# Patient Record
Sex: MALE | Race: WHITE | NOT HISPANIC OR LATINO | Employment: FULL TIME | URBAN - METROPOLITAN AREA
[De-identification: names, ages, dates, MRNs, and addresses within clinical notes are randomized per-mention and may not be internally consistent; named-entity substitution may affect disease eponyms.]

---

## 2024-03-27 ENCOUNTER — APPOINTMENT (OUTPATIENT)
Dept: CARDIOLOGY | Facility: HOSPITAL | Age: 64
DRG: 308 | End: 2024-03-27
Payer: COMMERCIAL

## 2024-03-27 ENCOUNTER — HOSPITAL ENCOUNTER (INPATIENT)
Facility: HOSPITAL | Age: 64
LOS: 4 days | Discharge: HOME | DRG: 308 | End: 2024-03-31
Attending: EMERGENCY MEDICINE | Admitting: INTERNAL MEDICINE
Payer: COMMERCIAL

## 2024-03-27 ENCOUNTER — APPOINTMENT (OUTPATIENT)
Dept: RADIOLOGY | Facility: HOSPITAL | Age: 64
DRG: 308 | End: 2024-03-27
Payer: COMMERCIAL

## 2024-03-27 DIAGNOSIS — I50.21 ACUTE HFREF (HEART FAILURE WITH REDUCED EJECTION FRACTION) (MULTI): ICD-10-CM

## 2024-03-27 DIAGNOSIS — I48.91 NEW ONSET ATRIAL FIBRILLATION (MULTI): Primary | ICD-10-CM

## 2024-03-27 DIAGNOSIS — I05.0 SEVERE MITRAL VALVE STENOSIS: ICD-10-CM

## 2024-03-27 LAB
ALBUMIN SERPL BCP-MCNC: 4.1 G/DL (ref 3.4–5)
ALP SERPL-CCNC: 87 U/L (ref 33–136)
ALT SERPL W P-5'-P-CCNC: 15 U/L (ref 10–52)
ANION GAP SERPL CALC-SCNC: 10 MMOL/L (ref 10–20)
APTT PPP: 36 SECONDS (ref 27–38)
AST SERPL W P-5'-P-CCNC: 12 U/L (ref 9–39)
BASOPHILS # BLD AUTO: 0.05 X10*3/UL (ref 0–0.1)
BASOPHILS NFR BLD AUTO: 0.6 %
BILIRUB SERPL-MCNC: 0.6 MG/DL (ref 0–1.2)
BNP SERPL-MCNC: 568 PG/ML (ref 0–99)
BUN SERPL-MCNC: 17 MG/DL (ref 6–23)
CALCIUM SERPL-MCNC: 9.4 MG/DL (ref 8.6–10.3)
CARDIAC TROPONIN I PNL SERPL HS: 25 NG/L (ref 0–20)
CARDIAC TROPONIN I PNL SERPL HS: 26 NG/L (ref 0–20)
CHLORIDE SERPL-SCNC: 104 MMOL/L (ref 98–107)
CO2 SERPL-SCNC: 27 MMOL/L (ref 21–32)
CREAT SERPL-MCNC: 1.36 MG/DL (ref 0.5–1.3)
D DIMER PPP FEU-MCNC: 476 NG/ML FEU
EGFRCR SERPLBLD CKD-EPI 2021: 58 ML/MIN/1.73M*2
EOSINOPHIL # BLD AUTO: 0.12 X10*3/UL (ref 0–0.7)
EOSINOPHIL NFR BLD AUTO: 1.4 %
ERYTHROCYTE [DISTWIDTH] IN BLOOD BY AUTOMATED COUNT: 13.7 % (ref 11.5–14.5)
GLUCOSE SERPL-MCNC: 118 MG/DL (ref 74–99)
HCT VFR BLD AUTO: 50.3 % (ref 41–52)
HGB BLD-MCNC: 16.4 G/DL (ref 13.5–17.5)
HOLD SPECIMEN: NORMAL
IMM GRANULOCYTES # BLD AUTO: 0.02 X10*3/UL (ref 0–0.7)
IMM GRANULOCYTES NFR BLD AUTO: 0.2 % (ref 0–0.9)
INR PPP: 1.2 (ref 0.9–1.1)
LYMPHOCYTES # BLD AUTO: 1.93 X10*3/UL (ref 1.2–4.8)
LYMPHOCYTES NFR BLD AUTO: 21.9 %
MAGNESIUM SERPL-MCNC: 2.01 MG/DL (ref 1.6–2.4)
MCH RBC QN AUTO: 29.2 PG (ref 26–34)
MCHC RBC AUTO-ENTMCNC: 32.6 G/DL (ref 32–36)
MCV RBC AUTO: 90 FL (ref 80–100)
MONOCYTES # BLD AUTO: 0.82 X10*3/UL (ref 0.1–1)
MONOCYTES NFR BLD AUTO: 9.3 %
NEUTROPHILS # BLD AUTO: 5.88 X10*3/UL (ref 1.2–7.7)
NEUTROPHILS NFR BLD AUTO: 66.6 %
NRBC BLD-RTO: 0 /100 WBCS (ref 0–0)
PLATELET # BLD AUTO: 138 X10*3/UL (ref 150–450)
POTASSIUM SERPL-SCNC: 4.1 MMOL/L (ref 3.5–5.3)
PROT SERPL-MCNC: 7.2 G/DL (ref 6.4–8.2)
PROTHROMBIN TIME: 13.2 SECONDS (ref 9.8–12.8)
RBC # BLD AUTO: 5.62 X10*6/UL (ref 4.5–5.9)
SODIUM SERPL-SCNC: 137 MMOL/L (ref 136–145)
TSH SERPL-ACNC: 1.58 MIU/L (ref 0.44–3.98)
UFH PPP CHRO-ACNC: 0.5 IU/ML
WBC # BLD AUTO: 8.8 X10*3/UL (ref 4.4–11.3)

## 2024-03-27 PROCEDURE — 85610 PROTHROMBIN TIME: CPT | Performed by: NURSE PRACTITIONER

## 2024-03-27 PROCEDURE — 71045 X-RAY EXAM CHEST 1 VIEW: CPT

## 2024-03-27 PROCEDURE — 85379 FIBRIN DEGRADATION QUANT: CPT | Performed by: NURSE PRACTITIONER

## 2024-03-27 PROCEDURE — 93005 ELECTROCARDIOGRAM TRACING: CPT

## 2024-03-27 PROCEDURE — S4991 NICOTINE PATCH NONLEGEND: HCPCS | Performed by: INTERNAL MEDICINE

## 2024-03-27 PROCEDURE — 96365 THER/PROPH/DIAG IV INF INIT: CPT

## 2024-03-27 PROCEDURE — 80053 COMPREHEN METABOLIC PANEL: CPT | Performed by: NURSE PRACTITIONER

## 2024-03-27 PROCEDURE — 83880 ASSAY OF NATRIURETIC PEPTIDE: CPT | Performed by: NURSE PRACTITIONER

## 2024-03-27 PROCEDURE — 2500000001 HC RX 250 WO HCPCS SELF ADMINISTERED DRUGS (ALT 637 FOR MEDICARE OP): Performed by: INTERNAL MEDICINE

## 2024-03-27 PROCEDURE — 1200000002 HC GENERAL ROOM WITH TELEMETRY DAILY

## 2024-03-27 PROCEDURE — 84443 ASSAY THYROID STIM HORMONE: CPT | Performed by: NURSE PRACTITIONER

## 2024-03-27 PROCEDURE — 99223 1ST HOSP IP/OBS HIGH 75: CPT | Performed by: INTERNAL MEDICINE

## 2024-03-27 PROCEDURE — 36415 COLL VENOUS BLD VENIPUNCTURE: CPT | Performed by: NURSE PRACTITIONER

## 2024-03-27 PROCEDURE — 2500000001 HC RX 250 WO HCPCS SELF ADMINISTERED DRUGS (ALT 637 FOR MEDICARE OP): Performed by: NURSE PRACTITIONER

## 2024-03-27 PROCEDURE — 96376 TX/PRO/DX INJ SAME DRUG ADON: CPT

## 2024-03-27 PROCEDURE — 83735 ASSAY OF MAGNESIUM: CPT | Performed by: NURSE PRACTITIONER

## 2024-03-27 PROCEDURE — 2500000002 HC RX 250 W HCPCS SELF ADMINISTERED DRUGS (ALT 637 FOR MEDICARE OP, ALT 636 FOR OP/ED): Performed by: INTERNAL MEDICINE

## 2024-03-27 PROCEDURE — 85025 COMPLETE CBC W/AUTO DIFF WBC: CPT | Performed by: NURSE PRACTITIONER

## 2024-03-27 PROCEDURE — 84484 ASSAY OF TROPONIN QUANT: CPT | Performed by: NURSE PRACTITIONER

## 2024-03-27 PROCEDURE — 2500000004 HC RX 250 GENERAL PHARMACY W/ HCPCS (ALT 636 FOR OP/ED): Performed by: NURSE PRACTITIONER

## 2024-03-27 PROCEDURE — 71045 X-RAY EXAM CHEST 1 VIEW: CPT | Performed by: RADIOLOGY

## 2024-03-27 PROCEDURE — 85520 HEPARIN ASSAY: CPT | Performed by: INTERNAL MEDICINE

## 2024-03-27 PROCEDURE — 99291 CRITICAL CARE FIRST HOUR: CPT | Performed by: EMERGENCY MEDICINE

## 2024-03-27 RX ORDER — POLYETHYLENE GLYCOL 3350 17 G/17G
17 POWDER, FOR SOLUTION ORAL DAILY
Status: DISCONTINUED | OUTPATIENT
Start: 2024-03-27 | End: 2024-03-31 | Stop reason: HOSPADM

## 2024-03-27 RX ORDER — HEPARIN SODIUM 5000 [USP'U]/ML
60 INJECTION, SOLUTION INTRAVENOUS; SUBCUTANEOUS ONCE
Status: COMPLETED | OUTPATIENT
Start: 2024-03-27 | End: 2024-03-27

## 2024-03-27 RX ORDER — NAPROXEN SODIUM 220 MG/1
81 TABLET, FILM COATED ORAL ONCE
Status: DISCONTINUED | OUTPATIENT
Start: 2024-03-27 | End: 2024-03-27

## 2024-03-27 RX ORDER — NAPROXEN SODIUM 220 MG/1
324 TABLET, FILM COATED ORAL ONCE
Status: COMPLETED | OUTPATIENT
Start: 2024-03-27 | End: 2024-03-27

## 2024-03-27 RX ORDER — PANTOPRAZOLE SODIUM 40 MG/1
40 TABLET, DELAYED RELEASE ORAL
Status: DISCONTINUED | OUTPATIENT
Start: 2024-03-28 | End: 2024-03-31 | Stop reason: HOSPADM

## 2024-03-27 RX ORDER — IBUPROFEN 200 MG
1 TABLET ORAL DAILY
Status: DISCONTINUED | OUTPATIENT
Start: 2024-03-27 | End: 2024-03-31 | Stop reason: HOSPADM

## 2024-03-27 RX ORDER — ACETAMINOPHEN 650 MG/1
650 SUPPOSITORY RECTAL EVERY 4 HOURS PRN
Status: DISCONTINUED | OUTPATIENT
Start: 2024-03-27 | End: 2024-03-31 | Stop reason: HOSPADM

## 2024-03-27 RX ORDER — AMOXICILLIN 250 MG
2 CAPSULE ORAL 2 TIMES DAILY
Status: DISCONTINUED | OUTPATIENT
Start: 2024-03-27 | End: 2024-03-31 | Stop reason: HOSPADM

## 2024-03-27 RX ORDER — ACETAMINOPHEN 500 MG
5 TABLET ORAL NIGHTLY
Status: DISCONTINUED | OUTPATIENT
Start: 2024-03-27 | End: 2024-03-31 | Stop reason: HOSPADM

## 2024-03-27 RX ORDER — ACETAMINOPHEN 325 MG/1
650 TABLET ORAL EVERY 4 HOURS PRN
Status: DISCONTINUED | OUTPATIENT
Start: 2024-03-27 | End: 2024-03-31 | Stop reason: HOSPADM

## 2024-03-27 RX ORDER — METOPROLOL TARTRATE 1 MG/ML
10 INJECTION, SOLUTION INTRAVENOUS EVERY 6 HOURS PRN
Status: DISCONTINUED | OUTPATIENT
Start: 2024-03-27 | End: 2024-03-30

## 2024-03-27 RX ORDER — METOPROLOL TARTRATE 25 MG/1
25 TABLET, FILM COATED ORAL 2 TIMES DAILY
Status: DISCONTINUED | OUTPATIENT
Start: 2024-03-27 | End: 2024-03-28

## 2024-03-27 RX ORDER — HEPARIN SODIUM 5000 [USP'U]/ML
1150 INJECTION, SOLUTION INTRAVENOUS; SUBCUTANEOUS ONCE
Status: COMPLETED | OUTPATIENT
Start: 2024-03-27 | End: 2024-03-27

## 2024-03-27 RX ORDER — PANTOPRAZOLE SODIUM 40 MG/10ML
40 INJECTION, POWDER, LYOPHILIZED, FOR SOLUTION INTRAVENOUS
Status: DISCONTINUED | OUTPATIENT
Start: 2024-03-28 | End: 2024-03-31 | Stop reason: HOSPADM

## 2024-03-27 RX ORDER — ACETAMINOPHEN 160 MG/5ML
650 SOLUTION ORAL EVERY 4 HOURS PRN
Status: DISCONTINUED | OUTPATIENT
Start: 2024-03-27 | End: 2024-03-31 | Stop reason: HOSPADM

## 2024-03-27 RX ORDER — HEPARIN SODIUM 5000 [USP'U]/ML
INJECTION, SOLUTION INTRAVENOUS; SUBCUTANEOUS EVERY 4 HOURS PRN
Status: DISCONTINUED | OUTPATIENT
Start: 2024-03-27 | End: 2024-03-27

## 2024-03-27 RX ORDER — HEPARIN SODIUM 10000 [USP'U]/100ML
0-4500 INJECTION, SOLUTION INTRAVENOUS CONTINUOUS
Status: DISCONTINUED | OUTPATIENT
Start: 2024-03-27 | End: 2024-03-27

## 2024-03-27 RX ORDER — ONDANSETRON HYDROCHLORIDE 2 MG/ML
4 INJECTION, SOLUTION INTRAVENOUS EVERY 8 HOURS PRN
Status: DISCONTINUED | OUTPATIENT
Start: 2024-03-27 | End: 2024-03-31 | Stop reason: HOSPADM

## 2024-03-27 RX ORDER — HEPARIN SODIUM 5000 [USP'U]/ML
2000-4000 INJECTION, SOLUTION INTRAVENOUS; SUBCUTANEOUS EVERY 4 HOURS PRN
Status: DISCONTINUED | OUTPATIENT
Start: 2024-03-27 | End: 2024-03-27

## 2024-03-27 RX ORDER — GUAIFENESIN/DEXTROMETHORPHAN 100-10MG/5
5 SYRUP ORAL EVERY 4 HOURS PRN
Status: DISCONTINUED | OUTPATIENT
Start: 2024-03-27 | End: 2024-03-31 | Stop reason: HOSPADM

## 2024-03-27 RX ORDER — HEPARIN SODIUM 10000 [USP'U]/100ML
0-4000 INJECTION, SOLUTION INTRAVENOUS CONTINUOUS
Status: DISCONTINUED | OUTPATIENT
Start: 2024-03-27 | End: 2024-03-27

## 2024-03-27 RX ADMIN — HEPARIN SODIUM 700 UNITS/HR: 10000 INJECTION, SOLUTION INTRAVENOUS at 10:59

## 2024-03-27 RX ADMIN — HEPARIN SODIUM 3450 UNITS: 5000 INJECTION INTRAVENOUS; SUBCUTANEOUS at 11:00

## 2024-03-27 RX ADMIN — SODIUM CHLORIDE 1000 ML: 9 INJECTION, SOLUTION INTRAVENOUS at 10:53

## 2024-03-27 RX ADMIN — HEPARIN SODIUM 1000 UNITS/HR: 10000 INJECTION, SOLUTION INTRAVENOUS at 11:29

## 2024-03-27 RX ADMIN — NICOTINE 1 PATCH: 14 PATCH, EXTENDED RELEASE TRANSDERMAL at 15:56

## 2024-03-27 RX ADMIN — METOPROLOL TARTRATE 25 MG: 25 TABLET, FILM COATED ORAL at 12:17

## 2024-03-27 RX ADMIN — ACETAMINOPHEN 650 MG: 325 TABLET ORAL at 21:49

## 2024-03-27 RX ADMIN — APIXABAN 5 MG: 5 TABLET, FILM COATED ORAL at 17:35

## 2024-03-27 RX ADMIN — METOPROLOL TARTRATE 25 MG: 25 TABLET, FILM COATED ORAL at 20:01

## 2024-03-27 RX ADMIN — HEPARIN SODIUM 1150 UNITS: 5000 INJECTION INTRAVENOUS; SUBCUTANEOUS at 11:30

## 2024-03-27 RX ADMIN — ASPIRIN 81 MG 324 MG: 81 TABLET ORAL at 10:53

## 2024-03-27 SDOH — SOCIAL STABILITY: SOCIAL NETWORK: HOW OFTEN DO YOU GET TOGETHER WITH FRIENDS OR RELATIVES?: PATIENT DECLINED

## 2024-03-27 SDOH — ECONOMIC STABILITY: FOOD INSECURITY: WITHIN THE PAST 12 MONTHS, THE FOOD YOU BOUGHT JUST DIDN'T LAST AND YOU DIDN'T HAVE MONEY TO GET MORE.: NEVER TRUE

## 2024-03-27 SDOH — ECONOMIC STABILITY: FOOD INSECURITY: WITHIN THE PAST 12 MONTHS, YOU WORRIED THAT YOUR FOOD WOULD RUN OUT BEFORE YOU GOT MONEY TO BUY MORE.: NEVER TRUE

## 2024-03-27 SDOH — SOCIAL STABILITY: SOCIAL INSECURITY: ABUSE: ADULT

## 2024-03-27 SDOH — ECONOMIC STABILITY: TRANSPORTATION INSECURITY
IN THE PAST 12 MONTHS, HAS LACK OF TRANSPORTATION KEPT YOU FROM MEETINGS, WORK, OR FROM GETTING THINGS NEEDED FOR DAILY LIVING?: NO

## 2024-03-27 SDOH — ECONOMIC STABILITY: TRANSPORTATION INSECURITY
IN THE PAST 12 MONTHS, HAS THE LACK OF TRANSPORTATION KEPT YOU FROM MEDICAL APPOINTMENTS OR FROM GETTING MEDICATIONS?: NO

## 2024-03-27 SDOH — SOCIAL STABILITY: SOCIAL NETWORK: IN A TYPICAL WEEK, HOW MANY TIMES DO YOU TALK ON THE PHONE WITH FAMILY, FRIENDS, OR NEIGHBORS?: PATIENT DECLINED

## 2024-03-27 SDOH — ECONOMIC STABILITY: HOUSING INSECURITY: IN THE LAST 12 MONTHS, HOW MANY PLACES HAVE YOU LIVED?: 1

## 2024-03-27 SDOH — SOCIAL STABILITY: SOCIAL INSECURITY: DO YOU FEEL UNSAFE GOING BACK TO THE PLACE WHERE YOU ARE LIVING?: NO

## 2024-03-27 SDOH — SOCIAL STABILITY: SOCIAL INSECURITY
WITHIN THE LAST YEAR, HAVE YOU BEEN HUMILIATED OR EMOTIONALLY ABUSED IN OTHER WAYS BY YOUR PARTNER OR EX-PARTNER?: PATIENT DECLINED

## 2024-03-27 SDOH — SOCIAL STABILITY: SOCIAL NETWORK: HOW OFTEN DO YOU ATTENT MEETINGS OF THE CLUB OR ORGANIZATION YOU BELONG TO?: PATIENT DECLINED

## 2024-03-27 SDOH — SOCIAL STABILITY: SOCIAL INSECURITY: WERE YOU ABLE TO COMPLETE ALL THE BEHAVIORAL HEALTH SCREENINGS?: YES

## 2024-03-27 SDOH — SOCIAL STABILITY: SOCIAL NETWORK
DO YOU BELONG TO ANY CLUBS OR ORGANIZATIONS SUCH AS CHURCH GROUPS UNIONS, FRATERNAL OR ATHLETIC GROUPS, OR SCHOOL GROUPS?: PATIENT DECLINED

## 2024-03-27 SDOH — SOCIAL STABILITY: SOCIAL INSECURITY: DOES ANYONE TRY TO KEEP YOU FROM HAVING/CONTACTING OTHER FRIENDS OR DOING THINGS OUTSIDE YOUR HOME?: NO

## 2024-03-27 SDOH — SOCIAL STABILITY: SOCIAL INSECURITY: HAS ANYONE EVER THREATENED TO HURT YOUR FAMILY OR YOUR PETS?: NO

## 2024-03-27 SDOH — ECONOMIC STABILITY: HOUSING INSECURITY
IN THE LAST 12 MONTHS, WAS THERE A TIME WHEN YOU DID NOT HAVE A STEADY PLACE TO SLEEP OR SLEPT IN A SHELTER (INCLUDING NOW)?: NO

## 2024-03-27 SDOH — SOCIAL STABILITY: SOCIAL INSECURITY: DO YOU FEEL ANYONE HAS EXPLOITED OR TAKEN ADVANTAGE OF YOU FINANCIALLY OR OF YOUR PERSONAL PROPERTY?: NO

## 2024-03-27 SDOH — SOCIAL STABILITY: SOCIAL INSECURITY: HAVE YOU HAD THOUGHTS OF HARMING ANYONE ELSE?: NO

## 2024-03-27 SDOH — ECONOMIC STABILITY: INCOME INSECURITY: HOW HARD IS IT FOR YOU TO PAY FOR THE VERY BASICS LIKE FOOD, HOUSING, MEDICAL CARE, AND HEATING?: NOT HARD AT ALL

## 2024-03-27 SDOH — SOCIAL STABILITY: SOCIAL INSECURITY: ARE THERE ANY APPARENT SIGNS OF INJURIES/BEHAVIORS THAT COULD BE RELATED TO ABUSE/NEGLECT?: NO

## 2024-03-27 SDOH — ECONOMIC STABILITY: INCOME INSECURITY: IN THE LAST 12 MONTHS, WAS THERE A TIME WHEN YOU WERE NOT ABLE TO PAY THE MORTGAGE OR RENT ON TIME?: NO

## 2024-03-27 SDOH — SOCIAL STABILITY: SOCIAL INSECURITY
WITHIN THE LAST YEAR, HAVE YOU BEEN KICKED, HIT, SLAPPED, OR OTHERWISE PHYSICALLY HURT BY YOUR PARTNER OR EX-PARTNER?: PATIENT DECLINED

## 2024-03-27 SDOH — HEALTH STABILITY: MENTAL HEALTH
STRESS IS WHEN SOMEONE FEELS TENSE, NERVOUS, ANXIOUS, OR CAN'T SLEEP AT NIGHT BECAUSE THEIR MIND IS TROUBLED. HOW STRESSED ARE YOU?: PATIENT DECLINED

## 2024-03-27 SDOH — SOCIAL STABILITY: SOCIAL INSECURITY: WITHIN THE LAST YEAR, HAVE YOU BEEN AFRAID OF YOUR PARTNER OR EX-PARTNER?: PATIENT DECLINED

## 2024-03-27 SDOH — SOCIAL STABILITY: SOCIAL NETWORK: HOW OFTEN DO YOU ATTEND CHURCH OR RELIGIOUS SERVICES?: PATIENT DECLINED

## 2024-03-27 SDOH — SOCIAL STABILITY: SOCIAL NETWORK: ARE YOU MARRIED, WIDOWED, DIVORCED, SEPARATED, NEVER MARRIED, OR LIVING WITH A PARTNER?: PATIENT DECLINED

## 2024-03-27 SDOH — SOCIAL STABILITY: SOCIAL INSECURITY
WITHIN THE LAST YEAR, HAVE TO BEEN RAPED OR FORCED TO HAVE ANY KIND OF SEXUAL ACTIVITY BY YOUR PARTNER OR EX-PARTNER?: PATIENT DECLINED

## 2024-03-27 SDOH — SOCIAL STABILITY: SOCIAL INSECURITY: ARE YOU OR HAVE YOU BEEN THREATENED OR ABUSED PHYSICALLY, EMOTIONALLY, OR SEXUALLY BY ANYONE?: YES

## 2024-03-27 ASSESSMENT — COGNITIVE AND FUNCTIONAL STATUS - GENERAL
MOBILITY SCORE: 24
MOBILITY SCORE: 24
DAILY ACTIVITIY SCORE: 24
PATIENT BASELINE BEDBOUND: NO
DAILY ACTIVITIY SCORE: 24

## 2024-03-27 ASSESSMENT — ACTIVITIES OF DAILY LIVING (ADL)
TOILETING: INDEPENDENT
JUDGMENT_ADEQUATE_SAFELY_COMPLETE_DAILY_ACTIVITIES: YES
WALKS IN HOME: INDEPENDENT
DRESSING YOURSELF: INDEPENDENT
GROOMING: INDEPENDENT
JUDGMENT_ADEQUATE_SAFELY_COMPLETE_DAILY_ACTIVITIES: YES
GROOMING: INDEPENDENT
BATHING: INDEPENDENT
LACK_OF_TRANSPORTATION: NO
DRESSING YOURSELF: INDEPENDENT
HEARING - LEFT EAR: FUNCTIONAL
LACK_OF_TRANSPORTATION: NO
HEARING - RIGHT EAR: FUNCTIONAL
ASSISTIVE_DEVICE: EYEGLASSES;DENTURES LOWER
HEARING - RIGHT EAR: FUNCTIONAL
ASSISTIVE_DEVICE: EYEGLASSES;DENTURES LOWER
ADEQUATE_TO_COMPLETE_ADL: YES
WALKS IN HOME: INDEPENDENT
ASSISTIVE_DEVICE: EYEGLASSES;DENTURES LOWER
TOILETING: INDEPENDENT
FEEDING YOURSELF: INDEPENDENT
LACK_OF_TRANSPORTATION: NO
HEARING - LEFT EAR: FUNCTIONAL
GROOMING: INDEPENDENT
FEEDING YOURSELF: INDEPENDENT
WALKS IN HOME: INDEPENDENT
ADEQUATE_TO_COMPLETE_ADL: YES
BATHING: INDEPENDENT
ADEQUATE_TO_COMPLETE_ADL: YES
JUDGMENT_ADEQUATE_SAFELY_COMPLETE_DAILY_ACTIVITIES: YES
HEARING - LEFT EAR: FUNCTIONAL
DRESSING YOURSELF: INDEPENDENT
TOILETING: INDEPENDENT
BATHING: INDEPENDENT
FEEDING YOURSELF: INDEPENDENT
PATIENT'S MEMORY ADEQUATE TO SAFELY COMPLETE DAILY ACTIVITIES?: YES
HEARING - RIGHT EAR: FUNCTIONAL

## 2024-03-27 ASSESSMENT — ENCOUNTER SYMPTOMS
DIZZINESS: 1
FEVER: 0
WEAKNESS: 1
PALPITATIONS: 0
COUGH: 0
CHILLS: 0
FATIGUE: 1
ABDOMINAL PAIN: 0
SHORTNESS OF BREATH: 1

## 2024-03-27 ASSESSMENT — PAIN DESCRIPTION - DESCRIPTORS: DESCRIPTORS: DULL;PRESSURE

## 2024-03-27 ASSESSMENT — PATIENT HEALTH QUESTIONNAIRE - PHQ9
2. FEELING DOWN, DEPRESSED OR HOPELESS: NOT AT ALL
1. LITTLE INTEREST OR PLEASURE IN DOING THINGS: NOT AT ALL
SUM OF ALL RESPONSES TO PHQ9 QUESTIONS 1 & 2: 0

## 2024-03-27 ASSESSMENT — LIFESTYLE VARIABLES
AUDIT-C TOTAL SCORE: 0
SKIP TO QUESTIONS 9-10: 1
SUBSTANCE_ABUSE_PAST_12_MONTHS: NO
HOW OFTEN DO YOU HAVE A DRINK CONTAINING ALCOHOL: NEVER
PRESCIPTION_ABUSE_PAST_12_MONTHS: NO
HOW MANY STANDARD DRINKS CONTAINING ALCOHOL DO YOU HAVE ON A TYPICAL DAY: PATIENT DOES NOT DRINK
HOW OFTEN DO YOU HAVE 6 OR MORE DRINKS ON ONE OCCASION: NEVER
AUDIT-C TOTAL SCORE: 0

## 2024-03-27 ASSESSMENT — PAIN - FUNCTIONAL ASSESSMENT
PAIN_FUNCTIONAL_ASSESSMENT: 0-10

## 2024-03-27 ASSESSMENT — COLUMBIA-SUICIDE SEVERITY RATING SCALE - C-SSRS
1. IN THE PAST MONTH, HAVE YOU WISHED YOU WERE DEAD OR WISHED YOU COULD GO TO SLEEP AND NOT WAKE UP?: NO
6. HAVE YOU EVER DONE ANYTHING, STARTED TO DO ANYTHING, OR PREPARED TO DO ANYTHING TO END YOUR LIFE?: NO
2. HAVE YOU ACTUALLY HAD ANY THOUGHTS OF KILLING YOURSELF?: NO

## 2024-03-27 ASSESSMENT — PAIN SCALES - GENERAL
PAINLEVEL_OUTOF10: 1
PAINLEVEL_OUTOF10: 0 - NO PAIN
PAINLEVEL_OUTOF10: 1

## 2024-03-27 ASSESSMENT — PAIN DESCRIPTION - LOCATION: LOCATION: CHEST

## 2024-03-27 NOTE — PROGRESS NOTES
03/27/24 1235   Discharge Planning   Living Arrangements Spouse/significant other   Support Systems Spouse/significant other   Assistance Needed A&OX3; independent with ADLs with no DME; drives; room air baseline and room air currently; not on anticoagulation prior to hospitalization   Type of Residence Private residence   Number of Stairs to Enter Residence 0   Number of Stairs Within Residence 12   Do you have animals or pets at home? No   Who is requesting discharge planning? Provider   Patient expects to be discharged to: TBD -likely home no needs pending cardiology workup (amiodarone? cardioversion? Eliquis?)   Does the patient need discharge transport arranged? No   Financial Resource Strain   How hard is it for you to pay for the very basics like food, housing, medical care, and heating? Not hard   Housing Stability   In the last 12 months, was there a time when you were not able to pay the mortgage or rent on time? N   In the last 12 months, how many places have you lived? 1   In the last 12 months, was there a time when you did not have a steady place to sleep or slept in a shelter (including now)? N   Transportation Needs   In the past 12 months, has lack of transportation kept you from medical appointments or from getting medications? no   In the past 12 months, has lack of transportation kept you from meetings, work, or from getting things needed for daily living? No     03/27/2024 1237pm  Spoke with patient bedside in ED. At this time pending cardiology workup, but patient denies any home going needs. Patient preference is home with no needs pending workup for new Afib.

## 2024-03-27 NOTE — PROGRESS NOTES
Pharmacy Medication History Review    Farooq Lang is a 63 y.o. male admitted for No Principal Problem: There is no principal problem currently on the Problem List. Please update the Problem List and refresh.. Pharmacy reviewed the patient's [id number] medications and allergies for accuracy.    The list below reflectives the updated PTA list. Please review each medication in order reconciliation for additional clarification and justification.  Prior to Admission medications    Medication Sig Start Date End Date Taking? Authorizing Provider   UNABLE TO FIND Take 1 capsule by mouth once daily. Med Name: IBS Clear OTC supplement    Historical Provider, MD        The list below reflectives the updated allergy list. Please review each documented allergy for additional clarification and justification.  Allergies  Reviewed by Sarah Jacob, EMT on 3/27/2024   No Known Allergies         Below are additional concerns with the patient's PTA list.      Emily Bettencourt

## 2024-03-27 NOTE — CONSULTS
Inpatient consult to Cardiology  Consult performed by: LENY Alexander-CNP  Consult ordered by: Cecil Conti MD  Reason for consult: afib          History Of Present Illness  Farooq Lang is a 63 y.o. male presenting with complaints of weakness and shortness of breath for about a week. He states he is normally a very active man and last week he went to bring in the garbage can and had to stop correction to catch his breath and then again when he got to the house. He thought it would get better over the next few days but it did not. He denies any palpitations but did have some superficial chest wall pain yesterday.     Lab work in the ER showed glucose 118, potassium 4.1, BUN/Cr 17/1.36, magnesium 2.01, , troponin 26, 25, d dimer negative, INR 1.2, WBC 8.8, H&H 16.4/50.3, platelets 138, CXR showed moderate vascular congestion and interstitial edema.     EKG showed atrial fibrillation, rate 95 bpm. /91.     He was give IVF bolus and metoprolol tartrate 25mg with improvement in his HR. He was also started on a heparin gtt.     Past Medical History  Past Medical History:   Diagnosis Date    Acute upper respiratory infection, unspecified 01/16/2019    Acute URI    Flatulence 09/19/2017    Flatulence and related conditions    Infectious gastroenteritis and colitis, unspecified 02/18/2016    Infectious colitis    Other chest pain 03/10/2016    Chest discomfort    Personal history of other benign neoplasm 09/19/2017    History of other benign neoplasm    Personal history of other diseases of the digestive system 02/15/2016    History of diverticulitis    Personal history of other diseases of the respiratory system 10/16/2019    History of acute bronchitis    Personal history of other mental and behavioral disorders 02/15/2016    History of depression    Personal history of other specified conditions 02/15/2016    History of insomnia    Personal history of other specified conditions 09/19/2017     History of diarrhea    Personal history of other specified conditions 03/10/2016    History of dizziness    Personal history of pneumonia (recurrent) 02/15/2016    History of pneumonia    Pleurodynia 11/29/2017    Chest pain, pleuritic    Right lower quadrant pain 11/14/2018    Abdominal pain, acute, right lower quadrant       Surgical History  Past Surgical History:   Procedure Laterality Date    TONSILLECTOMY  01/18/2016    Tonsillectomy        Social History  Current cigarette smoked, smokes 0.5ppd. Denies ETOH or drug use.     Family History  No family history on file.     Allergies  Patient has no known allergies.    Review of Systems  Review of Systems   Constitutional:  Positive for fatigue. Negative for chills and fever.   Respiratory:  Positive for shortness of breath. Negative for cough.    Cardiovascular:  Negative for chest pain, palpitations and leg swelling.   Gastrointestinal:  Negative for abdominal pain.   Neurological:  Positive for dizziness and weakness.   All other systems reviewed and are negative.         Physical Exam  Constitutional: Well developed, awake/alert/oriented x3, no distress, alert and cooperative  Eyes: PERRL, EOMI, clear sclera  ENMT: mucous membranes moist, no apparent injury, no lesions seen  Head/Neck: Neck supple, no apparent injury, thyroid without mass or tenderness, No JVD, trachea midline, no bruits  Respiratory/Thorax: Patent airways, diminished throughout with good chest expansion, thorax symmetric  Cardiovascular: irregular rhythm, no murmurs, 2+ equal pulses of the extremities, normal S 1and S 2  Gastrointestinal: Nondistended, soft, non-tender, no rebound tenderness or guarding, no masses palpable, no organomegaly, +BS, no bruits  Musculoskeletal: ROM intact, no joint swelling, normal strength  Extremities: normal extremities, no cyanosis edema, contusions or wounds, no clubbing  Neurological: alert and oriented x3, intact senses, motor, response and reflexes,  "normal strength  Lymphatic: No significant lymphadenopathy  Psychological: Appropriate mood and behavior  Skin: Warm and dry, no lesions, no rashes       Last Recorded Vitals  Blood pressure 106/75, pulse 88, temperature 37 °C (98.6 °F), temperature source Temporal, resp. rate 15, height 1.676 m (5' 6\"), weight 57.2 kg (126 lb), SpO2 98 %.    Relevant Results    Scheduled medications  apixaban, 5 mg, oral, q12h  metoprolol tartrate, 25 mg, oral, BID      Continuous medications  heparin, 0-4,500 Units/hr, Last Rate: 1,000 Units/hr (03/27/24 1129)      PRN medications  PRN medications: heparin, metoprolol    Results for orders placed or performed during the hospital encounter of 03/27/24 (from the past 24 hour(s))   Light Blue Top   Result Value Ref Range    Extra Tube Hold for add-ons.    Lavender Top   Result Value Ref Range    Extra Tube Hold for add-ons.    SST TOP   Result Value Ref Range    Extra Tube Hold for add-ons.    Lavender Top   Result Value Ref Range    Extra Tube Hold for add-ons.    PST Top   Result Value Ref Range    Extra Tube Hold for add-ons.    CBC and Auto Differential   Result Value Ref Range    WBC 8.8 4.4 - 11.3 x10*3/uL    nRBC 0.0 0.0 - 0.0 /100 WBCs    RBC 5.62 4.50 - 5.90 x10*6/uL    Hemoglobin 16.4 13.5 - 17.5 g/dL    Hematocrit 50.3 41.0 - 52.0 %    MCV 90 80 - 100 fL    MCH 29.2 26.0 - 34.0 pg    MCHC 32.6 32.0 - 36.0 g/dL    RDW 13.7 11.5 - 14.5 %    Platelets 138 (L) 150 - 450 x10*3/uL    Neutrophils % 66.6 40.0 - 80.0 %    Immature Granulocytes %, Automated 0.2 0.0 - 0.9 %    Lymphocytes % 21.9 13.0 - 44.0 %    Monocytes % 9.3 2.0 - 10.0 %    Eosinophils % 1.4 0.0 - 6.0 %    Basophils % 0.6 0.0 - 2.0 %    Neutrophils Absolute 5.88 1.20 - 7.70 x10*3/uL    Immature Granulocytes Absolute, Automated 0.02 0.00 - 0.70 x10*3/uL    Lymphocytes Absolute 1.93 1.20 - 4.80 x10*3/uL    Monocytes Absolute 0.82 0.10 - 1.00 x10*3/uL    Eosinophils Absolute 0.12 0.00 - 0.70 x10*3/uL    Basophils " Absolute 0.05 0.00 - 0.10 x10*3/uL   Comprehensive metabolic panel   Result Value Ref Range    Glucose 118 (H) 74 - 99 mg/dL    Sodium 137 136 - 145 mmol/L    Potassium 4.1 3.5 - 5.3 mmol/L    Chloride 104 98 - 107 mmol/L    Bicarbonate 27 21 - 32 mmol/L    Anion Gap 10 10 - 20 mmol/L    Urea Nitrogen 17 6 - 23 mg/dL    Creatinine 1.36 (H) 0.50 - 1.30 mg/dL    eGFR 58 (L) >60 mL/min/1.73m*2    Calcium 9.4 8.6 - 10.3 mg/dL    Albumin 4.1 3.4 - 5.0 g/dL    Alkaline Phosphatase 87 33 - 136 U/L    Total Protein 7.2 6.4 - 8.2 g/dL    AST 12 9 - 39 U/L    Bilirubin, Total 0.6 0.0 - 1.2 mg/dL    ALT 15 10 - 52 U/L   Magnesium   Result Value Ref Range    Magnesium 2.01 1.60 - 2.40 mg/dL   Coagulation Screen   Result Value Ref Range    Protime 13.2 (H) 9.8 - 12.8 seconds    INR 1.2 (H) 0.9 - 1.1    aPTT 36 27 - 38 seconds   Troponin I, High Sensitivity   Result Value Ref Range    Troponin I, High Sensitivity 26 (H) 0 - 20 ng/L   D-Dimer, Quantitative Non VTE   Result Value Ref Range    D-Dimer Non VTE, Quant (ng/mL FEU) 476 <=500 ng/mL FEU   B-type Natriuretic Peptide   Result Value Ref Range     (H) 0 - 99 pg/mL   Troponin I, High Sensitivity   Result Value Ref Range    Troponin I, High Sensitivity 25 (H) 0 - 20 ng/L   TSH with reflex to Free T4 if abnormal   Result Value Ref Range    Thyroid Stimulating Hormone 1.58 0.44 - 3.98 mIU/L       XR chest 1 view   Final Result   Moderate vascular congestion and interstitial edema.        MACRO:   None        Signed by: Mary Robert 3/27/2024 11:13 AM   Dictation workstation:   [id number]      Transthoracic Echo (TTE) Complete    (Results Pending)       No echocardiogram results found for the past 12 months       Assessment/Plan     New onset atrial fibrillation  -Reports symptoms started about a week ago  -EKG reviewed, AF, rate controlled  -Heparin gtt started  -Metoprolol tartrate 25mg BID started  -TSH WNL  -Check echo  -Stop heparin gtt   -Start Eliquis 5mg  BID  -Monitor on tele  -Plan for outpt DCCV in 3-4 weeks    2. Shortness of breath  -Currently pt denies  -Pulse ox 98% on room air  -CXR showed moderate vascular congestion and interstitial edema  -  -Would not give anymore IVF  -If acute SOB, give lasix IV    3. Elevated troponin-Non MI elevation   -Troponin downtrending  -I reviewed the EKG, no acute changes, ST elevation, or depression  -Reports superficial left sided chest pain  -Check echo  -Outpt stress test     4. Acute kidney injury  -IVF given  -Monitor    Patricia Rdz, APRN-CNP

## 2024-03-27 NOTE — PROGRESS NOTES
The patient was seen by the midlevel/resident.  I have personally saw the patient and made/approved the management plan and take responsibility for the patient management.  I reviewed the EKG's (when done) and agree with the interpretation.  I have seen and examined the patient; agree with the workup, evaluation, MDM, and diagnosis.  The care plan has been discussed with the midlevel/resident; I have reviewed the note and agree with the documented findings.       Presents ED with complaint of weakness for about a week patient is normally active but has been short of breath and a little weak which is unusual for him.  He denies any cough cold symptoms.  Here in the emergency room his heart rates been around 100 found to be in A-fib which is new for him.  He has been cutting back on his smoking is now down to half to three quarters of a pack a day.  He has been trying to quit.  He was started on heparin with no contraindication.  Spoke to Dr. Johnson who will consult and agrees with plan.  Will hospitalize under the hospitalist service.  ED Course as of 03/27/24 1816   Wed Mar 27, 2024   1134 Spoke to hospitalist agrees with plan to hospitalize.  Will repeat the troponin. [RZ]      ED Course User Index  [RZ] Cecil Conti MD         Diagnoses as of 03/27/24 1816   New onset atrial fibrillation (CMS/HCC)     Cecil Conti MD

## 2024-03-27 NOTE — ED PROVIDER NOTES
HPI   Chief Complaint   Patient presents with    Chest Pain     Pt presents to the ER with Chest discomfort, Dizziness, shortness of breath with any exertion, and generally not feeling well. PT states this has been going on for x2 months. PT does not have any medical hx. Pt states that his right ear has also been causing him discomfort.         63-year-old male presents today with weakness x 1 week.  He states that he is always very active and last week while taking out garbage he became very short of breath and weak.  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  He denies fever.  He denies cough.  His only medical history is irritable bowel syndrome which he manages naturally.  He will notice blood that is bright on his toilet paper which is common for patient.  He was tachycardic in triage at 110 bpm.  He was hypertensive but he does not take any medication for hypertension.  He has never been diagnosed with atrial fibs.  He smokes approximately 16 cigarettes daily but he usually smokes 1 pack/day.  He cut back after he started feeling short of breath.  He denies any sick contacts.  He denies fever or chills.  He denies any recent trauma or fall.  He has never required a blood transfusion.  He denies history of pulmonary embolism or DVT.      History provided by:  Patient   used: No                        No data recorded                   Patient History   Past Medical History:   Diagnosis Date    Acute upper respiratory infection, unspecified 01/16/2019    Acute URI    Flatulence 09/19/2017    Flatulence and related conditions    Infectious gastroenteritis and colitis, unspecified 02/18/2016    Infectious colitis    Other chest pain 03/10/2016    Chest discomfort    Personal history of other benign neoplasm 09/19/2017    History of other benign neoplasm    Personal history of other diseases of the digestive system 02/15/2016    History of diverticulitis    Personal history  of other diseases of the respiratory system 10/16/2019    History of acute bronchitis    Personal history of other mental and behavioral disorders 02/15/2016    History of depression    Personal history of other specified conditions 02/15/2016    History of insomnia    Personal history of other specified conditions 09/19/2017    History of diarrhea    Personal history of other specified conditions 03/10/2016    History of dizziness    Personal history of pneumonia (recurrent) 02/15/2016    History of pneumonia    Pleurodynia 11/29/2017    Chest pain, pleuritic    Right lower quadrant pain 11/14/2018    Abdominal pain, acute, right lower quadrant     Past Surgical History:   Procedure Laterality Date    TONSILLECTOMY  01/18/2016    Tonsillectomy     No family history on file.  Social History     Tobacco Use    Smoking status: Not on file    Smokeless tobacco: Not on file   Substance Use Topics    Alcohol use: Not on file    Drug use: Not on file       Physical Exam   ED Triage Vitals [03/27/24 1019]   Temperature Heart Rate Respirations BP   37 °C (98.6 °F) (!) 110 20 (!) 150/91      Pulse Ox Temp Source Heart Rate Source Patient Position   98 % Temporal Monitor Sitting      BP Location FiO2 (%)     Left arm --       Physical Exam  Constitutional:       Appearance: He is well-developed.   HENT:      Head: Normocephalic and atraumatic.   Cardiovascular:      Rate and Rhythm: Tachycardia present. Rhythm irregular.      Heart sounds: No murmur heard.  Pulmonary:      Effort: Pulmonary effort is normal.      Breath sounds: Normal breath sounds.   Abdominal:      Palpations: Abdomen is soft.   Musculoskeletal:         General: Normal range of motion.      Cervical back: Normal range of motion and neck supple.   Skin:     General: Skin is warm.      Capillary Refill: Capillary refill takes less than 2 seconds.   Neurological:      General: No focal deficit present.      Mental Status: He is alert.   Psychiatric:          Mood and Affect: Mood normal.         Behavior: Behavior normal.         ED Course & MDM   ED Course as of 03/27/24 1241   Wed Mar 27, 2024   1134 Spoke to hospitalist agrees with plan to hospitalize.  Will repeat the troponin. [RZ]      ED Course User Index  [RZ] Cecil Conti MD         Diagnoses as of 03/27/24 1241   New onset atrial fibrillation (CMS/HCC)       Medical Decision Making  EKG is new onset atrial fibs.  Patient was started on low intensity heparin for bridging of new onset atrial fibs.  He will probably come into our hospital with new onset atrial fibs.  He was seen and staffed with attending.  Basic labs were ordered.  He received 1 L normal saline.  Patient's heart rate was around 109 bpm and attending and I decided not to give him metoprolol or Cardizem at this time but instead I gave him 1 L normal saline.  His blood pressure actually improved after fluid from 150/91 to 131/78.  Patient appeared comfortable.  His first troponin was elevated to 25 and I requested a second troponin.  His second troponin was stable at 26.  Metabolic panel had a blood glucose of 118 and patient was experiencing acute kidney injury with a creatinine 1.36 and a GFR 58.  His INR was 1.2.  His D-dimer was only 476 and this lowered our suspicion for PE.  His CBC was negative for leukocytosis or left shift and his platelets were 138.  Chest x-ray showed moderate vascular congestion and patient does smoke regularly.  He was seen and staffed with attending who called report to hospital service and admitted patient for new onset atrial fibs.  Patient was started on high intensity heparin.    Amount and/or Complexity of Data Reviewed  Labs: ordered.  Radiology: ordered.  ECG/medicine tests: ordered and independent interpretation performed.     Details: EKG is atrial fibs completed at 10:10 AM.  Rate is 95 bpm.  No NE interval.  QT corrected is 417 ms.  No ST elevation or depression.  Interpreted both by attending and  myself.  Prior EKG was completed on June 19, 2023 and it was normal sinus rhythm.        Procedure  Procedures     Carloz Montano, APRN-CNP  03/27/24 1246

## 2024-03-27 NOTE — ED PROCEDURE NOTE
Procedure  Critical Care    Performed by: Cecil Conti MD  Authorized by: Cecil Conti MD    Critical care provider statement:     Critical care time (minutes):  35    Critical care time was exclusive of:  Separately billable procedures and treating other patients    Critical care was necessary to treat or prevent imminent or life-threatening deterioration of the following conditions:  Cardiac failure    Critical care was time spent personally by me on the following activities:  Ordering and performing treatments and interventions, ordering and review of radiographic studies, ordering and review of laboratory studies, pulse oximetry, re-evaluation of patient's condition, examination of patient, evaluation of patient's response to treatment and discussions with consultants    Care discussed with: admitting provider                 Cecil Conti MD  03/27/24 1125

## 2024-03-27 NOTE — CARE PLAN
The patient's goals for the shift include No SOB    The clinical goals for the shift include Pt will have no SOB throughout shift.      Problem: Pain - Adult  Goal: Verbalizes/displays adequate comfort level or baseline comfort level  Outcome: Progressing     Problem: Safety - Adult  Goal: Free from fall injury  Outcome: Progressing     Problem: Discharge Planning  Goal: Discharge to home or other facility with appropriate resources  Outcome: Progressing     Problem: Chronic Conditions and Co-morbidities  Goal: Patient's chronic conditions and co-morbidity symptoms are monitored and maintained or improved  Outcome: Progressing     Problem: Daily Care  Goal: Daily care needs are met  Outcome: Progressing     Problem: Psychosocial Needs  Goal: Demonstrates ability to cope with hospitalization/illness  Outcome: Progressing  Goal: Collaborate with me, my family, and caregiver to identify my specific goals  Outcome: Progressing  Flowsheets (Taken 3/27/2024 1133)  Cultural Requests During Hospitalization: none  Spiritual Requests During Hospitalization: none

## 2024-03-27 NOTE — H&P
[facility] Hospitalist History and Physical Note         Farooq Lang  :  1960(63 y.o.)  MRN:  [id number]  PCP: Munir Amado DO    Principal Problem:    New onset atrial fibrillation (CMS/HCC)      Assessment and Plan:     Farooq Lang is a 63 y.o. male without significant past medical history presented with dyspnea found to have new onset A-fib with RVR.    #New onset A-fib with RVR  #Acute cardiogenic pulmonary edema  #ELIE  #Elevated troponin not due to ACS/MI  #Tobacco abuse  -Started on apixaban and metoprolol  -Permissive hypertension due to ELIE  -Will consider diuretics if patient becomes hypoxia or respiratory status worsens despite rate control  -Awaiting echo  -Tobacco cessation encouraged, nicotine therapy provided  -CS recs appreciated: Cardiology     Disposition: await test results, await consultant recommendations, and await clinical improvement    DVT Prophylaxis: full anticoagulation apixaban    Code status: Full Code  Diet: Adult diet Regular    BMI Classification: Body mass index is 20.34 kg/m². normal BMI 18.5-24.9    Level of MDM:  Moderate    patient and family updated, I personally examined the patient, and I personally reviewed and agree with residentphysical exam, assessment/plan with my noted corrections if any    Family Communication  Number:  Name of Designated Family Representative:      Total time spent: 75 minutes, of total time providing counseling or in coordination of care.  Total time on this day of visit includes record and documentation review before and after visit including documentation and time not explicitly included on EMR time stamp.    [id number]: Please page me for patient care issues.  [phone]: Please page night hospitalist for any issues.        Subjective:     Chief Complaint   Patient presents with    Chest Pain     Pt presents to the ER with Chest discomfort, Dizziness, shortness of breath with any exertion, and generally not feeling well. PT  states this has been going on for x2 months. PT does not have any medical hx. Pt states that his right ear has also been causing him discomfort.       Interval History:    Farooq Lang is a 63 y.o. male with PMH of tobacco abuse presented from home with 1 week of worsening dyspnea.  Associated with fatigue.  Worse with exertion improved with rest.  Patient denies chest pain, palpitation, lower extremity edema, fever, rigor.  Patient is healthy at baseline and does not take any medication.  Upon arrival in ED patient was found to be in A-fib with RVR which is new for patient.  Temp:  [36.8 °C (98.2 °F)-37 °C (98.6 °F)] 36.8 °C (98.2 °F)  Heart Rate:  [] 77  Resp:  [13-20] 15  BP: (106-150)/(75-91) 129/83  Lab Results   Component Value Date    GLUCOSE 118 (H) 03/27/2024    CALCIUM 9.4 03/27/2024     03/27/2024    K 4.1 03/27/2024    CO2 27 03/27/2024     03/27/2024    BUN 17 03/27/2024    CREATININE 1.36 (H) 03/27/2024     Lab Results   Component Value Date    WBC 8.8 03/27/2024    HGB 16.4 03/27/2024    HCT 50.3 03/27/2024    MCV 90 03/27/2024     (L) 03/27/2024     IMAGES:  No results found for this or any previous visit (from the past 4464 hour(s)).     No echocardiogram results found for the past 12 months  === 03/27/24 ===    XR CHEST 1 VIEW    - Impression -  Moderate vascular congestion and interstitial edema.    MACRO:  None    Signed by: Mary Robert 3/27/2024 11:13 AM  Dictation workstation:   [id number]  === 11/02/21 ===    CT HEAD WO IV CONTRAST    - Impression -  No acute intracranial process.  === 03/27/24 ===    XR CHEST 1 VIEW    - Impression -  Moderate vascular congestion and interstitial edema.    MACRO:  None    Signed by: Mary Robert 3/27/2024 11:13 AM  Dictation workstation:   [id number]      Past Medical History:   Diagnosis Date    Acute upper respiratory infection, unspecified 01/16/2019    Acute URI    Flatulence 09/19/2017    Flatulence and related  conditions    Infectious gastroenteritis and colitis, unspecified 02/18/2016    Infectious colitis    Other chest pain 03/10/2016    Chest discomfort    Personal history of other benign neoplasm 09/19/2017    History of other benign neoplasm    Personal history of other diseases of the digestive system 02/15/2016    History of diverticulitis    Personal history of other diseases of the respiratory system 10/16/2019    History of acute bronchitis    Personal history of other mental and behavioral disorders 02/15/2016    History of depression    Personal history of other specified conditions 02/15/2016    History of insomnia    Personal history of other specified conditions 09/19/2017    History of diarrhea    Personal history of other specified conditions 03/10/2016    History of dizziness    Personal history of pneumonia (recurrent) 02/15/2016    History of pneumonia    Pleurodynia 11/29/2017    Chest pain, pleuritic    Right lower quadrant pain 11/14/2018    Abdominal pain, acute, right lower quadrant     Past Surgical History:   Procedure Laterality Date    TONSILLECTOMY  01/18/2016    Tonsillectomy     No family history on file.  Social History     Socioeconomic History    Marital status:      Spouse name: Not on file    Number of children: Not on file    Years of education: Not on file    Highest education level: Not on file   Occupational History    Not on file   Tobacco Use    Smoking status: Not on file    Smokeless tobacco: Not on file   Substance and Sexual Activity    Alcohol use: Not on file    Drug use: Not on file    Sexual activity: Not on file   Other Topics Concern    Not on file   Social History Narrative    Not on file     Social Determinants of Health     Financial Resource Strain: Low Risk  (3/27/2024)    Overall Financial Resource Strain (CARDIA)     Difficulty of Paying Living Expenses: Not hard at all   Food Insecurity: Not on file   Transportation Needs: No Transportation Needs  (3/27/2024)    PRAPARE - Transportation     Lack of Transportation (Medical): No     Lack of Transportation (Non-Medical): No   Physical Activity: Not on file   Stress: Not on file   Social Connections: Not on file   Intimate Partner Violence: Not on file   Housing Stability: Low Risk  (3/27/2024)    Housing Stability Vital Sign     Unable to Pay for Housing in the Last Year: No     Number of Places Lived in the Last Year: 1     Unstable Housing in the Last Year: No       No Known Allergies  Prior to Admission medications    Medication Sig Start Date End Date Taking? Authorizing Provider   UNABLE TO FIND Take 1 capsule by mouth once daily. Med Name: IBS Clear OTC supplement    Historical Provider, MD       Review of Systems   All other systems reviewed and are negative.      Objective:     Vitals:    03/27/24 1217 03/27/24 1300 03/27/24 1400 03/27/24 1500   BP: 131/78 124/85 106/75 129/83   BP Location:       Patient Position:       Pulse: (!) 109 (!) 107 88 77   Resp:  13 15 15   Temp:    36.8 °C (98.2 °F)   TempSrc:    Temporal   SpO2:    97%   Weight:       Height:         Physical Exam  Vitals and nursing note reviewed.   Constitutional:       General: He is not in acute distress.     Appearance: Normal appearance. He is not ill-appearing.   HENT:      Head: Normocephalic and atraumatic.      Right Ear: External ear normal.      Left Ear: External ear normal.      Nose: Nose normal. No congestion or rhinorrhea.      Mouth/Throat:      Mouth: Mucous membranes are moist.   Eyes:      Extraocular Movements: Extraocular movements intact.      Pupils: Pupils are equal, round, and reactive to light.   Cardiovascular:      Rate and Rhythm: Tachycardia present. Rhythm irregularly irregular.      Pulses: Normal pulses.      Heart sounds: Normal heart sounds.   Pulmonary:      Effort: Pulmonary effort is normal.      Breath sounds: Normal breath sounds.   Abdominal:      General: Abdomen is flat. Bowel sounds are normal.       Palpations: Abdomen is soft.   Musculoskeletal:         General: Normal range of motion.      Cervical back: Normal range of motion and neck supple.      Right lower leg: No edema.      Left lower leg: No edema.   Skin:     General: Skin is warm and dry.      Capillary Refill: Capillary refill takes less than 2 seconds.   Neurological:      General: No focal deficit present.      Mental Status: He is alert and oriented to person, place, and time. Mental status is at baseline.   Psychiatric:         Mood and Affect: Mood normal.         Thought Content: Thought content normal.         Judgment: Judgment normal.         Lab Results   Component Value Date     03/27/2024    K 4.1 03/27/2024     03/27/2024    CO2 27 03/27/2024    BUN 17 03/27/2024    CREATININE 1.36 (H) 03/27/2024    GLUCOSE 118 (H) 03/27/2024    CALCIUM 9.4 03/27/2024    PROT 7.2 03/27/2024    BILITOT 0.6 03/27/2024    ALKPHOS 87 03/27/2024    AST 12 03/27/2024    ALT 15 03/27/2024    GLOB 3.1 01/18/2023     Lab Results   Component Value Date    WBC 8.8 03/27/2024    HGB 16.4 03/27/2024    HCT 50.3 03/27/2024    MCV 90 03/27/2024     (L) 03/27/2024     Lab Results   Component Value Date    TSH 1.58 03/27/2024     Lab Results   Component Value Date    LACTATE 0.8 07/26/2021    TROPONINI <0.02 11/02/2021     (H) 03/27/2024    DDIMER 476 03/27/2024    INR 1.2 (H) 03/27/2024     Additional results since admission have been reviewed.    Dictated using Collisionable Version 2.4  Proof read however unrecognized voice recognition errors may have occurred     Electronically signed by Marin Marcos DO on 03/27/24 at 3:09 PM

## 2024-03-28 ENCOUNTER — APPOINTMENT (OUTPATIENT)
Dept: CARDIOLOGY | Facility: HOSPITAL | Age: 64
DRG: 308 | End: 2024-03-28
Payer: COMMERCIAL

## 2024-03-28 LAB
ALBUMIN SERPL BCP-MCNC: 3.7 G/DL (ref 3.4–5)
ALP SERPL-CCNC: 77 U/L (ref 33–136)
ALT SERPL W P-5'-P-CCNC: 15 U/L (ref 10–52)
ANION GAP SERPL CALC-SCNC: 12 MMOL/L (ref 10–20)
AORTIC VALVE MEAN GRADIENT: 2 MMHG
AORTIC VALVE PEAK VELOCITY: 0.88 M/S
AST SERPL W P-5'-P-CCNC: 10 U/L (ref 9–39)
AV PEAK GRADIENT: 3.1 MMHG
AVA (PEAK VEL): 2.38 CM2
AVA (VTI): 2.69 CM2
BASOPHILS # BLD AUTO: 0.03 X10*3/UL (ref 0–0.1)
BASOPHILS NFR BLD AUTO: 0.3 %
BILIRUB DIRECT SERPL-MCNC: 0.2 MG/DL (ref 0–0.3)
BILIRUB SERPL-MCNC: 1 MG/DL (ref 0–1.2)
BUN SERPL-MCNC: 13 MG/DL (ref 6–23)
CALCIUM SERPL-MCNC: 8.8 MG/DL (ref 8.6–10.3)
CHLORIDE SERPL-SCNC: 107 MMOL/L (ref 98–107)
CO2 SERPL-SCNC: 22 MMOL/L (ref 21–32)
CREAT SERPL-MCNC: 1.12 MG/DL (ref 0.5–1.3)
EGFRCR SERPLBLD CKD-EPI 2021: 73 ML/MIN/1.73M*2
EJECTION FRACTION APICAL 4 CHAMBER: 41.3
EJECTION FRACTION: 42 %
EOSINOPHIL # BLD AUTO: 0.08 X10*3/UL (ref 0–0.7)
EOSINOPHIL NFR BLD AUTO: 0.8 %
ERYTHROCYTE [DISTWIDTH] IN BLOOD BY AUTOMATED COUNT: 13.9 % (ref 11.5–14.5)
GLUCOSE SERPL-MCNC: 99 MG/DL (ref 74–99)
HCT VFR BLD AUTO: 45.4 % (ref 41–52)
HGB BLD-MCNC: 15 G/DL (ref 13.5–17.5)
IMM GRANULOCYTES # BLD AUTO: 0.04 X10*3/UL (ref 0–0.7)
IMM GRANULOCYTES NFR BLD AUTO: 0.4 % (ref 0–0.9)
LEFT ATRIUM VOLUME AREA LENGTH INDEX BSA: 43 ML/M2
LEFT VENTRICLE INTERNAL DIMENSION DIASTOLE: 4.47 CM (ref 3.5–6)
LEFT VENTRICULAR OUTFLOW TRACT DIAMETER: 1.98 CM
LYMPHOCYTES # BLD AUTO: 1.8 X10*3/UL (ref 1.2–4.8)
LYMPHOCYTES NFR BLD AUTO: 19 %
MAGNESIUM SERPL-MCNC: 1.78 MG/DL (ref 1.6–2.4)
MCH RBC QN AUTO: 29.3 PG (ref 26–34)
MCHC RBC AUTO-ENTMCNC: 33 G/DL (ref 32–36)
MCV RBC AUTO: 89 FL (ref 80–100)
MITRAL VALVE E/E' RATIO: 36.95
MONOCYTES # BLD AUTO: 0.98 X10*3/UL (ref 0.1–1)
MONOCYTES NFR BLD AUTO: 10.3 %
NEUTROPHILS # BLD AUTO: 6.55 X10*3/UL (ref 1.2–7.7)
NEUTROPHILS NFR BLD AUTO: 69.2 %
NRBC BLD-RTO: 0 /100 WBCS (ref 0–0)
PHOSPHATE SERPL-MCNC: 2.2 MG/DL (ref 2.5–4.9)
PLATELET # BLD AUTO: 118 X10*3/UL (ref 150–450)
POTASSIUM SERPL-SCNC: 4.1 MMOL/L (ref 3.5–5.3)
PROT SERPL-MCNC: 6.4 G/DL (ref 6.4–8.2)
RBC # BLD AUTO: 5.12 X10*6/UL (ref 4.5–5.9)
RIGHT VENTRICLE FREE WALL PEAK S': 10 CM/S
RIGHT VENTRICLE PEAK SYSTOLIC PRESSURE: 47.1 MMHG
SODIUM SERPL-SCNC: 137 MMOL/L (ref 136–145)
TRICUSPID ANNULAR PLANE SYSTOLIC EXCURSION: 1.8 CM
UFH PPP CHRO-ACNC: 0.6 IU/ML
UFH PPP CHRO-ACNC: 0.9 IU/ML
WBC # BLD AUTO: 9.5 X10*3/UL (ref 4.4–11.3)

## 2024-03-28 PROCEDURE — S4991 NICOTINE PATCH NONLEGEND: HCPCS | Performed by: INTERNAL MEDICINE

## 2024-03-28 PROCEDURE — 2500000002 HC RX 250 W HCPCS SELF ADMINISTERED DRUGS (ALT 637 FOR MEDICARE OP, ALT 636 FOR OP/ED): Performed by: INTERNAL MEDICINE

## 2024-03-28 PROCEDURE — 93306 TTE W/DOPPLER COMPLETE: CPT

## 2024-03-28 PROCEDURE — 36415 COLL VENOUS BLD VENIPUNCTURE: CPT | Performed by: NURSE PRACTITIONER

## 2024-03-28 PROCEDURE — 2500000004 HC RX 250 GENERAL PHARMACY W/ HCPCS (ALT 636 FOR OP/ED): Performed by: INTERNAL MEDICINE

## 2024-03-28 PROCEDURE — 2500000004 HC RX 250 GENERAL PHARMACY W/ HCPCS (ALT 636 FOR OP/ED): Performed by: NURSE PRACTITIONER

## 2024-03-28 PROCEDURE — 99232 SBSQ HOSP IP/OBS MODERATE 35: CPT | Performed by: INTERNAL MEDICINE

## 2024-03-28 PROCEDURE — 80076 HEPATIC FUNCTION PANEL: CPT | Performed by: INTERNAL MEDICINE

## 2024-03-28 PROCEDURE — 83735 ASSAY OF MAGNESIUM: CPT | Performed by: INTERNAL MEDICINE

## 2024-03-28 PROCEDURE — 36415 COLL VENOUS BLD VENIPUNCTURE: CPT | Performed by: INTERNAL MEDICINE

## 2024-03-28 PROCEDURE — 2500000002 HC RX 250 W HCPCS SELF ADMINISTERED DRUGS (ALT 637 FOR MEDICARE OP, ALT 636 FOR OP/ED): Performed by: NURSE PRACTITIONER

## 2024-03-28 PROCEDURE — 1200000002 HC GENERAL ROOM WITH TELEMETRY DAILY

## 2024-03-28 PROCEDURE — 85025 COMPLETE CBC W/AUTO DIFF WBC: CPT | Performed by: INTERNAL MEDICINE

## 2024-03-28 PROCEDURE — 85520 HEPARIN ASSAY: CPT | Performed by: NURSE PRACTITIONER

## 2024-03-28 PROCEDURE — 2500000001 HC RX 250 WO HCPCS SELF ADMINISTERED DRUGS (ALT 637 FOR MEDICARE OP): Performed by: INTERNAL MEDICINE

## 2024-03-28 PROCEDURE — 84100 ASSAY OF PHOSPHORUS: CPT | Performed by: INTERNAL MEDICINE

## 2024-03-28 PROCEDURE — 2500000001 HC RX 250 WO HCPCS SELF ADMINISTERED DRUGS (ALT 637 FOR MEDICARE OP): Performed by: NURSE PRACTITIONER

## 2024-03-28 RX ORDER — HEPARIN SODIUM 10000 [USP'U]/100ML
0-4000 INJECTION, SOLUTION INTRAVENOUS CONTINUOUS
Status: DISCONTINUED | OUTPATIENT
Start: 2024-03-28 | End: 2024-03-31

## 2024-03-28 RX ORDER — DIGOXIN 0.25 MG/ML
250 INJECTION INTRAMUSCULAR; INTRAVENOUS ONCE
Status: COMPLETED | OUTPATIENT
Start: 2024-03-28 | End: 2024-03-28

## 2024-03-28 RX ORDER — TRAZODONE HYDROCHLORIDE 50 MG/1
25 TABLET ORAL NIGHTLY PRN
Status: DISCONTINUED | OUTPATIENT
Start: 2024-03-28 | End: 2024-03-31 | Stop reason: HOSPADM

## 2024-03-28 RX ORDER — HEPARIN SODIUM 5000 [USP'U]/ML
INJECTION, SOLUTION INTRAVENOUS; SUBCUTANEOUS EVERY 4 HOURS PRN
Status: DISCONTINUED | OUTPATIENT
Start: 2024-03-28 | End: 2024-03-31

## 2024-03-28 RX ORDER — METOPROLOL SUCCINATE 50 MG/1
50 TABLET, EXTENDED RELEASE ORAL 2 TIMES DAILY
Status: DISCONTINUED | OUTPATIENT
Start: 2024-03-28 | End: 2024-03-29

## 2024-03-28 RX ORDER — WARFARIN SODIUM 5 MG/1
10 TABLET ORAL DAILY
Status: COMPLETED | OUTPATIENT
Start: 2024-03-28 | End: 2024-03-29

## 2024-03-28 RX ADMIN — Medication 5 MG: at 20:36

## 2024-03-28 RX ADMIN — BENZOCAINE AND MENTHOL 1 LOZENGE: 15; 3.6 LOZENGE ORAL at 21:54

## 2024-03-28 RX ADMIN — APIXABAN 5 MG: 5 TABLET, FILM COATED ORAL at 05:52

## 2024-03-28 RX ADMIN — DIGOXIN 250 MCG: 0.25 INJECTION INTRAMUSCULAR; INTRAVENOUS at 14:34

## 2024-03-28 RX ADMIN — METOPROLOL SUCCINATE 50 MG: 50 TABLET, EXTENDED RELEASE ORAL at 20:35

## 2024-03-28 RX ADMIN — PERFLUTREN 1 ML OF DILUTION: 6.52 INJECTION, SUSPENSION INTRAVENOUS at 10:22

## 2024-03-28 RX ADMIN — WARFARIN SODIUM 10 MG: 5 TABLET ORAL at 18:28

## 2024-03-28 RX ADMIN — METOPROLOL TARTRATE 25 MG: 25 TABLET, FILM COATED ORAL at 08:14

## 2024-03-28 RX ADMIN — PANTOPRAZOLE SODIUM 40 MG: 40 TABLET, DELAYED RELEASE ORAL at 05:51

## 2024-03-28 RX ADMIN — NICOTINE 1 PATCH: 14 PATCH, EXTENDED RELEASE TRANSDERMAL at 08:14

## 2024-03-28 RX ADMIN — SENNOSIDES AND DOCUSATE SODIUM 2 TABLET: 8.6; 5 TABLET ORAL at 20:35

## 2024-03-28 RX ADMIN — HEPARIN SODIUM 1000 UNITS/HR: 10000 INJECTION, SOLUTION INTRAVENOUS at 13:52

## 2024-03-28 ASSESSMENT — COGNITIVE AND FUNCTIONAL STATUS - GENERAL
DAILY ACTIVITIY SCORE: 24
DAILY ACTIVITIY SCORE: 24
MOBILITY SCORE: 24
MOBILITY SCORE: 24

## 2024-03-28 ASSESSMENT — PAIN SCALES - GENERAL
PAINLEVEL_OUTOF10: 0 - NO PAIN
PAINLEVEL_OUTOF10: 0 - NO PAIN

## 2024-03-28 ASSESSMENT — PAIN - FUNCTIONAL ASSESSMENT: PAIN_FUNCTIONAL_ASSESSMENT: 0-10

## 2024-03-28 NOTE — PROGRESS NOTES
Farooq Lang is a 63 y.o. male on day 1 of admission presenting with New onset atrial fibrillation (CMS/HCC).      Subjective   He is sitting up in the bed, feels anxious, wants to go outside to smoke. Complains of shortness of breath with laying on his left side and also with exertion.       Review of systems:  Constitutional: negative for fever, chills, or malaise  Neuro: negative for dizziness, headache, numbness, tingling  ENT: Negative for nasal congestion or sore throat  Resp: negative for cough, or wheezing  CV: negative for chest pain, palpitations  GI: negative for abd pain, nausea, vomiting or diarrhea  : negative for dysuria, frequency, or urgency  Skin: negative for lesions, wounds, or rash  Musculoskeletal: Negative for weakness, myalgia, or arthralgia  Endocrine: Negative for polyuria or polydipsia         Objective   Constitutional: Well developed, awake/alert/oriented x3, no distress, alert and cooperative  Eyes: PERRL, EOMI, clear sclera  ENMT: mucous membranes moist, no apparent injury, no lesions seen  Head/Neck: Neck supple, no apparent injury, thyroid without mass or tenderness, No JVD, trachea midline, no bruits  Respiratory/Thorax: Patent airways, CTAB, normal breath sounds with good chest expansion, thorax symmetric  Cardiovascular: irregular rhythm, no murmurs, 2+ equal pulses of the extremities, normal S 1and S 2  Gastrointestinal: Nondistended, soft, non-tender, no rebound tenderness or guarding, no masses palpable, no organomegaly, +BS, no bruits  Musculoskeletal: ROM intact, no joint swelling, normal strength  Extremities: normal extremities, no cyanosis edema, contusions or wounds, no clubbing  Neurological: alert and oriented x3, intact senses, motor, response and reflexes, normal strength  Lymphatic: No significant lymphadenopathy  Psychological: Appropriate mood and behavior  Skin: Warm and dry, no lesions, no rashes      Last Recorded Vitals  /81   Pulse 99   Temp 36.3  "°C (97.3 °F)   Resp 18   Ht 1.676 m (5' 6\")   Wt 57.2 kg (126 lb)   SpO2 99%   BMI 20.34 kg/m²     Intake/Output last 3 Shifts:  I/O last 3 completed shifts:  In: 60 (1 mL/kg) [P.O.:60]  Out: - (0 mL/kg)   Weight: 57.2 kg   No intake/output data recorded.    Relevant Results  Scheduled medications  melatonin, 5 mg, oral, Nightly  metoprolol succinate XL, 50 mg, oral, BID  nicotine, 1 patch, transdermal, Daily  pantoprazole, 40 mg, oral, Daily before breakfast   Or  pantoprazole, 40 mg, intravenous, Daily before breakfast  polyethylene glycol, 17 g, oral, Daily  sennosides-docusate sodium, 2 tablet, oral, BID  warfarin, 10 mg, oral, Daily      Continuous medications  heparin, 0-4,000 Units/hr, Last Rate: 1,000 Units/hr (03/28/24 1352)      PRN medications  PRN medications: acetaminophen **OR** acetaminophen **OR** acetaminophen, benzocaine-menthol, dextromethorphan-guaifenesin, heparin, metoprolol, ondansetron, traZODone    Results for orders placed or performed during the hospital encounter of 03/27/24 (from the past 24 hour(s))   Heparin Assay   Result Value Ref Range    Heparin Unfractionated 0.5 See Comment Below for Therapeutic Ranges IU/mL   CBC and Auto Differential   Result Value Ref Range    WBC 9.5 4.4 - 11.3 x10*3/uL    nRBC 0.0 0.0 - 0.0 /100 WBCs    RBC 5.12 4.50 - 5.90 x10*6/uL    Hemoglobin 15.0 13.5 - 17.5 g/dL    Hematocrit 45.4 41.0 - 52.0 %    MCV 89 80 - 100 fL    MCH 29.3 26.0 - 34.0 pg    MCHC 33.0 32.0 - 36.0 g/dL    RDW 13.9 11.5 - 14.5 %    Platelets 118 (L) 150 - 450 x10*3/uL    Neutrophils % 69.2 40.0 - 80.0 %    Immature Granulocytes %, Automated 0.4 0.0 - 0.9 %    Lymphocytes % 19.0 13.0 - 44.0 %    Monocytes % 10.3 2.0 - 10.0 %    Eosinophils % 0.8 0.0 - 6.0 %    Basophils % 0.3 0.0 - 2.0 %    Neutrophils Absolute 6.55 1.20 - 7.70 x10*3/uL    Immature Granulocytes Absolute, Automated 0.04 0.00 - 0.70 x10*3/uL    Lymphocytes Absolute 1.80 1.20 - 4.80 x10*3/uL    Monocytes Absolute " 0.98 0.10 - 1.00 x10*3/uL    Eosinophils Absolute 0.08 0.00 - 0.70 x10*3/uL    Basophils Absolute 0.03 0.00 - 0.10 x10*3/uL   Magnesium   Result Value Ref Range    Magnesium 1.78 1.60 - 2.40 mg/dL   Hepatic Function Panel   Result Value Ref Range    Albumin 3.7 3.4 - 5.0 g/dL    Bilirubin, Total 1.0 0.0 - 1.2 mg/dL    Bilirubin, Direct 0.2 0.0 - 0.3 mg/dL    Alkaline Phosphatase 77 33 - 136 U/L    ALT 15 10 - 52 U/L    AST 10 9 - 39 U/L    Total Protein 6.4 6.4 - 8.2 g/dL   Phosphorus   Result Value Ref Range    Phosphorus 2.2 (L) 2.5 - 4.9 mg/dL   Basic Metabolic Panel   Result Value Ref Range    Glucose 99 74 - 99 mg/dL    Sodium 137 136 - 145 mmol/L    Potassium 4.1 3.5 - 5.3 mmol/L    Chloride 107 98 - 107 mmol/L    Bicarbonate 22 21 - 32 mmol/L    Anion Gap 12 10 - 20 mmol/L    Urea Nitrogen 13 6 - 23 mg/dL    Creatinine 1.12 0.50 - 1.30 mg/dL    eGFR 73 >60 mL/min/1.73m*2    Calcium 8.8 8.6 - 10.3 mg/dL   Transthoracic Echo (TTE) Complete   Result Value Ref Range    AV pk ck 0.88 m/s    AV mn grad 2.0 mmHg    LVOT diam 1.98 cm    LV biplane EF 42 %    MV avg E/e' ratio 36.95     LA vol index A/L 43.0 ml/m2    Tricuspid annular plane systolic excursion 1.8 cm    RV free wall pk S' 10.00 cm/s    LVIDd 4.47 cm    RVSP 47.1 mmHg    Aortic Valve Area by Continuity of VTI 2.69 cm2    Aortic Valve Area by Continuity of Peak Velocity 2.38 cm2    AV pk grad 3.1 mmHg    LV A4C EF 41.3        Transthoracic Echo (TTE) Complete   Final Result      XR chest 1 view   Final Result   Moderate vascular congestion and interstitial edema.        MACRO:   None        Signed by: Mary Robert 3/27/2024 11:13 AM   Dictation workstation:   [id number]          Transthoracic Echo (TTE) Complete    Result Date: 3/28/2024   [facility], [address] [address]               Tel [phone] and Fax [phone] TRANSTHORACIC ECHOCARDIOGRAM REPORT  Patient Name:      MADYSON Barclay Physician:     [id number] Morris Johnson MD Study Date:        3/28/2024            Ordering Provider:    [id number] CHARISSA HILLIARD MRN/PID:           [id number]             Fellow: Accession#:        [id number]         Nurse:                Natalia Aleman RN Date of Birth/Age: 1960 / 63 years Sonographer:          Helen Linda RDCS Gender:            M                    Additional Staff: Height:            167.64 cm            Admit Date:           3/27/2024 Weight:            57.15 kg             Admission Status:     Inpatient -                                                               Routine BSA / BMI:         1.64 m2 / 20.34      Encounter#:           [id number]                    kg/m2                                         Department Location:  [facility] Non                                                               Invasive Blood Pressure: 124 /85 mmHg Study Type:    TRANSTHORACIC ECHO (TTE) COMPLETE Diagnosis/ICD: Unspecified atrial fibrillation-I48.91 Indication:    Atrial Fibrillation CPT Code:      Echo Complete w Full [id number] Patient History: Smoker:            Current. Pertinent History: Chest Pain and Dyspnea. Dizziness. Study Detail: The following Echo studies were performed: 2D, M-Mode, Doppler and               color flow. Definity used as a contrast agent for endocardial               border definition. Total contrast used for this procedure was 1.0               mL via IV push. The patient was awake.  Critical Event Critical Event: Test was completed as per department protocol. Critical Finding: Mitral stenosis. Time Test was Completed: 10:45:33 AM Notified: Dr. Johnson, Patricia Rdz NP. Attending notification time: 11:01:31 AM  PHYSICIAN INTERPRETATION: Left Ventricle: The left ventricular systolic function is mildly decreased, with an estimated ejection fraction of 45%. There is global hypokinesis of the left ventricle with minor  regional variations. The left ventricular cavity size is mildly dilated. Left ventricular diastolic filling was indeterminate. Left Atrium: The left atrium is severely dilated. Right Ventricle: The right ventricle is normal in size. There is normal right ventricular global systolic function. Right Atrium: The right atrium is mildly dilated. Aortic Valve: The aortic valve is trileaflet. There is mild aortic valve cusp calcification. There is trace to mild aortic valve regurgitation. The peak instantaneous gradient of the aortic valve is 3.1 mmHg. The mean gradient of the aortic valve is 2.0 mmHg. Mitral Valve: The mitral valve is moderately thickened. 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 There is evidence of severe mitral valve stenosis. The doppler estimated mean and peak diastolic pressure gradients are 12.7 mmHg and 21.8 mmHg respectively. There is moderate mitral annular calcification. There is moderate mitral valve regurgitation. Tricuspid Valve: The tricuspid valve is structurally normal. There is mild tricuspid regurgitation. Pulmonic Valve: The pulmonic valve is not well visualized. The pulmonic valve regurgitation was not well visualized. Pericardium: There is no pericardial effusion noted. Aorta: The aortic root is normal. Pulmonary Artery: The tricuspid regurgitant velocity is 3.32 m/s, and with an estimated right atrial pressure of 3 mmHg, the estimated pulmonary artery pressure is mild to moderately elevated with the RVSP at 47.1 mmHg.  CONCLUSIONS:  1. Left ventricular systolic function is mildly decreased with a 45% estimated ejection fraction.  2. The left atrium is severely dilated.  3. The mitral valve is moderately thickened. 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  4. There is moderate mitral annular calcification.  5. There is severe mitral valve stenosis.  6. Moderate mitral valve regurgitation.  7. Mild to moderately elevated pulmonary artery pressure.  8. There is global hypokinesis of  the left ventricle with minor regional variations. QUANTITATIVE DATA SUMMARY: 2D MEASUREMENTS:                          Normal Ranges: IVSd:          0.60 cm   (0.6-1.1cm) LVPWd:         0.90 cm   (0.6-1.1cm) LVIDd:         4.47 cm   (3.9-5.9cm) LVIDs:         3.60 cm LV Mass Index: 63.0 g/m2 LV % FS        19.4 % LA VOLUME:                               Normal Ranges: LA Vol A4C:        71.6 ml    (22+/-6mL/m2) LA Vol A2C:        67.4 ml LA Vol BP:         70.7 ml LA Vol Index A4C:  43.6 ml/m2 LA Vol Index A2C:  41.0 ml/m2 LA Vol Index BP:   43.0 ml/m2 LA Area A4C:       22.3 cm2 LA Area A2C:       22.0 cm2 LA Major Axis A4C: 5.9 cm LA Major Axis A2C: 6.1 cm LA Volume Index:   40.8 ml/m2 LA Vol A4C:        66.6 ml LA Vol A2C:        64.7 ml RA VOLUME BY A/L METHOD:                       Normal Ranges: RA Area A4C: 17.6 cm2 M-MODE MEASUREMENTS:                  Normal Ranges: Ao Root: 3.20 cm (2.0-3.7cm) LAs:     5.42 cm (2.7-4.0cm) AORTA MEASUREMENTS:                      Normal Ranges: Ao Sinus, d: 3.30 cm (2.1-3.5cm) Asc Ao, d:   3.00 cm (2.1-3.4cm) LV SYSTOLIC FUNCTION BY 2D PLANIMETRY (MOD):                     Normal Ranges: EF-A4C View: 41.3 % (>=55%) EF-A2C View: 40.4 % EF-Biplane:  41.5 % LV DIASTOLIC FUNCTION:                            Normal Ranges: MV Peak E:      1.85 m/s   (0.7-1.2 m/s) MV e'           0.05 m/s   (>8.0) MV lateral e'   0.05 m/s MV medial e'    0.04 m/s E/e' Ratio:     36.95      (<8.0) PulmV Sys Christian:  17.52 cm/s PulmV Thompson Christian: 32.92 cm/s PulmV S/D Christian:  0.53 MITRAL VALVE:                       Normal Ranges: MV Vmax:    2.33 m/s  (<=1.3m/s) MV peak P.8 mmHg (<5mmHg) MV mean P.7 mmHg (<2mmHg) MV VTI:     60.69 cm  (10-13cm) MITRAL INSUFFICIENCY:                      Normal Ranges: MR VTI:  150.95 cm MR Vmax: 491.71 cm/s AORTIC VALVE:                                   Normal Ranges: AoV Vmax:                0.88 m/s (<=1.7m/s) AoV Peak PG:             3.1 mmHg (<20mmHg) AoV  Mean P.0 mmHg (1.7-11.5mmHg) LVOT Max Christian:            0.68 m/s (<=1.1m/s) AoV VTI:                 14.53 cm (18-25cm) LVOT VTI:                12.65 cm LVOT Diameter:           1.98 cm  (1.8-2.4cm) AoV Area, VTI:           2.69 cm2 (2.5-5.5cm2) AoV Area,Vmax:           2.38 cm2 (2.5-4.5cm2) AoV Dimensionless Index: 0.87  RIGHT VENTRICLE: RV Basal 3.10 cm RV Mid   2.20 cm RV Major 6.7 cm TAPSE:   18.0 mm RV s'    0.10 m/s TRICUSPID VALVE/RVSP:                             Normal Ranges: Peak TR Velocity: 3.32 m/s RV Syst Pressure: 47.1 mmHg (< 30mmHg) IVC Diam:         2.21 cm PULMONIC VALVE:                      Normal Ranges: PV Max Christian: 0.7 m/s  (0.6-0.9m/s) PV Max P.7 mmHg Pulmonary Veins: PulmV Thompson Christian: 32.92 cm/s PulmV S/D Christian:  0.53 PulmV Sys Christian:  17.52 cm/s AORTA: Asc Ao Diam 3.01 cm  [id number] Morris Johnson MD Electronically signed on 3/28/2024 at 12:19:54 PM  ** Final **           Assessment/Plan   Principal Problem:    New onset atrial fibrillation (CMS/HCC)    New onset atrial fibrillation  -Reports symptoms started about a week ago  -EKG reviewed, AF, rate controlled  -Started on Eliquis and metoprolol  -TSH WNL  -Echo as above->LVEF 45%, left atrium severely dilated, severe mitral stenosis  -Stop eliquis->contraindicated with MS  -Start Heparin gtt and bridge with coumadin until INR 2-3     --Platelets down to 118 today-> monitor closely  -Change metoprolol tartrate to succinate 50mg BID  -Give Digoxin 0.25mg IV x1 now  -Due to severe atrium dilatation, unable to cardiovert->would most likely not be successful or remain in SR long  -Monitor on tele     2. Shortness of breath  -Currently pt denies  -Pulse ox 99% on room air  -CXR showed moderate vascular congestion and interstitial edema  -  -Would not give anymore IVF  -If acute SOB, give lasix IV  -SOB most likely from rapid AF->needs improved rate control as above     3. Elevated troponin-Non MI elevation   -Troponin  downtrending  -I reviewed the EKG, no acute changes, ST elevation, or depression  -Reports superficial left sided chest pain  -Echo as above  -Will need left and right heart cath as outpt     4. Acute kidney injury, resolved  -IVF given  -Monitor    5. Severe mitral stenosis, rheumatic valve disease  -Echo as above  -will need left and right heart cath as outpt then referral to structural heart team    6. Tobacco use  -Nicotine patch  -Advised to quit    7. Thrombocytopenia  -Platelets 138 on admit, today 118  -Monitor on heparin/Coumadin    Plan of care discussed with Dr. Johnson and hospitalist    Patricia Rdz, APRN-CNP

## 2024-03-28 NOTE — PROGRESS NOTES
[facility] Hospitalist Progress Note       [id number]: Please page me for patient care issues.  [id number]: Please page night hospitalist for any issues.     Farooq Lang  :  1960(63 y.o.)  MRN:  [id number]  PCP: Munir Amado DO      Principal Problem:    New onset atrial fibrillation (CMS/HCC)      Assessment and Plan:     Farooq Lang is a 63 y.o. male with PMH of tobacco abuse presented from home with 1 week of worsening dyspnea.  Associated with fatigue.  Worse with exertion improved with rest.  Patient denies chest pain, palpitation, lower extremity edema, fever, rigor.  Patient is healthy at baseline and does not take any medication.  Upon arrival in ED patient was found to be in A-fib with RVR which is new for patient      #New onset A-fib with RVR likely due to severe MS  # Acute HFmrEF (45%, 3/28/24)  # New severe mitral valve stenosis  #Acute cardiogenic pulmonary edema  #ELIE, resolved  # Thrombocytopenia  #Elevated troponin not due to ACS/MI  #Tobacco abuse  -Started on apixaban and metoprolol  -switched apixaban to coumadin due to valvular afib  -hep gtt pending therapeutic INR of 2-3  -PRN IV lasix for resp distress 2/2 pulm edema  -Tobacco cessation encouraged, nicotine therapy provided  -cards oupt [facility] and pt to be referred to valve clinic outpatient  -CS recs appreciated: Cardiology    Disposition: Discharge home on 3/29 VS 3/30 pending therapeutic INR of 2-3    DVT Prophylaxis:  Heparin gtt and Coumadin    Code status: Full Code  Diet: Adult diet Regular    Level of MDM:  High    I personally examined the patient, I personally reviewed chart, data, labs radiology reports, and I personally reviewed and agree with residentphysical exam, assessment/plan with my noted corrections if any    Family Communication  Number :  Name of Designated Family Representative:      Total time spent: 35 minutes, of total time providing counseling or in coordination of care. Total time on this day of  "visit includes record and documentation review before and after visit including documentation and time not explicitly included on EMR time stamp      Subjective:   Interval History:  HPI  The patient complains of dyspnea  The patient feels their symptoms areimproving  no events or new concerns    Scheduled Meds:digoxin, 250 mcg, intravenous, Once  melatonin, 5 mg, oral, Nightly  metoprolol succinate XL, 50 mg, oral, BID  nicotine, 1 patch, transdermal, Daily  pantoprazole, 40 mg, oral, Daily before breakfast   Or  pantoprazole, 40 mg, intravenous, Daily before breakfast  polyethylene glycol, 17 g, oral, Daily  sennosides-docusate sodium, 2 tablet, oral, BID  warfarin, 10 mg, oral, Daily      Continuous Infusions:heparin, 0-4,000 Units/hr, Last Rate: 1,000 Units/hr (03/28/24 1352)      PRN Meds:PRN medications: acetaminophen **OR** acetaminophen **OR** acetaminophen, benzocaine-menthol, dextromethorphan-guaifenesin, heparin, metoprolol, ondansetron, traZODone    Review of Systems   All other systems reviewed and are negative.    Interval Pertinent History:  Social History     Tobacco Use    Smoking status: Every Day     Packs/day: 0.50     Years: 49.00     Additional pack years: 0.00     Total pack years: 24.50     Types: Cigarettes     Start date: 1/1/1975     Passive exposure: Current    Smokeless tobacco: Never   Substance Use Topics    Alcohol use: Not on file         Objective:   Patient Vitals for the past 24 hrs:   BP Temp Temp src Pulse Resp SpO2 Height Weight   03/28/24 1402 119/81 36.3 °C (97.3 °F) -- 99 18 99 % -- --   03/28/24 1100 128/76 36.2 °C (97.2 °F) -- 99 18 95 % -- --   03/28/24 0915 -- -- -- -- -- 96 % -- --   03/28/24 0438 149/75 36.5 °C (97.7 °F) -- 90 18 96 % -- --   03/28/24 0000 121/78 36.6 °C (97.9 °F) -- 87 18 96 % -- --   03/27/24 2000 125/73 36.4 °C (97.6 °F) -- 59 18 95 % -- --   03/27/24 1536 129/83 36.8 °C (98.2 °F) Temporal 77 15 -- 1.676 m (5' 6\") 57.2 kg (126 lb)   03/27/24 1500 -- " 36.8 °C (98.2 °F) -- -- -- 97 % -- --       Average, Min, and Max for last 24 hours Vitals:  TEMPERATURE:  Temp  Av.5 °C (97.7 °F)  Min: 36.2 °C (97.2 °F)  Max: 36.8 °C (98.2 °F)    RESPIRATIONS RANGE: Resp  Av.5  Min: 15  Max: 18    PULSE RANGE: Pulse  Av.2  Min: 59  Max: 99    BLOOD PRESSURE RANGE:  Systolic (24hrs), Av , Min:119 , Max:149   ; Diastolic (24hrs), Av, Min:73, Max:83      PULSE OXIMETRY RANGE: SpO2  Av.3 %  Min: 95 %  Max: 99 %    I/O last 3 completed shifts:  In: 60 (1 mL/kg) [P.O.:60]  Out: - (0 mL/kg)   Weight: 57.2 kg   Weight change:      Physical Exam  Vitals and nursing note reviewed.   Constitutional:       General: He is not in acute distress.     Appearance: Normal appearance. He is not ill-appearing.   HENT:      Head: Normocephalic and atraumatic.      Right Ear: External ear normal.      Left Ear: External ear normal.      Nose: Nose normal. No congestion or rhinorrhea.      Mouth/Throat:      Mouth: Mucous membranes are moist.   Eyes:      Extraocular Movements: Extraocular movements intact.      Pupils: Pupils are equal, round, and reactive to light.   Cardiovascular:      Rate and Rhythm: Normal rate. Rhythm irregularly irregular.      Pulses: Normal pulses.      Heart sounds: Murmur heard.   Pulmonary:      Effort: Pulmonary effort is normal.      Breath sounds: Normal breath sounds.   Abdominal:      General: Abdomen is flat. Bowel sounds are normal.      Palpations: Abdomen is soft.   Musculoskeletal:         General: Normal range of motion.      Cervical back: Normal range of motion and neck supple.      Right lower leg: No edema.      Left lower leg: No edema.   Skin:     General: Skin is warm and dry.      Capillary Refill: Capillary refill takes less than 2 seconds.   Neurological:      General: No focal deficit present.      Mental Status: He is alert and oriented to person, place, and time. Mental status is at baseline.   Psychiatric:         Mood  and Affect: Mood normal.         Thought Content: Thought content normal.         Judgment: Judgment normal.         Lab Results   Component Value Date     03/28/2024    K 4.1 03/28/2024     03/28/2024    CO2 22 03/28/2024    BUN 13 03/28/2024    CREATININE 1.12 03/28/2024    GLUCOSE 99 03/28/2024    CALCIUM 8.8 03/28/2024    PROT 6.4 03/28/2024    BILITOT 1.0 03/28/2024    ALKPHOS 77 03/28/2024    AST 10 03/28/2024    ALT 15 03/28/2024    GLOB 3.1 01/18/2023       Lab Results   Component Value Date    WBC 9.5 03/28/2024    HGB 15.0 03/28/2024    HCT 45.4 03/28/2024    MCV 89 03/28/2024     (L) 03/28/2024    LYMPHOPCT 19.0 03/28/2024    RBC 5.12 03/28/2024    MCH 29.3 03/28/2024    MCHC 33.0 03/28/2024    RDW 13.9 03/28/2024       Lab Results   Component Value Date    TSH 1.58 03/27/2024     Lab Results   Component Value Date    LACTATE 0.8 07/26/2021    TROPONINI <0.02 11/02/2021     (H) 03/27/2024    DDIMER 476 03/27/2024    INR 1.2 (H) 03/27/2024       IMAGES:  No results found for this or any previous visit (from the past 4464 hour(s)).     Transthoracic Echo (TTE) Complete    Result Date: 3/28/2024   [facility], [id number] [place] [address]               Tel [phone] and Fax [phone] TRANSTHORACIC ECHOCARDIOGRAM REPORT  Patient Name:      MADYSON Barclay Physician:    [id number] Morris Johnson MD Study Date:        3/28/2024            Ordering Provider:    [id number] CHARISSA HILLIARD MRN/PID:           [id number]             Fellow: Accession#:        [id number]         Nurse:                Natalia Aleman RN Date of Birth/Age: 1960 / 63 years Sonographer:          Helen Linda RDCS Gender:            M                    Additional Staff: Height:            167.64 cm            Admit Date:           3/27/2024 Weight:            57.15 kg             Admission Status:     Inpatient -                                                                Routine BSA / BMI:         1.64 m2 / 20.34      Encounter#:           [id number]                    kg/m2                                         Department Location:  [facility] Non                                                               Invasive Blood Pressure: 124 /85 mmHg Study Type:    TRANSTHORACIC ECHO (TTE) COMPLETE Diagnosis/ICD: Unspecified atrial fibrillation-I48.91 Indication:    Atrial Fibrillation CPT Code:      Echo Complete w Full [id number] Patient History: Smoker:            Current. Pertinent History: Chest Pain and Dyspnea. Dizziness. Study Detail: The following Echo studies were performed: 2D, M-Mode, Doppler and               color flow. Definity used as a contrast agent for endocardial               border definition. Total contrast used for this procedure was 1.0               mL via IV push. The patient was awake.  Critical Event Critical Event: Test was completed as per department protocol. Critical Finding: Mitral stenosis. Time Test was Completed: 10:45:33 AM Notified: Patricia Morrison NP. Attending notification time: 11:01:31 AM  PHYSICIAN INTERPRETATION: Left Ventricle: The left ventricular systolic function is mildly decreased, with an estimated ejection fraction of 45%. There is global hypokinesis of the left ventricle with minor regional variations. The left ventricular cavity size is mildly dilated. Left ventricular diastolic filling was indeterminate. Left Atrium: The left atrium is severely dilated. Right Ventricle: The right ventricle is normal in size. There is normal right ventricular global systolic function. Right Atrium: The right atrium is mildly dilated. Aortic Valve: The aortic valve is trileaflet. There is mild aortic valve cusp calcification. There is trace to mild aortic valve regurgitation. The peak instantaneous gradient of the aortic valve is 3.1 mmHg. The mean gradient of the aortic valve is  2.0 mmHg. Mitral Valve: The mitral valve is moderately thickened. 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 There is evidence of severe mitral valve stenosis. The doppler estimated mean and peak diastolic pressure gradients are 12.7 mmHg and 21.8 mmHg respectively. There is moderate mitral annular calcification. There is moderate mitral valve regurgitation. Tricuspid Valve: The tricuspid valve is structurally normal. There is mild tricuspid regurgitation. Pulmonic Valve: The pulmonic valve is not well visualized. The pulmonic valve regurgitation was not well visualized. Pericardium: There is no pericardial effusion noted. Aorta: The aortic root is normal. Pulmonary Artery: The tricuspid regurgitant velocity is 3.32 m/s, and with an estimated right atrial pressure of 3 mmHg, the estimated pulmonary artery pressure is mild to moderately elevated with the RVSP at 47.1 mmHg.  CONCLUSIONS:  1. Left ventricular systolic function is mildly decreased with a 45% estimated ejection fraction.  2. The left atrium is severely dilated.  3. The mitral valve is moderately thickened. 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  4. There is moderate mitral annular calcification.  5. There is severe mitral valve stenosis.  6. Moderate mitral valve regurgitation.  7. Mild to moderately elevated pulmonary artery pressure.  8. There is global hypokinesis of the left ventricle with minor regional variations. QUANTITATIVE DATA SUMMARY: 2D MEASUREMENTS:                          Normal Ranges: IVSd:          0.60 cm   (0.6-1.1cm) LVPWd:         0.90 cm   (0.6-1.1cm) LVIDd:         4.47 cm   (3.9-5.9cm) LVIDs:         3.60 cm LV Mass Index: 63.0 g/m2 LV % FS        19.4 % LA VOLUME:                               Normal Ranges: LA Vol A4C:        71.6 ml    (22+/-6mL/m2) LA Vol A2C:        67.4 ml LA Vol BP:         70.7 ml LA Vol Index A4C:  43.6 ml/m2 LA Vol Index A2C:  41.0 ml/m2 LA Vol Index BP:   43.0 ml/m2 LA Area A4C:       22.3 cm2 LA  Area A2C:       22.0 cm2 LA Major Axis A4C: 5.9 cm LA Major Axis A2C: 6.1 cm LA Volume Index:   40.8 ml/m2 LA Vol A4C:        66.6 ml LA Vol A2C:        64.7 ml RA VOLUME BY A/L METHOD:                       Normal Ranges: RA Area A4C: 17.6 cm2 M-MODE MEASUREMENTS:                  Normal Ranges: Ao Root: 3.20 cm (2.0-3.7cm) LAs:     5.42 cm (2.7-4.0cm) AORTA MEASUREMENTS:                      Normal Ranges: Ao Sinus, d: 3.30 cm (2.1-3.5cm) Asc Ao, d:   3.00 cm (2.1-3.4cm) LV SYSTOLIC FUNCTION BY 2D PLANIMETRY (MOD):                     Normal Ranges: EF-A4C View: 41.3 % (>=55%) EF-A2C View: 40.4 % EF-Biplane:  41.5 % LV DIASTOLIC FUNCTION:                            Normal Ranges: MV Peak E:      1.85 m/s   (0.7-1.2 m/s) MV e'           0.05 m/s   (>8.0) MV lateral e'   0.05 m/s MV medial e'    0.04 m/s E/e' Ratio:     36.95      (<8.0) PulmV Sys Christian:  17.52 cm/s PulmV Thompson Christian: 32.92 cm/s PulmV S/D Christian:  0.53 MITRAL VALVE:                       Normal Ranges: MV Vmax:    2.33 m/s  (<=1.3m/s) MV peak P.8 mmHg (<5mmHg) MV mean P.7 mmHg (<2mmHg) MV VTI:     60.69 cm  (10-13cm) MITRAL INSUFFICIENCY:                      Normal Ranges: MR VTI:  150.95 cm MR Vmax: 491.71 cm/s AORTIC VALVE:                                   Normal Ranges: AoV Vmax:                0.88 m/s (<=1.7m/s) AoV Peak PG:             3.1 mmHg (<20mmHg) AoV Mean P.0 mmHg (1.7-11.5mmHg) LVOT Max Christian:            0.68 m/s (<=1.1m/s) AoV VTI:                 14.53 cm (18-25cm) LVOT VTI:                12.65 cm LVOT Diameter:           1.98 cm  (1.8-2.4cm) AoV Area, VTI:           2.69 cm2 (2.5-5.5cm2) AoV Area,Vmax:           2.38 cm2 (2.5-4.5cm2) AoV Dimensionless Index: 0.87  RIGHT VENTRICLE: RV Basal 3.10 cm RV Mid   2.20 cm RV Major 6.7 cm TAPSE:   18.0 mm RV s'    0.10 m/s TRICUSPID VALVE/RVSP:                             Normal Ranges: Peak TR Velocity: 3.32 m/s RV Syst Pressure: 47.1 mmHg (< 30mmHg) IVC Diam:          2.21 cm PULMONIC VALVE:                      Normal Ranges: PV Max Christian: 0.7 m/s  (0.6-0.9m/s) PV Max P.7 mmHg Pulmonary Veins: PulmV Thompson Christian: 32.92 cm/s PulmV S/D Christian:  0.53 PulmV Sys Christian:  17.52 cm/s AORTA: Asc Ao Diam 3.01 cm  [id number] Morris Johnson MD Electronically signed on 3/28/2024 at 12:19:54 PM  ** Final **    === 24 ===    XR CHEST 1 VIEW    - Impression -  Moderate vascular congestion and interstitial edema.    MACRO:  None    Signed by: Mayr Robert 3/27/2024 11:13 AM  Dictation workstation:   [id number]  === 21 ===    CT HEAD WO IV CONTRAST    - Impression -  No acute intracranial process.  === 24 ===    XR CHEST 1 VIEW    - Impression -  Moderate vascular congestion and interstitial edema.    MACRO:  None    Signed by: Mary Robert 3/27/2024 11:13 AM  Dictation workstation:   [id number]      Additional results of the last 24 hours have been reviewed.    Dictated using [employer] Version 2.4  Proof read however unrecognized voice recognition errors may have occurred     Electronically signed by Marin Marcos DO on 24 at 2:33 PM

## 2024-03-28 NOTE — CARE PLAN
The patient's goals for the shift include No SOB and no pain.    The clinical goals for the shift include no SOB and no pain.

## 2024-03-28 NOTE — CARE PLAN
Problem: Pain - Adult  Goal: Verbalizes/displays adequate comfort level or baseline comfort level  Outcome: Progressing     Problem: Safety - Adult  Goal: Free from fall injury  Outcome: Progressing     Problem: Discharge Planning  Goal: Discharge to home or other facility with appropriate resources  Outcome: Progressing     Problem: Chronic Conditions and Co-morbidities  Goal: Patient's chronic conditions and co-morbidity symptoms are monitored and maintained or improved  Outcome: Progressing     Problem: Pain  Goal: My pain/discomfort is manageable  Outcome: Progressing     Problem: Safety  Goal: Patient will be injury free during hospitalization  Outcome: Progressing  Goal: I will remain free of falls  Outcome: Progressing     Problem: Daily Care  Goal: Daily care needs are met  Outcome: Progressing     Problem: Psychosocial Needs  Goal: Demonstrates ability to cope with hospitalization/illness  Outcome: Progressing  Goal: Collaborate with me, my family, and caregiver to identify my specific goals  Outcome: Progressing     Problem: Discharge Barriers  Goal: My discharge needs are met  Outcome: Progressing   The patient's goals for the shift include No SOB    The clinical goals for the shift include Patient will verbalize a decrease in SOB this shift    Over the shift, the patient did not make progress toward the following goals. Barriers to progression include pain. Recommendations to address these barriers include pain meds, and sleep.

## 2024-03-29 LAB
ALBUMIN SERPL BCP-MCNC: 3.7 G/DL (ref 3.4–5)
ANION GAP SERPL CALC-SCNC: 10 MMOL/L (ref 10–20)
APTT PPP: 59 SECONDS (ref 27–38)
BASOPHILS # BLD AUTO: 0.04 X10*3/UL (ref 0–0.1)
BASOPHILS NFR BLD AUTO: 0.4 %
BUN SERPL-MCNC: 13 MG/DL (ref 6–23)
CALCIUM SERPL-MCNC: 8.7 MG/DL (ref 8.6–10.3)
CHLORIDE SERPL-SCNC: 105 MMOL/L (ref 98–107)
CO2 SERPL-SCNC: 23 MMOL/L (ref 21–32)
CREAT SERPL-MCNC: 1.05 MG/DL (ref 0.5–1.3)
EGFRCR SERPLBLD CKD-EPI 2021: 79 ML/MIN/1.73M*2
EOSINOPHIL # BLD AUTO: 0.07 X10*3/UL (ref 0–0.7)
EOSINOPHIL NFR BLD AUTO: 0.7 %
ERYTHROCYTE [DISTWIDTH] IN BLOOD BY AUTOMATED COUNT: 13.8 % (ref 11.5–14.5)
GLUCOSE SERPL-MCNC: 97 MG/DL (ref 74–99)
HCT VFR BLD AUTO: 44.3 % (ref 41–52)
HGB BLD-MCNC: 14.7 G/DL (ref 13.5–17.5)
IMM GRANULOCYTES # BLD AUTO: 0.04 X10*3/UL (ref 0–0.7)
IMM GRANULOCYTES NFR BLD AUTO: 0.4 % (ref 0–0.9)
INR PPP: 1.5 (ref 0.9–1.1)
LYMPHOCYTES # BLD AUTO: 1.75 X10*3/UL (ref 1.2–4.8)
LYMPHOCYTES NFR BLD AUTO: 17.7 %
MAGNESIUM SERPL-MCNC: 1.69 MG/DL (ref 1.6–2.4)
MCH RBC QN AUTO: 29.1 PG (ref 26–34)
MCHC RBC AUTO-ENTMCNC: 33.2 G/DL (ref 32–36)
MCV RBC AUTO: 88 FL (ref 80–100)
MONOCYTES # BLD AUTO: 1.06 X10*3/UL (ref 0.1–1)
MONOCYTES NFR BLD AUTO: 10.7 %
NEUTROPHILS # BLD AUTO: 6.92 X10*3/UL (ref 1.2–7.7)
NEUTROPHILS NFR BLD AUTO: 70.1 %
NRBC BLD-RTO: 0 /100 WBCS (ref 0–0)
PHOSPHATE SERPL-MCNC: 2.2 MG/DL (ref 2.5–4.9)
PLATELET # BLD AUTO: 117 X10*3/UL (ref 150–450)
POTASSIUM SERPL-SCNC: 3.8 MMOL/L (ref 3.5–5.3)
PROTHROMBIN TIME: 17.3 SECONDS (ref 9.8–12.8)
RBC # BLD AUTO: 5.05 X10*6/UL (ref 4.5–5.9)
SODIUM SERPL-SCNC: 134 MMOL/L (ref 136–145)
UFH PPP CHRO-ACNC: 0.5 IU/ML
WBC # BLD AUTO: 9.9 X10*3/UL (ref 4.4–11.3)

## 2024-03-29 PROCEDURE — S4991 NICOTINE PATCH NONLEGEND: HCPCS | Performed by: INTERNAL MEDICINE

## 2024-03-29 PROCEDURE — 85610 PROTHROMBIN TIME: CPT | Performed by: NURSE PRACTITIONER

## 2024-03-29 PROCEDURE — 85520 HEPARIN ASSAY: CPT | Performed by: INTERNAL MEDICINE

## 2024-03-29 PROCEDURE — 99232 SBSQ HOSP IP/OBS MODERATE 35: CPT | Performed by: INTERNAL MEDICINE

## 2024-03-29 PROCEDURE — 2500000002 HC RX 250 W HCPCS SELF ADMINISTERED DRUGS (ALT 637 FOR MEDICARE OP, ALT 636 FOR OP/ED): Performed by: NURSE PRACTITIONER

## 2024-03-29 PROCEDURE — 2500000001 HC RX 250 WO HCPCS SELF ADMINISTERED DRUGS (ALT 637 FOR MEDICARE OP): Performed by: INTERNAL MEDICINE

## 2024-03-29 PROCEDURE — 85730 THROMBOPLASTIN TIME PARTIAL: CPT | Performed by: NURSE PRACTITIONER

## 2024-03-29 PROCEDURE — 36415 COLL VENOUS BLD VENIPUNCTURE: CPT | Performed by: INTERNAL MEDICINE

## 2024-03-29 PROCEDURE — 2500000004 HC RX 250 GENERAL PHARMACY W/ HCPCS (ALT 636 FOR OP/ED): Performed by: NURSE PRACTITIONER

## 2024-03-29 PROCEDURE — 2500000004 HC RX 250 GENERAL PHARMACY W/ HCPCS (ALT 636 FOR OP/ED): Performed by: INTERNAL MEDICINE

## 2024-03-29 PROCEDURE — 83735 ASSAY OF MAGNESIUM: CPT | Performed by: INTERNAL MEDICINE

## 2024-03-29 PROCEDURE — 85025 COMPLETE CBC W/AUTO DIFF WBC: CPT | Performed by: INTERNAL MEDICINE

## 2024-03-29 PROCEDURE — 2500000002 HC RX 250 W HCPCS SELF ADMINISTERED DRUGS (ALT 637 FOR MEDICARE OP, ALT 636 FOR OP/ED): Performed by: INTERNAL MEDICINE

## 2024-03-29 PROCEDURE — C9113 INJ PANTOPRAZOLE SODIUM, VIA: HCPCS | Performed by: INTERNAL MEDICINE

## 2024-03-29 PROCEDURE — 2500000001 HC RX 250 WO HCPCS SELF ADMINISTERED DRUGS (ALT 637 FOR MEDICARE OP): Performed by: NURSE PRACTITIONER

## 2024-03-29 PROCEDURE — 1200000002 HC GENERAL ROOM WITH TELEMETRY DAILY

## 2024-03-29 PROCEDURE — 80069 RENAL FUNCTION PANEL: CPT | Performed by: INTERNAL MEDICINE

## 2024-03-29 RX ORDER — DIGOXIN 125 MCG
125 TABLET ORAL DAILY
Status: DISCONTINUED | OUTPATIENT
Start: 2024-03-29 | End: 2024-03-31 | Stop reason: HOSPADM

## 2024-03-29 RX ORDER — METOPROLOL SUCCINATE 50 MG/1
50 TABLET, EXTENDED RELEASE ORAL ONCE
Status: DISCONTINUED | OUTPATIENT
Start: 2024-03-29 | End: 2024-03-30

## 2024-03-29 RX ORDER — METOPROLOL SUCCINATE 50 MG/1
100 TABLET, EXTENDED RELEASE ORAL 2 TIMES DAILY
Status: DISCONTINUED | OUTPATIENT
Start: 2024-03-29 | End: 2024-03-31 | Stop reason: HOSPADM

## 2024-03-29 RX ADMIN — ACETAMINOPHEN 650 MG: 325 TABLET ORAL at 03:32

## 2024-03-29 RX ADMIN — HEPARIN SODIUM 900 UNITS/HR: 10000 INJECTION, SOLUTION INTRAVENOUS at 13:36

## 2024-03-29 RX ADMIN — Medication 1000 MG: at 12:53

## 2024-03-29 RX ADMIN — METOPROLOL SUCCINATE 100 MG: 50 TABLET, EXTENDED RELEASE ORAL at 20:46

## 2024-03-29 RX ADMIN — SENNOSIDES AND DOCUSATE SODIUM 2 TABLET: 8.6; 5 TABLET ORAL at 20:46

## 2024-03-29 RX ADMIN — METOPROLOL SUCCINATE 50 MG: 50 TABLET, EXTENDED RELEASE ORAL at 08:48

## 2024-03-29 RX ADMIN — PANTOPRAZOLE SODIUM 40 MG: 40 INJECTION, POWDER, FOR SOLUTION INTRAVENOUS at 03:16

## 2024-03-29 RX ADMIN — Medication 1000 MG: at 20:46

## 2024-03-29 RX ADMIN — Medication 5 MG: at 20:46

## 2024-03-29 RX ADMIN — DIGOXIN 125 MCG: 125 TABLET ORAL at 09:07

## 2024-03-29 RX ADMIN — NICOTINE 1 PATCH: 14 PATCH, EXTENDED RELEASE TRANSDERMAL at 08:48

## 2024-03-29 RX ADMIN — Medication 1000 MG: at 08:47

## 2024-03-29 RX ADMIN — Medication 1000 MG: at 16:33

## 2024-03-29 RX ADMIN — WARFARIN SODIUM 10 MG: 5 TABLET ORAL at 17:15

## 2024-03-29 ASSESSMENT — COGNITIVE AND FUNCTIONAL STATUS - GENERAL
DAILY ACTIVITIY SCORE: 24
MOBILITY SCORE: 24
MOBILITY SCORE: 24
DAILY ACTIVITIY SCORE: 24

## 2024-03-29 ASSESSMENT — PAIN SCALES - GENERAL
PAINLEVEL_OUTOF10: 0 - NO PAIN
PAINLEVEL_OUTOF10: 0 - NO PAIN
PAINLEVEL_OUTOF10: 3

## 2024-03-29 ASSESSMENT — PAIN - FUNCTIONAL ASSESSMENT
PAIN_FUNCTIONAL_ASSESSMENT: 0-10
PAIN_FUNCTIONAL_ASSESSMENT: 0-10

## 2024-03-29 ASSESSMENT — PAIN DESCRIPTION - LOCATION: LOCATION: HEAD

## 2024-03-29 NOTE — PROGRESS NOTES
[facility] Hospitalist Progress Note       [phone]: Please page me for patient care issues.  [phone]: Please page night hospitalist for any issues.     Farooq Lang  :  1960(63 y.o.)  MRN:  [id number]  PCP: Munir Amado DO      Principal Problem:    New onset atrial fibrillation (CMS/HCC)      Assessment and Plan:     Farooq Lang is a 63 y.o. male with PMH of tobacco abuse presented from home with 1 week of worsening dyspnea.  Associated with fatigue.  Worse with exertion improved with rest.  Patient denies chest pain, palpitation, lower extremity edema, fever, rigor.  Patient is healthy at baseline and does not take any medication.  Upon arrival in ED patient was found to be in A-fib with RVR which is new for patient      #New onset A-fib with RVR likely due to severe MS  # Acute HFmrEF (45%, 3/28/24)  # New severe mitral valve stenosis  #Acute cardiogenic pulmonary edema  #ELIE, resolved  # Thrombocytopenia w/o s/o bleeding  #Elevated troponin not due to ACS/MI  #Tobacco abuse  -Started on metoprolol  -switched apixaban to coumadin due to valvular afib  -hep gtt pending therapeutic INR of 2-3  -PRN IV lasix for resp distress 2/2 pulm edema  -Tobacco cessation encouraged, nicotine therapy provided  -cards oupt [facility] and pt to be referred to valve clinic outpatient  -CS recs appreciated: Cardiology    Disposition: Discharge home on 3/30 pending therapeutic INR of 2-3    DVT Prophylaxis:  Heparin gtt and Coumadin    Code status: Full Code  Diet: Adult diet Regular    Level of MDM:  High    I personally examined the patient, I personally reviewed chart, data, labs radiology reports, and I personally reviewed and agree with residentphysical exam, assessment/plan with my noted corrections if any    Family Communication  Number :  Name of Designated Family Representative:      Total time spent: 35 minutes, of total time providing counseling or in coordination of care. Total time on this day of visit  includes record and documentation review before and after visit including documentation and time not explicitly included on EMR time stamp      Subjective:   Interval History:  HPI  The patient complains of dyspnea  The patient feels their symptoms areimproving  no events or new concerns    Scheduled Meds:melatonin, 5 mg, oral, Nightly  metoprolol succinate XL, 50 mg, oral, BID  nicotine, 1 patch, transdermal, Daily  pantoprazole, 40 mg, oral, Daily before breakfast   Or  pantoprazole, 40 mg, intravenous, Daily before breakfast  polyethylene glycol, 17 g, oral, Daily  sennosides-docusate sodium, 2 tablet, oral, BID  warfarin, 10 mg, oral, Daily      Continuous Infusions:heparin, 0-4,000 Units/hr, Last Rate: 900 Units/hr (24)      PRN Meds:PRN medications: acetaminophen **OR** acetaminophen **OR** acetaminophen, benzocaine-menthol, dextromethorphan-guaifenesin, heparin, metoprolol, ondansetron, traZODone    Review of Systems   All other systems reviewed and are negative.    Interval Pertinent History:  Social History     Tobacco Use    Smoking status: Every Day     Packs/day: 0.50     Years: 49.00     Additional pack years: 0.00     Total pack years: 24.50     Types: Cigarettes     Start date: 1975     Passive exposure: Current    Smokeless tobacco: Never   Substance Use Topics    Alcohol use: Not on file         Objective:   Patient Vitals for the past 24 hrs:   BP Temp Temp src Pulse Resp SpO2   24 0405 111/74 36.4 °C (97.5 °F) Temporal 86 18 95 %   24 0000 117/79 36.8 °C (98.2 °F) Temporal 92 18 97 %   24 1957 119/82 36.8 °C (98.2 °F) Temporal 91 17 95 %   24 1800 110/71 36.3 °C (97.3 °F) -- 85 18 96 %   24 1402 119/81 36.3 °C (97.3 °F) -- 99 18 99 %   24 1100 128/76 36.2 °C (97.2 °F) -- 99 18 95 %   24 0915 -- -- -- -- -- 96 %         Average, Min, and Max for last 24 hours Vitals:  TEMPERATURE:  Temp  Av.4 °C (97.6 °F)  Min: 36.2 °C (97.2 °F)  Max:  36.8 °C (98.2 °F)    RESPIRATIONS RANGE: Resp  Av.8  Min: 17  Max: 18    PULSE RANGE: Pulse  Av  Min: 85  Max: 99    BLOOD PRESSURE RANGE:  Systolic (24hrs), Av , Min:110 , Max:128   ; Diastolic (24hrs), Av, Min:71, Max:82      PULSE OXIMETRY RANGE: SpO2  Av.1 %  Min: 95 %  Max: 99 %    I/O last 3 completed shifts:  In: 106.2 (1.9 mL/kg) [P.O.:60; I.V.:46.2 (0.8 mL/kg)]  Out: - (0 mL/kg)   Weight: 57.2 kg   Weight change:      Physical Exam  Vitals and nursing note reviewed.   Constitutional:       General: He is not in acute distress.     Appearance: Normal appearance. He is not ill-appearing.   HENT:      Head: Normocephalic and atraumatic.      Right Ear: External ear normal.      Left Ear: External ear normal.      Nose: Nose normal. No congestion or rhinorrhea.      Mouth/Throat:      Mouth: Mucous membranes are moist.   Eyes:      Extraocular Movements: Extraocular movements intact.      Pupils: Pupils are equal, round, and reactive to light.   Cardiovascular:      Rate and Rhythm: Tachycardia present. Rhythm irregularly irregular.      Pulses: Normal pulses.      Heart sounds: Murmur heard.   Pulmonary:      Effort: Pulmonary effort is normal.      Breath sounds: Normal breath sounds.   Abdominal:      General: Abdomen is flat. Bowel sounds are normal.      Palpations: Abdomen is soft.   Musculoskeletal:         General: Normal range of motion.      Cervical back: Normal range of motion and neck supple.      Right lower leg: No edema.      Left lower leg: No edema.   Skin:     General: Skin is warm and dry.      Capillary Refill: Capillary refill takes less than 2 seconds.   Neurological:      General: No focal deficit present.      Mental Status: He is alert and oriented to person, place, and time. Mental status is at baseline.   Psychiatric:         Mood and Affect: Mood normal.         Thought Content: Thought content normal.         Judgment: Judgment normal.         Lab Results    Component Value Date     (L) 03/29/2024    K 3.8 03/29/2024     03/29/2024    CO2 23 03/29/2024    BUN 13 03/29/2024    CREATININE 1.05 03/29/2024    GLUCOSE 97 03/29/2024    CALCIUM 8.7 03/29/2024    PROT 6.4 03/28/2024    BILITOT 1.0 03/28/2024    ALKPHOS 77 03/28/2024    AST 10 03/28/2024    ALT 15 03/28/2024    GLOB 3.1 01/18/2023       Lab Results   Component Value Date    WBC 9.9 03/29/2024    HGB 14.7 03/29/2024    HCT 44.3 03/29/2024    MCV 88 03/29/2024     (L) 03/29/2024    LYMPHOPCT 17.7 03/29/2024    RBC 5.05 03/29/2024    MCH 29.1 03/29/2024    MCHC 33.2 03/29/2024    RDW 13.8 03/29/2024       Lab Results   Component Value Date    TSH 1.58 03/27/2024     Lab Results   Component Value Date    LACTATE 0.8 07/26/2021    TROPONINI <0.02 11/02/2021     (H) 03/27/2024    DDIMER 476 03/27/2024    INR 1.5 (H) 03/29/2024       IMAGES:  No results found for this or any previous visit (from the past 4464 hour(s)).     Transthoracic Echo (TTE) Complete    Result Date: 3/28/2024   [facility], [address] [place] [address]               Tel [phone] and Fax [phone] TRANSTHORACIC ECHOCARDIOGRAM REPORT  Patient Name:      MADSYON Barclay Physician:    [id number] Morris Johnson MD Study Date:        3/28/2024            Ordering Provider:    [id number] CHARISSA HILLIARD MRN/PID:           [id number]             Fellow: Accession#:        [id number]         Nurse:                Natalia Aleman RN Date of Birth/Age: 1960 / 63 years Sonographer:          Helen Linda RDCS Gender:            M                    Additional Staff: Height:            167.64 cm            Admit Date:           3/27/2024 Weight:            57.15 kg             Admission Status:     Inpatient -                                                               Routine BSA / BMI:         1.64 m2 / 20.34      Encounter#:            [id number]                    kg/m2                                         Department Location:  [facility]                                                               Invasive Blood Pressure: 124 /85 mmHg Study Type:    TRANSTHORACIC ECHO (TTE) COMPLETE Diagnosis/ICD: Unspecified atrial fibrillation-I48.91 Indication:    Atrial Fibrillation CPT Code:      Echo Complete w Full Doppler-[id number] Patient History: Smoker:            Current. Pertinent History: Chest Pain and Dyspnea. Dizziness. Study Detail: The following Echo studies were performed: 2D, M-Mode, Doppler and               color flow. Definity used as a contrast agent for endocardial               border definition. Total contrast used for this procedure was 1.0               mL via IV push. The patient was awake.  Critical Event Critical Event: Test was completed as per department protocol. Critical Finding: Mitral stenosis. Time Test was Completed: 10:45:33 AM Notified: Patricia Morrison NP. Attending notification time: 11:01:31 AM  PHYSICIAN INTERPRETATION: Left Ventricle: The left ventricular systolic function is mildly decreased, with an estimated ejection fraction of 45%. There is global hypokinesis of the left ventricle with minor regional variations. The left ventricular cavity size is mildly dilated. Left ventricular diastolic filling was indeterminate. Left Atrium: The left atrium is severely dilated. Right Ventricle: The right ventricle is normal in size. There is normal right ventricular global systolic function. Right Atrium: The right atrium is mildly dilated. Aortic Valve: The aortic valve is trileaflet. There is mild aortic valve cusp calcification. There is trace to mild aortic valve regurgitation. The peak instantaneous gradient of the aortic valve is 3.1 mmHg. The mean gradient of the aortic valve is 2.0 mmHg. Mitral Valve: The mitral valve is moderately thickened. 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 There is  evidence of severe mitral valve stenosis. The doppler estimated mean and peak diastolic pressure gradients are 12.7 mmHg and 21.8 mmHg respectively. There is moderate mitral annular calcification. There is moderate mitral valve regurgitation. Tricuspid Valve: The tricuspid valve is structurally normal. There is mild tricuspid regurgitation. Pulmonic Valve: The pulmonic valve is not well visualized. The pulmonic valve regurgitation was not well visualized. Pericardium: There is no pericardial effusion noted. Aorta: The aortic root is normal. Pulmonary Artery: The tricuspid regurgitant velocity is 3.32 m/s, and with an estimated right atrial pressure of 3 mmHg, the estimated pulmonary artery pressure is mild to moderately elevated with the RVSP at 47.1 mmHg.  CONCLUSIONS:  1. Left ventricular systolic function is mildly decreased with a 45% estimated ejection fraction.  2. The left atrium is severely dilated.  3. The mitral valve is moderately thickened. 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  4. There is moderate mitral annular calcification.  5. There is severe mitral valve stenosis.  6. Moderate mitral valve regurgitation.  7. Mild to moderately elevated pulmonary artery pressure.  8. There is global hypokinesis of the left ventricle with minor regional variations. QUANTITATIVE DATA SUMMARY: 2D MEASUREMENTS:                          Normal Ranges: IVSd:          0.60 cm   (0.6-1.1cm) LVPWd:         0.90 cm   (0.6-1.1cm) LVIDd:         4.47 cm   (3.9-5.9cm) LVIDs:         3.60 cm LV Mass Index: 63.0 g/m2 LV % FS        19.4 % LA VOLUME:                               Normal Ranges: LA Vol A4C:        71.6 ml    (22+/-6mL/m2) LA Vol A2C:        67.4 ml LA Vol BP:         70.7 ml LA Vol Index A4C:  43.6 ml/m2 LA Vol Index A2C:  41.0 ml/m2 LA Vol Index BP:   43.0 ml/m2 LA Area A4C:       22.3 cm2 LA Area A2C:       22.0 cm2 LA Major Axis A4C: 5.9 cm LA Major Axis A2C: 6.1 cm LA Volume Index:   40.8 ml/m2 LA Vol A4C:         66.6 ml LA Vol A2C:        64.7 ml RA VOLUME BY A/L METHOD:                       Normal Ranges: RA Area A4C: 17.6 cm2 M-MODE MEASUREMENTS:                  Normal Ranges: Ao Root: 3.20 cm (2.0-3.7cm) LAs:     5.42 cm (2.7-4.0cm) AORTA MEASUREMENTS:                      Normal Ranges: Ao Sinus, d: 3.30 cm (2.1-3.5cm) Asc Ao, d:   3.00 cm (2.1-3.4cm) LV SYSTOLIC FUNCTION BY 2D PLANIMETRY (MOD):                     Normal Ranges: EF-A4C View: 41.3 % (>=55%) EF-A2C View: 40.4 % EF-Biplane:  41.5 % LV DIASTOLIC FUNCTION:                            Normal Ranges: MV Peak E:      1.85 m/s   (0.7-1.2 m/s) MV e'           0.05 m/s   (>8.0) MV lateral e'   0.05 m/s MV medial e'    0.04 m/s E/e' Ratio:     36.95      (<8.0) PulmV Sys Christian:  17.52 cm/s PulmV Thompson Christian: 32.92 cm/s PulmV S/D Christian:  0.53 MITRAL VALVE:                       Normal Ranges: MV Vmax:    2.33 m/s  (<=1.3m/s) MV peak P.8 mmHg (<5mmHg) MV mean P.7 mmHg (<2mmHg) MV VTI:     60.69 cm  (10-13cm) MITRAL INSUFFICIENCY:                      Normal Ranges: MR VTI:  150.95 cm MR Vmax: 491.71 cm/s AORTIC VALVE:                                   Normal Ranges: AoV Vmax:                0.88 m/s (<=1.7m/s) AoV Peak PG:             3.1 mmHg (<20mmHg) AoV Mean P.0 mmHg (1.7-11.5mmHg) LVOT Max Christian:            0.68 m/s (<=1.1m/s) AoV VTI:                 14.53 cm (18-25cm) LVOT VTI:                12.65 cm LVOT Diameter:           1.98 cm  (1.8-2.4cm) AoV Area, VTI:           2.69 cm2 (2.5-5.5cm2) AoV Area,Vmax:           2.38 cm2 (2.5-4.5cm2) AoV Dimensionless Index: 0.87  RIGHT VENTRICLE: RV Basal 3.10 cm RV Mid   2.20 cm RV Major 6.7 cm TAPSE:   18.0 mm RV s'    0.10 m/s TRICUSPID VALVE/RVSP:                             Normal Ranges: Peak TR Velocity: 3.32 m/s RV Syst Pressure: 47.1 mmHg (< 30mmHg) IVC Diam:         2.21 cm PULMONIC VALVE:                      Normal Ranges: PV Max Crhistian: 0.7 m/s  (0.6-0.9m/s) PV Max P.7 mmHg  Pulmonary Veins: PulmV Thompson Christian: 32.92 cm/s PulmV S/D Christian:  0.53 PulmV Sys Christian:  17.52 cm/s AORTA: Asc Ao Diam 3.01 cm  [id number] Morris Johnson MD Electronically signed on 3/28/2024 at 12:19:54 PM  ** Final **    === 03/27/24 ===    XR CHEST 1 VIEW    - Impression -  Moderate vascular congestion and interstitial edema.    MACRO:  None    Signed by: Mary Robert 3/27/2024 11:13 AM  Dictation workstation:   [id number]  === 11/02/21 ===    CT HEAD WO IV CONTRAST    - Impression -  No acute intracranial process.  === 03/27/24 ===    XR CHEST 1 VIEW    - Impression -  Moderate vascular congestion and interstitial edema.    MACRO:  None    Signed by: Mary Robert 3/27/2024 11:13 AM  Dictation workstation:   [id number]      Additional results of the last 24 hours have been reviewed.    Dictated using [employer] Version 2.4  Proof read however unrecognized voice recognition errors may have occurred     Electronically signed by Marin Marcos DO on 03/29/24 at 8:11 AM

## 2024-03-29 NOTE — PROGRESS NOTES
03/29/24 1524   Discharge Planning   Living Arrangements Spouse/significant other   Support Systems Spouse/significant other   Assistance Needed A&Ox3, independent with ADLs with no DME; drives, room air baseline   Type of Residence Private residence   Home or Post Acute Services None   Patient expects to be discharged to: Home no needs, plan on patient discharging on Coumadin   Does the patient need discharge transport arranged? No

## 2024-03-29 NOTE — DOCUMENTATION CLARIFICATION NOTE
"    PATIENT:               MADYSON WRIGHT  ACCT #:                  [id number]  MRN:                       [id number]  :                       1960  ADMIT DATE:       3/27/2024 10:15 AM  DISCH DATE:  RESPONDING PROVIDER #:        [id number]          PROVIDER RESPONSE TEXT:    no myocardial injury    CDI QUERY TEXT:    [facility]_Abnormal Studies      Instruction:    Based on your assessment of the patient and the clinical information, please provide the requested documentation by clicking on the appropriate radio button and enter any additional information if prompted.    Question: Is there a diagnosis indicative of the lab values or image study      When answering this query, please exercise your independent professional judgment. The fact that a question is being asked, does not imply that any particular answer is desired or expected.    The patient's clinical indicators include:  Clinical Information: 64 y/o male with a BMI of 20.34 presented with weakness x 1 week. He was taking out his trash and became very short of breath and weak.    Clinical Indicators:    Per H and P 3/27/24 Agidi \"New onset A-fib with RVR\" and \"Elevated troponin not due to ACS/MI\"    Per Labs 3/27/24 through 3/29/24    Troponins 26 and 25    Per XR chest \"Moderate vascular congestion and interstitial edema.\"      Treatment: Cardiology consult, Toprol-XL 50 mg table one then 100mg 2 times a day, Lanoxin 250 mcg injection once then 125mcg table daily, Lopressor tablet 25mg 2 times a day, repeat labs, and supportive care    Risk Factors: 64 y/o with New onset AFIB with RVR and elevated troponins  Options provided:  -- Chronic Myocardial injury  -- Acute Myocardial injury  -- Other - I will add my own diagnosis  -- Refer to Clinical Documentation Reviewer    Query created by: Royal Aleman on 3/29/2024 9:36 AM      Electronically signed by:  CHARISSA HILLIARD DO 3/29/2024 9:49 AM          "

## 2024-03-29 NOTE — CARE PLAN
The patient's goals for the shift include No SOB    The clinical goals for the shift include Heparin drip should be continued as a bridge to therapeutic coumadin therapy      Problem: Pain - Adult  Goal: Verbalizes/displays adequate comfort level or baseline comfort level  Outcome: Progressing     Problem: Safety - Adult  Goal: Free from fall injury  Outcome: Progressing     Problem: Discharge Planning  Goal: Discharge to home or other facility with appropriate resources  Outcome: Progressing     Problem: Chronic Conditions and Co-morbidities  Goal: Patient's chronic conditions and co-morbidity symptoms are monitored and maintained or improved  Outcome: Progressing     Problem: Pain  Goal: My pain/discomfort is manageable  Outcome: Progressing     Problem: Safety  Goal: Patient will be injury free during hospitalization  Outcome: Progressing  Goal: I will remain free of falls  Outcome: Progressing     Problem: Daily Care  Goal: Daily care needs are met  Outcome: Progressing     Problem: Psychosocial Needs  Goal: Demonstrates ability to cope with hospitalization/illness  Outcome: Progressing  Goal: Collaborate with me, my family, and caregiver to identify my specific goals  Outcome: Progressing     Problem: Discharge Barriers  Goal: My discharge needs are met  Outcome: Progressing

## 2024-03-29 NOTE — PROGRESS NOTES
Farooq Lang is a 63 y.o. male on day 2 of admission presenting with New onset atrial fibrillation (CMS/HCC).      Subjective   He is sitting up in the bed, states he feels a little better today. Telemetry reviewed, HR 80's at rest, not increasing as much with ambulation.       Review of systems:  Constitutional: negative for fever, chills, or malaise  Neuro: negative for dizziness, headache, numbness, tingling  ENT: Negative for nasal congestion or sore throat  Resp: negative for cough, or wheezing  CV: negative for chest pain, palpitations  GI: negative for abd pain, nausea, vomiting or diarrhea  : negative for dysuria, frequency, or urgency  Skin: negative for lesions, wounds, or rash  Musculoskeletal: Negative for weakness, myalgia, or arthralgia  Endocrine: Negative for polyuria or polydipsia         Objective   Constitutional: Well developed, awake/alert/oriented x3, no distress, alert and cooperative  Eyes: PERRL, EOMI, clear sclera  ENMT: mucous membranes moist, no apparent injury, no lesions seen  Head/Neck: Neck supple, no apparent injury, thyroid without mass or tenderness, No JVD, trachea midline, no bruits  Respiratory/Thorax: Patent airways, CTAB, normal breath sounds with good chest expansion, thorax symmetric  Cardiovascular: irregular rhythm, no murmurs, 2+ equal pulses of the extremities, normal S 1and S 2  Gastrointestinal: Nondistended, soft, non-tender, no rebound tenderness or guarding, no masses palpable, no organomegaly, +BS, no bruits  Musculoskeletal: ROM intact, no joint swelling, normal strength  Extremities: normal extremities, no cyanosis edema, contusions or wounds, no clubbing  Neurological: alert and oriented x3, intact senses, motor, response and reflexes, normal strength  Lymphatic: No significant lymphadenopathy  Psychological: Appropriate mood and behavior  Skin: Warm and dry, no lesions, no rashes      Last Recorded Vitals  /88 (Patient Position: Standing)   Pulse 83  "  Temp 36.2 °C (97.2 °F)   Resp 18   Ht 1.676 m (5' 6\")   Wt 57.2 kg (126 lb)   SpO2 92%   BMI 20.34 kg/m²     Intake/Output last 3 Shifts:  I/O last 3 completed shifts:  In: 106.2 (1.9 mL/kg) [P.O.:60; I.V.:46.2 (0.8 mL/kg)]  Out: - (0 mL/kg)   Weight: 57.2 kg   No intake/output data recorded.    Relevant Results  Scheduled medications  digoxin, 125 mcg, oral, Daily  melatonin, 5 mg, oral, Nightly  metoprolol succinate XL, 100 mg, oral, BID  metoprolol succinate XL, 50 mg, oral, Once  nicotine, 1 patch, transdermal, Daily  pantoprazole, 40 mg, oral, Daily before breakfast   Or  pantoprazole, 40 mg, intravenous, Daily before breakfast  polyethylene glycol, 17 g, oral, Daily  potassium phosphate (monobasic), 1,000 mg, oral, With meals & nightly  sennosides-docusate sodium, 2 tablet, oral, BID  warfarin, 10 mg, oral, Daily      Continuous medications  heparin, 0-4,000 Units/hr, Last Rate: 900 Units/hr (03/29/24 1336)      PRN medications  PRN medications: acetaminophen **OR** acetaminophen **OR** acetaminophen, benzocaine-menthol, dextromethorphan-guaifenesin, heparin, metoprolol, ondansetron, traZODone    Results for orders placed or performed during the hospital encounter of 03/27/24 (from the past 24 hour(s))   Heparin Assay, UFH   Result Value Ref Range    Heparin Unfractionated 0.9 See Comment Below for Therapeutic Ranges IU/mL   Heparin Assay, UFH   Result Value Ref Range    Heparin Unfractionated 0.6 See Comment Below for Therapeutic Ranges IU/mL   Renal Function Panel   Result Value Ref Range    Glucose 97 74 - 99 mg/dL    Sodium 134 (L) 136 - 145 mmol/L    Potassium 3.8 3.5 - 5.3 mmol/L    Chloride 105 98 - 107 mmol/L    Bicarbonate 23 21 - 32 mmol/L    Anion Gap 10 10 - 20 mmol/L    Urea Nitrogen 13 6 - 23 mg/dL    Creatinine 1.05 0.50 - 1.30 mg/dL    eGFR 79 >60 mL/min/1.73m*2    Calcium 8.7 8.6 - 10.3 mg/dL    Phosphorus 2.2 (L) 2.5 - 4.9 mg/dL    Albumin 3.7 3.4 - 5.0 g/dL   CBC and Auto " Differential   Result Value Ref Range    WBC 9.9 4.4 - 11.3 x10*3/uL    nRBC 0.0 0.0 - 0.0 /100 WBCs    RBC 5.05 4.50 - 5.90 x10*6/uL    Hemoglobin 14.7 13.5 - 17.5 g/dL    Hematocrit 44.3 41.0 - 52.0 %    MCV 88 80 - 100 fL    MCH 29.1 26.0 - 34.0 pg    MCHC 33.2 32.0 - 36.0 g/dL    RDW 13.8 11.5 - 14.5 %    Platelets 117 (L) 150 - 450 x10*3/uL    Neutrophils % 70.1 40.0 - 80.0 %    Immature Granulocytes %, Automated 0.4 0.0 - 0.9 %    Lymphocytes % 17.7 13.0 - 44.0 %    Monocytes % 10.7 2.0 - 10.0 %    Eosinophils % 0.7 0.0 - 6.0 %    Basophils % 0.4 0.0 - 2.0 %    Neutrophils Absolute 6.92 1.20 - 7.70 x10*3/uL    Immature Granulocytes Absolute, Automated 0.04 0.00 - 0.70 x10*3/uL    Lymphocytes Absolute 1.75 1.20 - 4.80 x10*3/uL    Monocytes Absolute 1.06 (H) 0.10 - 1.00 x10*3/uL    Eosinophils Absolute 0.07 0.00 - 0.70 x10*3/uL    Basophils Absolute 0.04 0.00 - 0.10 x10*3/uL   Magnesium   Result Value Ref Range    Magnesium 1.69 1.60 - 2.40 mg/dL   Protime-INR   Result Value Ref Range    Protime 17.3 (H) 9.8 - 12.8 seconds    INR 1.5 (H) 0.9 - 1.1   aPTT - baseline   Result Value Ref Range    aPTT 59 (H) 27 - 38 seconds   Heparin Assay   Result Value Ref Range    Heparin Unfractionated 0.5 See Comment Below for Therapeutic Ranges IU/mL       Transthoracic Echo (TTE) Complete   Final Result      XR chest 1 view   Final Result   Moderate vascular congestion and interstitial edema.        MACRO:   None        Signed by: Mary Robert 3/27/2024 11:13 AM   Dictation workstation:   [id number]          Transthoracic Echo (TTE) Complete    Result Date: 3/28/2024   [facility], [address] [place] [address]               Tel [phone] and Fax [phone] TRANSTHORACIC ECHOCARDIOGRAM REPORT  Patient Name:      MADYSON Barclay Physician:    [id number] Morris Johnson MD Study Date:        3/28/2024            Ordering Provider:     [id number] CHARISSA HILLIARD MRN/PID:           [id number]             Fellow: Accession#:        [id number]         Nurse:                Natalia Aleman RN Date of Birth/Age: 1960 / 63 years Sonographer:          Helen Linda RDCS Gender:            M                    Additional Staff: Height:            167.64 cm            Admit Date:           3/27/2024 Weight:            57.15 kg             Admission Status:     Inpatient -                                                               Routine BSA / BMI:         1.64 m2 / 20.34      Encounter#:           [id number]                    kg/m2                                         Department Location:  [facility] Non                                                               Invasive Blood Pressure: 124 /85 mmHg Study Type:    TRANSTHORACIC ECHO (TTE) COMPLETE Diagnosis/ICD: Unspecified atrial fibrillation-I48.91 Indication:    Atrial Fibrillation CPT Code:      Echo Complete w Full Doppler-[id number] Patient History: Smoker:            Current. Pertinent History: Chest Pain and Dyspnea. Dizziness. Study Detail: The following Echo studies were performed: 2D, M-Mode, Doppler and               color flow. Definity used as a contrast agent for endocardial               border definition. Total contrast used for this procedure was 1.0               mL via IV push. The patient was awake.  Critical Event Critical Event: Test was completed as per department protocol. Critical Finding: Mitral stenosis. Time Test was Completed: 10:45:33 AM Notified: Patricia Morrison NP. Attending notification time: 11:01:31 AM  PHYSICIAN INTERPRETATION: Left Ventricle: The left ventricular systolic function is mildly decreased, with an estimated ejection fraction of 45%. There is global hypokinesis of the left ventricle with minor regional variations. The left ventricular cavity size is mildly dilated. Left ventricular diastolic filling was indeterminate. Left Atrium: The left  atrium is severely dilated. Right Ventricle: The right ventricle is normal in size. There is normal right ventricular global systolic function. Right Atrium: The right atrium is mildly dilated. Aortic Valve: The aortic valve is trileaflet. There is mild aortic valve cusp calcification. There is trace to mild aortic valve regurgitation. The peak instantaneous gradient of the aortic valve is 3.1 mmHg. The mean gradient of the aortic valve is 2.0 mmHg. Mitral Valve: The mitral valve is moderately thickened. 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 There is evidence of severe mitral valve stenosis. The doppler estimated mean and peak diastolic pressure gradients are 12.7 mmHg and 21.8 mmHg respectively. There is moderate mitral annular calcification. There is moderate mitral valve regurgitation. Tricuspid Valve: The tricuspid valve is structurally normal. There is mild tricuspid regurgitation. Pulmonic Valve: The pulmonic valve is not well visualized. The pulmonic valve regurgitation was not well visualized. Pericardium: There is no pericardial effusion noted. Aorta: The aortic root is normal. Pulmonary Artery: The tricuspid regurgitant velocity is 3.32 m/s, and with an estimated right atrial pressure of 3 mmHg, the estimated pulmonary artery pressure is mild to moderately elevated with the RVSP at 47.1 mmHg.  CONCLUSIONS:  1. Left ventricular systolic function is mildly decreased with a 45% estimated ejection fraction.  2. The left atrium is severely dilated.  3. The mitral valve is moderately thickened. 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  4. There is moderate mitral annular calcification.  5. There is severe mitral valve stenosis.  6. Moderate mitral valve regurgitation.  7. Mild to moderately elevated pulmonary artery pressure.  8. There is global hypokinesis of the left ventricle with minor regional variations. QUANTITATIVE DATA SUMMARY: 2D MEASUREMENTS:                          Normal Ranges: IVSd:           0.60 cm   (0.6-1.1cm) LVPWd:         0.90 cm   (0.6-1.1cm) LVIDd:         4.47 cm   (3.9-5.9cm) LVIDs:         3.60 cm LV Mass Index: 63.0 g/m2 LV % FS        19.4 % LA VOLUME:                               Normal Ranges: LA Vol A4C:        71.6 ml    (22+/-6mL/m2) LA Vol A2C:        67.4 ml LA Vol BP:         70.7 ml LA Vol Index A4C:  43.6 ml/m2 LA Vol Index A2C:  41.0 ml/m2 LA Vol Index BP:   43.0 ml/m2 LA Area A4C:       22.3 cm2 LA Area A2C:       22.0 cm2 LA Major Axis A4C: 5.9 cm LA Major Axis A2C: 6.1 cm LA Volume Index:   40.8 ml/m2 LA Vol A4C:        66.6 ml LA Vol A2C:        64.7 ml RA VOLUME BY A/L METHOD:                       Normal Ranges: RA Area A4C: 17.6 cm2 M-MODE MEASUREMENTS:                  Normal Ranges: Ao Root: 3.20 cm (2.0-3.7cm) LAs:     5.42 cm (2.7-4.0cm) AORTA MEASUREMENTS:                      Normal Ranges: Ao Sinus, d: 3.30 cm (2.1-3.5cm) Asc Ao, d:   3.00 cm (2.1-3.4cm) LV SYSTOLIC FUNCTION BY 2D PLANIMETRY (MOD):                     Normal Ranges: EF-A4C View: 41.3 % (>=55%) EF-A2C View: 40.4 % EF-Biplane:  41.5 % LV DIASTOLIC FUNCTION:                            Normal Ranges: MV Peak E:      1.85 m/s   (0.7-1.2 m/s) MV e'           0.05 m/s   (>8.0) MV lateral e'   0.05 m/s MV medial e'    0.04 m/s E/e' Ratio:     36.95      (<8.0) PulmV Sys Christian:  17.52 cm/s PulmV Thompson Christian: 32.92 cm/s PulmV S/D Christian:  0.53 MITRAL VALVE:                       Normal Ranges: MV Vmax:    2.33 m/s  (<=1.3m/s) MV peak P.8 mmHg (<5mmHg) MV mean P.7 mmHg (<2mmHg) MV VTI:     60.69 cm  (10-13cm) MITRAL INSUFFICIENCY:                      Normal Ranges: MR VTI:  150.95 cm MR Vmax: 491.71 cm/s AORTIC VALVE:                                   Normal Ranges: AoV Vmax:                0.88 m/s (<=1.7m/s) AoV Peak PG:             3.1 mmHg (<20mmHg) AoV Mean P.0 mmHg (1.7-11.5mmHg) LVOT Max Christian:            0.68 m/s (<=1.1m/s) AoV VTI:                 14.53 cm (18-25cm) LVOT VTI:                 12.65 cm LVOT Diameter:           1.98 cm  (1.8-2.4cm) AoV Area, VTI:           2.69 cm2 (2.5-5.5cm2) AoV Area,Vmax:           2.38 cm2 (2.5-4.5cm2) AoV Dimensionless Index: 0.87  RIGHT VENTRICLE: RV Basal 3.10 cm RV Mid   2.20 cm RV Major 6.7 cm TAPSE:   18.0 mm RV s'    0.10 m/s TRICUSPID VALVE/RVSP:                             Normal Ranges: Peak TR Velocity: 3.32 m/s RV Syst Pressure: 47.1 mmHg (< 30mmHg) IVC Diam:         2.21 cm PULMONIC VALVE:                      Normal Ranges: PV Max Christian: 0.7 m/s  (0.6-0.9m/s) PV Max P.7 mmHg Pulmonary Veins: PulmV Thompson Christian: 32.92 cm/s PulmV S/D Christian:  0.53 PulmV Sys Christian:  17.52 cm/s AORTA: Asc Ao Diam 3.01 cm  [id number] Morris Johnson MD Electronically signed on 3/28/2024 at 12:19:54 PM  ** Final **           Assessment/Plan   Principal Problem:    New onset atrial fibrillation (CMS/HCC)    New onset atrial fibrillation  -Reports symptoms started about a week ago  -EKG reviewed, AF, rate controlled  -Started on Eliquis and metoprolol  -TSH WNL  -Echo as above->LVEF 45%, left atrium severely dilated, severe mitral stenosis  -Stop eliquis->contraindicated with MS  -Cont Heparin gtt and bridge with coumadin until INR 2-3     --INR 1.5->give another 10mg coumadin tonight     --Platelets down to 118->117 today-> monitor closely  -Changed metoprolol tartrate to succinate 50mg BID  -Given Digoxin 0.25mg IV x1 (3/38)  -Start Digoxin 0.125mg PO daily  -If further rate control needed, will add amiodarone  -Due to severe atrium dilatation, unable to cardiovert->would most likely not be successful or remain in SR long  -Monitor on tele     2. Shortness of breath  -Currently pt denies  -Pulse ox 99% on room air  -CXR showed moderate vascular congestion and interstitial edema  -  -Would not give anymore IVF  -If acute SOB, give lasix IV  -SOB most likely from rapid AF->needs improved rate control as above     3. Elevated troponin-Non MI elevation   -Troponin downtrending  -I  reviewed the EKG, no acute changes, ST elevation, or depression  -Reports superficial left sided chest pain  -Echo as above  -Will need left and right heart cath as outpt     4. Acute kidney injury, resolved  -IVF given  -Monitor    5. Severe mitral stenosis, rheumatic valve disease  -Echo as above  -will need left and right heart cath as outpt then referral to structural heart team    6. Tobacco use  -Nicotine patch  -Advised to quit    7. Thrombocytopenia  -Platelets 138 on admit, today 117  -Monitor on heparin/Coumadin    Plan of care discussed with Dr. Johnson and hospitalist    Patricia Rdz, APRN-CNP

## 2024-03-30 ENCOUNTER — APPOINTMENT (OUTPATIENT)
Dept: RADIOLOGY | Facility: HOSPITAL | Age: 64
DRG: 308 | End: 2024-03-30
Payer: COMMERCIAL

## 2024-03-30 LAB
ALBUMIN SERPL BCP-MCNC: 3.5 G/DL (ref 3.4–5)
ANION GAP SERPL CALC-SCNC: 12 MMOL/L (ref 10–20)
BASOPHILS # BLD AUTO: 0.04 X10*3/UL (ref 0–0.1)
BASOPHILS NFR BLD AUTO: 0.6 %
BUN SERPL-MCNC: 16 MG/DL (ref 6–23)
CALCIUM SERPL-MCNC: 8.5 MG/DL (ref 8.6–10.3)
CHLORIDE SERPL-SCNC: 105 MMOL/L (ref 98–107)
CO2 SERPL-SCNC: 23 MMOL/L (ref 21–32)
CREAT SERPL-MCNC: 1.07 MG/DL (ref 0.5–1.3)
EGFRCR SERPLBLD CKD-EPI 2021: 77 ML/MIN/1.73M*2
EOSINOPHIL # BLD AUTO: 0.19 X10*3/UL (ref 0–0.7)
EOSINOPHIL NFR BLD AUTO: 2.7 %
ERYTHROCYTE [DISTWIDTH] IN BLOOD BY AUTOMATED COUNT: 13.8 % (ref 11.5–14.5)
GLUCOSE SERPL-MCNC: 95 MG/DL (ref 74–99)
HCT VFR BLD AUTO: 46 % (ref 41–52)
HGB BLD-MCNC: 15.3 G/DL (ref 13.5–17.5)
IMM GRANULOCYTES # BLD AUTO: 0.01 X10*3/UL (ref 0–0.7)
IMM GRANULOCYTES NFR BLD AUTO: 0.1 % (ref 0–0.9)
INR PPP: 1.9 (ref 0.9–1.1)
LYMPHOCYTES # BLD AUTO: 1.77 X10*3/UL (ref 1.2–4.8)
LYMPHOCYTES NFR BLD AUTO: 25.1 %
MAGNESIUM SERPL-MCNC: 1.83 MG/DL (ref 1.6–2.4)
MCH RBC QN AUTO: 29.3 PG (ref 26–34)
MCHC RBC AUTO-ENTMCNC: 33.3 G/DL (ref 32–36)
MCV RBC AUTO: 88 FL (ref 80–100)
MONOCYTES # BLD AUTO: 0.75 X10*3/UL (ref 0.1–1)
MONOCYTES NFR BLD AUTO: 10.6 %
NEUTROPHILS # BLD AUTO: 4.3 X10*3/UL (ref 1.2–7.7)
NEUTROPHILS NFR BLD AUTO: 60.9 %
NRBC BLD-RTO: 0 /100 WBCS (ref 0–0)
PHOSPHATE SERPL-MCNC: 3.1 MG/DL (ref 2.5–4.9)
PLATELET # BLD AUTO: 129 X10*3/UL (ref 150–450)
POTASSIUM SERPL-SCNC: 4.3 MMOL/L (ref 3.5–5.3)
PROTHROMBIN TIME: 21.4 SECONDS (ref 9.8–12.8)
RBC # BLD AUTO: 5.22 X10*6/UL (ref 4.5–5.9)
SODIUM SERPL-SCNC: 136 MMOL/L (ref 136–145)
UFH PPP CHRO-ACNC: 0.4 IU/ML
WBC # BLD AUTO: 7.1 X10*3/UL (ref 4.4–11.3)

## 2024-03-30 PROCEDURE — 2500000004 HC RX 250 GENERAL PHARMACY W/ HCPCS (ALT 636 FOR OP/ED): Performed by: INTERNAL MEDICINE

## 2024-03-30 PROCEDURE — 2500000002 HC RX 250 W HCPCS SELF ADMINISTERED DRUGS (ALT 637 FOR MEDICARE OP, ALT 636 FOR OP/ED): Performed by: INTERNAL MEDICINE

## 2024-03-30 PROCEDURE — 71045 X-RAY EXAM CHEST 1 VIEW: CPT | Performed by: STUDENT IN AN ORGANIZED HEALTH CARE EDUCATION/TRAINING PROGRAM

## 2024-03-30 PROCEDURE — 83735 ASSAY OF MAGNESIUM: CPT | Performed by: INTERNAL MEDICINE

## 2024-03-30 PROCEDURE — S4991 NICOTINE PATCH NONLEGEND: HCPCS | Performed by: INTERNAL MEDICINE

## 2024-03-30 PROCEDURE — 99232 SBSQ HOSP IP/OBS MODERATE 35: CPT | Performed by: INTERNAL MEDICINE

## 2024-03-30 PROCEDURE — 2500000004 HC RX 250 GENERAL PHARMACY W/ HCPCS (ALT 636 FOR OP/ED): Performed by: NURSE PRACTITIONER

## 2024-03-30 PROCEDURE — 85520 HEPARIN ASSAY: CPT | Performed by: INTERNAL MEDICINE

## 2024-03-30 PROCEDURE — 80069 RENAL FUNCTION PANEL: CPT | Performed by: INTERNAL MEDICINE

## 2024-03-30 PROCEDURE — 85610 PROTHROMBIN TIME: CPT | Performed by: INTERNAL MEDICINE

## 2024-03-30 PROCEDURE — 36415 COLL VENOUS BLD VENIPUNCTURE: CPT | Performed by: INTERNAL MEDICINE

## 2024-03-30 PROCEDURE — 2500000001 HC RX 250 WO HCPCS SELF ADMINISTERED DRUGS (ALT 637 FOR MEDICARE OP): Performed by: NURSE PRACTITIONER

## 2024-03-30 PROCEDURE — 2500000001 HC RX 250 WO HCPCS SELF ADMINISTERED DRUGS (ALT 637 FOR MEDICARE OP): Performed by: INTERNAL MEDICINE

## 2024-03-30 PROCEDURE — 85025 COMPLETE CBC W/AUTO DIFF WBC: CPT | Performed by: INTERNAL MEDICINE

## 2024-03-30 PROCEDURE — 71045 X-RAY EXAM CHEST 1 VIEW: CPT

## 2024-03-30 PROCEDURE — 1200000002 HC GENERAL ROOM WITH TELEMETRY DAILY

## 2024-03-30 RX ORDER — WARFARIN SODIUM 5 MG/1
5 TABLET ORAL DAILY
Status: DISCONTINUED | OUTPATIENT
Start: 2024-03-30 | End: 2024-03-31 | Stop reason: HOSPADM

## 2024-03-30 RX ORDER — FUROSEMIDE 10 MG/ML
20 INJECTION INTRAMUSCULAR; INTRAVENOUS ONCE
Status: DISCONTINUED | OUTPATIENT
Start: 2024-03-30 | End: 2024-03-31

## 2024-03-30 RX ADMIN — Medication 5 MG: at 21:06

## 2024-03-30 RX ADMIN — METOPROLOL SUCCINATE 100 MG: 50 TABLET, EXTENDED RELEASE ORAL at 21:06

## 2024-03-30 RX ADMIN — POLYETHYLENE GLYCOL 3350 17 G: 17 POWDER, FOR SOLUTION ORAL at 08:11

## 2024-03-30 RX ADMIN — DIGOXIN 125 MCG: 125 TABLET ORAL at 08:12

## 2024-03-30 RX ADMIN — METOPROLOL SUCCINATE 100 MG: 50 TABLET, EXTENDED RELEASE ORAL at 08:12

## 2024-03-30 RX ADMIN — NICOTINE 1 PATCH: 14 PATCH, EXTENDED RELEASE TRANSDERMAL at 08:26

## 2024-03-30 RX ADMIN — WARFARIN SODIUM 5 MG: 5 TABLET ORAL at 17:26

## 2024-03-30 RX ADMIN — SENNOSIDES AND DOCUSATE SODIUM 2 TABLET: 8.6; 5 TABLET ORAL at 08:11

## 2024-03-30 RX ADMIN — HEPARIN SODIUM 900 UNITS/HR: 10000 INJECTION, SOLUTION INTRAVENOUS at 15:02

## 2024-03-30 ASSESSMENT — PAIN SCALES - GENERAL
PAINLEVEL_OUTOF10: 0 - NO PAIN

## 2024-03-30 ASSESSMENT — COGNITIVE AND FUNCTIONAL STATUS - GENERAL
MOBILITY SCORE: 24
DAILY ACTIVITIY SCORE: 24
MOBILITY SCORE: 24
DAILY ACTIVITIY SCORE: 24

## 2024-03-30 ASSESSMENT — PAIN - FUNCTIONAL ASSESSMENT
PAIN_FUNCTIONAL_ASSESSMENT: 0-10
PAIN_FUNCTIONAL_ASSESSMENT: 0-10

## 2024-03-30 ASSESSMENT — ENCOUNTER SYMPTOMS: SHORTNESS OF BREATH: 1

## 2024-03-30 NOTE — PROGRESS NOTES
Faoroq Lang is a 63 y.o. male on day 3 of admission presenting with New onset atrial fibrillation (CMS/HCC).      Subjective     Farooq was sitting up in bed, complaining of not feeling well when he first woke up this morning, felt better after eating food. Tele reviewed, atrial fibrillation rate controlled.     Review of systems:  Constitutional: negative for fever, chills, or malaise  Neuro: negative for dizziness, headache, numbness, tingling  ENT: Negative for nasal congestion or sore throat  Resp: negative for shortness of breath, cough, or wheezing  CV: negative for chest pain, palpitations  GI: negative for abd pain, nausea, vomiting or diarrhea  : negative for dysuria, frequency, or urgency  Skin: negative for lesions, wounds, or rash  Musculoskeletal: Negative for weakness, myalgia, or arthralgia  Endocrine: Negative for polyuria or polydipsia         Objective   Constitutional: Well developed, awake/alert/oriented x3, no distress, alert and cooperative  Eyes: PERRL, EOMI, clear sclera  ENMT: mucous membranes moist, no apparent injury, no lesions seen  Head/Neck: Neck supple, no apparent injury, thyroid without mass or tenderness, No JVD, trachea midline, no bruits  Respiratory/Thorax: Patent airways, CTAB, normal breath sounds with good chest expansion, thorax symmetric  Cardiovascular: Regular, rate and rhythm, no murmurs, 2+ equal pulses of the extremities, normal S 1and S 2  Gastrointestinal: Nondistended, soft, non-tender, no rebound tenderness or guarding, no masses palpable, no organomegaly, +BS, no bruits  Musculoskeletal: ROM intact, no joint swelling, normal strength  Extremities: normal extremities, no cyanosis edema, contusions or wounds, no clubbing  Neurological: alert and oriented x3, intact senses, motor, response and reflexes, normal strength  Lymphatic: No significant lymphadenopathy  Psychological: Appropriate mood and behavior  Skin: Warm and dry, no lesions, no rashes      Last  "Recorded Vitals  /62 (BP Location: Right arm, Patient Position: Lying)   Pulse 72   Temp 36.2 °C (97.2 °F) (Temporal)   Resp 16   Ht 1.676 m (5' 6\")   Wt 57.2 kg (126 lb)   SpO2 96%   BMI 20.34 kg/m²     Intake/Output last 3 Shifts:  I/O last 3 completed shifts:  In: 480 (8.4 mL/kg) [P.O.:480]  Out: - (0 mL/kg)   Weight: 57.2 kg   No intake/output data recorded.    Relevant Results  Scheduled medications  digoxin, 125 mcg, oral, Daily  melatonin, 5 mg, oral, Nightly  metoprolol succinate XL, 100 mg, oral, BID  metoprolol succinate XL, 50 mg, oral, Once  nicotine, 1 patch, transdermal, Daily  pantoprazole, 40 mg, oral, Daily before breakfast   Or  pantoprazole, 40 mg, intravenous, Daily before breakfast  polyethylene glycol, 17 g, oral, Daily  sennosides-docusate sodium, 2 tablet, oral, BID      Continuous medications  heparin, 0-4,000 Units/hr, Last Rate: 900 Units/hr (03/29/24 1336)      PRN medications  PRN medications: acetaminophen **OR** acetaminophen **OR** acetaminophen, benzocaine-menthol, dextromethorphan-guaifenesin, heparin, metoprolol, ondansetron, traZODone    Results for orders placed or performed during the hospital encounter of 03/27/24 (from the past 24 hour(s))   Renal Function Panel   Result Value Ref Range    Glucose 95 74 - 99 mg/dL    Sodium 136 136 - 145 mmol/L    Potassium 4.3 3.5 - 5.3 mmol/L    Chloride 105 98 - 107 mmol/L    Bicarbonate 23 21 - 32 mmol/L    Anion Gap 12 10 - 20 mmol/L    Urea Nitrogen 16 6 - 23 mg/dL    Creatinine 1.07 0.50 - 1.30 mg/dL    eGFR 77 >60 mL/min/1.73m*2    Calcium 8.5 (L) 8.6 - 10.3 mg/dL    Phosphorus 3.1 2.5 - 4.9 mg/dL    Albumin 3.5 3.4 - 5.0 g/dL   CBC and Auto Differential   Result Value Ref Range    WBC 7.1 4.4 - 11.3 x10*3/uL    nRBC 0.0 0.0 - 0.0 /100 WBCs    RBC 5.22 4.50 - 5.90 x10*6/uL    Hemoglobin 15.3 13.5 - 17.5 g/dL    Hematocrit 46.0 41.0 - 52.0 %    MCV 88 80 - 100 fL    MCH 29.3 26.0 - 34.0 pg    MCHC 33.3 32.0 - 36.0 g/dL    " RDW 13.8 11.5 - 14.5 %    Platelets 129 (L) 150 - 450 x10*3/uL    Neutrophils % 60.9 40.0 - 80.0 %    Immature Granulocytes %, Automated 0.1 0.0 - 0.9 %    Lymphocytes % 25.1 13.0 - 44.0 %    Monocytes % 10.6 2.0 - 10.0 %    Eosinophils % 2.7 0.0 - 6.0 %    Basophils % 0.6 0.0 - 2.0 %    Neutrophils Absolute 4.30 1.20 - 7.70 x10*3/uL    Immature Granulocytes Absolute, Automated 0.01 0.00 - 0.70 x10*3/uL    Lymphocytes Absolute 1.77 1.20 - 4.80 x10*3/uL    Monocytes Absolute 0.75 0.10 - 1.00 x10*3/uL    Eosinophils Absolute 0.19 0.00 - 0.70 x10*3/uL    Basophils Absolute 0.04 0.00 - 0.10 x10*3/uL   Magnesium   Result Value Ref Range    Magnesium 1.83 1.60 - 2.40 mg/dL   Heparin Assay   Result Value Ref Range    Heparin Unfractionated 0.4 See Comment Below for Therapeutic Ranges IU/mL   Protime-INR   Result Value Ref Range    Protime 21.4 (H) 9.8 - 12.8 seconds    INR 1.9 (H) 0.9 - 1.1       Transthoracic Echo (TTE) Complete   Final Result      XR chest 1 view   Final Result   Moderate vascular congestion and interstitial edema.        MACRO:   None        Signed by: Mary Robert 3/27/2024 11:13 AM   Dictation workstation:   [id number]          Transthoracic Echo (TTE) Complete    Result Date: 3/28/2024   [facility], [address] [place] [address]               Tel [phone] and Fax [phone] TRANSTHORACIC ECHOCARDIOGRAM REPORT  Patient Name:      MADYSON Barclay Physician:    [id number] Morris Johnson MD Study Date:        3/28/2024            Ordering Provider:    [id number] CHARISSA HILLIARD MRN/PID:           [id number]             Fellow: Accession#:        [id number]         Nurse:                Natalia Aleman RN Date of Birth/Age: 1960 / 63 years Sonographer:          Helen Linda RD Gender:            M                    Additional Staff: Height:            167.64 cm            Admit Date:           3/27/2024  Weight:            57.15 kg             Admission Status:     Inpatient -                                                               Routine BSA / BMI:         1.64 m2 / 20.34      Encounter#:           [id number]                    kg/m2                                         Department Location:  [facility] Non                                                               Invasive Blood Pressure: 124 /85 mmHg Study Type:    TRANSTHORACIC ECHO (TTE) COMPLETE Diagnosis/ICD: Unspecified atrial fibrillation-I48.91 Indication:    Atrial Fibrillation CPT Code:      Echo Complete w Full [id number] Patient History: Smoker:            Current. Pertinent History: Chest Pain and Dyspnea. Dizziness. Study Detail: The following Echo studies were performed: 2D, M-Mode, Doppler and               color flow. Definity used as a contrast agent for endocardial               border definition. Total contrast used for this procedure was 1.0               mL via IV push. The patient was awake.  Critical Event Critical Event: Test was completed as per department protocol. Critical Finding: Mitral stenosis. Time Test was Completed: 10:45:33 AM Notified: Patricia Morrison NP. Attending notification time: 11:01:31 AM  PHYSICIAN INTERPRETATION: Left Ventricle: The left ventricular systolic function is mildly decreased, with an estimated ejection fraction of 45%. There is global hypokinesis of the left ventricle with minor regional variations. The left ventricular cavity size is mildly dilated. Left ventricular diastolic filling was indeterminate. Left Atrium: The left atrium is severely dilated. Right Ventricle: The right ventricle is normal in size. There is normal right ventricular global systolic function. Right Atrium: The right atrium is mildly dilated. Aortic Valve: The aortic valve is trileaflet. There is mild aortic valve cusp calcification. There is trace to mild aortic valve regurgitation. The peak instantaneous  gradient of the aortic valve is 3.1 mmHg. The mean gradient of the aortic valve is 2.0 mmHg. Mitral Valve: The mitral valve is moderately thickened. 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 There is evidence of severe mitral valve stenosis. The doppler estimated mean and peak diastolic pressure gradients are 12.7 mmHg and 21.8 mmHg respectively. There is moderate mitral annular calcification. There is moderate mitral valve regurgitation. Tricuspid Valve: The tricuspid valve is structurally normal. There is mild tricuspid regurgitation. Pulmonic Valve: The pulmonic valve is not well visualized. The pulmonic valve regurgitation was not well visualized. Pericardium: There is no pericardial effusion noted. Aorta: The aortic root is normal. Pulmonary Artery: The tricuspid regurgitant velocity is 3.32 m/s, and with an estimated right atrial pressure of 3 mmHg, the estimated pulmonary artery pressure is mild to moderately elevated with the RVSP at 47.1 mmHg.  CONCLUSIONS:  1. Left ventricular systolic function is mildly decreased with a 45% estimated ejection fraction.  2. The left atrium is severely dilated.  3. The mitral valve is moderately thickened. 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  4. There is moderate mitral annular calcification.  5. There is severe mitral valve stenosis.  6. Moderate mitral valve regurgitation.  7. Mild to moderately elevated pulmonary artery pressure.  8. There is global hypokinesis of the left ventricle with minor regional variations. QUANTITATIVE DATA SUMMARY: 2D MEASUREMENTS:                          Normal Ranges: IVSd:          0.60 cm   (0.6-1.1cm) LVPWd:         0.90 cm   (0.6-1.1cm) LVIDd:         4.47 cm   (3.9-5.9cm) LVIDs:         3.60 cm LV Mass Index: 63.0 g/m2 LV % FS        19.4 % LA VOLUME:                               Normal Ranges: LA Vol A4C:        71.6 ml    (22+/-6mL/m2) LA Vol A2C:        67.4 ml LA Vol BP:         70.7 ml LA Vol Index A4C:  43.6 ml/m2 LA Vol  Index A2C:  41.0 ml/m2 LA Vol Index BP:   43.0 ml/m2 LA Area A4C:       22.3 cm2 LA Area A2C:       22.0 cm2 LA Major Axis A4C: 5.9 cm LA Major Axis A2C: 6.1 cm LA Volume Index:   40.8 ml/m2 LA Vol A4C:        66.6 ml LA Vol A2C:        64.7 ml RA VOLUME BY A/L METHOD:                       Normal Ranges: RA Area A4C: 17.6 cm2 M-MODE MEASUREMENTS:                  Normal Ranges: Ao Root: 3.20 cm (2.0-3.7cm) LAs:     5.42 cm (2.7-4.0cm) AORTA MEASUREMENTS:                      Normal Ranges: Ao Sinus, d: 3.30 cm (2.1-3.5cm) Asc Ao, d:   3.00 cm (2.1-3.4cm) LV SYSTOLIC FUNCTION BY 2D PLANIMETRY (MOD):                     Normal Ranges: EF-A4C View: 41.3 % (>=55%) EF-A2C View: 40.4 % EF-Biplane:  41.5 % LV DIASTOLIC FUNCTION:                            Normal Ranges: MV Peak E:      1.85 m/s   (0.7-1.2 m/s) MV e'           0.05 m/s   (>8.0) MV lateral e'   0.05 m/s MV medial e'    0.04 m/s E/e' Ratio:     36.95      (<8.0) PulmV Sys Christian:  17.52 cm/s PulmV Thompson Christian: 32.92 cm/s PulmV S/D Christian:  0.53 MITRAL VALVE:                       Normal Ranges: MV Vmax:    2.33 m/s  (<=1.3m/s) MV peak P.8 mmHg (<5mmHg) MV mean P.7 mmHg (<2mmHg) MV VTI:     60.69 cm  (10-13cm) MITRAL INSUFFICIENCY:                      Normal Ranges: MR VTI:  150.95 cm MR Vmax: 491.71 cm/s AORTIC VALVE:                                   Normal Ranges: AoV Vmax:                0.88 m/s (<=1.7m/s) AoV Peak PG:             3.1 mmHg (<20mmHg) AoV Mean P.0 mmHg (1.7-11.5mmHg) LVOT Max Christian:            0.68 m/s (<=1.1m/s) AoV VTI:                 14.53 cm (18-25cm) LVOT VTI:                12.65 cm LVOT Diameter:           1.98 cm  (1.8-2.4cm) AoV Area, VTI:           2.69 cm2 (2.5-5.5cm2) AoV Area,Vmax:           2.38 cm2 (2.5-4.5cm2) AoV Dimensionless Index: 0.87  RIGHT VENTRICLE: RV Basal 3.10 cm RV Mid   2.20 cm RV Major 6.7 cm TAPSE:   18.0 mm RV s'    0.10 m/s TRICUSPID VALVE/RVSP:                             Normal Ranges:  Peak TR Velocity: 3.32 m/s RV Syst Pressure: 47.1 mmHg (< 30mmHg) IVC Diam:         2.21 cm PULMONIC VALVE:                      Normal Ranges: PV Max Christian: 0.7 m/s  (0.6-0.9m/s) PV Max P.7 mmHg Pulmonary Veins: PulmV Thompson Christian: 32.92 cm/s PulmV S/D Christian:  0.53 PulmV Sys Christian:  17.52 cm/s AORTA: Asc Ao Diam 3.01 cm  [id number] Morris Johnson MD Electronically signed on 3/28/2024 at 12:19:54 PM  ** Final **           Assessment/Plan   Principal Problem:    New onset atrial fibrillation (CMS/HCC)    New onset atrial fibrillation  -Reports symptoms started about a week ago  -EKG reviewed, AF, rate controlled  -Started on Eliquis and metoprolol  -TSH WNL  -Echo as above->LVEF 45%, left atrium severely dilated, severe mitral stenosis  -Stop eliquis->contraindicated with MS  -Cont Heparin gtt and bridge with coumadin until INR 2-3     --INR 1.9->give another 5 mg coumadin tonight     --Platelets down to 118->117 -> 129 today-> monitor closely  -Changed metoprolol tartrate to succinate 50mg BID  -Given Digoxin 0.25mg IV x1 (3/38)  -Start Digoxin 0.125mg PO daily  -If further rate control needed, will add amiodarone  -Due to severe atrium dilatation, unable to cardiovert->would most likely not be successful or remain in SR long  -Monitor on tele     2. Shortness of breath  -Currently pt denies  -Pulse ox 99% on room air  -CXR showed moderate vascular congestion and interstitial edema  -  -Would not give anymore IVF  -If acute SOB, give lasix IV  -SOB most likely from rapid AF->needs improved rate control as above     3. Elevated troponin-Non MI elevation   -Troponin downtrending  -I reviewed the EKG, no acute changes, ST elevation, or depression  -Reports superficial left sided chest pain  -Echo as above  -Will need left and right heart cath as outpt     4. Acute kidney injury, resolved  -IVF given  -Monitor     5. Severe mitral stenosis, rheumatic valve disease  -Echo as above  -will need left and right heart cath as  outpt then referral to structural heart team     6. Tobacco use  -Nicotine patch  -Advised to quit     7. Thrombocytopenia  -Platelets 138 on admit, today 129  -Monitor on heparin/Coumadin      LENY Del Cid-CNP

## 2024-03-30 NOTE — CARE PLAN
Uneventful night. Pt rested comfortably. Minimal c/o pain this shift. Heparin assay remains therapeutic. Assay 0.4. Heparin drip running at 9mL/hr. INR resulted 1.9 this morning. Plan to DC pt home when INR is 2-3.... Possibly recheck INR again later today to see if it is within range for discharge. Pt continues to progress towards meeting goals. VSS. Will continue to monitor.

## 2024-03-30 NOTE — PROGRESS NOTES
[facility] Hospitalist Progress Note       [phone]: Please page me for patient care issues.  [phone]: Please page night hospitalist for any issues.     Farooq Lang  :  1960(63 y.o.)  MRN:  [id number]  PCP: Munir Amado DO      Principal Problem:    New onset atrial fibrillation (CMS/HCC)      Assessment and Plan:     Farooq Lang is a 63 y.o. male with PMH of tobacco abuse presented from home with 1 week of worsening dyspnea.  Associated with fatigue.  Worse with exertion improved with rest.  Patient denies chest pain, palpitation, lower extremity edema, fever, rigor.  Patient is healthy at baseline and does not take any medication.  Upon arrival in ED patient was found to be in A-fib with RVR which is new for patient      #New onset A-fib with RVR likely due to severe MS  # Acute HFmrEF (45%, 3/28/24)  # New severe mitral valve stenosis  #Acute cardiogenic pulmonary edema  #ELIE, resolved  # Thrombocytopenia w/o s/o bleeding  #Elevated troponin not due to ACS/MI  #Tobacco abuse  -Started on metoprolol  -switched apixaban to coumadin due to valvular afib  -hep gtt pending therapeutic INR of 2-3  -PRN IV lasix for resp distress 2/2 pulm edema  -Tobacco cessation encouraged, nicotine therapy provided  -cards oupt [facility] and pt to be referred to valve clinic outpatient  -CS recs appreciated: Cardiology    Disposition: Discharge home on 3/31 pending therapeutic INR of 2-3    DVT Prophylaxis:  Heparin gtt and Coumadin    Code status: Full Code  Diet: Adult diet Regular    Level of MDM:  High    I personally examined the patient, I personally reviewed chart, data, labs radiology reports, and I personally reviewed and agree with residentphysical exam, assessment/plan with my noted corrections if any    Family Communication  Number :  Name of Designated Family Representative:      Total time spent: 35 minutes, of total time providing counseling or in coordination of care. Total time on this day of visit  includes record and documentation review before and after visit including documentation and time not explicitly included on EMR time stamp      Subjective:   Interval History:  HPI  The patient complains of dyspnea  The patient feels their symptoms areimproving  no events or new concerns    Scheduled Meds:digoxin, 125 mcg, oral, Daily  melatonin, 5 mg, oral, Nightly  metoprolol succinate XL, 100 mg, oral, BID  nicotine, 1 patch, transdermal, Daily  pantoprazole, 40 mg, oral, Daily before breakfast   Or  pantoprazole, 40 mg, intravenous, Daily before breakfast  polyethylene glycol, 17 g, oral, Daily  sennosides-docusate sodium, 2 tablet, oral, BID  warfarin, 5 mg, oral, Daily      Continuous Infusions:heparin, 0-4,000 Units/hr, Last Rate: 900 Units/hr (03/29/24 1336)      PRN Meds:PRN medications: acetaminophen **OR** acetaminophen **OR** acetaminophen, benzocaine-menthol, dextromethorphan-guaifenesin, heparin, ondansetron, traZODone    Review of Systems   Respiratory:  Positive for shortness of breath (orthopnea).    Cardiovascular:  Positive for chest pain.   All other systems reviewed and are negative.    Interval Pertinent History:  Social History     Tobacco Use    Smoking status: Every Day     Packs/day: 0.50     Years: 49.00     Additional pack years: 0.00     Total pack years: 24.50     Types: Cigarettes     Start date: 1/1/1975     Passive exposure: Current    Smokeless tobacco: Never   Substance Use Topics    Alcohol use: Not on file         Objective:   Patient Vitals for the past 24 hrs:   BP Temp Temp src Pulse Resp SpO2   03/30/24 0500 114/62 36.2 °C (97.2 °F) Temporal 72 16 96 %   03/30/24 0016 112/70 36.4 °C (97.5 °F) Temporal 86 16 96 %   03/29/24 2045 119/73 36.2 °C (97.2 °F) Temporal 89 16 97 %   03/29/24 1730 106/72 36.2 °C (97.2 °F) Temporal 73 16 95 %   03/29/24 1339 136/88 36.2 °C (97.2 °F) -- 83 18 92 %   03/29/24 1004 107/64 36.2 °C (97.2 °F) -- 88 18 99 %       Average, Min, and Max for last  24 hours Vitals:  TEMPERATURE:  Temp  Av.3 °C (97.3 °F)  Min: 36.2 °C (97.2 °F)  Max: 36.4 °C (97.5 °F)    RESPIRATIONS RANGE: Resp  Av.7  Min: 16  Max: 18    PULSE RANGE: Pulse  Av.8  Min: 72  Max: 89    BLOOD PRESSURE RANGE:  Systolic (24hrs), Av , Min:106 , Max:136   ; Diastolic (24hrs), Av, Min:62, Max:88      PULSE OXIMETRY RANGE: SpO2  Av.8 %  Min: 92 %  Max: 99 %    I/O last 3 completed shifts:  In: 480 (8.4 mL/kg) [P.O.:480]  Out: - (0 mL/kg)   Weight: 57.2 kg   Weight change:      Physical Exam  Vitals and nursing note reviewed.   Constitutional:       General: He is not in acute distress.     Appearance: Normal appearance. He is not ill-appearing.   HENT:      Head: Normocephalic and atraumatic.      Right Ear: External ear normal.      Left Ear: External ear normal.      Nose: Nose normal. No congestion or rhinorrhea.      Mouth/Throat:      Mouth: Mucous membranes are moist.   Eyes:      Extraocular Movements: Extraocular movements intact.      Pupils: Pupils are equal, round, and reactive to light.   Cardiovascular:      Rate and Rhythm: Rhythm irregularly irregular.      Pulses: Normal pulses.      Heart sounds: Murmur heard.   Pulmonary:      Effort: Pulmonary effort is normal.      Breath sounds: Normal breath sounds.   Abdominal:      General: Abdomen is flat. Bowel sounds are normal.      Palpations: Abdomen is soft.   Musculoskeletal:         General: Normal range of motion.      Cervical back: Normal range of motion and neck supple.      Right lower leg: No edema.      Left lower leg: No edema.   Skin:     General: Skin is warm and dry.      Capillary Refill: Capillary refill takes less than 2 seconds.   Neurological:      General: No focal deficit present.      Mental Status: He is alert and oriented to person, place, and time. Mental status is at baseline.   Psychiatric:         Mood and Affect: Mood normal.         Thought Content: Thought content normal.          Judgment: Judgment normal.         Lab Results   Component Value Date     03/30/2024    K 4.3 03/30/2024     03/30/2024    CO2 23 03/30/2024    BUN 16 03/30/2024    CREATININE 1.07 03/30/2024    GLUCOSE 95 03/30/2024    CALCIUM 8.5 (L) 03/30/2024    PROT 6.4 03/28/2024    BILITOT 1.0 03/28/2024    ALKPHOS 77 03/28/2024    AST 10 03/28/2024    ALT 15 03/28/2024    GLOB 3.1 01/18/2023       Lab Results   Component Value Date    WBC 7.1 03/30/2024    HGB 15.3 03/30/2024    HCT 46.0 03/30/2024    MCV 88 03/30/2024     (L) 03/30/2024    LYMPHOPCT 25.1 03/30/2024    RBC 5.22 03/30/2024    MCH 29.3 03/30/2024    MCHC 33.3 03/30/2024    RDW 13.8 03/30/2024       Lab Results   Component Value Date    TSH 1.58 03/27/2024     Lab Results   Component Value Date    LACTATE 0.8 07/26/2021    TROPONINI <0.02 11/02/2021     (H) 03/27/2024    DDIMER 476 03/27/2024    INR 1.9 (H) 03/30/2024       IMAGES:  No results found for this or any previous visit (from the past 4464 hour(s)).     Transthoracic Echo (TTE) Complete    Result Date: 3/28/2024   [facility], [address] [place] [address]               Tel [phone] and Fax [phone] TRANSTHORACIC ECHOCARDIOGRAM REPORT  Patient Name:      MADYSON Barclay Physician:    [id number] Morris Johnson MD Study Date:        3/28/2024            Ordering Provider:    [id number] CHARISSA HILLIARD MRN/PID:           [id number]             Fellow: Accession#:        [id number]         Nurse:                Natalia Aleman RN Date of Birth/Age: 1960 / 63 years Sonographer:          Helen Linda RD Gender:            M                    Additional Staff: Height:            167.64 cm            Admit Date:           3/27/2024 Weight:            57.15 kg             Admission Status:     Inpatient -                                                               Routine BSA / BMI:          1.64 m2 / 20.34      Encounter#:           [id number]                    kg/m2                                         Department Location:  [facility] Non                                                               Invasive Blood Pressure: 124 /85 mmHg Study Type:    TRANSTHORACIC ECHO (TTE) COMPLETE Diagnosis/ICD: Unspecified atrial fibrillation-I48.91 Indication:    Atrial Fibrillation CPT Code:      Echo Complete w Full Doppler-07098 Patient History: Smoker:            Current. Pertinent History: Chest Pain and Dyspnea. Dizziness. Study Detail: The following Echo studies were performed: 2D, M-Mode, Doppler and               color flow. Definity used as a contrast agent for endocardial               border definition. Total contrast used for this procedure was 1.0               mL via IV push. The patient was awake.  Critical Event Critical Event: Test was completed as per department protocol. Critical Finding: Mitral stenosis. Time Test was Completed: 10:45:33 AM Notified: Patricia Morrison NP. Attending notification time: 11:01:31 AM  PHYSICIAN INTERPRETATION: Left Ventricle: The left ventricular systolic function is mildly decreased, with an estimated ejection fraction of 45%. There is global hypokinesis of the left ventricle with minor regional variations. The left ventricular cavity size is mildly dilated. Left ventricular diastolic filling was indeterminate. Left Atrium: The left atrium is severely dilated. Right Ventricle: The right ventricle is normal in size. There is normal right ventricular global systolic function. Right Atrium: The right atrium is mildly dilated. Aortic Valve: The aortic valve is trileaflet. There is mild aortic valve cusp calcification. There is trace to mild aortic valve regurgitation. The peak instantaneous gradient of the aortic valve is 3.1 mmHg. The mean gradient of the aortic valve is 2.0 mmHg. Mitral Valve: The mitral valve is moderately thickened. The mitral  valve appears to be rheumatic. There is evidence of severe mitral valve stenosis. The doppler estimated mean and peak diastolic pressure gradients are 12.7 mmHg and 21.8 mmHg respectively. There is moderate mitral annular calcification. There is moderate mitral valve regurgitation. Tricuspid Valve: The tricuspid valve is structurally normal. There is mild tricuspid regurgitation. Pulmonic Valve: The pulmonic valve is not well visualized. The pulmonic valve regurgitation was not well visualized. Pericardium: There is no pericardial effusion noted. Aorta: The aortic root is normal. Pulmonary Artery: The tricuspid regurgitant velocity is 3.32 m/s, and with an estimated right atrial pressure of 3 mmHg, the estimated pulmonary artery pressure is mild to moderately elevated with the RVSP at 47.1 mmHg.  CONCLUSIONS:  1. Left ventricular systolic function is mildly decreased with a 45% estimated ejection fraction.  2. The left atrium is severely dilated.  3. The mitral valve is moderately thickened. 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  4. There is moderate mitral annular calcification.  5. There is severe mitral valve stenosis.  6. Moderate mitral valve regurgitation.  7. Mild to moderately elevated pulmonary artery pressure.  8. There is global hypokinesis of the left ventricle with minor regional variations. QUANTITATIVE DATA SUMMARY: 2D MEASUREMENTS:                          Normal Ranges: IVSd:          0.60 cm   (0.6-1.1cm) LVPWd:         0.90 cm   (0.6-1.1cm) LVIDd:         4.47 cm   (3.9-5.9cm) LVIDs:         3.60 cm LV Mass Index: 63.0 g/m2 LV % FS        19.4 % LA VOLUME:                               Normal Ranges: LA Vol A4C:        71.6 ml    (22+/-6mL/m2) LA Vol A2C:        67.4 ml LA Vol BP:         70.7 ml LA Vol Index A4C:  43.6 ml/m2 LA Vol Index A2C:  41.0 ml/m2 LA Vol Index BP:   43.0 ml/m2 LA Area A4C:       22.3 cm2 LA Area A2C:       22.0 cm2 LA Major Axis A4C: 5.9 cm LA Major Axis A2C: 6.1 cm LA  Volume Index:   40.8 ml/m2 LA Vol A4C:        66.6 ml LA Vol A2C:        64.7 ml RA VOLUME BY A/L METHOD:                       Normal Ranges: RA Area A4C: 17.6 cm2 M-MODE MEASUREMENTS:                  Normal Ranges: Ao Root: 3.20 cm (2.0-3.7cm) LAs:     5.42 cm (2.7-4.0cm) AORTA MEASUREMENTS:                      Normal Ranges: Ao Sinus, d: 3.30 cm (2.1-3.5cm) Asc Ao, d:   3.00 cm (2.1-3.4cm) LV SYSTOLIC FUNCTION BY 2D PLANIMETRY (MOD):                     Normal Ranges: EF-A4C View: 41.3 % (>=55%) EF-A2C View: 40.4 % EF-Biplane:  41.5 % LV DIASTOLIC FUNCTION:                            Normal Ranges: MV Peak E:      1.85 m/s   (0.7-1.2 m/s) MV e'           0.05 m/s   (>8.0) MV lateral e'   0.05 m/s MV medial e'    0.04 m/s E/e' Ratio:     36.95      (<8.0) PulmV Sys Christian:  17.52 cm/s PulmV Thompson Christian: 32.92 cm/s PulmV S/D Christian:  0.53 MITRAL VALVE:                       Normal Ranges: MV Vmax:    2.33 m/s  (<=1.3m/s) MV peak P.8 mmHg (<5mmHg) MV mean P.7 mmHg (<2mmHg) MV VTI:     60.69 cm  (10-13cm) MITRAL INSUFFICIENCY:                      Normal Ranges: MR VTI:  150.95 cm MR Vmax: 491.71 cm/s AORTIC VALVE:                                   Normal Ranges: AoV Vmax:                0.88 m/s (<=1.7m/s) AoV Peak PG:             3.1 mmHg (<20mmHg) AoV Mean P.0 mmHg (1.7-11.5mmHg) LVOT Max Christian:            0.68 m/s (<=1.1m/s) AoV VTI:                 14.53 cm (18-25cm) LVOT VTI:                12.65 cm LVOT Diameter:           1.98 cm  (1.8-2.4cm) AoV Area, VTI:           2.69 cm2 (2.5-5.5cm2) AoV Area,Vmax:           2.38 cm2 (2.5-4.5cm2) AoV Dimensionless Index: 0.87  RIGHT VENTRICLE: RV Basal 3.10 cm RV Mid   2.20 cm RV Major 6.7 cm TAPSE:   18.0 mm RV s'    0.10 m/s TRICUSPID VALVE/RVSP:                             Normal Ranges: Peak TR Velocity: 3.32 m/s RV Syst Pressure: 47.1 mmHg (< 30mmHg) IVC Diam:         2.21 cm PULMONIC VALVE:                      Normal Ranges: PV Max Christian: 0.7 m/s   (0.6-0.9m/s) PV Max P.7 mmHg Pulmonary Veins: PulmV Thompson Christian: 32.92 cm/s PulmV S/D Christian:  0.53 PulmV Sys Christian:  17.52 cm/s AORTA: Asc Ao Diam 3.01 cm  [id number] Morris Johnson MD Electronically signed on 3/28/2024 at 12:19:54 PM  ** Final **    === 24 ===    XR CHEST 1 VIEW    - Impression -  Moderate vascular congestion and interstitial edema.    MACRO:  None    Signed by: Mary Robert 3/27/2024 11:13 AM  Dictation workstation:   [id number]  === 21 ===    CT HEAD WO IV CONTRAST    - Impression -  No acute intracranial process.  === 24 ===    XR CHEST 1 VIEW    - Impression -  Moderate vascular congestion and interstitial edema.    MACRO:  None    Signed by: Mary Robert 3/27/2024 11:13 AM  Dictation workstation:   [id number]      Additional results of the last 24 hours have been reviewed.    Dictated using [employer] Version 2.4  Proof read however unrecognized voice recognition errors may have occurred     Electronically signed by Marin Marcos DO on 24 at 10:02 AM

## 2024-03-30 NOTE — CARE PLAN
The patient's goals for the shift include sleep and  No SOB    The clinical goals for the shift include HR sill be sustained <110 this shift

## 2024-03-31 VITALS
OXYGEN SATURATION: 95 % | WEIGHT: 126 LBS | SYSTOLIC BLOOD PRESSURE: 104 MMHG | HEART RATE: 80 BPM | RESPIRATION RATE: 18 BRPM | HEIGHT: 66 IN | DIASTOLIC BLOOD PRESSURE: 66 MMHG | BODY MASS INDEX: 20.25 KG/M2 | TEMPERATURE: 97 F

## 2024-03-31 LAB
ALBUMIN SERPL BCP-MCNC: 3.5 G/DL (ref 3.4–5)
ANION GAP SERPL CALC-SCNC: 11 MMOL/L (ref 10–20)
BASOPHILS # BLD AUTO: 0.04 X10*3/UL (ref 0–0.1)
BASOPHILS NFR BLD AUTO: 0.8 %
BUN SERPL-MCNC: 19 MG/DL (ref 6–23)
CALCIUM SERPL-MCNC: 8.7 MG/DL (ref 8.6–10.3)
CHLORIDE SERPL-SCNC: 107 MMOL/L (ref 98–107)
CO2 SERPL-SCNC: 24 MMOL/L (ref 21–32)
CREAT SERPL-MCNC: 1.15 MG/DL (ref 0.5–1.3)
EGFRCR SERPLBLD CKD-EPI 2021: 71 ML/MIN/1.73M*2
EOSINOPHIL # BLD AUTO: 0.16 X10*3/UL (ref 0–0.7)
EOSINOPHIL NFR BLD AUTO: 3.1 %
ERYTHROCYTE [DISTWIDTH] IN BLOOD BY AUTOMATED COUNT: 13.7 % (ref 11.5–14.5)
GLUCOSE SERPL-MCNC: 93 MG/DL (ref 74–99)
HCT VFR BLD AUTO: 44.4 % (ref 41–52)
HGB BLD-MCNC: 14.7 G/DL (ref 13.5–17.5)
IMM GRANULOCYTES # BLD AUTO: 0.01 X10*3/UL (ref 0–0.7)
IMM GRANULOCYTES NFR BLD AUTO: 0.2 % (ref 0–0.9)
INR PPP: 2.3 (ref 0.9–1.1)
LYMPHOCYTES # BLD AUTO: 1.62 X10*3/UL (ref 1.2–4.8)
LYMPHOCYTES NFR BLD AUTO: 30.9 %
MAGNESIUM SERPL-MCNC: 1.92 MG/DL (ref 1.6–2.4)
MCH RBC QN AUTO: 29.1 PG (ref 26–34)
MCHC RBC AUTO-ENTMCNC: 33.1 G/DL (ref 32–36)
MCV RBC AUTO: 88 FL (ref 80–100)
MONOCYTES # BLD AUTO: 0.58 X10*3/UL (ref 0.1–1)
MONOCYTES NFR BLD AUTO: 11.1 %
NEUTROPHILS # BLD AUTO: 2.83 X10*3/UL (ref 1.2–7.7)
NEUTROPHILS NFR BLD AUTO: 53.9 %
NRBC BLD-RTO: 0 /100 WBCS (ref 0–0)
PHOSPHATE SERPL-MCNC: 3.1 MG/DL (ref 2.5–4.9)
PLATELET # BLD AUTO: 161 X10*3/UL (ref 150–450)
POTASSIUM SERPL-SCNC: 3.9 MMOL/L (ref 3.5–5.3)
PROTHROMBIN TIME: 26.2 SECONDS (ref 9.8–12.8)
RBC # BLD AUTO: 5.06 X10*6/UL (ref 4.5–5.9)
SODIUM SERPL-SCNC: 138 MMOL/L (ref 136–145)
UFH PPP CHRO-ACNC: 0.3 IU/ML
WBC # BLD AUTO: 5.2 X10*3/UL (ref 4.4–11.3)

## 2024-03-31 PROCEDURE — 85520 HEPARIN ASSAY: CPT | Performed by: NURSE PRACTITIONER

## 2024-03-31 PROCEDURE — 2500000001 HC RX 250 WO HCPCS SELF ADMINISTERED DRUGS (ALT 637 FOR MEDICARE OP): Performed by: NURSE PRACTITIONER

## 2024-03-31 PROCEDURE — 83735 ASSAY OF MAGNESIUM: CPT | Performed by: INTERNAL MEDICINE

## 2024-03-31 PROCEDURE — 99239 HOSP IP/OBS DSCHRG MGMT >30: CPT | Performed by: INTERNAL MEDICINE

## 2024-03-31 PROCEDURE — 36415 COLL VENOUS BLD VENIPUNCTURE: CPT | Performed by: INTERNAL MEDICINE

## 2024-03-31 PROCEDURE — 2500000004 HC RX 250 GENERAL PHARMACY W/ HCPCS (ALT 636 FOR OP/ED): Performed by: INTERNAL MEDICINE

## 2024-03-31 PROCEDURE — 80069 RENAL FUNCTION PANEL: CPT | Performed by: INTERNAL MEDICINE

## 2024-03-31 PROCEDURE — 2500000002 HC RX 250 W HCPCS SELF ADMINISTERED DRUGS (ALT 637 FOR MEDICARE OP, ALT 636 FOR OP/ED): Performed by: INTERNAL MEDICINE

## 2024-03-31 PROCEDURE — S4991 NICOTINE PATCH NONLEGEND: HCPCS | Performed by: INTERNAL MEDICINE

## 2024-03-31 PROCEDURE — 2500000001 HC RX 250 WO HCPCS SELF ADMINISTERED DRUGS (ALT 637 FOR MEDICARE OP): Performed by: INTERNAL MEDICINE

## 2024-03-31 PROCEDURE — 85610 PROTHROMBIN TIME: CPT | Performed by: INTERNAL MEDICINE

## 2024-03-31 PROCEDURE — 85025 COMPLETE CBC W/AUTO DIFF WBC: CPT | Performed by: INTERNAL MEDICINE

## 2024-03-31 RX ORDER — FUROSEMIDE 40 MG/1
20 TABLET ORAL DAILY
Qty: 30 TABLET | Refills: 1 | Status: ON HOLD | OUTPATIENT
Start: 2024-03-31

## 2024-03-31 RX ORDER — WARFARIN SODIUM 5 MG/1
5 TABLET ORAL NIGHTLY
Qty: 10 TABLET | Refills: 0 | Status: ON HOLD | OUTPATIENT
Start: 2024-03-31 | End: 2024-06-04

## 2024-03-31 RX ORDER — METOPROLOL SUCCINATE 100 MG/1
100 TABLET, EXTENDED RELEASE ORAL 2 TIMES DAILY
Qty: 120 TABLET | Refills: 1 | Status: SHIPPED | OUTPATIENT
Start: 2024-03-31 | End: 2024-05-06 | Stop reason: ALTCHOICE

## 2024-03-31 RX ORDER — FUROSEMIDE 10 MG/ML
20 INJECTION INTRAMUSCULAR; INTRAVENOUS ONCE
Status: COMPLETED | OUTPATIENT
Start: 2024-03-31 | End: 2024-03-31

## 2024-03-31 RX ADMIN — DIGOXIN 125 MCG: 125 TABLET ORAL at 08:11

## 2024-03-31 RX ADMIN — FUROSEMIDE 20 MG: 10 INJECTION, SOLUTION INTRAMUSCULAR; INTRAVENOUS at 09:37

## 2024-03-31 RX ADMIN — NICOTINE 1 PATCH: 14 PATCH, EXTENDED RELEASE TRANSDERMAL at 08:10

## 2024-03-31 RX ADMIN — METOPROLOL SUCCINATE 100 MG: 50 TABLET, EXTENDED RELEASE ORAL at 08:11

## 2024-03-31 ASSESSMENT — COGNITIVE AND FUNCTIONAL STATUS - GENERAL
MOBILITY SCORE: 24
DAILY ACTIVITIY SCORE: 24

## 2024-03-31 NOTE — CARE PLAN
The patient's goals for the shift include No SOB    The clinical goals for the shift include pt will report less SOB while lying in bed      Problem: Pain - Adult  Goal: Verbalizes/displays adequate comfort level or baseline comfort level  Outcome: Progressing     Problem: Safety - Adult  Goal: Free from fall injury  Outcome: Progressing     Problem: Discharge Planning  Goal: Discharge to home or other facility with appropriate resources  Outcome: Progressing     Problem: Chronic Conditions and Co-morbidities  Goal: Patient's chronic conditions and co-morbidity symptoms are monitored and maintained or improved  Outcome: Progressing     Problem: Pain  Goal: My pain/discomfort is manageable  Outcome: Progressing     Problem: Safety  Goal: Patient will be injury free during hospitalization  Outcome: Progressing  Goal: I will remain free of falls  Outcome: Progressing     Problem: Daily Care  Goal: Daily care needs are met  Outcome: Progressing     Problem: Psychosocial Needs  Goal: Demonstrates ability to cope with hospitalization/illness  Outcome: Progressing  Goal: Collaborate with me, my family, and caregiver to identify my specific goals  Outcome: Progressing     Problem: Discharge Barriers  Goal: My discharge needs are met  Outcome: Progressing

## 2024-03-31 NOTE — DISCHARGE SUMMARY
[facility] Hospitalist  Discharge Summary with Discharge Day Progress Note and Transition Note                 Farooq Lang                  : 1960                      MRN:  [id number]     ADMIT DATE: 3/27/2024            DISCHARGE DATE:  3/31/2024     PRIMARYCARE PHYSICIAN:  Munir Amado DO     VISIT STATUS: Admission     CODE STATUS:  Full Code     DISCHARGE DIAGNOSES:    Principal Problem:    New onset atrial fibrillation (CMS/HCC)       HOSPITAL COURSE:    Farooq Lang is a 63 y.o. male with PMH of tobacco abuse presented from home with 1 week of worsening dyspnea.  Associated with fatigue.  Worse with exertion improved with rest.  Patient denies chest pain, palpitation, lower extremity edema, fever, rigor.  Patient is healthy at baseline and does not take any medication.  Upon arrival in ED patient was found to be in A-fib with RVR which is new for patient.  Additional workup inpatient revealed acute HFmrEF (45%, 3/28/24) and severe mitral stenosis.  Patient was rate controlled with metoprolol and started on Coumadin and later discharged home with plans for outpatient referral to valve clinic.  Patient was instructed to follow-up with PCP in 24 to 48 hours for INR check with Coumadin dose adjustment.     #New onset A-fib with RVR likely due to severe MS  # Acute HFmrEF (45%, 3/28/24)  # New severe mitral valve stenosis  #Acute cardiogenic pulmonary edema  #ELIE, resolved  # Thrombocytopenia w/o s/o bleeding, resolved  #Elevated troponin not due to ACS/MI  #Tobacco abuse      PROCEDURES:  None  CONSULTANTS:  Cardiology    COMPLEXITY OF FOLLOW UP:  [] Moderate Complexity: follow up within 7-14 calendar days (11946)  [x]Severe Complexity: follow up within 7 calendar days (63452)     FOLLOW UP TESTING, PENDING RESULTS ORREFERRALS AT TRANSITIONAL CARE VISIT:   [x]Yes  - follow-up with PCP in 24 to 48 hours for INR check with Coumadin dose adjustment.  -Outpatient referral to valve  clinic  -Follow-up with cardiology   [] No    DAY OF DISCHARGE:  Review of Systems   All other systems reviewed and are negative.       Patient Vitals for the past 24 hrs:   BP Temp Temp src Pulse Resp SpO2   24 0415 104/66 36.1 °C (97 °F) -- 80 18 95 %   24 0017 127/71 36.1 °C (97 °F) Temporal 79 16 96 %   24 1910 114/70 36.9 °C (98.4 °F) Temporal 73 16 97 %   24 1354 113/70 36.2 °C (97.2 °F) Temporal 72 16 98 %        Average, Min, and Max forlast 24 hours Vitals:  TEMPERATURE:  Temp  Av.3 °C (97.4 °F)  Min: 36.1 °C (97 °F)  Max: 36.9 °C (98.4 °F)     RESPIRATIONS RANGE: Resp  Av.5  Min: 16  Max: 18     PULSE RANGE: Pulse  Av  Min: 72  Max: 80     BLOOD PRESSURE RANGE:  Systolic (24hrs), Av , Min:104 , Max:127   ; Diastolic (24hrs), Av, Min:66, Max:71       PULSE OXIMETRYRANGE: SpO2  Av.5 %  Min: 95 %  Max: 98 %     I/O last 3 completed shifts:  In: 699.5 (12.2 mL/kg) [P.O.:240; I.V.:459.5 (8 mL/kg)]  Out: - (0 mL/kg)   Weight: 57.2 kg      Physical Exam  Vitals and nursing note reviewed.   Constitutional:       General: He is not in acute distress.     Appearance: Normal appearance. He is not ill-appearing.   HENT:      Head: Normocephalic and atraumatic.      Right Ear: External ear normal.      Left Ear: External ear normal.      Nose: Nose normal. No congestion or rhinorrhea.      Mouth/Throat:      Mouth: Mucous membranes are moist.   Eyes:      Extraocular Movements: Extraocular movements intact.      Pupils: Pupils are equal, round, and reactive to light.   Cardiovascular:      Rate and Rhythm: Normal rate. Rhythm irregularly irregular.      Pulses: Normal pulses.      Heart sounds: Normal heart sounds.   Pulmonary:      Effort: Pulmonary effort is normal.      Breath sounds: Normal breath sounds.   Abdominal:      General: Abdomen is flat. Bowel sounds are normal.      Palpations: Abdomen is soft.   Musculoskeletal:         General: Normal range of  motion.      Cervical back: Normal range of motion and neck supple.      Right lower leg: No edema.      Left lower leg: No edema.   Skin:     General: Skin is warm and dry.      Capillary Refill: Capillary refill takes less than 2 seconds.   Neurological:      General: No focal deficit present.      Mental Status: He is alert and oriented to person, place, and time. Mental status is at baseline.   Psychiatric:         Mood and Affect: Mood normal.         Thought Content: Thought content normal.         Judgment: Judgment normal.           DISCHARGE MEDICATIONS:     Significant Medication Changes:         Your medication list        START taking these medications        Instructions Last Dose Given Next Dose Due   furosemide 40 mg tablet  Commonly known as: Lasix      Take 0.5 tablets (20 mg) by mouth once daily.       metoprolol succinate  mg 24 hr tablet  Commonly known as: Toprol-XL      Take 1 tablet (100 mg) by mouth 2 times a day. Do not crush or chew.       warfarin 5 mg tablet  Commonly known as: Coumadin      Take 1 tablet (5 mg) by mouth once daily at bedtime for 10 days. Take as directed per After Visit Summary.              CONTINUE taking these medications        Instructions Last Dose Given Next Dose Due   UNABLE TO FIND                     Where to Get Your Medications        These medications were sent to [facility] [id number]      Phone: [phone]   furosemide 40 mg tablet  metoprolol succinate  mg 24 hr tablet  warfarin 5 mg tablet        DIET: cardiac      DISPOSITION: Home     Follow up with Munir Amado DO       DISCHARGE TIME: > 30 minutes    Electronically signed by Marin Marcos DO on 03/31/24 at 8:56 AM    Dictated using [facility] Version 2.4  Proof read however unrecognized voice recognition errors may have occurred

## 2024-04-01 ENCOUNTER — LAB (OUTPATIENT)
Dept: LAB | Facility: LAB | Age: 64
End: 2024-04-01
Payer: COMMERCIAL

## 2024-04-01 ENCOUNTER — TELEPHONE (OUTPATIENT)
Dept: CARDIOLOGY | Facility: HOSPITAL | Age: 64
End: 2024-04-01

## 2024-04-01 DIAGNOSIS — I48.91 NEW ONSET ATRIAL FIBRILLATION (MULTI): ICD-10-CM

## 2024-04-01 LAB
ANION GAP SERPL CALC-SCNC: 11 MMOL/L (ref 10–20)
BUN SERPL-MCNC: 18 MG/DL (ref 6–23)
CALCIUM SERPL-MCNC: 9.2 MG/DL (ref 8.6–10.3)
CHLORIDE SERPL-SCNC: 101 MMOL/L (ref 98–107)
CO2 SERPL-SCNC: 30 MMOL/L (ref 21–32)
CREAT SERPL-MCNC: 1.36 MG/DL (ref 0.5–1.3)
EGFRCR SERPLBLD CKD-EPI 2021: 58 ML/MIN/1.73M*2
ERYTHROCYTE [DISTWIDTH] IN BLOOD BY AUTOMATED COUNT: 13.8 % (ref 11.5–14.5)
GLUCOSE SERPL-MCNC: 82 MG/DL (ref 74–99)
HCT VFR BLD AUTO: 50.4 % (ref 41–52)
HGB BLD-MCNC: 16.3 G/DL (ref 13.5–17.5)
INR PPP: 2.1 (ref 0.9–1.1)
MCH RBC QN AUTO: 29.1 PG (ref 26–34)
MCHC RBC AUTO-ENTMCNC: 32.3 G/DL (ref 32–36)
MCV RBC AUTO: 90 FL (ref 80–100)
NRBC BLD-RTO: 0 /100 WBCS (ref 0–0)
PLATELET # BLD AUTO: 186 X10*3/UL (ref 150–450)
POTASSIUM SERPL-SCNC: 4 MMOL/L (ref 3.5–5.3)
PROTHROMBIN TIME: 23.3 SECONDS (ref 9.8–12.8)
RBC # BLD AUTO: 5.61 X10*6/UL (ref 4.5–5.9)
SODIUM SERPL-SCNC: 138 MMOL/L (ref 136–145)
WBC # BLD AUTO: 7.1 X10*3/UL (ref 4.4–11.3)

## 2024-04-01 PROCEDURE — 85610 PROTHROMBIN TIME: CPT

## 2024-04-01 PROCEDURE — 36415 COLL VENOUS BLD VENIPUNCTURE: CPT

## 2024-04-01 PROCEDURE — 80048 BASIC METABOLIC PNL TOTAL CA: CPT

## 2024-04-01 PROCEDURE — 85027 COMPLETE CBC AUTOMATED: CPT

## 2024-04-03 ENCOUNTER — APPOINTMENT (OUTPATIENT)
Dept: CARDIAC SURGERY | Facility: HOSPITAL | Age: 64
End: 2024-04-03
Payer: COMMERCIAL

## 2024-04-03 ENCOUNTER — TELEMEDICINE (OUTPATIENT)
Dept: CARDIOLOGY | Facility: HOSPITAL | Age: 64
End: 2024-04-03
Payer: COMMERCIAL

## 2024-04-03 ENCOUNTER — APPOINTMENT (OUTPATIENT)
Dept: CARDIOLOGY | Facility: HOSPITAL | Age: 64
End: 2024-04-03
Payer: COMMERCIAL

## 2024-04-03 ENCOUNTER — TELEMEDICINE (OUTPATIENT)
Dept: CARDIAC SURGERY | Facility: HOSPITAL | Age: 64
End: 2024-04-03
Payer: COMMERCIAL

## 2024-04-03 DIAGNOSIS — I05.0 SEVERE MITRAL VALVE STENOSIS: ICD-10-CM

## 2024-04-03 DIAGNOSIS — I48.91 NEW ONSET ATRIAL FIBRILLATION (MULTI): ICD-10-CM

## 2024-04-03 DIAGNOSIS — I50.21 ACUTE HFREF (HEART FAILURE WITH REDUCED EJECTION FRACTION) (MULTI): ICD-10-CM

## 2024-04-03 DIAGNOSIS — R07.9 CHEST PAIN, UNSPECIFIED TYPE: ICD-10-CM

## 2024-04-03 DIAGNOSIS — I05.0 MITRAL VALVE STENOSIS, UNSPECIFIED ETIOLOGY: ICD-10-CM

## 2024-04-03 PROCEDURE — 99204 OFFICE O/P NEW MOD 45 MIN: CPT | Performed by: THORACIC SURGERY (CARDIOTHORACIC VASCULAR SURGERY)

## 2024-04-03 PROCEDURE — 99204 OFFICE O/P NEW MOD 45 MIN: CPT | Performed by: INTERNAL MEDICINE

## 2024-04-03 NOTE — PROGRESS NOTES
So this is a 63 years old male patient we have a phone virtual visit with him today for about 30 minutes.  We went through his file with Dr. Soto.  He is very clear on the echo that he is got a severe mitral stenosis with very thickened leaflets and subvalvular apparatus.  The left atrial size is huge.  The patient is severely symptomatic.  So we talked about options of course we mention open heart surgery, he understood the need for this procedure and he is willing to proceed.  We talked about risk and benefits and he also understood.  He will be scheduled with Dr. Becky Ludwig for coronary angiogram as a preoperative workup.  Will continue to gather all the necessary preoperative workup and then we will schedule surgery accordingly.

## 2024-04-03 NOTE — SIGNIFICANT EVENT
Follow Up Phone Call    Outgoing phone call    Spoke to: Farooq Lang Relationship:self   Phone number: [phone]      Outcome: I left a message on answering machine   Chief Complaint   Patient presents with    Chest Pain     Pt presents to the ER with Chest discomfort, Dizziness, shortness of breath with any exertion, and generally not feeling well. PT states this has been going on for x2 months. PT does not have any medical hx. Pt states that his right ear has also been causing him discomfort.            Diagnosis:Not applicable

## 2024-04-04 PROBLEM — I05.0 MITRAL VALVE STENOSIS: Status: ACTIVE | Noted: 2024-04-03

## 2024-04-04 NOTE — PROGRESS NOTES
HPI: 63-year-old male with past medical history of A-fib, heart failure who was referred to us for mitral stenosis.  On review of his echocardiogram he has severe mitral stenosis with very thickened leaflets and subvalvular apparatus.  The left atrial size is huge.  Patient endorses fatigue and dyspnea with minimal exertion.  Patient had a recent hospitalization for heart failure and new onset A-fib.   Denies any chest pain or loss of consciousness.    Patient is a very active person at baseline.       ROS:    Constitutional: fatigue  Eyes: no acute eye problems, no blurred vision, no diplopia, no eye pain  ENT: no nosebleeds, no acute hearing loss, no earache, no sore throat  Cardiovascular: dyspnea on exertion, no chest pain  Respiratory: no chronic cough, not coughing up sputum, no wheezing that is consistent with asthma  Gastrointestinal: no acute bowel complaints  Musculoskeletal: no acute arthralgias, no acute myalgias, no acute joint swelling  Skin: no skin rashes, no change in skin color and pigmentation, no skin lesions and no skin lumps.   Neurological: no headaches, no dizziness, no tingling, no fainting and no limb weakness.   Psychiatric:  no suicidal ideation, no confusion, no personality change and no emotional problems.   Hematologic/Lymphatic: no bleeding issues.   All other systems have been reviewed and are negative for complaint.       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 mean gradient across mitral valve 12.7 mmHg, moderate mitral annular calcification  - CT TAVR: none  - LHC: Pending  - dental clearance: Full dentures    Physical Exam:  Virtual visit        3/29/2024     8:45 PM 3/30/2024    12:16 AM 3/30/2024     5:00 AM 3/30/2024     1:54 PM 3/30/2024     7:10 PM 3/31/2024    12:17 AM 3/31/2024     4:15 AM   Vitals   Systolic 119 112 114 113 114  127 104   Diastolic 73 70 62 70 70 71 66   Heart Rate 89 86 72 72 73 79 80   Temp 36.2 °C (97.2 °F) 36.4 °C (97.5 °F) 36.2 °C (97.2 °F) 36.2 °C (97.2 °F) 36.9 °C (98.4 °F) 36.1 °C (97 °F) 36.1 °C (97 °F)   Resp 16 16 16 16 16 16 18        Current Outpatient Medications   Medication Instructions    furosemide (LASIX) 20 mg, oral, Daily    metoprolol succinate XL (TOPROL-XL) 100 mg, oral, 2 times daily, Do not crush or chew.    UNABLE TO FIND 1 capsule, oral, Daily, Med Name: IBS Clear OTC supplement    warfarin (COUMADIN) 5 mg, oral, Nightly, Take as directed per After Visit Summary.        Impression:   This is a 63-year-old male with symptomatic severe mitral stenosis on echocardiogram.  Patient is a good surgical candidate.   We reviewed his echocardiogram.     -Patient will need a left heart cath    We discussed all the risks associated with the procedure, including but not limited to stroke, MI, pericardial tamponade, vascular complications, infection and death were discussed with the patient. The risk of needing a permament pacemaker was also discussed in detail. The patient verbalized understanding and decided to proceed with the procedure.     We will discuss this patient's case at our Valve Team meeting with representatives from [facility] Card[facility]. Our nurse navigators will contact patient with further diagnostic needs and formal plan.

## 2024-04-05 DIAGNOSIS — I05.0 MITRAL VALVE STENOSIS, UNSPECIFIED ETIOLOGY: Primary | ICD-10-CM

## 2024-04-10 ENCOUNTER — APPOINTMENT (OUTPATIENT)
Dept: CARDIOLOGY | Facility: HOSPITAL | Age: 64
End: 2024-04-10
Payer: COMMERCIAL

## 2024-04-10 ENCOUNTER — HOSPITAL ENCOUNTER (EMERGENCY)
Facility: HOSPITAL | Age: 64
Discharge: HOME | End: 2024-04-10
Payer: COMMERCIAL

## 2024-04-10 ENCOUNTER — APPOINTMENT (OUTPATIENT)
Dept: RADIOLOGY | Facility: HOSPITAL | Age: 64
End: 2024-04-10
Payer: COMMERCIAL

## 2024-04-10 ENCOUNTER — HOSPITAL ENCOUNTER (OUTPATIENT)
Dept: RADIOLOGY | Facility: HOSPITAL | Age: 64
Discharge: HOME | End: 2024-04-10
Payer: COMMERCIAL

## 2024-04-10 VITALS
DIASTOLIC BLOOD PRESSURE: 81 MMHG | SYSTOLIC BLOOD PRESSURE: 110 MMHG | BODY MASS INDEX: 20.55 KG/M2 | HEIGHT: 66 IN | WEIGHT: 127.87 LBS | OXYGEN SATURATION: 97 % | RESPIRATION RATE: 18 BRPM | HEART RATE: 60 BPM | TEMPERATURE: 96.8 F

## 2024-04-10 DIAGNOSIS — R06.02 SHORTNESS OF BREATH: Primary | ICD-10-CM

## 2024-04-10 DIAGNOSIS — I05.0 MITRAL VALVE STENOSIS, UNSPECIFIED ETIOLOGY: ICD-10-CM

## 2024-04-10 LAB
ALBUMIN SERPL BCP-MCNC: 4 G/DL (ref 3.4–5)
ALP SERPL-CCNC: 79 U/L (ref 33–136)
ALT SERPL W P-5'-P-CCNC: 32 U/L (ref 10–52)
ANION GAP SERPL CALC-SCNC: 10 MMOL/L (ref 10–20)
AST SERPL W P-5'-P-CCNC: 23 U/L (ref 9–39)
BASOPHILS # BLD AUTO: 0.05 X10*3/UL (ref 0–0.1)
BASOPHILS NFR BLD AUTO: 0.6 %
BILIRUB SERPL-MCNC: 0.3 MG/DL (ref 0–1.2)
BNP SERPL-MCNC: 511 PG/ML (ref 0–99)
BUN SERPL-MCNC: 19 MG/DL (ref 6–23)
CALCIUM SERPL-MCNC: 9.3 MG/DL (ref 8.6–10.3)
CARDIAC TROPONIN I PNL SERPL HS: 14 NG/L (ref 0–20)
CARDIAC TROPONIN I PNL SERPL HS: 14 NG/L (ref 0–20)
CHLORIDE SERPL-SCNC: 104 MMOL/L (ref 98–107)
CO2 SERPL-SCNC: 29 MMOL/L (ref 21–32)
CREAT SERPL-MCNC: 1.26 MG/DL (ref 0.5–1.3)
EGFRCR SERPLBLD CKD-EPI 2021: 64 ML/MIN/1.73M*2
EOSINOPHIL # BLD AUTO: 0.21 X10*3/UL (ref 0–0.7)
EOSINOPHIL NFR BLD AUTO: 2.5 %
ERYTHROCYTE [DISTWIDTH] IN BLOOD BY AUTOMATED COUNT: 13.9 % (ref 11.5–14.5)
FLUAV RNA RESP QL NAA+PROBE: NOT DETECTED
FLUBV RNA RESP QL NAA+PROBE: NOT DETECTED
GLUCOSE SERPL-MCNC: 97 MG/DL (ref 74–99)
HCT VFR BLD AUTO: 47.5 % (ref 41–52)
HGB BLD-MCNC: 15.7 G/DL (ref 13.5–17.5)
IMM GRANULOCYTES # BLD AUTO: 0.03 X10*3/UL (ref 0–0.7)
IMM GRANULOCYTES NFR BLD AUTO: 0.4 % (ref 0–0.9)
INR PPP: 1.7 (ref 0.9–1.1)
LYMPHOCYTES # BLD AUTO: 2.64 X10*3/UL (ref 1.2–4.8)
LYMPHOCYTES NFR BLD AUTO: 31.2 %
MAGNESIUM SERPL-MCNC: 2.1 MG/DL (ref 1.6–2.4)
MCH RBC QN AUTO: 29.3 PG (ref 26–34)
MCHC RBC AUTO-ENTMCNC: 33.1 G/DL (ref 32–36)
MCV RBC AUTO: 89 FL (ref 80–100)
MONOCYTES # BLD AUTO: 0.79 X10*3/UL (ref 0.1–1)
MONOCYTES NFR BLD AUTO: 9.3 %
NEUTROPHILS # BLD AUTO: 4.74 X10*3/UL (ref 1.2–7.7)
NEUTROPHILS NFR BLD AUTO: 56 %
NRBC BLD-RTO: 0 /100 WBCS (ref 0–0)
PLATELET # BLD AUTO: 157 X10*3/UL (ref 150–450)
POTASSIUM SERPL-SCNC: 4.9 MMOL/L (ref 3.5–5.3)
PROT SERPL-MCNC: 7.3 G/DL (ref 6.4–8.2)
PROTHROMBIN TIME: 19 SECONDS (ref 9.8–12.8)
RBC # BLD AUTO: 5.35 X10*6/UL (ref 4.5–5.9)
SARS-COV-2 RNA RESP QL NAA+PROBE: NOT DETECTED
SODIUM SERPL-SCNC: 138 MMOL/L (ref 136–145)
WBC # BLD AUTO: 8.5 X10*3/UL (ref 4.4–11.3)

## 2024-04-10 PROCEDURE — 36415 COLL VENOUS BLD VENIPUNCTURE: CPT | Performed by: PHYSICIAN ASSISTANT

## 2024-04-10 PROCEDURE — 71275 CT ANGIOGRAPHY CHEST: CPT | Mod: FOREIGN READ | Performed by: RADIOLOGY

## 2024-04-10 PROCEDURE — 83880 ASSAY OF NATRIURETIC PEPTIDE: CPT | Performed by: PHYSICIAN ASSISTANT

## 2024-04-10 PROCEDURE — 80053 COMPREHEN METABOLIC PANEL: CPT | Performed by: PHYSICIAN ASSISTANT

## 2024-04-10 PROCEDURE — 71275 CT ANGIOGRAPHY CHEST: CPT

## 2024-04-10 PROCEDURE — 99285 EMERGENCY DEPT VISIT HI MDM: CPT | Mod: 25

## 2024-04-10 PROCEDURE — 2550000001 HC RX 255 CONTRASTS: Performed by: PHYSICIAN ASSISTANT

## 2024-04-10 PROCEDURE — 87636 SARSCOV2 & INF A&B AMP PRB: CPT | Performed by: PHYSICIAN ASSISTANT

## 2024-04-10 PROCEDURE — 84484 ASSAY OF TROPONIN QUANT: CPT | Performed by: PHYSICIAN ASSISTANT

## 2024-04-10 PROCEDURE — 85025 COMPLETE CBC W/AUTO DIFF WBC: CPT | Performed by: PHYSICIAN ASSISTANT

## 2024-04-10 PROCEDURE — 93005 ELECTROCARDIOGRAM TRACING: CPT

## 2024-04-10 PROCEDURE — 71046 X-RAY EXAM CHEST 2 VIEWS: CPT

## 2024-04-10 PROCEDURE — 83735 ASSAY OF MAGNESIUM: CPT | Performed by: PHYSICIAN ASSISTANT

## 2024-04-10 PROCEDURE — 85610 PROTHROMBIN TIME: CPT | Performed by: PHYSICIAN ASSISTANT

## 2024-04-10 RX ADMIN — IOHEXOL 90 ML: 350 INJECTION, SOLUTION INTRAVENOUS at 16:20

## 2024-04-10 ASSESSMENT — COLUMBIA-SUICIDE SEVERITY RATING SCALE - C-SSRS
6. HAVE YOU EVER DONE ANYTHING, STARTED TO DO ANYTHING, OR PREPARED TO DO ANYTHING TO END YOUR LIFE?: NO
1. IN THE PAST MONTH, HAVE YOU WISHED YOU WERE DEAD OR WISHED YOU COULD GO TO SLEEP AND NOT WAKE UP?: NO
2. HAVE YOU ACTUALLY HAD ANY THOUGHTS OF KILLING YOURSELF?: NO

## 2024-04-10 ASSESSMENT — LIFESTYLE VARIABLES
EVER FELT BAD OR GUILTY ABOUT YOUR DRINKING: NO
EVER HAD A DRINK FIRST THING IN THE MORNING TO STEADY YOUR NERVES TO GET RID OF A HANGOVER: NO
TOTAL SCORE: 0
HAVE PEOPLE ANNOYED YOU BY CRITICIZING YOUR DRINKING: NO
HAVE YOU EVER FELT YOU SHOULD CUT DOWN ON YOUR DRINKING: NO

## 2024-04-10 ASSESSMENT — PAIN - FUNCTIONAL ASSESSMENT: PAIN_FUNCTIONAL_ASSESSMENT: 0-10

## 2024-04-10 ASSESSMENT — PAIN SCALES - GENERAL: PAINLEVEL_OUTOF10: 0 - NO PAIN

## 2024-04-10 NOTE — DISCHARGE INSTRUCTIONS
After talking with your cardiologist we believe that it would be best to take your metoprolol only once a day.  Metoprolol at night.  You should be taking metoprolol 100 mg XL in the evening only.

## 2024-04-10 NOTE — ED TRIAGE NOTES
Pt c/o SOB that started this am. Pt recently discharged from hospital for A-fib. Pt started on coumadin and metoprolol succinate  mg BID. Pt's heart rate is from 48-62.

## 2024-04-10 NOTE — ED PROVIDER NOTES
HPI   Chief Complaint   Patient presents with    Shortness of Breath     Pt c/o SOB that started this am. Pt recently discharged from hospital for A-fib. Pt started on coumadin and metoprolol succinate  mg BID. Pt's heart rate is from 48-62.       Is a 63-year-old male who was recently diagnosed with atrial fibrillation coming in for increasing shortness of breath.  Patient states that for the last 3 days he has felt short of breath.  He has noticed a low heart rate.  Patient feels palpitations.  He states he gets some minimal discomfort to the middle of the chest.  He states he takes metoprolol 100 mg extended release twice a day.  He had an appointment to see his cardiologist on Friday.  Patient denies any fevers or chills.  No vomiting or diarrhea.  Patient denies any congestion cough.      History provided by:  Patient                      Kaylyn Coma Scale Score: 15                     Patient History   Past Medical History:   Diagnosis Date    A-fib (CMS/[facility])     Acute upper respiratory infection, unspecified 01/16/2019    Acute URI    Flatulence 09/19/2017    Flatulence and related conditions    Infectious gastroenteritis and colitis, unspecified 02/18/2016    Infectious colitis    Other chest pain 03/10/2016    Chest discomfort    Personal history of other benign neoplasm 09/19/2017    History of other benign neoplasm    Personal history of other diseases of the digestive system 02/15/2016    History of diverticulitis    Personal history of other diseases of the respiratory system 10/16/2019    History of acute bronchitis    Personal history of other mental and behavioral disorders 02/15/2016    History of depression    Personal history of other specified conditions 02/15/2016    History of insomnia    Personal history of other specified conditions 09/19/2017    History of diarrhea    Personal history of other specified conditions 03/10/2016    History of dizziness    Personal history of pneumonia  (recurrent) 02/15/2016    History of pneumonia    Pleurodynia 11/29/2017    Chest pain, pleuritic    Right lower quadrant pain 11/14/2018    Abdominal pain, acute, right lower quadrant     Past Surgical History:   Procedure Laterality Date    TONSILLECTOMY  01/18/2016    Tonsillectomy     No family history on file.  Social History     Tobacco Use    Smoking status: Every Day     Current packs/day: 0.50     Average packs/day: 0.5 packs/day for 49.3 years (24.6 ttl pk-yrs)     Types: Cigarettes     Start date: 1/1/1975     Passive exposure: Current    Smokeless tobacco: Never   Vaping Use    Vaping status: Never Used   Substance Use Topics    Alcohol use: Never    Drug use: Never       Physical Exam   ED Triage Vitals   Temperature Heart Rate Respirations BP   04/10/24 1416 04/10/24 1416 04/10/24 1416 04/10/24 1416   36 °C (96.8 °F) 53 16 122/70      Pulse Ox Temp src Heart Rate Source Patient Position   04/10/24 1416 -- 04/10/24 1430 --   99 %  Monitor       BP Location FiO2 (%)     04/10/24 1416 --     Right arm        Physical Exam  Vitals and nursing note reviewed.   Constitutional:       General: He is not in acute distress.     Appearance: Normal appearance. He is not toxic-appearing.   HENT:      Head: Normocephalic and atraumatic.      Nose: Nose normal.      Mouth/Throat:      Mouth: Mucous membranes are moist.      Pharynx: Oropharynx is clear.   Eyes:      Extraocular Movements: Extraocular movements intact.      Conjunctiva/sclera: Conjunctivae normal.      Pupils: Pupils are equal, round, and reactive to light.   Cardiovascular:      Rate and Rhythm: Regular rhythm.      Pulses: Normal pulses.      Heart sounds: Normal heart sounds.   Pulmonary:      Effort: Pulmonary effort is normal. No respiratory distress.      Breath sounds: Normal breath sounds.   Abdominal:      General: Abdomen is flat. Bowel sounds are normal.      Palpations: Abdomen is soft.      Tenderness: There is no abdominal tenderness.    Musculoskeletal:         General: Normal range of motion.      Cervical back: Normal range of motion and neck supple.   Skin:     General: Skin is warm and dry.      Coloration: Skin is not jaundiced or pale.      Findings: No bruising.   Neurological:      General: No focal deficit present.      Mental Status: He is alert and oriented to person, place, and time. Mental status is at baseline.   Psychiatric:         Mood and Affect: Mood normal.         Behavior: Behavior normal.         ED Course & MDM   Diagnoses as of 04/10/24 1751   Shortness of breath       Medical Decision Making  Summary:  Medical Decision Making:   Patient presented as described in HPI. Patient case including ROS, PE, and treatment and plan discussed with ED attending if attached as cosigner. Results from labs and or imaging included below if completed. Farooq Lang  is a 63 y.o. coming in for Patient presents with:  Shortness of Breath: Pt c/o SOB that started this am. Pt recently discharged from hospital for A-fib. Pt started on coumadin and metoprolol succinate  mg BID. Pt's heart rate is from 48-62.  .  The patient's complaint lab work was completed as well as imaging.  The CT scan of the chest is completed as INR slightly low.  The troponin remained stable.  Spoke with patient's cardiologist, Dr. Johnson who advised to have patient take his metoprolol just once a day in the evening.  Patient is made aware of this plan.  Lab work showed no acute concerning findings.  CT scan of the chest is pending however patient would like to be discharged.  He states that he will come back if there is any significance or concerning finding.  He will likely before the CT scan results are read.       Disposition is completed with shared decision making with the patient or guardian present with the patient. They were advised to follow up with PCP or recommended provider in 2-3 days for another evaluation and exam. I advised the patient to return  or go to closest emergency room immediately if symptoms change, get worse, or new symptoms develop prior to follow up. I explained the plan and treatment course. Patient/guardian is in agreement with plan, treatment course, and follow up and state that they will comply.    Labs Reviewed  PROTIME-INR - Abnormal     Protime                       19.0 (*)               INR                           1.7 (*)             B-TYPE NATRIURETIC PEPTIDE - Abnormal     BNP                           511 (*)                  Narrative:    <100 pg/mL - Heart failure unlikely                100-299 pg/mL - Intermediate probability of acute heart                                failure exacerbation. Correlate with clinical                                context and patient history.                  >=300 pg/mL - Heart Failure likely. Correlate with clinical                                context and patient history.                                BNP testing is performed using different testing methodology at [facility] than at other [facility] hospitals. Direct result comparisons should only be made within the same method.                   COMPREHENSIVE METABOLIC PANEL - Normal     Glucose                       97                     Sodium                        138                    Potassium                     4.9                    Chloride                      104                    Bicarbonate                   29                     Anion Gap                     10                     Urea Nitrogen                 19                     Creatinine                    1.26                   eGFR                          64                     Calcium                       9.3                    Albumin                       4.0                    Alkaline Phosphatase          79                     Total Protein                 7.3                    AST                           23                     Bilirubin,  Total              0.3                    ALT                           32                  MAGNESIUM - Normal     Magnesium                     2.10                SARS-COV-2 AND INFLUENZA A/B PCR - Normal     Flu A Result                                         Flu B Result                                         Coronavirus 2019, PCR                                  Narrative: This assay has received FDA Emergency Use Authorization (EUA) and  is only authorized for the duration of time that circumstances exist to justify the authorization of the emergency use of in vitro diagnostic tests for the detection of SARS-CoV-2 virus and/or diagnosis of COVID-19 infection under section 564(b)(1) of the Act, 21 U.S.C. 360bbb-3(b)(1). Testing for SARS-CoV-2 is only recommended for patients who meet current clinical and/or epidemiological criteria as defined by federal, state, or local public health directives. This assay is an in vitro diagnostic nucleic acid amplification test for the qualitative detection of SARS-CoV-2, Influenza A, and Influenza B from nasopharyngeal specimens and has been validated for use at [facility]. Negative results do not preclude COVID-19 infections or Influenza A/B infections, and should not be used as the sole basis for diagnosis, treatment, or other management decisions. If Influenza A/B and RSV PCR results are negative, testing for Parainfluenza virus, Adenovirus and Metapneumovirus is routinely performed for [facility] pediatric oncology and intensive care inpatients, and is available on other patients by placing an add-on request.   SERIAL TROPONIN-INITIAL - Normal     Troponin I, High Sensitivity   14                       Narrative: Less than 99th percentile of normal range cutoff-                Female and children under 18 years old <14 ng/L; Male <21 ng/L: Negative                Repeat testing should be performed if clinically indicated.                                  Female and children under 18 years old 14-50 ng/L; Male 21-50 ng/L:                Consistent with possible cardiac damage and possible increased clinical                 risk. Serial measurements may help to assess extent of myocardial damage.                                 >50 ng/L: Consistent with cardiac damage, increased clinical risk and                myocardial infarction. Serial measurements may help assess extent of                 myocardial damage.                                  NOTE: Children less than 1 year old may have higher baseline troponin                 levels and results should be interpreted in conjunction with the overall                 clinical context.                                 NOTE: Troponin I testing is performed using a different                 testing methodology at [facility] than at other                 [facility]. Direct result comparisons should only                 be made within the same method.  SERIAL TROPONIN, 1 HOUR - Normal     Troponin I, High Sensitivity   14                       Narrative: Less than 99th percentile of normal range cutoff-                Female and children under 18 years old <14 ng/L; Male <21 ng/L: Negative                Repeat testing should be performed if clinically indicated.                                 Female and children under 18 years old 14-50 ng/L; Male 21-50 ng/L:                Consistent with possible cardiac damage and possible increased clinical                 risk. Serial measurements may help to assess extent of myocardial damage.                                 >50 ng/L: Consistent with cardiac damage, increased clinical risk and                myocardial infarction. Serial measurements may help assess extent of                 myocardial damage.                                  NOTE: Children less than 1 year old may have higher baseline troponin                 levels and results should be  interpreted in conjunction with the overall                 clinical context.                                 NOTE: Troponin I testing is performed using a different                 testing methodology at [facility] than at other                 [facility]. Direct result comparisons should only                 be made within the same method.  TROPONIN SERIES- (INITIAL, 1 HR)       Narrative: The following orders were created for panel order Troponin I Series, High Sensitivity (0, 1 HR).                Procedure                               Abnormality         Status                                   ---------                               -----------         ------                                   Troponin I, High Sensiti...[[id number]]  Normal              Final result                             Troponin, High Sensitivi...[[id number]]  Normal              Final result                                             Please view results for these tests on the individual orders.  CBC WITH AUTO DIFFERENTIAL   CT angio chest for pulmonary embolism    (Results Pending)      Tests/Medications/Escalations of Care considered but not given: As in MDM    Patient care discussed with: N/A  Social Determinants affecting care: N/A    Final diagnosis and disposition as documented     Diagnoses as of 04/10/24 1752  Shortness of breath       Shared decision making was completed and determined that patient will be discharged. I discussed the differential; results and discharge plan with the patient and/or family/friend/caregiver if present.  I emphasized the importance of follow-up with the physician I referred them to in the timeframe recommended.  I explained reasons for the patient to return to the Emergency Department. They agreed that if they feel their condition is worsening or if they have any other concern they should call 911 immediately for further assistance. I gave the patient an opportunity to ask all  questions they had and answered all of them accordingly. They understand return precautions and discharge instructions. The patient and/or family/friend/caregiver expressed understanding verbally and that they would comply.     Disposition: Discharge      This note has been transcribed using voice recognition and may contain grammatical errors, misplaced words, incorrect words, incorrect phrases or other errors.         Procedure  Procedures     Cecil Richter PA-C  04/10/24 1800

## 2024-04-11 LAB
ATRIAL RATE: 326 BPM
Q ONSET: 219 MS
Q ONSET: 221 MS
QRS COUNT: 10 BEATS
QRS COUNT: 11 BEATS
QRS DURATION: 86 MS
QRS DURATION: 90 MS
QT INTERVAL: 400 MS
QT INTERVAL: 400 MS
QTC CALCULATION(BAZETT): 412 MS
QTC CALCULATION(BAZETT): 425 MS
QTC FREDERICIA: 408 MS
QTC FREDERICIA: 417 MS
R AXIS: 80 DEGREES
R AXIS: 81 DEGREES
T AXIS: 66 DEGREES
T AXIS: 69 DEGREES
T OFFSET: 419 MS
T OFFSET: 421 MS
VENTRICULAR RATE: 64 BPM
VENTRICULAR RATE: 68 BPM

## 2024-04-22 ENCOUNTER — HOSPITAL ENCOUNTER (OUTPATIENT)
Facility: HOSPITAL | Age: 64
Setting detail: OUTPATIENT SURGERY
Discharge: HOME | End: 2024-04-22
Attending: INTERNAL MEDICINE | Admitting: INTERNAL MEDICINE
Payer: COMMERCIAL

## 2024-04-22 VITALS
HEIGHT: 66 IN | DIASTOLIC BLOOD PRESSURE: 77 MMHG | BODY MASS INDEX: 20.41 KG/M2 | SYSTOLIC BLOOD PRESSURE: 108 MMHG | WEIGHT: 127 LBS | HEART RATE: 102 BPM | OXYGEN SATURATION: 98 % | RESPIRATION RATE: 16 BRPM

## 2024-04-22 DIAGNOSIS — I05.0 MITRAL VALVE STENOSIS, UNSPECIFIED ETIOLOGY: Primary | ICD-10-CM

## 2024-04-22 PROCEDURE — 93456 R HRT CORONARY ARTERY ANGIO: CPT | Performed by: INTERNAL MEDICINE

## 2024-04-22 PROCEDURE — 99153 MOD SED SAME PHYS/QHP EA: CPT | Performed by: INTERNAL MEDICINE

## 2024-04-22 PROCEDURE — C1769 GUIDE WIRE: HCPCS | Performed by: INTERNAL MEDICINE

## 2024-04-22 PROCEDURE — 99152 MOD SED SAME PHYS/QHP 5/>YRS: CPT | Performed by: INTERNAL MEDICINE

## 2024-04-22 PROCEDURE — C1894 INTRO/SHEATH, NON-LASER: HCPCS | Performed by: INTERNAL MEDICINE

## 2024-04-22 PROCEDURE — 2720000007 HC OR 272 NO HCPCS: Performed by: INTERNAL MEDICINE

## 2024-04-22 PROCEDURE — 7100000009 HC PHASE TWO TIME - INITIAL BASE CHARGE: Performed by: INTERNAL MEDICINE

## 2024-04-22 PROCEDURE — C1887 CATHETER, GUIDING: HCPCS | Performed by: INTERNAL MEDICINE

## 2024-04-22 PROCEDURE — 96373 THER/PROPH/DIAG INJ IA: CPT | Performed by: INTERNAL MEDICINE

## 2024-04-22 PROCEDURE — 2500000005 HC RX 250 GENERAL PHARMACY W/O HCPCS: Performed by: INTERNAL MEDICINE

## 2024-04-22 PROCEDURE — 2500000004 HC RX 250 GENERAL PHARMACY W/ HCPCS (ALT 636 FOR OP/ED): Performed by: INTERNAL MEDICINE

## 2024-04-22 PROCEDURE — C1760 CLOSURE DEV, VASC: HCPCS | Performed by: INTERNAL MEDICINE

## 2024-04-22 PROCEDURE — 7100000010 HC PHASE TWO TIME - EACH INCREMENTAL 1 MINUTE: Performed by: INTERNAL MEDICINE

## 2024-04-22 RX ORDER — LIDOCAINE HYDROCHLORIDE 20 MG/ML
INJECTION, SOLUTION INFILTRATION; PERINEURAL AS NEEDED
Status: DISCONTINUED | OUTPATIENT
Start: 2024-04-22 | End: 2024-04-22 | Stop reason: HOSPADM

## 2024-04-22 RX ORDER — FENTANYL CITRATE 50 UG/ML
INJECTION, SOLUTION INTRAMUSCULAR; INTRAVENOUS AS NEEDED
Status: DISCONTINUED | OUTPATIENT
Start: 2024-04-22 | End: 2024-04-22 | Stop reason: HOSPADM

## 2024-04-22 RX ORDER — MIDAZOLAM HYDROCHLORIDE 1 MG/ML
INJECTION, SOLUTION INTRAMUSCULAR; INTRAVENOUS AS NEEDED
Status: DISCONTINUED | OUTPATIENT
Start: 2024-04-22 | End: 2024-04-22 | Stop reason: HOSPADM

## 2024-04-22 RX ORDER — HEPARIN SODIUM 1000 [USP'U]/ML
INJECTION, SOLUTION INTRAVENOUS; SUBCUTANEOUS AS NEEDED
Status: DISCONTINUED | OUTPATIENT
Start: 2024-04-22 | End: 2024-04-22 | Stop reason: HOSPADM

## 2024-04-22 RX ORDER — NITROGLYCERIN 40 MG/100ML
INJECTION INTRAVENOUS AS NEEDED
Status: DISCONTINUED | OUTPATIENT
Start: 2024-04-22 | End: 2024-04-22 | Stop reason: HOSPADM

## 2024-04-22 RX ORDER — DILTIAZEM HYDROCHLORIDE 120 MG/1
120 CAPSULE, COATED, EXTENDED RELEASE ORAL EVERY EVENING
Status: ON HOLD | COMMUNITY

## 2024-04-22 RX ORDER — VERAPAMIL HYDROCHLORIDE 2.5 MG/ML
INJECTION, SOLUTION INTRAVENOUS AS NEEDED
Status: DISCONTINUED | OUTPATIENT
Start: 2024-04-22 | End: 2024-04-22 | Stop reason: HOSPADM

## 2024-04-22 NOTE — PROGRESS NOTES
Pharmacy Medication History Review    Farooq Lang is a 63 y.o. male admitted for Mitral valve stenosis. Pharmacy reviewed the patient's uccux-wp-nawzobaqy medications and allergies for accuracy.    The list below reflects the updated PTA list. Comments regarding how patient may be taking medications differently can be found in the Admit Orders Activity  Prior to Admission Medications   Prescriptions Last Dose Informant   Lactobacillus acidophilus (PROBIOTIC ORAL) 4/22/2024 Self   Sig: Take 1 capsule by mouth once daily in the morning.   UNABLE TO FIND 4/21/2024 Self   Sig: Take 1 capsule by mouth once daily in the evening. Take with meals. Med Name: IBS Clear OTC supplement   dilTIAZem CD (Cardizem CD) 120 mg 24 hr capsule 4/21/2024 Self   Sig: Take 1 capsule (120 mg) by mouth once daily.   furosemide (Lasix) 40 mg tablet Unknown Self   Sig: Take 0.5 tablets (20 mg) by mouth once daily.   Patient taking differently: Take 0.5 tablets (20 mg) by mouth once daily as needed.   metoprolol succinate XL (Toprol-XL) 100 mg 24 hr tablet Not Taking Self   Sig: Take 1 tablet (100 mg) by mouth 2 times a day. Do not crush or chew.   Patient not taking: Reported on 4/22/2024   warfarin (Coumadin) 5 mg tablet 4/17/2024 Self   Sig: Take 1 tablet (5 mg) by mouth once daily at bedtime for 10 days. Take as directed per After Visit Summary.      Facility-Administered Medications: None        The list below reflects the updated allergy list. Please review each documented allergy for additional clarification and justification.  Allergies  Reviewed by Martín Roe RN on 4/22/2024   No Known Allergies         Patient declines M2B at discharge. Pharmacy has been updated to [facility] in [place].    Sources:    -patient interview (had print out tags/receipts from pharmacy paperwork form last dispense of 2 prescription medications)  -outpatient pharmacy dispense history  -Care Everywhere medication lists  -[place]ARRS    Below are additional concerns  with the patient's PTA list:     -patient was switched from metoprolol to diltiazem CD 120mg once daily recently (he had print out from pharmacy for diltiazem and warfarin with formulation, dosage strength and directions)    Antonio Adrian, PharmD  Transitions of Care Pharmacist  [facility]  Please reach out via Secure Chat for questions, or if no response call [employer] or [facility]

## 2024-04-22 NOTE — INTERVAL H&P NOTE
H&P reviewed. The patient was examined and there are no changes to the H&P. Reports some sinus congestion, minimal SOB. Denies orthopnea.

## 2024-04-22 NOTE — Clinical Note
Closure device placed in the right radial artery. Site closed by radial compression system. 16cc of air to Band

## 2024-04-22 NOTE — DISCHARGE INSTRUCTIONS
CARDIAC CATHETERIZATION DISCHARGE INSTRUCTIONS (procedure done on 4/22/24)     FOR SUDDEN AND SEVERE CHEST PAIN, SHORTNESS OF BREATH, EXCESSIVE BLEEDING, SIGNS OF STROKE, OR CHANGES IN MENTAL STATUS YOU SHOULD CALL 911 IMMEDIATELY.     If your provider has prescribed aspirin and/or clopidogrel (Plavix), or prasugrel (Effient), or ticagrelor (Brilinta), DO NOT STOP THESE MEDICATIONS for any reason without talking to your cardiologist first. If any of these were prescribed, you must take them every day without missing a single dose. If you are getting low on these medications, contact your provider immediately for a refill.     FOR NEXT 24 HOURS  - Upon discharge, you should return home and rest for the remainder of the day and evening. You do not have to stay on bed rest but should not be very active.  It is recommended a responsible adult be with you for the first 24 hours after the procedure.    - No driving for 24 hours after procedure. Please arrange for someone to drive you home from the hospital today.     - Do not drive, operate machinery, or use power tools for 24 hours after your procedure.     - Do not make any legal decisions for 24 hours after your procedure.     - Do not drink alcoholic beverages for 24 hours after your procedure.    WOUND CARE   *FOR FEMORAL (LEG) ACCESS*  ·      Avoid heavy lifting (over 10 pounds) for 7 days, squatting or excessive bending for 2 days, and strenuous exercise for 7 days.  ·      No submerged bathing, swimming, or hot tubs for the next 7 days, or until fully healed.  ·      Avoid sexual activity for 3-4 days until any groin discomfort has ceased.     *FOR RADIAL (WRIST) ACCESS*  ·      No lifting more than 5 pounds or excessive use of the wrist for 24 hours - for example, treat your wrist as if it is sprained.  ·      Do not engage in vigorous activities (tennis, golf, bowling, weights) for at least 48 hours after the procedure.  ·      Do not submerge the wrist  for 7 days after the procedure.  ·      You should expect mild tingling in your hand and tenderness at the puncture site for up to 3 days.    - The transparent dressing should be removed from the site 24 hours after the procedure.  Wash the site gently with soap and water. Rinse well and pat dry. Keep the area clean and dry. You may apply a Band-Aid to the site. Avoid lotions, ointments, or powders until fully healed.     - You may shower the day after your procedure.      - It is normal to notice a small bruise around the puncture site and/or a small grape sized or smaller lump. Any large bruising or large lump warrants a call to the office.     - If bleeding should occur, lay down and apply pressure to the affected area for 10 minutes.  If the bleeding stops notify your physician.  If there is a large amount of bleeding or spurting of blood CALL 911 immediately.  DO NOT drive yourself to the hospital.    - You may experience some tenderness, bruising or minimal inflammation.  If you have any concerns, you may contact the Cath Lab or if any of these symptoms become excessive, contact your cardiologist or go to the emergency room.     OTHER INSTRUCTIONS  - You may take acetaminophen (Tylenol) as directed for discomfort.  If pain is not relieved with acetaminophen (Tylenol), contact your doctor.    - If you notice or experience any of the following, you should notify your doctor or seek medical attention  Chest pain or discomfort  Change in mental status or weakness in extremities.  Dizziness, light headedness, or feeling faint.  Change in the site where the procedure was performed, such as bleeding or an increased area of bruising or swelling.  Tingling, numbness, pain, or coolness in the leg/arm beyond the site where the procedure was performed.  Signs of infection (i.e. shaking chills, temperature > 100 degrees Fahrenheit, warmth, redness) in the leg/arm area where the procedure was performed.  Changes in  urination   Bloody or black stools  Vomiting blood  Severe nose bleeds  Any excessive bleeding    - If you DO NOT have an appointment with your cardiologist within 2-4 weeks following your procedure, please contact their office.

## 2024-04-24 LAB
ATRIAL RATE: 357 BPM
Q ONSET: 224 MS
QRS COUNT: 16 BEATS
QRS DURATION: 84 MS
QT INTERVAL: 332 MS
QTC CALCULATION(BAZETT): 417 MS
QTC FREDERICIA: 386 MS
R AXIS: 88 DEGREES
T AXIS: 70 DEGREES
T OFFSET: 390 MS
VENTRICULAR RATE: 95 BPM

## 2024-04-29 ENCOUNTER — TELEMEDICINE CLINICAL SUPPORT (OUTPATIENT)
Dept: PREADMISSION TESTING | Facility: HOSPITAL | Age: 64
End: 2024-04-29
Payer: COMMERCIAL

## 2024-04-29 ASSESSMENT — DUKE ACTIVITY SCORE INDEX (DASI)
CAN YOU TAKE CARE OF YOURSELF (EAT, DRESS, BATHE, OR USE TOILET): YES
CAN YOU PARTICIPATE IN MODERATE RECREATIONAL ACTIVITIES LIKE GOLF, BOWLING, DANCING, DOUBLES TENNIS OR THROWING A BASEBALL OR FOOTBALL: YES
CAN YOU WALK A BLOCK OR TWO ON LEVEL GROUND: YES
CAN YOU DO YARD WORK LIKE RAKING LEAVES, WEEDING OR PUSHING A MOWER: YES
CAN YOU DO LIGHT WORK AROUND THE HOUSE LIKE DUSTING OR WASHING DISHES: YES
CAN YOU DO MODERATE WORK AROUND THE HOUSE LIKE VACUUMING, SWEEPING FLOORS OR CARRYING GROCERIES: YES
CAN YOU PARTICIPATE IN STRENOUS SPORTS LIKE SWIMMING, SINGLES TENNIS, FOOTBALL, BASKETBALL, OR SKIING: NO
CAN YOU DO HEAVY WORK AROUND THE HOUSE LIKE SCRUBBING FLOORS OR LIFTING AND MOVING HEAVY FURNITURE: YES
CAN YOU WALK INDOORS, SUCH AS AROUND YOUR HOUSE: YES
CAN YOU HAVE SEXUAL RELATIONS: YES
DASI METS SCORE: 9
CAN YOU RUN A SHORT DISTANCE: YES
TOTAL_SCORE: 50.7
CAN YOU CLIMB A FLIGHT OF STAIRS OR WALK UP A HILL: YES

## 2024-04-29 ASSESSMENT — ENCOUNTER SYMPTOMS
RESPIRATORY NEGATIVE: 1
CONSTITUTIONAL NEGATIVE: 1
GASTROINTESTINAL NEGATIVE: 1
DYSPNEA WITH EXERTION: 1
EYES NEGATIVE: 1

## 2024-04-29 NOTE — CPM/PAT NURSE NOTE
CPM/PAT Nurse Note      Name: Farooq Lang (Farooq Lang)  /Age: 1960/63 y.o.     Scheduled for replacement mitral valve on 24.    Past Medical History:   Diagnosis Date    A-fib (Multi)     Diverticulosis     Dizziness     Heart failure with mid-range ejection fraction (HFmEF) (Multi)     (45%, 3/28/24)    Irregular heart beat     afib on coumadin    Irritable bowel syndrome     Mitral valve stenosis     Other chest pain 03/10/2016    Chest discomfort    Pleurodynia 2017    Chest pain, pleuritic    Right lower quadrant pain 2018    Abdominal pain, acute, right lower quadrant    Shortness of breath     VANCE    Thrombocytopenia (CMS-HCC)        Past Surgical History:   Procedure Laterality Date    CARDIAC CATHETERIZATION N/A 2024    Procedure: Left And Right Heart Cath, Without LV;  Surgeon: Yobani Soto MD;  Location: [place] [address] [facility];  Service: Cardiovascular;  Laterality: N/A;  Schedule at 3:30pm Dr. Soto    COLONOSCOPY      DENTAL SURGERY      TONSILLECTOMY         Patient Sexual activity questions deferred to the physician.    Family History   Problem Relation Name Age of Onset    Cancer Father  74    Brain Aneurysm Father         No Known Allergies    Prior to Admission medications    Medication Sig Start Date End Date Taking? Authorizing Provider   dilTIAZem CD (Cardizem CD) 120 mg 24 hr capsule Take 1 capsule (120 mg) by mouth once daily.    Historical Provider, MD   furosemide (Lasix) 40 mg tablet Take 0.5 tablets (20 mg) by mouth once daily.  Patient taking differently: Take 0.5 tablets (20 mg) by mouth once daily as needed. 3/31/24   Senyo Hemant, DO   Lactobacillus acidophilus (PROBIOTIC ORAL) Take 1 capsule by mouth once daily in the morning.    Historical Provider, MD   metoprolol succinate XL (Toprol-XL) 100 mg 24 hr tablet Take 1 tablet (100 mg) by mouth 2 times a day. Do not crush or chew.  Patient not taking: Reported on 2024  3/31/24   Marin Marcos,    UNABLE TO FIND Take 1 capsule by mouth once daily in the evening. Take with meals. Med Name: IBS Clear OTC supplement    Historical Provider, MD   warfarin (Coumadin) 5 mg tablet Take 1 tablet (5 mg) by mouth once daily at bedtime for 10 days. Take as directed per After Visit Summary. 3/31/24 4/22/24  Marin Marcos, DO        PAT ROS:   Constitutional:   neg    Neuro/Psych:   Eyes:   neg    Ears:   Nose:   neg    Mouth:    dental issues (dentures)  Throat:   neg    Neck:   Cardio:    VANCE  Respiratory:   neg    Endocrine:   GI:   neg    :   neg    Musculoskeletal:   Hematologic:   neg    Skin:      DASI Risk Score      Flowsheet Row Most Recent Value   DASI SCORE 50.7   METS Score (Will be calculated only when all the questions are answered) 9          Caprini DVT Assessment    No data to display       Modified Frailty Index    No data to display       CHADS2 Stroke Risk  Current as of 8 minutes ago        2.8% 3 to 100%: High Risk   2 to < 3%: Medium Risk   0 to < 2%: Low Risk     Last Change:           This score determines the patient's risk of having a stroke if the patient has atrial fibrillation.          Points Metrics   1 Has Congestive Heart Failure:  Yes     Patients with congestive heart failure get 1 point.    Current as of 8 minutes ago   0 Has Hypertension:  No     Patients with hypertension get 1 point.    Current as of 8 minutes ago   0 Age:  63     Patients who are 75 years of age or older get 1 point.    Current as of 8 minutes ago   0 Has Diabetes:  No     Patients with diabetes get 1 point.    Current as of 8 minutes ago   0 Had Stroke:  No  Had TIA:  No  Had Thromboembolism:  No     Patients who have had a stroke, TIA, or thromboembolism get 2 points.    Current as of 8 minutes ago             Revised Cardiac Risk Index    No data to display       Apfel Simplified Score    No data to display       Risk Analysis Index Results This Encounter    No data found in the last  1 encounters.     PCP: Munir Amado DO   [facility] Phone:[phone]  Fax: [phone]  Cardiology: Morris Johnson   Phone: [phone] Fax: [phone]  -EC24: Atrial fibrillation  Abnormal ECG  -Cardiac Cath: 24: CONCLUSIONS: 1. Normal coronary arteries.  -TTE: 24:   CONCLUSIONS   1. Left ventricular systolic function is mildly decreased with a 45% estimated ejection fraction.   2. The left atrium is severely dilated.   3. The mitral valve is moderately thickened. 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   4. There is moderate mitral annular calcification.   5. There is severe mitral valve stenosis.   6. Moderate mitral valve regurgitation.   7. Mild to moderately elevated pulmonary artery pressure.   8. There is global hypokinesis of the left ventricle with minor regional variations.    Thoracic Surgery: Lukasz Nugent MD LOV 24    Nurse Plan of Action:   Faxed risk stratification to cardiology office requesting pre-op coumadin instructions.  Update: 24: Received cardiac risk assessment and Coumadin instructions, scanned under media tab.     Medication Instructions:  Instructed to hold vitamins, supplements, and NSAIDs 7 days prior to surgery.      Sue Shukla RN  Pre Admission Testing

## 2024-05-06 ENCOUNTER — CARDIOLOGY CONFERENCE (OUTPATIENT)
Dept: CARDIOLOGY | Facility: HOSPITAL | Age: 64
End: 2024-05-06

## 2024-05-06 ENCOUNTER — PRE-ADMISSION TESTING (OUTPATIENT)
Dept: PREADMISSION TESTING | Facility: HOSPITAL | Age: 64
End: 2024-05-06
Payer: COMMERCIAL

## 2024-05-06 VITALS
HEIGHT: 66 IN | TEMPERATURE: 97.1 F | WEIGHT: 132.3 LBS | BODY MASS INDEX: 21.26 KG/M2 | SYSTOLIC BLOOD PRESSURE: 130 MMHG | DIASTOLIC BLOOD PRESSURE: 77 MMHG | HEART RATE: 72 BPM | OXYGEN SATURATION: 99 %

## 2024-05-06 DIAGNOSIS — R07.9 CHEST PAIN, UNSPECIFIED TYPE: ICD-10-CM

## 2024-05-06 DIAGNOSIS — Z01.818 PREOP TESTING: Primary | ICD-10-CM

## 2024-05-06 DIAGNOSIS — Z79.01 LONG TERM CURRENT USE OF ANTICOAGULANT: ICD-10-CM

## 2024-05-06 DIAGNOSIS — I05.0 MITRAL VALVE STENOSIS, UNSPECIFIED ETIOLOGY: ICD-10-CM

## 2024-05-06 LAB
ABO GROUP (TYPE) IN BLOOD: NORMAL
ALBUMIN SERPL BCP-MCNC: 4.1 G/DL (ref 3.4–5)
ALP SERPL-CCNC: 82 U/L (ref 33–136)
ALT SERPL W P-5'-P-CCNC: 10 U/L (ref 10–52)
ANION GAP SERPL CALC-SCNC: 14 MMOL/L (ref 10–20)
ANTIBODY SCREEN: NORMAL
APPEARANCE UR: CLEAR
APTT PPP: 43 SECONDS (ref 27–38)
AST SERPL W P-5'-P-CCNC: 9 U/L (ref 9–39)
BASOPHILS # BLD AUTO: 0.03 X10*3/UL (ref 0–0.1)
BASOPHILS NFR BLD AUTO: 0.5 %
BILIRUB SERPL-MCNC: 0.4 MG/DL (ref 0–1.2)
BILIRUB UR STRIP.AUTO-MCNC: NEGATIVE MG/DL
BUN SERPL-MCNC: 15 MG/DL (ref 6–23)
CALCIUM SERPL-MCNC: 9.2 MG/DL (ref 8.6–10.6)
CHLORIDE SERPL-SCNC: 104 MMOL/L (ref 98–107)
CO2 SERPL-SCNC: 24 MMOL/L (ref 21–32)
COLOR UR: COLORLESS
CREAT SERPL-MCNC: 1.15 MG/DL (ref 0.5–1.3)
CRP SERPL-MCNC: 0.75 MG/DL
EGFRCR SERPLBLD CKD-EPI 2021: 71 ML/MIN/1.73M*2
EOSINOPHIL # BLD AUTO: 0.14 X10*3/UL (ref 0–0.7)
EOSINOPHIL NFR BLD AUTO: 2.4 %
ERYTHROCYTE [DISTWIDTH] IN BLOOD BY AUTOMATED COUNT: 14.8 % (ref 11.5–14.5)
EST. AVERAGE GLUCOSE BLD GHB EST-MCNC: 117 MG/DL
GLUCOSE SERPL-MCNC: 94 MG/DL (ref 74–99)
GLUCOSE UR STRIP.AUTO-MCNC: NORMAL MG/DL
HBA1C MFR BLD: 5.7 %
HCT VFR BLD AUTO: 45.8 % (ref 41–52)
HGB BLD-MCNC: 15.1 G/DL (ref 13.5–17.5)
IMM GRANULOCYTES # BLD AUTO: 0.01 X10*3/UL (ref 0–0.7)
IMM GRANULOCYTES NFR BLD AUTO: 0.2 % (ref 0–0.9)
INR PPP: 1.8 (ref 0.9–1.1)
KETONES UR STRIP.AUTO-MCNC: NEGATIVE MG/DL
LEUKOCYTE ESTERASE UR QL STRIP.AUTO: NEGATIVE
LYMPHOCYTES # BLD AUTO: 1.86 X10*3/UL (ref 1.2–4.8)
LYMPHOCYTES NFR BLD AUTO: 31.6 %
MCH RBC QN AUTO: 29 PG (ref 26–34)
MCHC RBC AUTO-ENTMCNC: 33 G/DL (ref 32–36)
MCV RBC AUTO: 88 FL (ref 80–100)
MONOCYTES # BLD AUTO: 0.58 X10*3/UL (ref 0.1–1)
MONOCYTES NFR BLD AUTO: 9.9 %
NEUTROPHILS # BLD AUTO: 3.26 X10*3/UL (ref 1.2–7.7)
NEUTROPHILS NFR BLD AUTO: 55.4 %
NITRITE UR QL STRIP.AUTO: NEGATIVE
NRBC BLD-RTO: 0 /100 WBCS (ref 0–0)
PH UR STRIP.AUTO: 5 [PH]
PLATELET # BLD AUTO: 140 X10*3/UL (ref 150–450)
POTASSIUM SERPL-SCNC: 4.2 MMOL/L (ref 3.5–5.3)
PROT SERPL-MCNC: 7 G/DL (ref 6.4–8.2)
PROT UR STRIP.AUTO-MCNC: NEGATIVE MG/DL
PROTHROMBIN TIME: 20.2 SECONDS (ref 9.8–12.8)
RBC # BLD AUTO: 5.2 X10*6/UL (ref 4.5–5.9)
RBC # UR STRIP.AUTO: NEGATIVE /UL
RH FACTOR (ANTIGEN D): NORMAL
SODIUM SERPL-SCNC: 138 MMOL/L (ref 136–145)
SP GR UR STRIP.AUTO: 1.01
UROBILINOGEN UR STRIP.AUTO-MCNC: NORMAL MG/DL
WBC # BLD AUTO: 5.9 X10*3/UL (ref 4.4–11.3)

## 2024-05-06 PROCEDURE — 85025 COMPLETE CBC W/AUTO DIFF WBC: CPT

## 2024-05-06 PROCEDURE — 87081 CULTURE SCREEN ONLY: CPT

## 2024-05-06 PROCEDURE — 84075 ASSAY ALKALINE PHOSPHATASE: CPT

## 2024-05-06 PROCEDURE — 83036 HEMOGLOBIN GLYCOSYLATED A1C: CPT

## 2024-05-06 PROCEDURE — 99406 BEHAV CHNG SMOKING 3-10 MIN: CPT | Performed by: NURSE PRACTITIONER

## 2024-05-06 PROCEDURE — 85610 PROTHROMBIN TIME: CPT

## 2024-05-06 PROCEDURE — 86901 BLOOD TYPING SEROLOGIC RH(D): CPT

## 2024-05-06 PROCEDURE — 99204 OFFICE O/P NEW MOD 45 MIN: CPT | Performed by: NURSE PRACTITIONER

## 2024-05-06 PROCEDURE — 36415 COLL VENOUS BLD VENIPUNCTURE: CPT

## 2024-05-06 PROCEDURE — 86140 C-REACTIVE PROTEIN: CPT

## 2024-05-06 PROCEDURE — 81003 URINALYSIS AUTO W/O SCOPE: CPT

## 2024-05-06 RX ORDER — CHLORHEXIDINE GLUCONATE 40 MG/ML
SOLUTION TOPICAL 2 TIMES DAILY
Qty: 473 ML | Refills: 0 | Status: SHIPPED | OUTPATIENT
Start: 2024-05-06 | End: 2024-05-11

## 2024-05-06 RX ORDER — CHLORHEXIDINE GLUCONATE ORAL RINSE 1.2 MG/ML
15 SOLUTION DENTAL SEE ADMIN INSTRUCTIONS
Qty: 473 ML | Refills: 0 | Status: ON HOLD | OUTPATIENT
Start: 2024-05-06 | End: 2024-06-04 | Stop reason: ALTCHOICE

## 2024-05-06 ASSESSMENT — DUKE ACTIVITY SCORE INDEX (DASI)
CAN YOU WALK INDOORS, SUCH AS AROUND YOUR HOUSE: YES
DASI METS SCORE: 8.2
TOTAL_SCORE: 44.7
CAN YOU DO YARD WORK LIKE RAKING LEAVES, WEEDING OR PUSHING A MOWER: YES
CAN YOU PARTICIPATE IN MODERATE RECREATIONAL ACTIVITIES LIKE GOLF, BOWLING, DANCING, DOUBLES TENNIS OR THROWING A BASEBALL OR FOOTBALL: NO
CAN YOU TAKE CARE OF YOURSELF (EAT, DRESS, BATHE, OR USE TOILET): YES
CAN YOU WALK A BLOCK OR TWO ON LEVEL GROUND: YES
CAN YOU RUN A SHORT DISTANCE: YES
CAN YOU DO MODERATE WORK AROUND THE HOUSE LIKE VACUUMING, SWEEPING FLOORS OR CARRYING GROCERIES: YES
CAN YOU HAVE SEXUAL RELATIONS: YES
CAN YOU CLIMB A FLIGHT OF STAIRS OR WALK UP A HILL: YES
CAN YOU DO LIGHT WORK AROUND THE HOUSE LIKE DUSTING OR WASHING DISHES: YES
CAN YOU PARTICIPATE IN STRENOUS SPORTS LIKE SWIMMING, SINGLES TENNIS, FOOTBALL, BASKETBALL, OR SKIING: NO
CAN YOU DO HEAVY WORK AROUND THE HOUSE LIKE SCRUBBING FLOORS OR LIFTING AND MOVING HEAVY FURNITURE: YES

## 2024-05-06 ASSESSMENT — LIFESTYLE VARIABLES: SMOKING_STATUS: SMOKER

## 2024-05-06 ASSESSMENT — ENCOUNTER SYMPTOMS
ENDOCRINE NEGATIVE: 1
NECK NEGATIVE: 1
GASTROINTESTINAL NEGATIVE: 1
CHILLS: 0
CONSTITUTIONAL NEGATIVE: 1
FEVER: 0
NEUROLOGICAL NEGATIVE: 1
SHORTNESS OF BREATH: 1
MUSCULOSKELETAL NEGATIVE: 1
CARDIOVASCULAR NEGATIVE: 1
EYES NEGATIVE: 1

## 2024-05-06 ASSESSMENT — PAIN SCALES - GENERAL: PAINLEVEL_OUTOF10: 0 - NO PAIN

## 2024-05-06 ASSESSMENT — PAIN - FUNCTIONAL ASSESSMENT: PAIN_FUNCTIONAL_ASSESSMENT: 0-10

## 2024-05-06 NOTE — CPM/PAT H&P
CPM/PAT Evaluation       Name: Farooq Lang (Farooq Lang)  /Age: 1960/63 y.o.     Visit Type:   In-Person       Chief Complaint: Replacement Mitral Valve     HPI Patient is a 63 year old male scheduled for surgery with Dr. Lukasz Nugent on 24 related to related to Mitral valve stenosis, unspecified etiology     Patient referred by Dr. Nugent for preoperative evaluation of atrial fib, heart failure, mitral valve stenosis, thrombocytopenia, and irritable bowel syndrome    Past Medical History:   Diagnosis Date    A-fib (Multi)     Diverticulosis     Dizziness     Heart failure with mid-range ejection fraction (HFmEF) (Multi)     (45%, 3/28/24)    Irregular heart beat     afib on coumadin    Irritable bowel syndrome     Mitral valve stenosis     Other chest pain 03/10/2016    Chest discomfort    Pleurodynia 2017    Chest pain, pleuritic    Right lower quadrant pain 2018    Abdominal pain, acute, right lower quadrant    Shortness of breath     VANCE    Thrombocytopenia (CMS-HCC)      Past Surgical History:   Procedure Laterality Date    CARDIAC CATHETERIZATION N/A 2024    Procedure: Left And Right Heart Cath, Without LV;  Surgeon: Yobani Soto MD;  Location: [place] [address] [facility];  Service: Cardiovascular;  Laterality: N/A;  Schedule at 3:30pm Dr. Soto    COLONOSCOPY      DENTAL SURGERY      TONSILLECTOMY       Family History   Problem Relation Name Age of Onset    Cancer Father  74    Brain Aneurysm Father       No Known Allergies    Prior to Admission medications    Medication Sig Start Date End Date Taking? Authorizing Provider   dilTIAZem CD (Cardizem CD) 120 mg 24 hr capsule Take 1 capsule (120 mg) by mouth once daily.    Historical Provider, MD   furosemide (Lasix) 40 mg tablet Take 0.5 tablets (20 mg) by mouth once daily.  Patient taking differently: Take 0.5 tablets (20 mg) by mouth once daily as needed. 3/31/24   DO Sparkle Ingram  acidophilus (PROBIOTIC ORAL) Take 1 capsule by mouth once daily in the morning.    Historical Provider, MD   metoprolol succinate XL (Toprol-XL) 100 mg 24 hr tablet Take 1 tablet (100 mg) by mouth 2 times a day. Do not crush or chew.  Patient not taking: Reported on 4/22/2024 3/31/24   Marin Marcos DO   UNABLE TO FIND Take 1 capsule by mouth once daily in the evening. Take with meals. Med Name: IBS Clear OTC supplement    Historical Provider, MD   warfarin (Coumadin) 5 mg tablet Take 1 tablet (5 mg) by mouth once daily at bedtime for 10 days. Take as directed per After Visit Summary. 3/31/24 4/22/24  Marin Marcos DO      PAT ROS:   Constitutional:   neg     no fever   no chills  Neuro/Psych:   neg    Eyes:   neg    Ears:   neg    Nose:   neg    Mouth:   neg    Throat:   neg    Neck:   neg    Cardio:   neg    Respiratory:    shortness of breath  Endocrine:   neg    GI:    Flatulent and frequent stools, but not diarrhea  neg    :   neg    Musculoskeletal:   neg    Hematologic:   neg     no history of blood transfusion   no blood clots  Skin:  neg      Physical Exam  Vitals reviewed.   Constitutional:       Appearance: Normal appearance. He is normal weight.   HENT:      Head: Normocephalic and atraumatic.      Nose: Nose normal.      Mouth/Throat:      Mouth: Mucous membranes are moist.      Pharynx: Oropharynx is clear.   Eyes:      Extraocular Movements: Extraocular movements intact.      Pupils: Pupils are equal, round, and reactive to light.   Cardiovascular:      Rate and Rhythm: Normal rate. Rhythm irregular.      Heart sounds: Murmur heard.   Pulmonary:      Effort: Pulmonary effort is normal.      Breath sounds: Normal breath sounds.   Abdominal:      General: Abdomen is flat. Bowel sounds are normal.      Palpations: Abdomen is soft.   Musculoskeletal:         General: Normal range of motion.      Cervical back: Normal range of motion and neck supple.   Skin:     General: Skin is warm and dry.    Neurological:      Mental Status: He is alert and oriented to person, place, and time.   Psychiatric:         Mood and Affect: Mood normal.         Behavior: Behavior normal.         Thought Content: Thought content normal.         Judgment: Judgment normal.        PAT AIRWAY:   Airway:     Mallampati::  II    Neck ROM::  Full   lower dentures    Visit Vitals  /77   Pulse 72   Temp 36.2 °C (97.1 °F) (Temporal)     DASI Risk Score      Flowsheet Row Most Recent Value   DASI SCORE 44.7   METS Score (Will be calculated only when all the questions are answered) 8.2          Caprini DVT Assessment      Flowsheet Row Most Recent Value   DVT Score 9   Current Status Major surgery planned, lasting over 3 hours   History Prior major surgery   Age 60-75 years   BMI 30 or less          Modified Frailty Index      Flowsheet Row Most Recent Value   Modified Frailty Index Calculator .1818          CHADS2 Stroke Risk  Current as of 3 hours ago        2.8% 3 to 100%: High Risk   2 to < 3%: Medium Risk   0 to < 2%: Low Risk     Last Change:           This score determines the patient's risk of having a stroke if the patient has atrial fibrillation.          Points Metrics   1 Has Congestive Heart Failure:  Yes     Patients with congestive heart failure get 1 point.    Current as of 3 hours ago   0 Has Hypertension:  No     Patients with hypertension get 1 point.    Current as of 3 hours ago   0 Age:  63     Patients who are 75 years of age or older get 1 point.    Current as of 3 hours ago   0 Has Diabetes:  No     Patients with diabetes get 1 point.    Current as of 3 hours ago   0 Had Stroke:  No  Had TIA:  No  Had Thromboembolism:  No     Patients who have had a stroke, TIA, or thromboembolism get 2 points.    Current as of 3 hours ago             Revised Cardiac Risk Index      Flowsheet Row Most Recent Value   Revised Cardiac Risk Calculator 2          Apfel Simplified Score      Flowsheet Row Most Recent Value   Apfel  Simplified Score Calculator 1          Risk Analysis Index Results This Encounter    No data found in the last 1 encounters.       Stop Bang Score      Flowsheet Row Most Recent Value   Do you snore loudly? 0   Do you often feel tired or fatigued after your sleep? 0   Has anyone ever observed you stop breathing in your sleep? 0   Do you have or are you being treated for high blood pressure? 1   Recent BMI (Calculated) 21.4   Is BMI greater than 35 kg/m2? 0=No   Age older than 50 years old? 1=Yes   Is your neck circumference greater than 17 inches (Male) or 16 inches (Female)? 0   Gender - Male 1=Yes   STOP-BANG Total Score 3          Assessment and Plan:     Neuro:   No diagnosis or significant findings on chart review or clinical presentation and evaluation.    Patient given information on brain exercises and delirium prevention.    HEENT/Airway  No diagnosis or significant findings on chart review or clinical presentation and evaluation.    Cardiovascular    Atrial fib, heart failure, mitral valve stenosis   Currently on diltiazem, furosemide PRN, as well as warfarin  BP today 130/77  No recent lipid panel noted     CARDS EVAL  The patient follows with cardiology, Dr. Morris Johnson.     Per note/cardiac clearance from Dr. Johnson in media tab patient may be off warfarin for 4 days prior to surgery    RCRI  The patient meets 2 RCRI criteria and therefore has a 6.6% risk (elevated) of major adverse cardiac complications.  METS  The patient's functional capacity is greater than 4 METS.  MACE  30-day risk for MACE of 2 predictors, 10.1% risk for cardiac death, nonfatal myocardial infarction, and nonfatal cardiac arrest  LAUREN  0.2% risk for 51st to 90th percentile    4-10-24 EKG atrial fib    4-22-24: Cath  CONCLUSIONS:   1. Normal coronary arteries.    3-28-24: Echo  CONCLUSIONS:   1. Left ventricular systolic function is mildly decreased with a 45% estimated ejection fraction.   2. The left atrium is severely dilated.    3. The mitral valve is moderately thickened. 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   4. There is moderate mitral annular calcification.   5. There is severe mitral valve stenosis.   6. Moderate mitral valve regurgitation.   7. Mild to moderately elevated pulmonary artery pressure.   8. There is global hypokinesis of the left ventricle with minor regional variations.     Pulmonary   No diagnosis or significant findings on chart review or clinical presentation and evaluation.    Smoking  States has been working to cut down on cigarettes. Encouragement and education offered   Ready to quit: Yes  Counseling given: Yes    Tobacco Use: High Risk (5/8/2024)    Patient History     Smoking Tobacco Use: Every Day     Smokeless Tobacco Use: Never     Passive Exposure: Current     STOP BANG Score of 3, which places patient at intermediate risk for having RICK.  ARISCAT 50, High, 42.1% risk of in-hospital postoperative pulmonary complications  PRODIGY 26, high of respiratory depression episode.    Patient given information on deep breathing exercises.     Renal  No diagnosis or significant findings on chart review or clinical presentation and evaluation.    DPS 4 points high isk for perioperative acute kidney injury.     Endocrine  No diagnosis or significant findings on chart review or clinical presentation and evaluation.    Hematology    Thrombocytopenia   5-6-24 H&H 15.1/45.8, WBC 5.9, Platelets 140    Anticoagulation management  The patient is currently receiving anticoagulation therapy for afib.    Warfarin  Per cardiologist patient may stop 4 days prior to surgery. Note per media tab    Caprini score 9, high risk of VTE    Transfusion Evaluation  A type and screen was obtained given the likelihood for perioperative transfusion of blood or blood products.    Patient given information on DVT prevention.     GI    Irritable bowel syndrome  States has flatulence and frequent stools, but not diarrhea     Eat 10- 0  Apfel: 1  points 21% risk for post operative nausea/vomiting.     Genitourinary  No diagnosis or significant findings on chart review or clinical presentation and evaluation.    ID  No diagnosis or significant findings on chart review or clinical presentation and evaluation.    MRSA swab ordered  UA with reflex culture ordered  Chlorhexidine mouth wash and body wash ordered    Musculoskeletal  No diagnosis or significant findings on chart review or clinical presentation and evaluation.    -Preoperative medication instructions were provided and reviewed with the patient.  Any additional testing or evaluation was explained to the patient.  NPO Instructions were discussed, and the patient's questions were answered prior to conclusion of this encounter    Labs ordered:    MRSA Swab: MSSA noted    Platelets 140. Dr. Nugent notified     Repeat INR for 5-23-24    Results for orders placed or performed in visit on 05/06/24   Staphylococcus aureus/MRSA colonization, Culture    Specimen: Nares/Axilla/Groin; Swab   Result Value Ref Range    Staph/MRSA Screen Culture (A)      Isolated: Methicillin Susceptible Staphylococcus aureus (MSSA)   Comprehensive Metabolic Panel   Result Value Ref Range    Glucose 94 74 - 99 mg/dL    Sodium 138 136 - 145 mmol/L    Potassium 4.2 3.5 - 5.3 mmol/L    Chloride 104 98 - 107 mmol/L    Bicarbonate 24 21 - 32 mmol/L    Anion Gap 14 10 - 20 mmol/L    Urea Nitrogen 15 6 - 23 mg/dL    Creatinine 1.15 0.50 - 1.30 mg/dL    eGFR 71 >60 mL/min/1.73m*2    Calcium 9.2 8.6 - 10.6 mg/dL    Albumin 4.1 3.4 - 5.0 g/dL    Alkaline Phosphatase 82 33 - 136 U/L    Total Protein 7.0 6.4 - 8.2 g/dL    AST 9 9 - 39 U/L    Bilirubin, Total 0.4 0.0 - 1.2 mg/dL    ALT 10 10 - 52 U/L   C-Reactive Protein   Result Value Ref Range    C-Reactive Protein 0.75 <1.00 mg/dL   CBC and Auto Differential   Result Value Ref Range    WBC 5.9 4.4 - 11.3 x10*3/uL    nRBC 0.0 0.0 - 0.0 /100 WBCs    RBC 5.20 4.50 - 5.90 x10*6/uL    Hemoglobin  15.1 13.5 - 17.5 g/dL    Hematocrit 45.8 41.0 - 52.0 %    MCV 88 80 - 100 fL    MCH 29.0 26.0 - 34.0 pg    MCHC 33.0 32.0 - 36.0 g/dL    RDW 14.8 (H) 11.5 - 14.5 %    Platelets 140 (L) 150 - 450 x10*3/uL    Neutrophils % 55.4 40.0 - 80.0 %    Immature Granulocytes %, Automated 0.2 0.0 - 0.9 %    Lymphocytes % 31.6 13.0 - 44.0 %    Monocytes % 9.9 2.0 - 10.0 %    Eosinophils % 2.4 0.0 - 6.0 %    Basophils % 0.5 0.0 - 2.0 %    Neutrophils Absolute 3.26 1.20 - 7.70 x10*3/uL    Immature Granulocytes Absolute, Automated 0.01 0.00 - 0.70 x10*3/uL    Lymphocytes Absolute 1.86 1.20 - 4.80 x10*3/uL    Monocytes Absolute 0.58 0.10 - 1.00 x10*3/uL    Eosinophils Absolute 0.14 0.00 - 0.70 x10*3/uL    Basophils Absolute 0.03 0.00 - 0.10 x10*3/uL   Coagulation Screen   Result Value Ref Range    Protime 20.2 (H) 9.8 - 12.8 seconds    INR 1.8 (H) 0.9 - 1.1    aPTT 43 (H) 27 - 38 seconds   Type And Screen   Result Value Ref Range    ABO TYPE A     Rh TYPE POS     ANTIBODY SCREEN NEG    Hemoglobin A1C   Result Value Ref Range    Hemoglobin A1C 5.7 (H) see below %    Estimated Average Glucose 117 Not Established mg/dL   Urinalysis with Reflex Culture and Microscopic   Result Value Ref Range    Color, Urine Colorless (N) Light-Yellow, Yellow, Dark-Yellow    Appearance, Urine Clear Clear    Specific Gravity, Urine 1.007 1.005 - 1.035    pH, Urine 5.0 5.0, 5.5, 6.0, 6.5, 7.0, 7.5, 8.0    Protein, Urine NEGATIVE NEGATIVE, 10 (TRACE), 20 (TRACE) mg/dL    Glucose, Urine Normal Normal mg/dL    Blood, Urine NEGATIVE NEGATIVE    Ketones, Urine NEGATIVE NEGATIVE mg/dL    Bilirubin, Urine NEGATIVE NEGATIVE    Urobilinogen, Urine Normal Normal mg/dL    Nitrite, Urine NEGATIVE NEGATIVE    Leukocyte Esterase, Urine NEGATIVE NEGATIVE   Extra Urine Gray Tube   Result Value Ref Range    Extra Tube Hold for add-ons.        Recent Results (from the past 168 hour(s))   Comprehensive Metabolic Panel    Collection Time: 05/06/24  1:06 PM   Result Value Ref  Range    Glucose 94 74 - 99 mg/dL    Sodium 138 136 - 145 mmol/L    Potassium 4.2 3.5 - 5.3 mmol/L    Chloride 104 98 - 107 mmol/L    Bicarbonate 24 21 - 32 mmol/L    Anion Gap 14 10 - 20 mmol/L    Urea Nitrogen 15 6 - 23 mg/dL    Creatinine 1.15 0.50 - 1.30 mg/dL    eGFR 71 >60 mL/min/1.73m*2    Calcium 9.2 8.6 - 10.6 mg/dL    Albumin 4.1 3.4 - 5.0 g/dL    Alkaline Phosphatase 82 33 - 136 U/L    Total Protein 7.0 6.4 - 8.2 g/dL    AST 9 9 - 39 U/L    Bilirubin, Total 0.4 0.0 - 1.2 mg/dL    ALT 10 10 - 52 U/L   C-Reactive Protein    Collection Time: 05/06/24  1:06 PM   Result Value Ref Range    C-Reactive Protein 0.75 <1.00 mg/dL   CBC and Auto Differential    Collection Time: 05/06/24  1:06 PM   Result Value Ref Range    WBC 5.9 4.4 - 11.3 x10*3/uL    nRBC 0.0 0.0 - 0.0 /100 WBCs    RBC 5.20 4.50 - 5.90 x10*6/uL    Hemoglobin 15.1 13.5 - 17.5 g/dL    Hematocrit 45.8 41.0 - 52.0 %    MCV 88 80 - 100 fL    MCH 29.0 26.0 - 34.0 pg    MCHC 33.0 32.0 - 36.0 g/dL    RDW 14.8 (H) 11.5 - 14.5 %    Platelets 140 (L) 150 - 450 x10*3/uL    Neutrophils % 55.4 40.0 - 80.0 %    Immature Granulocytes %, Automated 0.2 0.0 - 0.9 %    Lymphocytes % 31.6 13.0 - 44.0 %    Monocytes % 9.9 2.0 - 10.0 %    Eosinophils % 2.4 0.0 - 6.0 %    Basophils % 0.5 0.0 - 2.0 %    Neutrophils Absolute 3.26 1.20 - 7.70 x10*3/uL    Immature Granulocytes Absolute, Automated 0.01 0.00 - 0.70 x10*3/uL    Lymphocytes Absolute 1.86 1.20 - 4.80 x10*3/uL    Monocytes Absolute 0.58 0.10 - 1.00 x10*3/uL    Eosinophils Absolute 0.14 0.00 - 0.70 x10*3/uL    Basophils Absolute 0.03 0.00 - 0.10 x10*3/uL   Coagulation Screen    Collection Time: 05/06/24  1:06 PM   Result Value Ref Range    Protime 20.2 (H) 9.8 - 12.8 seconds    INR 1.8 (H) 0.9 - 1.1    aPTT 43 (H) 27 - 38 seconds   Type And Screen    Collection Time: 05/06/24  1:06 PM   Result Value Ref Range    ABO TYPE A     Rh TYPE POS     ANTIBODY SCREEN NEG    Hemoglobin A1C    Collection Time: 05/06/24  1:06  PM   Result Value Ref Range    Hemoglobin A1C 5.7 (H) see below %    Estimated Average Glucose 117 Not Established mg/dL   Staphylococcus aureus/MRSA colonization, Culture    Collection Time: 05/06/24  1:06 PM    Specimen: Nares/Axilla/Groin; Swab   Result Value Ref Range    Staph/MRSA Screen Culture (A)      Isolated: Methicillin Susceptible Staphylococcus aureus (MSSA)   Urinalysis with Reflex Culture and Microscopic    Collection Time: 05/06/24  1:06 PM   Result Value Ref Range    Color, Urine Colorless (N) Light-Yellow, Yellow, Dark-Yellow    Appearance, Urine Clear Clear    Specific Gravity, Urine 1.007 1.005 - 1.035    pH, Urine 5.0 5.0, 5.5, 6.0, 6.5, 7.0, 7.5, 8.0    Protein, Urine NEGATIVE NEGATIVE, 10 (TRACE), 20 (TRACE) mg/dL    Glucose, Urine Normal Normal mg/dL    Blood, Urine NEGATIVE NEGATIVE    Ketones, Urine NEGATIVE NEGATIVE mg/dL    Bilirubin, Urine NEGATIVE NEGATIVE    Urobilinogen, Urine Normal Normal mg/dL    Nitrite, Urine NEGATIVE NEGATIVE    Leukocyte Esterase, Urine NEGATIVE NEGATIVE   Extra Urine Gray Tube    Collection Time: 05/06/24  1:06 PM   Result Value Ref Range    Extra Tube Hold for add-ons.    HEMOGLOBIN A1C    Collection Time: 05/06/24  1:06 PM   Result Value Ref Range    Hemoglobin A1C 5.7 (H) see below %    Estimated Average Glucose 117 Not Established mg/dL   ECG 12 lead    Collection Time: 05/08/24  9:16 AM   Result Value Ref Range    Ventricular Rate 59 BPM    Atrial Rate 344 BPM    QRS Duration 82 ms    QT Interval 420 ms    QTC Calculation(Bazett) 415 ms    R Axis 77 degrees    T Axis 50 degrees    QRS Count 10 beats    Q Onset 223 ms    T Offset 433 ms    QTC Fredericia 417 ms   CBC and Auto Differential    Collection Time: 05/08/24  9:18 AM   Result Value Ref Range    WBC 6.9 4.4 - 11.3 x10*3/uL    nRBC 0.0 0.0 - 0.0 /100 WBCs    RBC 5.71 4.50 - 5.90 x10*6/uL    Hemoglobin 16.6 13.5 - 17.5 g/dL    Hematocrit 51.3 41.0 - 52.0 %    MCV 90 80 - 100 fL    MCH 29.1 26.0 -  34.0 pg    MCHC 32.4 32.0 - 36.0 g/dL    RDW 15.6 (H) 11.5 - 14.5 %    Platelets 147 (L) 150 - 450 x10*3/uL    Neutrophils % 59.0 40.0 - 80.0 %    Immature Granulocytes %, Automated 0.3 0.0 - 0.9 %    Lymphocytes % 29.2 13.0 - 44.0 %    Monocytes % 9.2 2.0 - 10.0 %    Eosinophils % 1.7 0.0 - 6.0 %    Basophils % 0.6 0.0 - 2.0 %    Neutrophils Absolute 4.06 1.20 - 7.70 x10*3/uL    Immature Granulocytes Absolute, Automated 0.02 0.00 - 0.70 x10*3/uL    Lymphocytes Absolute 2.01 1.20 - 4.80 x10*3/uL    Monocytes Absolute 0.63 0.10 - 1.00 x10*3/uL    Eosinophils Absolute 0.12 0.00 - 0.70 x10*3/uL    Basophils Absolute 0.04 0.00 - 0.10 x10*3/uL   Comprehensive Metabolic Panel    Collection Time: 05/08/24  9:18 AM   Result Value Ref Range    Glucose 112 (H) 74 - 99 mg/dL    Sodium 136 136 - 145 mmol/L    Potassium 4.2 3.5 - 5.3 mmol/L    Chloride 106 98 - 107 mmol/L    Bicarbonate 24 21 - 32 mmol/L    Anion Gap 10 10 - 20 mmol/L    Urea Nitrogen 14 6 - 23 mg/dL    Creatinine 1.19 0.50 - 1.30 mg/dL    eGFR 68 >60 mL/min/1.73m*2    Calcium 9.5 8.6 - 10.3 mg/dL    Albumin 4.2 3.4 - 5.0 g/dL    Alkaline Phosphatase 88 33 - 136 U/L    Total Protein 7.5 6.4 - 8.2 g/dL    AST 10 9 - 39 U/L    Bilirubin, Total 0.7 0.0 - 1.2 mg/dL    ALT 11 10 - 52 U/L   Magnesium    Collection Time: 05/08/24  9:18 AM   Result Value Ref Range    Magnesium 1.97 1.60 - 2.40 mg/dL   Coagulation Screen    Collection Time: 05/08/24  9:18 AM   Result Value Ref Range    Protime 21.4 (H) 9.8 - 12.8 seconds    INR 1.9 (H) 0.9 - 1.1    aPTT 44 (H) 27 - 38 seconds   Troponin I, High Sensitivity, Initial    Collection Time: 05/08/24  9:18 AM   Result Value Ref Range    Troponin I, High Sensitivity 38 (H) 0 - 20 ng/L   Troponin, High Sensitivity, 1 Hour    Collection Time: 05/08/24 10:25 AM   Result Value Ref Range    Troponin I, High Sensitivity 35 (H) 0 - 20 ng/L   ECG 12 lead    Collection Time: 05/08/24 10:26 AM   Result Value Ref Range    Ventricular Rate  73 BPM    Atrial Rate 340 BPM    QRS Duration 82 ms    QT Interval 378 ms    QTC Calculation(Bazett) 416 ms    R Axis 79 degrees    T Axis 49 degrees    QRS Count 12 beats    Q Onset 222 ms    T Offset 411 ms    QTC Fredericia 403 ms

## 2024-05-06 NOTE — PREPROCEDURE INSTRUCTIONS
Thank you for visiting [facility] ([facility]) today for your pre-procedure evaluation, you were seen by    Sarah Grider, MSN, NP-C, CNP  Family Nurse Practitioner  Department of Anesthesiology and Perioperative Medicine  Main phone: [phone]  Fax :[phone]      This summary includes instructions and information to aid you during your perioperative period.  Please read carefully. If you have any questions about your visit today, please call the number listed above.  If you become ill or have any changes to your health before your surgery, please contact your primary care provider and alert your surgeon.    Preparing for your Surgery       Exercises  Preoperative Deep Breathing Exercises  Why it is important to do deep breathing exercises before my surgery?  Deep breathing exercises strengthen your breathing muscles.  This helps you to recover after your surgery and decreases the chance of breathing complications.  How are the deep breathing exercises done?  Sit straight with your back supported.  Breathe in deeply and slowly through your nose. Your lower rib cage should expand and your abdomen may move forward.  Hold that breath for 3 to 5 seconds.  Breathe out through pursed lips, slowly and completely.  Rest and repeat 10 times every hour while awake.  Rest longer if you become dizzy or lightheaded.       Incentive Spirometer   You were provided with an incentive spirometer in CPM/PAT, please follow the below instructions.   You were not provided an incentive spirometer in [facility], please disregard the incentive spirometer instructions  What is an incentive spirometer?  An incentive spirometer is a device used before and after surgery to “exercise” your lungs.  It helps you to take deeper breaths to expand your lungs.  Below is an example of a basic incentive spirometer.  The device you receive may differ slightly but they all function the same.    Why do I need to use an incentive  spirometer?  Using your incentive spirometer prepares your lungs for surgery and helps prevent lung problems after surgery.  How do I use my incentive spirometer?  When you're using your incentive spirometer, make sure to breathe through your mouth. If you breathe through your nose, the incentive spirometer won't work properly. You can hold your nose if you have trouble.  If you feel dizzy at any time, stop and rest. Try again at a later time.  Follow the steps below:  Set up your incentive spirometer, expand the flexible tubing and connect to the outlet.  Sit upright in a chair or bed. Hold the incentive spirometer at eye level.   Put the mouthpiece in your mouth and close your lips tightly around it. Slowly breathe out (exhale) completely.  Breathe in (inhale) slowly through your mouth as deeply as you can. As you take a breath, you will see the piston rise inside the large column. While the piston rises, the indicator should move upwards. It should stay in between the 2 arrows (see Figure).  Try to get the piston as high as you can, while keeping the indicator between the arrows.   If the indicator doesn't stay between the arrows, you're breathing either too fast or too slow.  When you get it as high as you can, hold your breath for 10 seconds, or as long as possible. While you're holding your breath, the piston will slowly fall to the base of the spirometer.  Once the piston reaches the bottom of the spirometer, breathe out slowly through your mouth. Rest for a few seconds.  Repeat 10 times. Try to get the piston to the same level with each breath.  Repeat every hour while awake  You can carefully clean the outside of the mouthpiece with an alcohol wipe or soap and water.      Preoperative Brain Exercises    What are brain exercises?  A brain exercise is any activity that engages your thinking (cognitive) skills.    What types of activities are considered brain exercises?  Jigsaw puzzles, crossword puzzles, word  HOSTING, memory games, word search, and many more.  Many can be found free online or on your phone via a mobile betty.    Why should I do brain exercises before my surgery?  More recent research has shown brain exercise before surgery can lower the risk of postoperative delirium (confusion) which can be especially important for older adults.  Patients who did brain exercises for 5 to 10 hours the days before surgery, cut their risk of postoperative delirium in half up to 1 week after surgery.    Sit-to-Stand Exercise    What is the sit-to-stand exercise?  The sit-to-stand exercise strengthens the muscles of your lower body and muscles in the center of your body (core muscles for stability) helping to maintain and improve your strength and mobility.  How do I do the sit-to-stand exercise?  The goal is to do this exercise without using your arms or hands.  If this is too difficult, use your arms and hands or a chair with armrests to help slowly push yourself to the standing position and lower yourself back to the sitting position. As the movement becomes easier use your arms and hands less.    Steps to the sit-to-stand exercise  Sit up tall in a sturdy chair, knees bent, feet flat on the floor shoulder-width apart.  Shift your hips/pelvis forward in the chair to correctly position yourself for the next movement.  Lean forward at your hips.  Stand up straight putting equal weight on both feet.  Check to be sure you are properly aligned with the chair, in a slow controlled movement sit back down.  Repeat this exercise 10-15 times.  If needed you can do it fewer times until your strength improves.  Rest for 1 minute.  Do another 10-15 sit-to-stand exercises.  Try to do this in the morning and evening.        Instructions    Preoperative Fasting Guidelines    Why must I stop eating and drinking near surgery time?  With sedation, food or liquid in your stomach can enter your lungs causing serious complications  Food can  increase nausea and vomiting  When do I need to stop eating and drinking before my surgery?      Do not eat any food after midnight the night before your surgery/procedure. You may have up to 13.5 ounces of clear liquid until TWO hours before your instructed arrival time to the hospital.  This includes water, black tea/coffee, (no milk or cream) apple juice, and electrolyte drinks (Gatorade). You may chew gum until TWO hours before your surgery/procedure            Simple things you can do to help prevent blood clots     Blood clots are blockages that can form in the body's veins. When a blood clot forms in your deep veins, it may be called a deep vein thrombosis, or DVT for short. Blood clots can happen in any part of the body where blood flows, but they are most common in the arms and legs. If a piece of a blood clot breaks free and travels to the lungs, it is called a pulmonary embolus (PE). A PE can be a very serious problem.         Being in the hospital or having surgery can raise your chances of getting a blood clot because you may not be well enough to move around as much as you normally do.         Ways you can help prevent blood clots in the hospital       Wearing SCDs  SCDs stands for Sequential Compression Devices.   SCDs are special sleeves that wrap around your legs. They attach to a pump that fills them with air to gently squeeze your legs every few minutes.  This helps return the blood in your legs to your heart.   SCDs should only be taken off when walking or bathing. SCDs may not be comfortable, but they can help save your life.              Pump SCD leg sleeves  Wearing compression stockings - if your doctor orders them. These special snug-fitting stockings gently squeeze your legs to help blood flow.       Walking. Walking helps move the blood in your legs.   If your doctor says it is ok, try walking the halls at least   5 times a day. Ask us to help you get up, so you don't fall.      Taking any  blood-thinning medicines your doctor orders.              Ways you can help prevent blood clots at home         Wearing compression stockings - if your doctor orders them.   Walking - to help move the blood in your legs.    Taking any blood-thinning medicines your doctor orders.      Signs of a blood clot or PE    Tell your doctor or nurse right away if you have any of the problems listed below.         If you are at home, seek medical care right away. Call 911 for chest pain or problems breathing.            Signs of a blood clot (DVT) - such as pain, swelling, redness, or warmth in your arm or legs.  Signs of a pulmonary embolism (PE) - such as chest pain or feeling short of breath      Tobacco and Alcohol;  Do not drink alcohol or smoke within 24 hours of surgery.  It is best to quit smoking for as long as possible before any surgery or procedure.    The Week before Surgery        Seven days before Surgery  Check your [facility] medication instructions  Do the exercises provided to you by [facility]   Arrange for a responsible, adult licensed  to take you home after surgery and stay with you for 24 hours.  You will not be permitted to drive yourself home if you have received any anesthetic/sedation  Six days before surgery  Check your [facility] medication instructions  Do the exercises provided to you by [facility]   Start using Chlorhexidene (CHG) body wash if prescribed  Five days before surgery  Check your [facility] medication instructions  Do the exercises provided to you by [facility]   Continue to use CHG body wash if prescribed  Three days before surgery  Check your [facility] medication instructions  Do the exercises provided to you by [facility]   Continue to use CHG body wash if prescribed  Two days before surgery  Check your [facility] medication instructions  Do the exercises provided to you by [facility]   Continue to use CHG body wash if prescribed    The Day before Surgery       Check your [facility] medication and all other [facility] instructions including when to stop  eating and drinking  You will be called with your arrival time for surgery in the late afternoon.  If you do not receive a call please reach out to your surgeon's office.  Do not smoke or drink 24 hours before surgery  Prepare items to bring with you to the hospital  Shower with your chlorhexidine wash if prescribed  Brush your teeth and use your chlorhexidine dental rinse if prescribed    The Day of Surgery       Check your [facility] medication instructions  Ensure you follow the instructions for when to stop eating and drinking  Shower, if prescribed use CHG.  Do not apply any lotions, creams, moisturizers, perfume or deodorant  Brush your teeth and use your CHG dental rinse if prescribed  Wear loose comfortable clothing  Avoid make-up  Remove  jewelry and piercings, consider professional piercing removal with a plastic spacer if needed  Bring photo ID and Insurance card  Bring an accurate medication list that includes medication dose, frequency and allergies  Bring a copy of your advanced directives (will, health care power of )  Bring any devices and controllers as well as medical devices you have been provided with for surgery (CPAP, slings, braces, etc.)  Dentures, eyeglasses, and contacts will be removed before surgery, please bring cases for contacts or glasses

## 2024-05-07 LAB — HOLD SPECIMEN: NORMAL

## 2024-05-07 NOTE — PROGRESS NOTES
Case was reviewed by Valve and Structural Heart team.  Patient determined to be good surgical candidate and has been scheduled for surgery.

## 2024-05-08 ENCOUNTER — APPOINTMENT (OUTPATIENT)
Dept: CARDIOLOGY | Facility: HOSPITAL | Age: 64
End: 2024-05-08
Payer: COMMERCIAL

## 2024-05-08 ENCOUNTER — APPOINTMENT (OUTPATIENT)
Dept: RADIOLOGY | Facility: HOSPITAL | Age: 64
End: 2024-05-08
Payer: COMMERCIAL

## 2024-05-08 ENCOUNTER — HOSPITAL ENCOUNTER (EMERGENCY)
Facility: HOSPITAL | Age: 64
Discharge: LEFT BEFORE DISCHARGE | End: 2024-05-08
Attending: EMERGENCY MEDICINE
Payer: COMMERCIAL

## 2024-05-08 VITALS
HEART RATE: 69 BPM | SYSTOLIC BLOOD PRESSURE: 153 MMHG | DIASTOLIC BLOOD PRESSURE: 73 MMHG | WEIGHT: 126 LBS | OXYGEN SATURATION: 99 % | BODY MASS INDEX: 20.25 KG/M2 | HEIGHT: 66 IN | TEMPERATURE: 97.3 F | RESPIRATION RATE: 16 BRPM

## 2024-05-08 DIAGNOSIS — R07.89 CHEST PRESSURE: Primary | ICD-10-CM

## 2024-05-08 DIAGNOSIS — R53.83 OTHER FATIGUE: ICD-10-CM

## 2024-05-08 DIAGNOSIS — R79.89 ELEVATED TROPONIN: ICD-10-CM

## 2024-05-08 PROBLEM — R42 DIZZINESS: Status: ACTIVE | Noted: 2024-05-08

## 2024-05-08 LAB
ALBUMIN SERPL BCP-MCNC: 4.2 G/DL (ref 3.4–5)
ALP SERPL-CCNC: 88 U/L (ref 33–136)
ALT SERPL W P-5'-P-CCNC: 11 U/L (ref 10–52)
ANION GAP SERPL CALC-SCNC: 10 MMOL/L (ref 10–20)
APTT PPP: 44 SECONDS (ref 27–38)
AST SERPL W P-5'-P-CCNC: 10 U/L (ref 9–39)
BASOPHILS # BLD AUTO: 0.04 X10*3/UL (ref 0–0.1)
BASOPHILS NFR BLD AUTO: 0.6 %
BILIRUB SERPL-MCNC: 0.7 MG/DL (ref 0–1.2)
BUN SERPL-MCNC: 14 MG/DL (ref 6–23)
CALCIUM SERPL-MCNC: 9.5 MG/DL (ref 8.6–10.3)
CARDIAC TROPONIN I PNL SERPL HS: 35 NG/L (ref 0–20)
CARDIAC TROPONIN I PNL SERPL HS: 38 NG/L (ref 0–20)
CHLORIDE SERPL-SCNC: 106 MMOL/L (ref 98–107)
CO2 SERPL-SCNC: 24 MMOL/L (ref 21–32)
CREAT SERPL-MCNC: 1.19 MG/DL (ref 0.5–1.3)
EGFRCR SERPLBLD CKD-EPI 2021: 68 ML/MIN/1.73M*2
EOSINOPHIL # BLD AUTO: 0.12 X10*3/UL (ref 0–0.7)
EOSINOPHIL NFR BLD AUTO: 1.7 %
ERYTHROCYTE [DISTWIDTH] IN BLOOD BY AUTOMATED COUNT: 15.6 % (ref 11.5–14.5)
GLUCOSE SERPL-MCNC: 112 MG/DL (ref 74–99)
HCT VFR BLD AUTO: 51.3 % (ref 41–52)
HGB BLD-MCNC: 16.6 G/DL (ref 13.5–17.5)
IMM GRANULOCYTES # BLD AUTO: 0.02 X10*3/UL (ref 0–0.7)
IMM GRANULOCYTES NFR BLD AUTO: 0.3 % (ref 0–0.9)
INR PPP: 1.9 (ref 0.9–1.1)
LYMPHOCYTES # BLD AUTO: 2.01 X10*3/UL (ref 1.2–4.8)
LYMPHOCYTES NFR BLD AUTO: 29.2 %
MAGNESIUM SERPL-MCNC: 1.97 MG/DL (ref 1.6–2.4)
MCH RBC QN AUTO: 29.1 PG (ref 26–34)
MCHC RBC AUTO-ENTMCNC: 32.4 G/DL (ref 32–36)
MCV RBC AUTO: 90 FL (ref 80–100)
MONOCYTES # BLD AUTO: 0.63 X10*3/UL (ref 0.1–1)
MONOCYTES NFR BLD AUTO: 9.2 %
NEUTROPHILS # BLD AUTO: 4.06 X10*3/UL (ref 1.2–7.7)
NEUTROPHILS NFR BLD AUTO: 59 %
NRBC BLD-RTO: 0 /100 WBCS (ref 0–0)
PLATELET # BLD AUTO: 147 X10*3/UL (ref 150–450)
POTASSIUM SERPL-SCNC: 4.2 MMOL/L (ref 3.5–5.3)
PROT SERPL-MCNC: 7.5 G/DL (ref 6.4–8.2)
PROTHROMBIN TIME: 21.4 SECONDS (ref 9.8–12.8)
RBC # BLD AUTO: 5.71 X10*6/UL (ref 4.5–5.9)
SODIUM SERPL-SCNC: 136 MMOL/L (ref 136–145)
STAPHYLOCOCCUS SPEC CULT: ABNORMAL
WBC # BLD AUTO: 6.9 X10*3/UL (ref 4.4–11.3)

## 2024-05-08 PROCEDURE — 71045 X-RAY EXAM CHEST 1 VIEW: CPT

## 2024-05-08 PROCEDURE — 84075 ASSAY ALKALINE PHOSPHATASE: CPT | Performed by: PHYSICIAN ASSISTANT

## 2024-05-08 PROCEDURE — 36415 COLL VENOUS BLD VENIPUNCTURE: CPT | Performed by: PHYSICIAN ASSISTANT

## 2024-05-08 PROCEDURE — 84484 ASSAY OF TROPONIN QUANT: CPT | Performed by: PHYSICIAN ASSISTANT

## 2024-05-08 PROCEDURE — 93005 ELECTROCARDIOGRAM TRACING: CPT

## 2024-05-08 PROCEDURE — 85025 COMPLETE CBC W/AUTO DIFF WBC: CPT | Performed by: PHYSICIAN ASSISTANT

## 2024-05-08 PROCEDURE — 96366 THER/PROPH/DIAG IV INF ADDON: CPT

## 2024-05-08 PROCEDURE — 83735 ASSAY OF MAGNESIUM: CPT | Performed by: PHYSICIAN ASSISTANT

## 2024-05-08 PROCEDURE — 2500000004 HC RX 250 GENERAL PHARMACY W/ HCPCS (ALT 636 FOR OP/ED): Performed by: PHYSICIAN ASSISTANT

## 2024-05-08 PROCEDURE — 99284 EMERGENCY DEPT VISIT MOD MDM: CPT | Mod: 25

## 2024-05-08 PROCEDURE — 85610 PROTHROMBIN TIME: CPT | Performed by: PHYSICIAN ASSISTANT

## 2024-05-08 PROCEDURE — 71045 X-RAY EXAM CHEST 1 VIEW: CPT | Performed by: RADIOLOGY

## 2024-05-08 PROCEDURE — 96365 THER/PROPH/DIAG IV INF INIT: CPT

## 2024-05-08 RX ORDER — MAGNESIUM SULFATE HEPTAHYDRATE 40 MG/ML
2 INJECTION, SOLUTION INTRAVENOUS ONCE
Status: COMPLETED | OUTPATIENT
Start: 2024-05-08 | End: 2024-05-08

## 2024-05-08 RX ORDER — ONDANSETRON HYDROCHLORIDE 2 MG/ML
4 INJECTION, SOLUTION INTRAVENOUS EVERY 8 HOURS PRN
Status: CANCELLED | OUTPATIENT
Start: 2024-05-08

## 2024-05-08 RX ORDER — SODIUM CHLORIDE 9 MG/ML
75 INJECTION, SOLUTION INTRAVENOUS CONTINUOUS
Status: CANCELLED | OUTPATIENT
Start: 2024-05-08

## 2024-05-08 RX ORDER — ACETAMINOPHEN 325 MG/1
650 TABLET ORAL EVERY 6 HOURS PRN
Status: CANCELLED | OUTPATIENT
Start: 2024-05-08

## 2024-05-08 RX ORDER — ACETAMINOPHEN 160 MG/5ML
650 SOLUTION ORAL EVERY 6 HOURS PRN
Status: CANCELLED | OUTPATIENT
Start: 2024-05-08

## 2024-05-08 RX ORDER — WARFARIN SODIUM 5 MG/1
1.5 TABLET ORAL
Status: ON HOLD | COMMUNITY
End: 2024-06-04 | Stop reason: WASHOUT

## 2024-05-08 RX ORDER — ONDANSETRON 4 MG/1
4 TABLET, ORALLY DISINTEGRATING ORAL EVERY 8 HOURS PRN
Status: CANCELLED | OUTPATIENT
Start: 2024-05-08

## 2024-05-08 RX ORDER — ACETAMINOPHEN 650 MG/1
650 SUPPOSITORY RECTAL EVERY 6 HOURS PRN
Status: CANCELLED | OUTPATIENT
Start: 2024-05-08

## 2024-05-08 RX ORDER — DOCUSATE SODIUM 100 MG/1
100 CAPSULE, LIQUID FILLED ORAL 2 TIMES DAILY
Status: CANCELLED | OUTPATIENT
Start: 2024-05-08

## 2024-05-08 RX ORDER — SIMETHICONE 80 MG
80 TABLET,CHEWABLE ORAL EVERY 6 HOURS PRN
Status: ON HOLD | COMMUNITY
End: 2024-06-04 | Stop reason: WASHOUT

## 2024-05-08 RX ADMIN — MAGNESIUM SULFATE HEPTAHYDRATE 2 G: 2 INJECTION, SOLUTION INTRAVENOUS at 10:02

## 2024-05-08 ASSESSMENT — HEART SCORE
AGE: 45-64
ECG: NON-SPECIFIC REPOLARIZATION DISTURBANCE
TROPONIN: 1-3 TIMES NORMAL LIMIT
RISK FACTORS: 1-2 RISK FACTORS
HISTORY: SLIGHTLY SUSPICIOUS
HEART SCORE: 4

## 2024-05-08 ASSESSMENT — ACTIVITIES OF DAILY LIVING (ADL): LACK_OF_TRANSPORTATION: NO

## 2024-05-08 ASSESSMENT — LIFESTYLE VARIABLES
EVER HAD A DRINK FIRST THING IN THE MORNING TO STEADY YOUR NERVES TO GET RID OF A HANGOVER: NO
HAVE YOU EVER FELT YOU SHOULD CUT DOWN ON YOUR DRINKING: NO
TOTAL SCORE: 0
EVER FELT BAD OR GUILTY ABOUT YOUR DRINKING: NO
HAVE PEOPLE ANNOYED YOU BY CRITICIZING YOUR DRINKING: NO

## 2024-05-08 ASSESSMENT — PAIN - FUNCTIONAL ASSESSMENT: PAIN_FUNCTIONAL_ASSESSMENT: 0-10

## 2024-05-08 ASSESSMENT — PAIN SCALES - GENERAL
PAINLEVEL_OUTOF10: 3
PAINLEVEL_OUTOF10: 0 - NO PAIN
PAINLEVEL_OUTOF10: 3

## 2024-05-08 NOTE — ED PROVIDER NOTES
HPI   Chief Complaint   Patient presents with    Chest Pain       63-year-old male presenting with chief complaint of chest pressure that started last evening.  Patient states he has had chest pressure in the past but it is never lasted for this long.  Is usually very intermittent and goes away on its own.  He states he did a little bit of physical labor yesterday but was extremely fatigued, much more than normal.  He denies any shortness of breath or difficulty breathing.  He states the pressure does not radiate to anywhere else.  He is due for mitral valve replacement within the next couple of weeks.  He also had a cardiac cath this year which showed no occlusion of the vessels.  He denies any nausea, vomiting constipation or diarrhea.  No fevers or chills at home.                        No data recorded                   Patient History   Past Medical History:   Diagnosis Date    A-fib (Multi)     Diverticulosis     Dizziness     Heart failure with mid-range ejection fraction (HFmEF) (Multi)     (45%, 3/28/24)    Irregular heart beat     afib on coumadin    Irritable bowel syndrome     Mitral valve stenosis     Other chest pain 03/10/2016    Chest discomfort    Pleurodynia 11/29/2017    Chest pain, pleuritic    Right lower quadrant pain 11/14/2018    Abdominal pain, acute, right lower quadrant    Shortness of breath     VANCE    Thrombocytopenia (CMS-HCC)      Past Surgical History:   Procedure Laterality Date    CARDIAC CATHETERIZATION N/A 04/22/2024    Procedure: Left And Right Heart Cath, Without LV;  Surgeon: Yobani Soto MD;  Location: [place] [address] Cardiac Cath Lab;  Service: Cardiovascular;  Laterality: N/A;  Schedule at 3:30pm Dr. Soto    COLONOSCOPY      DENTAL SURGERY      TONSILLECTOMY       Family History   Problem Relation Name Age of Onset    Cancer Father  74    Brain Aneurysm Father       Social History     Tobacco Use    Smoking status: Every Day     Current packs/day: 0.50      Average packs/day: 0.5 packs/day for 49.3 years (24.7 ttl pk-yrs)     Types: Cigarettes     Start date: 1/1/1975     Passive exposure: Current    Smokeless tobacco: Never   Vaping Use    Vaping status: Never Used   Substance Use Topics    Alcohol use: Never    Drug use: Never       Physical Exam   ED Triage Vitals [05/08/24 0903]   Temperature Heart Rate Respirations BP   36.5 °C (97.7 °F) 73 16 123/85      Pulse Ox Temp src Heart Rate Source Patient Position   99 % -- -- --      BP Location FiO2 (%)     -- --       Physical Exam  Vitals reviewed.   Constitutional:       General: He is not in acute distress.     Appearance: He is not ill-appearing.   HENT:      Head: Normocephalic and atraumatic.      Right Ear: Tympanic membrane normal.      Left Ear: Tympanic membrane normal.      Nose: Nose normal.      Mouth/Throat:      Mouth: Mucous membranes are moist.      Pharynx: Oropharynx is clear.   Eyes:      Extraocular Movements: Extraocular movements intact.      Pupils: Pupils are equal, round, and reactive to light.   Cardiovascular:      Rate and Rhythm: Normal rate. Rhythm irregular.      Pulses: Normal pulses.      Heart sounds: Normal heart sounds. No murmur heard.     No friction rub. No gallop.   Pulmonary:      Effort: Pulmonary effort is normal. No respiratory distress.      Breath sounds: Normal breath sounds. No wheezing or rhonchi.   Chest:      Chest wall: No tenderness or crepitus.   Abdominal:      General: There is no distension.      Palpations: Abdomen is soft.      Tenderness: There is no abdominal tenderness.   Genitourinary:     Penis: Normal.       Testes: Normal.   Musculoskeletal:         General: Normal range of motion.      Cervical back: Normal range of motion. No tenderness.      Right lower leg: No edema.      Left lower leg: No edema.   Skin:     General: Skin is warm and dry.      Capillary Refill: Capillary refill takes less than 2 seconds.   Neurological:      General: No focal  deficit present.      Mental Status: He is alert and oriented to person, place, and time.      Sensory: No sensory deficit.      Motor: No weakness.   Psychiatric:         Mood and Affect: Mood normal.         Behavior: Behavior normal.         ED Course & MDM   ED Course as of 05/08/24 1143   Wed May 08, 2024   0908 EKG obtained.  Ventricular rate of 73.  Impression is a flutter.  This was compared with old EKG from April 10.  No ST or T wave abnormalities.   [MW]   0948 Magnesium  Will give 2 g to push to mag greater than 2 [MW]   0949 Coagulation Screen(!)  On Coumadin [MW]   0949 CBC and Auto Differential(!)  Thrombocytopenia is at baseline.  There is no leukocytosis or left shift [MW]   0949 Troponin I Series, High Sensitivity (0, 1 HR)(!)  Mild elevation.  Will obtain delta Trope [MW]   0949 Comprehensive Metabolic Panel(!)  Normal nonfasting CMP [MW]   1007 XR chest 1 view  Bilateral edema.  Stable from previous.  Patient is oxygenating well on room air [MW]   1030 Spoke with Dr. Johnson, cardiologist and updated him that the patient will be observed for his heart score of 4.  Agrees to consult [MW]   1122 Troponin I Series, High Sensitivity (0, 1 HR)(!)  Stable [MW]      ED Course User Index  [MW] Jerardo Cueto PA-C         Diagnoses as of 05/08/24 1143   Chest pressure   Elevated troponin   Other fatigue       Medical Decision Making  63-year-old who is due for mitral valve replacement presents with chest pressure since last evening.  EKG as outlined in ED course.  Labs obtained and are unremarkable with the exception of troponin which is in the 30s.  Delta Trope is pending.  His exam is reassuring.  He does not have any extremity edema.  He is not currently having chest pressure.  He does not describe any worsening with lying down of the chest pressure so I do not think pericarditis is at play.  He has no shortness of breath or difficulty breathing at this time.  I anticipate the patient will need to be  observed for cardiac monitoring.    I spoke with the hospitalist who agreed to obs telemetry the patient.        Procedure  Procedures     Jerardo Cueto PA-C  05/08/24 1141

## 2024-05-08 NOTE — CARE PLAN
Paged for admission, interviewed patient and placed orders. Discussed plan for observation and likely source of symptoms is established diagnosis of mitral valve stenosis and has replacement scheduled for 5/24/2024. However, with elevated troponins would recommend observation, troponin trending, and telemetry monitoring with cardiology evaluation. Patient declined admission and requested discharge so ED provider was notified for further disposition.

## 2024-05-08 NOTE — PROGRESS NOTES
Pharmacy Medication History Review    Farooq Lang is a 63 y.o. male admitted for Chest pressure. Pharmacy reviewed the patient's [id number] medications and allergies for accuracy.    The list below reflectives the updated PTA list. Please review each medication in order reconciliation for additional clarification and justification.  Prior to Admission medications    Medication Sig Start Date End Date Taking? Authorizing Provider   dilTIAZem CD (Cardizem CD) 120 mg 24 hr capsule Take 1 capsule (120 mg) by mouth once daily in the evening. AT 1700   Yes Historical Provider, MD   Lactobacillus acidophilus capsule Take 1 capsule by mouth once daily as needed.   Yes Historical Provider, MD   lubricating eye drops ophthalmic solution Administer 1 drop into both eyes if needed for dry eyes.   Yes Historical Provider, MD   simethicone (Mylicon) 80 mg chewable tablet Chew 1 tablet (80 mg) every 6 hours if needed for flatulence.   Yes Historical Provider, MD   warfarin (Coumadin) 5 mg tablet Take 1 tablet (5 mg) by mouth once daily at bedtime for 10 days. Take as directed per After Visit Summary.  Patient taking differently: Take 1 tablet (5 mg) by mouth once a day on Sunday, Tuesday, Thursday, and Saturday. Take as directed per After Visit Summary. 3/31/24 5/8/24 Yes Marin Marcos, DO   chlorhexidine (Hibiclens) 4 % external liquid Apply topically 2 times a day for 5 days. 5/6/24 5/11/24  Sarah PradhanimmetLENY tapia-CNP   chlorhexidine (Peridex) 0.12 % solution Use 15 mL in the mouth or throat see administration instructions for 2 doses. Use the night before and morning of surgery. 5/6/24   LENY Moralez-CNP   furosemide (Lasix) 40 mg tablet Take 0.5 tablets (20 mg) by mouth once daily.  Patient taking differently: Take 0.5 tablets (20 mg) by mouth once daily as needed. 3/31/24   Marin Marcos,    warfarin (Coumadin) 5 mg tablet Take 1.5 tablets (7.5 mg) by mouth once a day on Monday, Wednesday, and Friday. Take  as directed per After Visit Summary. AT 1800    Historical Provider, MD   Lactobacillus acidophilus (PROBIOTIC ORAL) Take 1 capsule by mouth once daily in the morning.  5/6/24  Historical Provider, MD   metoprolol succinate XL (Toprol-XL) 100 mg 24 hr tablet Take 1 tablet (100 mg) by mouth 2 times a day. Do not crush or chew.  Patient not taking: Reported on 5/6/2024 3/31/24 5/6/24  Marin Marcos, DO   UNABLE TO FIND Take 1 capsule by mouth once daily in the evening. Take with meals. Med Name: IBS Clear OTC supplement  5/6/24  Historical Provider, MD        The list below reflectives the updated allergy list. Please review each documented allergy for additional clarification and justification.  Allergies  Reviewed by Mariela Tan RN on 5/8/2024   No Known Allergies         Below are additional concerns with the patient's PTA list.      Emily Bettencourt

## 2024-05-08 NOTE — PROGRESS NOTES
05/08/24 0952   Discharge Planning   Living Arrangements Spouse/significant other   Support Systems Spouse/significant other   Assistance Needed A&OX3;very independent with ADLs with no DME; works and drives; room air baseline and currently room air   Type of Residence Private residence   Number of Stairs to Enter Residence 8   Number of Stairs Within Residence 0   Do you have animals or pets at home? No   Who is requesting discharge planning? Provider   Patient expects to be discharged to: Pending cardiac workup, but likely dc home no needs. Patient very independent and denies any home going needs.   Does the patient need discharge transport arranged? No   Financial Resource Strain   How hard is it for you to pay for the very basics like food, housing, medical care, and heating? Not hard   Housing Stability   In the last 12 months, was there a time when you were not able to pay the mortgage or rent on time? N   In the last 12 months, how many places have you lived? 1   In the last 12 months, was there a time when you did not have a steady place to sleep or slept in a shelter (including now)? N   Transportation Needs   In the past 12 months, has lack of transportation kept you from medical appointments or from getting medications? no   In the past 12 months, has lack of transportation kept you from meetings, work, or from getting things needed for daily living? No     05/08/2024 0954am  Spoke with patient bedside in ED.

## 2024-05-08 NOTE — ED TRIAGE NOTES
Pt presents ambulatory via POV from home with left sided chest pressure/squeezing sensation. Pt states the squeezing sensation started last night and then went away for a little while, but returned this AM after waking up. Pt also states he is SOB while working(not while walking) and that this is new for him. States he has Hx of a-fib and is scheduled for a heart valve replacement on the 24th of this month, sees Dr. Johnson for his cardiologist. Pt states he is concerned because the increased his coumadin dosage on Friday. Pt also takes diltiazem.

## 2024-05-08 NOTE — PROGRESS NOTES
The patient was seen by the midlevel/resident.  I have personally saw the patient and made/approved the management plan and take responsibility for the patient management.  I reviewed the EKG's (when done) and agree with the interpretation.  I have seen and examined the patient; agree with the workup, evaluation, MDM, and diagnosis.  The care plan has been discussed with the midlevel/resident; I have reviewed the note and agree with the documented findings.       Presents ED with complaints of not having a lot of energy and being fatigued.  Patient has history of bad valve and is supposed to have it replaced later this month.  In the interim he goes in and out of A-fib flutter.  Yesterday he was carrying a door and started to feel more shortness of breath.  He is currently on Coumadin his INR is therapeutic.  He has  a flutter but rate controlled.  Will be worked up in ED found to have elevated troponin.  He spoke to his cardiologist Dr. Johnson plan is to hospitalize and observe elevated heart score is found.  ED Course as of 05/08/24 1302   Wed May 08, 2024   0908 EKG obtained.  Ventricular rate of 73.  Impression is a flutter.  This was compared with old EKG from April 10.  No ST or T wave abnormalities.   [MW]   0948 Magnesium  Will give 2 g to push to mag greater than 2 [MW]   0949 Coagulation Screen(!)  On Coumadin [MW]   0949 CBC and Auto Differential(!)  Thrombocytopenia is at baseline.  There is no leukocytosis or left shift [MW]   0949 Troponin I Series, High Sensitivity (0, 1 HR)(!)  Mild elevation.  Will obtain delta Trope [MW]   0949 Comprehensive Metabolic Panel(!)  Normal nonfasting CMP [MW]   1007 XR chest 1 view  Bilateral edema.  Stable from previous.  Patient is oxygenating well on room air [MW]   1030 Spoke with Dr. Johnson, cardiologist and updated him that the patient will be observed for his heart score of 4.  Agrees to consult [MW]   1122 Troponin I Series, High Sensitivity (0, 1 HR)(!)  Stable  [MW]   1300 Patient was worked up and admitted when he says that he cannot stay anymore.  He decided he wanted to go home he will be discharged against medical vice I spent some time talking to him about the risk benefits alternatives he says he is just hungry we offered him some food but he still wants to go home.  He will follow-up as an outpatient. [RZ]      ED Course User Index  [MW] Jerardo Cueto PA-C  [RZ] Cecil Conti MD         Diagnoses as of 05/08/24 1302   Chest pressure   Elevated troponin   Other fatigue     Cecil Conti MD

## 2024-05-10 LAB
ATRIAL RATE: 340 BPM
ATRIAL RATE: 344 BPM
Q ONSET: 222 MS
Q ONSET: 223 MS
QRS COUNT: 10 BEATS
QRS COUNT: 12 BEATS
QRS DURATION: 82 MS
QRS DURATION: 82 MS
QT INTERVAL: 378 MS
QT INTERVAL: 420 MS
QTC CALCULATION(BAZETT): 415 MS
QTC CALCULATION(BAZETT): 416 MS
QTC FREDERICIA: 403 MS
QTC FREDERICIA: 417 MS
R AXIS: 77 DEGREES
R AXIS: 79 DEGREES
T AXIS: 49 DEGREES
T AXIS: 50 DEGREES
T OFFSET: 411 MS
T OFFSET: 433 MS
VENTRICULAR RATE: 59 BPM
VENTRICULAR RATE: 73 BPM

## 2024-06-04 ENCOUNTER — APPOINTMENT (OUTPATIENT)
Dept: CARDIOLOGY | Facility: HOSPITAL | Age: 64
DRG: 219 | End: 2024-06-04
Payer: COMMERCIAL

## 2024-06-04 ENCOUNTER — HOSPITAL ENCOUNTER (INPATIENT)
Facility: HOSPITAL | Age: 64
DRG: 219 | End: 2024-06-04
Attending: THORACIC SURGERY (CARDIOTHORACIC VASCULAR SURGERY) | Admitting: NURSE PRACTITIONER
Payer: COMMERCIAL

## 2024-06-04 ENCOUNTER — ANESTHESIA (OUTPATIENT)
Dept: OPERATING ROOM | Facility: HOSPITAL | Age: 64
End: 2024-06-04
Payer: COMMERCIAL

## 2024-06-04 ENCOUNTER — ANESTHESIA EVENT (OUTPATIENT)
Dept: OPERATING ROOM | Facility: HOSPITAL | Age: 64
End: 2024-06-04
Payer: COMMERCIAL

## 2024-06-04 ENCOUNTER — HOSPITAL ENCOUNTER (INPATIENT)
Dept: OPERATING ROOM | Facility: HOSPITAL | Age: 64
Discharge: HOME | DRG: 219 | End: 2024-06-04
Payer: COMMERCIAL

## 2024-06-04 ENCOUNTER — APPOINTMENT (OUTPATIENT)
Dept: RADIOLOGY | Facility: HOSPITAL | Age: 64
DRG: 219 | End: 2024-06-04
Payer: COMMERCIAL

## 2024-06-04 DIAGNOSIS — I05.0 MITRAL VALVE STENOSIS, UNSPECIFIED ETIOLOGY: Primary | ICD-10-CM

## 2024-06-04 DIAGNOSIS — I05.9 RHEUMATIC MITRAL VALVE DISEASE, UNSPECIFIED: ICD-10-CM

## 2024-06-04 DIAGNOSIS — Z95.2 S/P MVR (MITRAL VALVE REPLACEMENT): ICD-10-CM

## 2024-06-04 DIAGNOSIS — I48.91 NEW ONSET ATRIAL FIBRILLATION (MULTI): ICD-10-CM

## 2024-06-04 LAB
ABO GROUP (TYPE) IN BLOOD: NORMAL
ACT BLD: 115 SEC (ref 82–174)
ACT BLD: 134 SEC (ref 82–174)
ACT BLD: 913 SEC (ref 82–174)
ACT BLD: NORMAL S
ACT BLD: NORMAL S
ALBUMIN SERPL BCP-MCNC: 3.4 G/DL (ref 3.4–5)
ANION GAP BLDA CALCULATED.4IONS-SCNC: 10 MMO/L (ref 10–25)
ANION GAP BLDA CALCULATED.4IONS-SCNC: 11 MMO/L (ref 10–25)
ANION GAP BLDA CALCULATED.4IONS-SCNC: 12 MMO/L (ref 10–25)
ANION GAP BLDA CALCULATED.4IONS-SCNC: 12 MMO/L (ref 10–25)
ANION GAP BLDA CALCULATED.4IONS-SCNC: 14 MMO/L (ref 10–25)
ANION GAP BLDV CALCULATED.4IONS-SCNC: 14 MMOL/L (ref 10–25)
ANION GAP SERPL CALC-SCNC: 15 MMOL/L (ref 10–20)
ANTIBODY SCREEN: NORMAL
APTT PPP: 34 SECONDS (ref 27–38)
BASE EXCESS BLDA CALC-SCNC: -1 MMOL/L (ref -2–3)
BASE EXCESS BLDA CALC-SCNC: -2 MMOL/L (ref -2–3)
BASE EXCESS BLDA CALC-SCNC: -2 MMOL/L (ref -2–3)
BASE EXCESS BLDA CALC-SCNC: -2.9 MMOL/L (ref -2–3)
BASE EXCESS BLDA CALC-SCNC: -4.5 MMOL/L (ref -2–3)
BASE EXCESS BLDA CALC-SCNC: -7.3 MMOL/L (ref -2–3)
BASE EXCESS BLDA CALC-SCNC: 1.3 MMOL/L (ref -2–3)
BASE EXCESS BLDV CALC-SCNC: 0.5 MMOL/L (ref -2–3)
BODY TEMPERATURE: 37 DEGREES CELSIUS
BUN SERPL-MCNC: 16 MG/DL (ref 6–23)
CA-I BLD-SCNC: 1.22 MMOL/L (ref 1.1–1.33)
CA-I BLDA-SCNC: 0.96 MMOL/L (ref 1.1–1.33)
CA-I BLDA-SCNC: 1.03 MMOL/L (ref 1.1–1.33)
CA-I BLDA-SCNC: 1.05 MMOL/L (ref 1.1–1.33)
CA-I BLDA-SCNC: 1.07 MMOL/L (ref 1.1–1.33)
CA-I BLDA-SCNC: 1.21 MMOL/L (ref 1.1–1.33)
CA-I BLDA-SCNC: 1.22 MMOL/L (ref 1.1–1.33)
CA-I BLDA-SCNC: 1.5 MMOL/L (ref 1.1–1.33)
CA-I BLDV-SCNC: 1.13 MMOL/L (ref 1.1–1.33)
CALCIUM SERPL-MCNC: 8.6 MG/DL (ref 8.6–10.6)
CFT FORM KAOLIN IND BLD RES TEG: 2.1 MIN (ref 0.8–2.1)
CHLORIDE BLDA-SCNC: 103 MMOL/L (ref 98–107)
CHLORIDE BLDA-SCNC: 105 MMOL/L (ref 98–107)
CHLORIDE BLDA-SCNC: 106 MMOL/L (ref 98–107)
CHLORIDE BLDA-SCNC: 107 MMOL/L (ref 98–107)
CHLORIDE BLDV-SCNC: 103 MMOL/L (ref 98–107)
CHLORIDE SERPL-SCNC: 109 MMOL/L (ref 98–107)
CLOT ANGLE.KAOLIN INDUCED BLD RES TEG: 67 DEG (ref 63–78)
CLOT INIT KAO IND P HEP NEUT BLD RES TEG: 8 MIN (ref 4.6–9.1)
CLOT INIT KAO IND P HEP NEUT BLD RES TEG: 8.2 MIN (ref 4.3–8.3)
CO2 SERPL-SCNC: 23 MMOL/L (ref 21–32)
COHGB MFR BLDA: 3 %
COHGB MFR BLDA: 3.2 %
COHGB MFR BLDA: 3.2 %
COHGB MFR BLDA: 3.3 %
COHGB MFR BLDA: 3.5 %
COHGB MFR BLDA: 3.7 %
COHGB MFR BLDV: 3.5 %
CREAT SERPL-MCNC: 1.16 MG/DL (ref 0.5–1.3)
DO-HGB MFR BLDA: 0 % (ref 0–5)
DO-HGB MFR BLDA: 0.1 % (ref 0–5)
DO-HGB MFR BLDA: 19.6 % (ref 0–5)
EGFRCR SERPLBLD CKD-EPI 2021: 70 ML/MIN/1.73M*2
ERYTHROCYTE [DISTWIDTH] IN BLOOD BY AUTOMATED COUNT: 14.8 % (ref 11.5–14.5)
FIBRINOGEN BLD CALC-MCNC: 257 MG/DL (ref 278–581)
FIBRINOGEN PPP-MCNC: 244 MG/DL (ref 200–400)
GLUCOSE BLDA-MCNC: 102 MG/DL (ref 74–99)
GLUCOSE BLDA-MCNC: 103 MG/DL (ref 74–99)
GLUCOSE BLDA-MCNC: 112 MG/DL (ref 74–99)
GLUCOSE BLDA-MCNC: 123 MG/DL (ref 74–99)
GLUCOSE BLDA-MCNC: 81 MG/DL (ref 74–99)
GLUCOSE BLDA-MCNC: 96 MG/DL (ref 74–99)
GLUCOSE BLDA-MCNC: 98 MG/DL (ref 74–99)
GLUCOSE BLDV-MCNC: 96 MG/DL (ref 74–99)
GLUCOSE SERPL-MCNC: 121 MG/DL (ref 74–99)
HCO3 BLDA-SCNC: 19.3 MMOL/L (ref 22–26)
HCO3 BLDA-SCNC: 22.6 MMOL/L (ref 22–26)
HCO3 BLDA-SCNC: 23.1 MMOL/L (ref 22–26)
HCO3 BLDA-SCNC: 23.5 MMOL/L (ref 22–26)
HCO3 BLDA-SCNC: 23.7 MMOL/L (ref 22–26)
HCO3 BLDA-SCNC: 24.4 MMOL/L (ref 22–26)
HCO3 BLDA-SCNC: 26.6 MMOL/L (ref 22–26)
HCO3 BLDV-SCNC: 27 MMOL/L (ref 22–26)
HCT VFR BLD AUTO: 42.3 % (ref 41–52)
HCT VFR BLD EST: 36 % (ref 41–52)
HCT VFR BLD EST: 37 % (ref 41–52)
HCT VFR BLD EST: 37 % (ref 41–52)
HCT VFR BLD EST: 38 % (ref 41–52)
HCT VFR BLD EST: 44 % (ref 41–52)
HCT VFR BLD EST: 47 % (ref 41–52)
HGB BLD-MCNC: 14 G/DL (ref 13.5–17.5)
HGB BLDA-MCNC: 12 G/DL (ref 13.5–17.5)
HGB BLDA-MCNC: 12 G/DL (ref 13.5–17.5)
HGB BLDA-MCNC: 12.2 G/DL (ref 13.5–17.5)
HGB BLDA-MCNC: 12.2 G/DL (ref 13.5–17.5)
HGB BLDA-MCNC: 12.4 G/DL (ref 13.5–17.5)
HGB BLDA-MCNC: 12.4 G/DL (ref 13.5–17.5)
HGB BLDA-MCNC: 12.7 G/DL (ref 13.5–17.5)
HGB BLDA-MCNC: 12.7 G/DL (ref 13.5–17.5)
HGB BLDA-MCNC: 12.8 G/DL (ref 13.5–17.5)
HGB BLDA-MCNC: 12.8 G/DL (ref 13.5–17.5)
HGB BLDA-MCNC: 14.5 G/DL (ref 13.5–17.5)
HGB BLDA-MCNC: 15.7 G/DL (ref 13.5–17.5)
HGB BLDA-MCNC: 15.7 G/DL (ref 13.5–17.5)
HGB BLDV-MCNC: 12.5 G/DL (ref 13.5–17.5)
INHALED O2 CONCENTRATION: 100 %
INHALED O2 CONCENTRATION: 30 %
INHALED O2 CONCENTRATION: 50 %
INHALED O2 CONCENTRATION: 60 %
INHALED O2 CONCENTRATION: 70 %
INHALED O2 CONCENTRATION: 75 %
INHALED O2 CONCENTRATION: 75 %
INHALED O2 CONCENTRATION: 80 %
INR PPP: 1.5 (ref 0.9–1.1)
LACTATE BLDA-SCNC: 0.9 MMOL/L (ref 0.4–2)
LACTATE BLDA-SCNC: 0.9 MMOL/L (ref 0.4–2)
LACTATE BLDA-SCNC: 1 MMOL/L (ref 0.4–2)
LACTATE BLDA-SCNC: 1 MMOL/L (ref 0.4–2)
LACTATE BLDA-SCNC: 1.2 MMOL/L (ref 0.4–2)
LACTATE BLDA-SCNC: 1.2 MMOL/L (ref 0.4–2)
LACTATE BLDA-SCNC: 1.5 MMOL/L (ref 0.4–2)
LACTATE BLDV-SCNC: 1 MMOL/L (ref 0.4–2)
MA KAOLIN BLD RES TEG: 51 MM (ref 52–69)
MA KAOLIN+TF BLD RES TEG: 49 MM (ref 52–70)
MA TF IND+IIB-IIIA INH BLD RES TEG: 14 MM (ref 15–32)
MAGNESIUM SERPL-MCNC: 2.85 MG/DL (ref 1.6–2.4)
MCH RBC QN AUTO: 29.6 PG (ref 26–34)
MCHC RBC AUTO-ENTMCNC: 33.1 G/DL (ref 32–36)
MCV RBC AUTO: 89 FL (ref 80–100)
METHGB MFR BLDA: 0.6 % (ref 0–1.5)
METHGB MFR BLDA: 0.8 % (ref 0–1.5)
METHGB MFR BLDA: 0.8 % (ref 0–1.5)
METHGB MFR BLDA: 0.9 % (ref 0–1.5)
METHGB MFR BLDA: 1 % (ref 0–1.5)
METHGB MFR BLDA: 1 % (ref 0–1.5)
METHGB MFR BLDV: 0.5 % (ref 0–1.5)
NRBC BLD-RTO: 0 /100 WBCS (ref 0–0)
OXYHGB MFR BLDA: 76.6 % (ref 94–98)
OXYHGB MFR BLDA: 76.6 % (ref 94–98)
OXYHGB MFR BLDA: 91.5 % (ref 94–98)
OXYHGB MFR BLDA: 95.2 % (ref 94–98)
OXYHGB MFR BLDA: 95.2 % (ref 94–98)
OXYHGB MFR BLDA: 95.6 % (ref 94–98)
OXYHGB MFR BLDA: 95.6 % (ref 94–98)
OXYHGB MFR BLDA: 95.8 % (ref 94–98)
OXYHGB MFR BLDA: 95.8 % (ref 94–98)
OXYHGB MFR BLDA: 96 % (ref 94–98)
OXYHGB MFR BLDA: 96 % (ref 94–98)
OXYHGB MFR BLDA: 96.1 % (ref 94–98)
OXYHGB MFR BLDA: 96.1 % (ref 94–98)
OXYHGB MFR BLDV: 78.9 % (ref 45–75)
PCO2 BLDA: 38 MM HG (ref 38–42)
PCO2 BLDA: 40 MM HG (ref 38–42)
PCO2 BLDA: 42 MM HG (ref 38–42)
PCO2 BLDA: 43 MM HG (ref 38–42)
PCO2 BLDA: 44 MM HG (ref 38–42)
PCO2 BLDA: 48 MM HG (ref 38–42)
PCO2 BLDA: 52 MM HG (ref 38–42)
PCO2 BLDV: 50 MM HG (ref 41–51)
PH BLDA: 7.27 PH (ref 7.38–7.42)
PH BLDA: 7.28 PH (ref 7.38–7.42)
PH BLDA: 7.28 PH (ref 7.38–7.42)
PH BLDA: 7.35 PH (ref 7.38–7.42)
PH BLDA: 7.37 PH (ref 7.38–7.42)
PH BLDA: 7.39 PH (ref 7.38–7.42)
PH BLDA: 7.4 PH (ref 7.38–7.42)
PH BLDV: 7.34 PH (ref 7.33–7.43)
PHOSPHATE SERPL-MCNC: 2.9 MG/DL (ref 2.5–4.9)
PLATELET # BLD AUTO: 97 X10*3/UL (ref 150–450)
PO2 BLDA: 161 MM HG (ref 85–95)
PO2 BLDA: 190 MM HG (ref 85–95)
PO2 BLDA: 281 MM HG (ref 85–95)
PO2 BLDA: 282 MM HG (ref 85–95)
PO2 BLDA: 284 MM HG (ref 85–95)
PO2 BLDA: 45 MM HG (ref 85–95)
PO2 BLDA: 74 MM HG (ref 85–95)
PO2 BLDV: 45 MM HG (ref 35–45)
POTASSIUM BLDA-SCNC: 3.7 MMOL/L (ref 3.5–5.3)
POTASSIUM BLDA-SCNC: 4.4 MMOL/L (ref 3.5–5.3)
POTASSIUM BLDA-SCNC: 4.4 MMOL/L (ref 3.5–5.3)
POTASSIUM BLDA-SCNC: 4.5 MMOL/L (ref 3.5–5.3)
POTASSIUM BLDA-SCNC: 5 MMOL/L (ref 3.5–5.3)
POTASSIUM BLDA-SCNC: 5.1 MMOL/L (ref 3.5–5.3)
POTASSIUM BLDA-SCNC: 5.2 MMOL/L (ref 3.5–5.3)
POTASSIUM BLDV-SCNC: 4.5 MMOL/L (ref 3.5–5.3)
POTASSIUM SERPL-SCNC: 4.6 MMOL/L (ref 3.5–5.3)
PROTHROMBIN TIME: 16.5 SECONDS (ref 9.8–12.8)
RBC # BLD AUTO: 4.73 X10*6/UL (ref 4.5–5.9)
RH FACTOR (ANTIGEN D): NORMAL
SAO2 % BLDA: 100 % (ref 94–100)
SAO2 % BLDA: 80 % (ref 94–100)
SAO2 % BLDA: 96 % (ref 94–100)
SAO2 % BLDV: 82 % (ref 45–75)
SODIUM BLDA-SCNC: 133 MMOL/L (ref 136–145)
SODIUM BLDA-SCNC: 136 MMOL/L (ref 136–145)
SODIUM BLDA-SCNC: 136 MMOL/L (ref 136–145)
SODIUM BLDA-SCNC: 137 MMOL/L (ref 136–145)
SODIUM BLDA-SCNC: 138 MMOL/L (ref 136–145)
SODIUM BLDV-SCNC: 139 MMOL/L (ref 136–145)
SODIUM SERPL-SCNC: 142 MMOL/L (ref 136–145)
TEST COMMENT: ABNORMAL
WBC # BLD AUTO: 18 X10*3/UL (ref 4.4–11.3)

## 2024-06-04 PROCEDURE — 87081 CULTURE SCREEN ONLY: CPT | Performed by: NURSE PRACTITIONER

## 2024-06-04 PROCEDURE — 82375 ASSAY CARBOXYHB QUANT: CPT

## 2024-06-04 PROCEDURE — 3600000011 HC PERFUSION TIME - INITIAL BASE CHARGE: Performed by: THORACIC SURGERY (CARDIOTHORACIC VASCULAR SURGERY)

## 2024-06-04 PROCEDURE — 84132 ASSAY OF SERUM POTASSIUM: CPT

## 2024-06-04 PROCEDURE — 2500000004 HC RX 250 GENERAL PHARMACY W/ HCPCS (ALT 636 FOR OP/ED): Performed by: NURSE PRACTITIONER

## 2024-06-04 PROCEDURE — 86901 BLOOD TYPING SEROLOGIC RH(D): CPT | Performed by: ANESTHESIOLOGY

## 2024-06-04 PROCEDURE — 2500000005 HC RX 250 GENERAL PHARMACY W/O HCPCS: Performed by: ANESTHESIOLOGY

## 2024-06-04 PROCEDURE — 2500000001 HC RX 250 WO HCPCS SELF ADMINISTERED DRUGS (ALT 637 FOR MEDICARE OP): Performed by: NURSE PRACTITIONER

## 2024-06-04 PROCEDURE — 2780000003 HC OR 278 NO HCPCS: Performed by: THORACIC SURGERY (CARDIOTHORACIC VASCULAR SURGERY)

## 2024-06-04 PROCEDURE — 93005 ELECTROCARDIOGRAM TRACING: CPT

## 2024-06-04 PROCEDURE — 83735 ASSAY OF MAGNESIUM: CPT | Performed by: NURSE PRACTITIONER

## 2024-06-04 PROCEDURE — 85384 FIBRINOGEN ACTIVITY: CPT

## 2024-06-04 PROCEDURE — 3600000018 HC OR TIME - INITIAL BASE CHARGE - PROCEDURE LEVEL SIX: Performed by: THORACIC SURGERY (CARDIOTHORACIC VASCULAR SURGERY)

## 2024-06-04 PROCEDURE — 71045 X-RAY EXAM CHEST 1 VIEW: CPT | Performed by: RADIOLOGY

## 2024-06-04 PROCEDURE — 2500000005 HC RX 250 GENERAL PHARMACY W/O HCPCS: Performed by: THORACIC SURGERY (CARDIOTHORACIC VASCULAR SURGERY)

## 2024-06-04 PROCEDURE — 99223 1ST HOSP IP/OBS HIGH 75: CPT

## 2024-06-04 PROCEDURE — 37799 UNLISTED PX VASCULAR SURGERY: CPT | Performed by: NURSE PRACTITIONER

## 2024-06-04 PROCEDURE — 2500000005 HC RX 250 GENERAL PHARMACY W/O HCPCS: Performed by: ANESTHESIOLOGIST ASSISTANT

## 2024-06-04 PROCEDURE — 94002 VENT MGMT INPAT INIT DAY: CPT

## 2024-06-04 PROCEDURE — A4312 CATH W/O BAG 2-WAY SILICONE: HCPCS | Performed by: THORACIC SURGERY (CARDIOTHORACIC VASCULAR SURGERY)

## 2024-06-04 PROCEDURE — 3600000017 HC OR TIME - EACH INCREMENTAL 1 MINUTE - PROCEDURE LEVEL SIX: Performed by: THORACIC SURGERY (CARDIOTHORACIC VASCULAR SURGERY)

## 2024-06-04 PROCEDURE — 3700000002 HC GENERAL ANESTHESIA TIME - EACH INCREMENTAL 1 MINUTE: Performed by: THORACIC SURGERY (CARDIOTHORACIC VASCULAR SURGERY)

## 2024-06-04 PROCEDURE — 33430 REPLACEMENT OF MITRAL VALVE: CPT | Performed by: THORACIC SURGERY (CARDIOTHORACIC VASCULAR SURGERY)

## 2024-06-04 PROCEDURE — 0753T DGTZ GLS MCRSCP SLD LEVEL IV: CPT | Mod: TC,SUR,PARLAB | Performed by: THORACIC SURGERY (CARDIOTHORACIC VASCULAR SURGERY)

## 2024-06-04 PROCEDURE — P9045 ALBUMIN (HUMAN), 5%, 250 ML: HCPCS | Mod: JZ | Performed by: STUDENT IN AN ORGANIZED HEALTH CARE EDUCATION/TRAINING PROGRAM

## 2024-06-04 PROCEDURE — 2500000004 HC RX 250 GENERAL PHARMACY W/ HCPCS (ALT 636 FOR OP/ED): Performed by: STUDENT IN AN ORGANIZED HEALTH CARE EDUCATION/TRAINING PROGRAM

## 2024-06-04 PROCEDURE — 2020000001 HC ICU ROOM DAILY

## 2024-06-04 PROCEDURE — 85027 COMPLETE CBC AUTOMATED: CPT | Performed by: NURSE PRACTITIONER

## 2024-06-04 PROCEDURE — 2500000004 HC RX 250 GENERAL PHARMACY W/ HCPCS (ALT 636 FOR OP/ED): Performed by: ANESTHESIOLOGY

## 2024-06-04 PROCEDURE — 2720000007 HC OR 272 NO HCPCS: Performed by: THORACIC SURGERY (CARDIOTHORACIC VASCULAR SURGERY)

## 2024-06-04 PROCEDURE — 2500000005 HC RX 250 GENERAL PHARMACY W/O HCPCS: Performed by: STUDENT IN AN ORGANIZED HEALTH CARE EDUCATION/TRAINING PROGRAM

## 2024-06-04 PROCEDURE — 85347 COAGULATION TIME ACTIVATED: CPT

## 2024-06-04 PROCEDURE — 84132 ASSAY OF SERUM POTASSIUM: CPT | Performed by: NURSE PRACTITIONER

## 2024-06-04 PROCEDURE — A4649 SURGICAL SUPPLIES: HCPCS | Performed by: THORACIC SURGERY (CARDIOTHORACIC VASCULAR SURGERY)

## 2024-06-04 PROCEDURE — 2500000004 HC RX 250 GENERAL PHARMACY W/ HCPCS (ALT 636 FOR OP/ED): Performed by: ANESTHESIOLOGIST ASSISTANT

## 2024-06-04 PROCEDURE — 85610 PROTHROMBIN TIME: CPT | Performed by: NURSE PRACTITIONER

## 2024-06-04 PROCEDURE — C1889 IMPLANT/INSERT DEVICE, NOC: HCPCS | Performed by: THORACIC SURGERY (CARDIOTHORACIC VASCULAR SURGERY)

## 2024-06-04 PROCEDURE — 85384 FIBRINOGEN ACTIVITY: CPT | Performed by: NURSE PRACTITIONER

## 2024-06-04 PROCEDURE — 71045 X-RAY EXAM CHEST 1 VIEW: CPT

## 2024-06-04 PROCEDURE — 82330 ASSAY OF CALCIUM: CPT | Performed by: NURSE PRACTITIONER

## 2024-06-04 PROCEDURE — 3600000012 HC PERFUSION TIME - EACH INCREMENTAL 1 MINUTE: Performed by: THORACIC SURGERY (CARDIOTHORACIC VASCULAR SURGERY)

## 2024-06-04 PROCEDURE — 36415 COLL VENOUS BLD VENIPUNCTURE: CPT | Performed by: ANESTHESIOLOGY

## 2024-06-04 PROCEDURE — 2500000004 HC RX 250 GENERAL PHARMACY W/ HCPCS (ALT 636 FOR OP/ED): Performed by: THORACIC SURGERY (CARDIOTHORACIC VASCULAR SURGERY)

## 2024-06-04 PROCEDURE — 3700000001 HC GENERAL ANESTHESIA TIME - INITIAL BASE CHARGE: Performed by: THORACIC SURGERY (CARDIOTHORACIC VASCULAR SURGERY)

## 2024-06-04 PROCEDURE — 99291 CRITICAL CARE FIRST HOUR: CPT | Performed by: STUDENT IN AN ORGANIZED HEALTH CARE EDUCATION/TRAINING PROGRAM

## 2024-06-04 DEVICE — IMPLANTABLE DEVICE: Type: IMPLANTABLE DEVICE | Site: HEART | Status: FUNCTIONAL

## 2024-06-04 RX ORDER — NOREPINEPHRINE BITARTRATE/D5W 8 MG/250ML
0-1 PLASTIC BAG, INJECTION (ML) INTRAVENOUS CONTINUOUS
Status: DISCONTINUED | OUTPATIENT
Start: 2024-06-04 | End: 2024-06-05

## 2024-06-04 RX ORDER — MAGNESIUM SULFATE HEPTAHYDRATE 40 MG/ML
4 INJECTION, SOLUTION INTRAVENOUS EVERY 6 HOURS PRN
Status: DISCONTINUED | OUTPATIENT
Start: 2024-06-04 | End: 2024-06-06

## 2024-06-04 RX ORDER — HYDROMORPHONE HYDROCHLORIDE 0.2 MG/ML
0.2 INJECTION INTRAMUSCULAR; INTRAVENOUS; SUBCUTANEOUS
Status: DISCONTINUED | OUTPATIENT
Start: 2024-06-04 | End: 2024-06-05

## 2024-06-04 RX ORDER — POLYETHYLENE GLYCOL 3350 17 G/17G
17 POWDER, FOR SOLUTION ORAL DAILY
Status: DISCONTINUED | OUTPATIENT
Start: 2024-06-05 | End: 2024-06-05

## 2024-06-04 RX ORDER — CALCIUM GLUCONATE 20 MG/ML
1 INJECTION, SOLUTION INTRAVENOUS EVERY 6 HOURS PRN
Status: DISCONTINUED | OUTPATIENT
Start: 2024-06-04 | End: 2024-06-06

## 2024-06-04 RX ORDER — NITROGLYCERIN 40 MG/100ML
INJECTION INTRAVENOUS AS NEEDED
Status: DISCONTINUED | OUTPATIENT
Start: 2024-06-04 | End: 2024-06-04

## 2024-06-04 RX ORDER — PANTOPRAZOLE SODIUM 40 MG/1
40 TABLET, DELAYED RELEASE ORAL
Status: DISCONTINUED | OUTPATIENT
Start: 2024-06-05 | End: 2024-06-05

## 2024-06-04 RX ORDER — LIDOCAINE HYDROCHLORIDE 20 MG/ML
INJECTION, SOLUTION INFILTRATION; PERINEURAL AS NEEDED
Status: DISCONTINUED | OUTPATIENT
Start: 2024-06-04 | End: 2024-06-04

## 2024-06-04 RX ORDER — INSULIN LISPRO 100 [IU]/ML
0-15 INJECTION, SOLUTION INTRAVENOUS; SUBCUTANEOUS EVERY 4 HOURS
Status: DISCONTINUED | OUTPATIENT
Start: 2024-06-05 | End: 2024-06-05

## 2024-06-04 RX ORDER — PROPOFOL 10 MG/ML
0-50 INJECTION, EMULSION INTRAVENOUS CONTINUOUS
Status: DISCONTINUED | OUTPATIENT
Start: 2024-06-04 | End: 2024-06-05

## 2024-06-04 RX ORDER — OXYCODONE HYDROCHLORIDE 5 MG/1
10 TABLET ORAL EVERY 4 HOURS PRN
Status: DISCONTINUED | OUTPATIENT
Start: 2024-06-04 | End: 2024-06-07

## 2024-06-04 RX ORDER — ROCURONIUM BROMIDE 10 MG/ML
INJECTION, SOLUTION INTRAVENOUS AS NEEDED
Status: DISCONTINUED | OUTPATIENT
Start: 2024-06-04 | End: 2024-06-04

## 2024-06-04 RX ORDER — ONDANSETRON HYDROCHLORIDE 2 MG/ML
4 INJECTION, SOLUTION INTRAVENOUS EVERY 8 HOURS PRN
Status: DISCONTINUED | OUTPATIENT
Start: 2024-06-04 | End: 2024-06-18 | Stop reason: HOSPADM

## 2024-06-04 RX ORDER — AMOXICILLIN 250 MG
2 CAPSULE ORAL 2 TIMES DAILY
Status: DISCONTINUED | OUTPATIENT
Start: 2024-06-04 | End: 2024-06-08

## 2024-06-04 RX ORDER — FENTANYL CITRATE 50 UG/ML
INJECTION, SOLUTION INTRAMUSCULAR; INTRAVENOUS AS NEEDED
Status: DISCONTINUED | OUTPATIENT
Start: 2024-06-04 | End: 2024-06-04

## 2024-06-04 RX ORDER — METHADONE IN SOD CHLOR,ISO-OSM 10 MG/ML
SYRINGE (ML) INTRAVENOUS AS NEEDED
Status: DISCONTINUED | OUTPATIENT
Start: 2024-06-04 | End: 2024-06-04

## 2024-06-04 RX ORDER — ACETAMINOPHEN 325 MG/1
650 TABLET ORAL EVERY 6 HOURS
Status: DISCONTINUED | OUTPATIENT
Start: 2024-06-04 | End: 2024-06-05

## 2024-06-04 RX ORDER — POTASSIUM CHLORIDE 29.8 MG/ML
40 INJECTION INTRAVENOUS EVERY 6 HOURS PRN
Status: DISCONTINUED | OUTPATIENT
Start: 2024-06-04 | End: 2024-06-06

## 2024-06-04 RX ORDER — EPINEPHRINE HCL IN 0.9 % NACL 4MG/250ML
PLASTIC BAG, INJECTION (ML) INTRAVENOUS CONTINUOUS PRN
Status: DISCONTINUED | OUTPATIENT
Start: 2024-06-04 | End: 2024-06-04

## 2024-06-04 RX ORDER — CALCIUM GLUCONATE 20 MG/ML
2 INJECTION, SOLUTION INTRAVENOUS EVERY 6 HOURS PRN
Status: DISCONTINUED | OUTPATIENT
Start: 2024-06-04 | End: 2024-06-06

## 2024-06-04 RX ORDER — EPINEPHRINE HCL IN DEXTROSE 5% 4MG/250ML
.01-1 PLASTIC BAG, INJECTION (ML) INTRAVENOUS CONTINUOUS
Status: DISCONTINUED | OUTPATIENT
Start: 2024-06-04 | End: 2024-06-05

## 2024-06-04 RX ORDER — CALCIUM CHLORIDE INJECTION 100 MG/ML
INJECTION, SOLUTION INTRAVENOUS AS NEEDED
Status: DISCONTINUED | OUTPATIENT
Start: 2024-06-04 | End: 2024-06-04

## 2024-06-04 RX ORDER — DEXTROSE 50 % IN WATER (D50W) INTRAVENOUS SYRINGE
25
Status: DISCONTINUED | OUTPATIENT
Start: 2024-06-04 | End: 2024-06-18 | Stop reason: HOSPADM

## 2024-06-04 RX ORDER — CEFAZOLIN SODIUM 2 G/100ML
2 INJECTION, SOLUTION INTRAVENOUS EVERY 8 HOURS
Status: COMPLETED | OUTPATIENT
Start: 2024-06-05 | End: 2024-06-06

## 2024-06-04 RX ORDER — LIDOCAINE HYDROCHLORIDE 10 MG/ML
INJECTION INFILTRATION; PERINEURAL AS NEEDED
Status: DISCONTINUED | OUTPATIENT
Start: 2024-06-04 | End: 2024-06-04

## 2024-06-04 RX ORDER — SODIUM CHLORIDE, SODIUM LACTATE, POTASSIUM CHLORIDE, CALCIUM CHLORIDE 600; 310; 30; 20 MG/100ML; MG/100ML; MG/100ML; MG/100ML
5 INJECTION, SOLUTION INTRAVENOUS CONTINUOUS
Status: DISCONTINUED | OUTPATIENT
Start: 2024-06-04 | End: 2024-06-06

## 2024-06-04 RX ORDER — PANTOPRAZOLE SODIUM 40 MG/10ML
40 INJECTION, POWDER, LYOPHILIZED, FOR SOLUTION INTRAVENOUS
Status: DISCONTINUED | OUTPATIENT
Start: 2024-06-05 | End: 2024-06-05

## 2024-06-04 RX ORDER — ALBUMIN HUMAN 50 G/1000ML
12.5 SOLUTION INTRAVENOUS ONCE
Status: DISCONTINUED | OUTPATIENT
Start: 2024-06-04 | End: 2024-06-06

## 2024-06-04 RX ORDER — PROPOFOL 10 MG/ML
INJECTION, EMULSION INTRAVENOUS AS NEEDED
Status: DISCONTINUED | OUTPATIENT
Start: 2024-06-04 | End: 2024-06-04

## 2024-06-04 RX ORDER — NALOXONE HYDROCHLORIDE 0.4 MG/ML
0.2 INJECTION, SOLUTION INTRAMUSCULAR; INTRAVENOUS; SUBCUTANEOUS EVERY 5 MIN PRN
Status: DISCONTINUED | OUTPATIENT
Start: 2024-06-04 | End: 2024-06-18 | Stop reason: HOSPADM

## 2024-06-04 RX ORDER — CEFAZOLIN 1 G/1
INJECTION, POWDER, FOR SOLUTION INTRAVENOUS AS NEEDED
Status: DISCONTINUED | OUTPATIENT
Start: 2024-06-04 | End: 2024-06-04

## 2024-06-04 RX ORDER — PROTAMINE SULFATE 10 MG/ML
INJECTION, SOLUTION INTRAVENOUS AS NEEDED
Status: DISCONTINUED | OUTPATIENT
Start: 2024-06-04 | End: 2024-06-04

## 2024-06-04 RX ORDER — SODIUM CHLORIDE, SODIUM GLUCONATE, SODIUM ACETATE, POTASSIUM CHLORIDE AND MAGNESIUM CHLORIDE 30; 37; 368; 526; 502 MG/100ML; MG/100ML; MG/100ML; MG/100ML; MG/100ML
INJECTION, SOLUTION INTRAVENOUS CONTINUOUS PRN
Status: DISCONTINUED | OUTPATIENT
Start: 2024-06-04 | End: 2024-06-04

## 2024-06-04 RX ORDER — SODIUM CHLORIDE, SODIUM LACTATE, POTASSIUM CHLORIDE, CALCIUM CHLORIDE 600; 310; 30; 20 MG/100ML; MG/100ML; MG/100ML; MG/100ML
30 INJECTION, SOLUTION INTRAVENOUS CONTINUOUS
Status: DISCONTINUED | OUTPATIENT
Start: 2024-06-04 | End: 2024-06-05

## 2024-06-04 RX ORDER — ONDANSETRON 4 MG/1
4 TABLET, FILM COATED ORAL EVERY 8 HOURS PRN
Status: DISCONTINUED | OUTPATIENT
Start: 2024-06-04 | End: 2024-06-07

## 2024-06-04 RX ORDER — OXYCODONE HYDROCHLORIDE 5 MG/1
5 TABLET ORAL EVERY 4 HOURS PRN
Status: DISCONTINUED | OUTPATIENT
Start: 2024-06-04 | End: 2024-06-07

## 2024-06-04 RX ORDER — MIDAZOLAM HYDROCHLORIDE 1 MG/ML
INJECTION INTRAMUSCULAR; INTRAVENOUS AS NEEDED
Status: DISCONTINUED | OUTPATIENT
Start: 2024-06-04 | End: 2024-06-04

## 2024-06-04 RX ORDER — MUPIROCIN 20 MG/G
1 OINTMENT TOPICAL 2 TIMES DAILY
Status: COMPLETED | OUTPATIENT
Start: 2024-06-04 | End: 2024-06-09

## 2024-06-04 RX ORDER — ALBUMIN HUMAN 50 G/1000ML
12.5 SOLUTION INTRAVENOUS ONCE
Status: COMPLETED | OUTPATIENT
Start: 2024-06-04 | End: 2024-06-04

## 2024-06-04 RX ORDER — HEPARIN SODIUM 1000 [USP'U]/ML
INJECTION, SOLUTION INTRAVENOUS; SUBCUTANEOUS AS NEEDED
Status: DISCONTINUED | OUTPATIENT
Start: 2024-06-04 | End: 2024-06-04

## 2024-06-04 RX ORDER — POTASSIUM CHLORIDE 14.9 MG/ML
20 INJECTION INTRAVENOUS EVERY 6 HOURS PRN
Status: DISCONTINUED | OUTPATIENT
Start: 2024-06-04 | End: 2024-06-06

## 2024-06-04 RX ORDER — NAPROXEN SODIUM 220 MG/1
81 TABLET, FILM COATED ORAL DAILY
Status: DISCONTINUED | OUTPATIENT
Start: 2024-06-05 | End: 2024-06-18 | Stop reason: HOSPADM

## 2024-06-04 RX ORDER — MAGNESIUM SULFATE HEPTAHYDRATE 500 MG/ML
INJECTION, SOLUTION INTRAMUSCULAR; INTRAVENOUS AS NEEDED
Status: DISCONTINUED | OUTPATIENT
Start: 2024-06-04 | End: 2024-06-04

## 2024-06-04 RX ORDER — MAGNESIUM SULFATE HEPTAHYDRATE 40 MG/ML
2 INJECTION, SOLUTION INTRAVENOUS EVERY 6 HOURS PRN
Status: DISCONTINUED | OUTPATIENT
Start: 2024-06-04 | End: 2024-06-06

## 2024-06-04 RX ORDER — NOREPINEPHRINE BITARTRATE 0.03 MG/ML
INJECTION, SOLUTION INTRAVENOUS CONTINUOUS PRN
Status: DISCONTINUED | OUTPATIENT
Start: 2024-06-04 | End: 2024-06-04

## 2024-06-04 RX ADMIN — CEFAZOLIN 2 G: 1 INJECTION, POWDER, FOR SOLUTION INTRAMUSCULAR; INTRAVENOUS at 18:50

## 2024-06-04 RX ADMIN — NOREPINEPHRINE BITARTRATE 16 MCG: 1 INJECTION, SOLUTION, CONCENTRATE INTRAVENOUS at 18:20

## 2024-06-04 RX ADMIN — Medication 0.02 MCG/KG/MIN: at 20:20

## 2024-06-04 RX ADMIN — ROCURONIUM 20 MG: 50 INJECTION, SOLUTION INTRAVENOUS at 20:11

## 2024-06-04 RX ADMIN — SODIUM CHLORIDE, SODIUM GLUCONATE, SODIUM ACETATE, POTASSIUM CHLORIDE AND MAGNESIUM CHLORIDE: 526; 502; 368; 37; 30 INJECTION, SOLUTION INTRAVENOUS at 18:10

## 2024-06-04 RX ADMIN — Medication 0.06 MCG/KG/MIN: at 18:46

## 2024-06-04 RX ADMIN — CALCIUM CHLORIDE 1 G: 100 INJECTION, SOLUTION INTRAVENOUS at 20:10

## 2024-06-04 RX ADMIN — PROPOFOL 50 MCG/KG/MIN: 10 INJECTION, EMULSION INTRAVENOUS at 22:31

## 2024-06-04 RX ADMIN — FENTANYL CITRATE 100 MCG: 50 INJECTION, SOLUTION INTRAMUSCULAR; INTRAVENOUS at 18:10

## 2024-06-04 RX ADMIN — SODIUM CHLORIDE, POTASSIUM CHLORIDE, SODIUM LACTATE AND CALCIUM CHLORIDE 500 ML: 600; 310; 30; 20 INJECTION, SOLUTION INTRAVENOUS at 22:57

## 2024-06-04 RX ADMIN — NOREPINEPHRINE BITARTRATE 8 MCG: 1 INJECTION, SOLUTION, CONCENTRATE INTRAVENOUS at 18:10

## 2024-06-04 RX ADMIN — MAGNESIUM SULFATE HEPTAHYDRATE 2 G: 500 INJECTION, SOLUTION INTRAMUSCULAR; INTRAVENOUS at 20:01

## 2024-06-04 RX ADMIN — LIDOCAINE HYDROCHLORIDE 100 MG: 10 INJECTION, SOLUTION INFILTRATION; PERINEURAL at 20:01

## 2024-06-04 RX ADMIN — ROCURONIUM 30 MG: 50 INJECTION, SOLUTION INTRAVENOUS at 19:17

## 2024-06-04 RX ADMIN — PROPOFOL 20 MG: 10 INJECTION, EMULSION INTRAVENOUS at 19:01

## 2024-06-04 RX ADMIN — SODIUM CHLORIDE, SODIUM LACTATE, POTASSIUM CHLORIDE, AND CALCIUM CHLORIDE: 600; 310; 30; 20 INJECTION, SOLUTION INTRAVENOUS at 18:10

## 2024-06-04 RX ADMIN — PROPOFOL 50 MCG: 10 INJECTION, EMULSION INTRAVENOUS at 21:11

## 2024-06-04 RX ADMIN — MUPIROCIN 1 APPLICATION: 20 OINTMENT TOPICAL at 22:30

## 2024-06-04 RX ADMIN — FENTANYL CITRATE 200 MCG: 50 INJECTION, SOLUTION INTRAMUSCULAR; INTRAVENOUS at 18:58

## 2024-06-04 RX ADMIN — SODIUM CHLORIDE, SODIUM LACTATE, POTASSIUM CHLORIDE, AND CALCIUM CHLORIDE: 600; 310; 30; 20 INJECTION, SOLUTION INTRAVENOUS at 19:39

## 2024-06-04 RX ADMIN — NOREPINEPHRINE BITARTRATE 16 MCG: 1 INJECTION, SOLUTION, CONCENTRATE INTRAVENOUS at 20:02

## 2024-06-04 RX ADMIN — MIDAZOLAM HYDROCHLORIDE 2 MG: 1 INJECTION, SOLUTION INTRAMUSCULAR; INTRAVENOUS at 17:54

## 2024-06-04 RX ADMIN — Medication 0.05 MCG/KG/MIN: at 18:34

## 2024-06-04 RX ADMIN — NOREPINEPHRINE BITARTRATE 16 MCG: 1 INJECTION, SOLUTION, CONCENTRATE INTRAVENOUS at 18:34

## 2024-06-04 RX ADMIN — NOREPINEPHRINE BITARTRATE 16 MCG: 1 INJECTION, SOLUTION, CONCENTRATE INTRAVENOUS at 19:06

## 2024-06-04 RX ADMIN — SUGAMMADEX 200 MG: 100 INJECTION, SOLUTION INTRAVENOUS at 21:09

## 2024-06-04 RX ADMIN — SODIUM CHLORIDE 45 MG/KG/HR: 9 INJECTION, SOLUTION INTRAVENOUS at 18:48

## 2024-06-04 RX ADMIN — PROPOFOL 100 MG: 10 INJECTION, EMULSION INTRAVENOUS at 18:10

## 2024-06-04 RX ADMIN — ALBUMIN HUMAN 12.5 G: 0.05 INJECTION, SOLUTION INTRAVENOUS at 22:31

## 2024-06-04 RX ADMIN — NITROGLYCERIN 100 MCG: 10 INJECTION INTRAVENOUS at 19:01

## 2024-06-04 RX ADMIN — NOREPINEPHRINE BITARTRATE 16 MCG: 1 INJECTION, SOLUTION, CONCENTRATE INTRAVENOUS at 19:08

## 2024-06-04 RX ADMIN — FENTANYL CITRATE 200 MCG: 50 INJECTION, SOLUTION INTRAMUSCULAR; INTRAVENOUS at 20:41

## 2024-06-04 RX ADMIN — LIDOCAINE HYDROCHLORIDE 80 MG: 20 INJECTION, SOLUTION INFILTRATION; PERINEURAL at 18:10

## 2024-06-04 RX ADMIN — HEPARIN SODIUM 25000 UNITS: 1000 INJECTION INTRAVENOUS; SUBCUTANEOUS at 19:00

## 2024-06-04 RX ADMIN — Medication 10 MG: at 18:10

## 2024-06-04 RX ADMIN — PROTAMINE SULFATE 250 MG: 10 INJECTION, SOLUTION INTRAVENOUS at 20:12

## 2024-06-04 RX ADMIN — ROCURONIUM 100 MG: 50 INJECTION, SOLUTION INTRAVENOUS at 18:10

## 2024-06-04 SDOH — HEALTH STABILITY: MENTAL HEALTH: CURRENT SMOKER: 1

## 2024-06-04 ASSESSMENT — PAIN SCALES - GENERAL
PAIN_LEVEL: 0
PAINLEVEL_OUTOF10: 0 - NO PAIN
PAINLEVEL_OUTOF10: 0 - NO PAIN

## 2024-06-04 ASSESSMENT — PAIN - FUNCTIONAL ASSESSMENT
PAIN_FUNCTIONAL_ASSESSMENT: 0-10
PAIN_FUNCTIONAL_ASSESSMENT: CPOT (CRITICAL CARE PAIN OBSERVATION TOOL)

## 2024-06-04 NOTE — H&P
History Of Present Illness  Farooq Lang is a 63 y.o. male presenting with Mitral stenosis.     Past Medical History  Past Medical History:   Diagnosis Date    A-fib (Multi)     Diverticulosis     Dizziness     Heart failure with mid-range ejection fraction (HFmEF) (Multi)     (45%, 3/28/24)    Irregular heart beat     afib on coumadin    Irritable bowel syndrome     Mitral valve stenosis     Other chest pain 03/10/2016    Chest discomfort    Pleurodynia 11/29/2017    Chest pain, pleuritic    Right lower quadrant pain 11/14/2018    Abdominal pain, acute, right lower quadrant    Shortness of breath     VANCE    Thrombocytopenia (CMS-HCC)        Surgical History  Past Surgical History:   Procedure Laterality Date    CARDIAC CATHETERIZATION N/A 04/22/2024    Procedure: Left And Right Heart Cath, Without LV;  Surgeon: Yobani Soto MD;  Location: [place] [address] Cardiac Cath Lab;  Service: Cardiovascular;  Laterality: N/A;  Schedule at 3:30pm Dr. Soto    COLONOSCOPY      DENTAL SURGERY      TONSILLECTOMY          Social History  He reports that he has been smoking cigarettes. He started smoking about 49 years ago. He has a 24.7 pack-year smoking history. He has been exposed to tobacco smoke. He has never used smokeless tobacco. He reports that he does not drink alcohol and does not use drugs.    Family History  Family History   Problem Relation Name Age of Onset    Cancer Father  74    Brain Aneurysm Father          Allergies  Patient has no known allergies.    Review of Systems     Physical Exam     Last Recorded Vitals  There were no vitals taken for this visit.    Relevant Results             Assessment/Plan   Principal Problem:    Mitral valve stenosis             I spent 20 minutes in the professional and overall care of this patient.      Lukasz Nugent MD

## 2024-06-04 NOTE — PROGRESS NOTES
Pharmacy Medication History Review    Farooq Lang is a 63 y.o. male admitted for Mitral valve stenosis. Pharmacy reviewed the patient's eoluv-sv-[id number] medications and allergies for accuracy.    The list below reflects the updated PTA list. Comments regarding how patient may be taking medications differently can be found in the Admit Orders Activity  Prior to Admission Medications   Prescriptions Last Dose Informant   UNABLE TO FIND 6/3/2024 Self, Spouse/Significant Other   Sig: Take 1 capsule by mouth once daily. Med Name: IBS Clear supplement   dilTIAZem CD (Cardizem CD) 120 mg 24 hr capsule 6/3/2024 Self, Spouse/Significant Other   Sig: Take 1 capsule (120 mg) by mouth once daily in the evening. AT 1700   furosemide (Lasix) 40 mg tablet Not Taking Self, Spouse/Significant Other   Sig: Take 0.5 tablets (20 mg) by mouth once daily.   Patient not taking: Reported on 6/4/2024   warfarin (Coumadin) 5 mg tablet 5/30/2024 Self, Spouse/Significant Other   Sig: Take 1 tablet (5 mg) by mouth once daily at bedtime for 10 days. Take as directed per After Visit Summary.   Patient taking differently: Current dosing as of 6/4/24:  1.5 tablets (7.5mg) once daily on Monday and Wednesdays, and 1 tablet (5mg) once daily on all other days of the week.      Facility-Administered Medications: None        The list below reflects the updated allergy list. Please review each documented allergy for additional clarification and justification.  Allergies  Reviewed by Vernon Arita RN on 6/4/2024   No Known Allergies         Patient declines M2B at discharge. Pharmacy has been updated to [facility] in [place], [place].    Sources:    -patient interview with wife present to assist (brought in all current Rx and OTC medication bottles)  -outpatient pharmacy dispense history  -Care Everywhere medication lists  -OARRS    Below are additional concerns with the patient's PTA list:    Patient brought in 7mg nicotine patches but has not started using them  at home yet.  He brought in for use at hospital.    Antonio Adrian, PharmD  Transitions of Care Pharmacist  [facility]  Please reach out via Secure Chat for questions, or if no response call [employer] or [facility]

## 2024-06-04 NOTE — ANESTHESIA PREPROCEDURE EVALUATION
Patient: Farooq Lang    Procedure Information       Date/Time: 06/04/24 1715    Procedure: Replacement Mitral Valve    Location: [facility] OR 20 / Virtual [facility] OR    Surgeons: Lukasz Nugent MD     HPI:   Patient is a 63 year old male wit Mitral valve stenosis here for MVR and LAAL with Dr. Nugent.     Patient referred by Dr. Nugent for preoperative evaluation of atrial fib, heart failure, mitral valve stenosis, thrombocytopenia, and irritable bowel syndrome       ALLERGIES:  No Known Allergies     MEDICAL HISTORY:  Past Medical History:   Diagnosis Date    A-fib (Multi)     Diverticulosis     Dizziness     Heart failure with mid-range ejection fraction (HFmEF) (Multi)     (45%, 3/28/24)    Irregular heart beat     afib on coumadin    Irritable bowel syndrome     Mitral valve stenosis     Other chest pain 03/10/2016    Chest discomfort    Pleurodynia 11/29/2017    Chest pain, pleuritic    Right lower quadrant pain 11/14/2018    Abdominal pain, acute, right lower quadrant    Shortness of breath     VANCE    Thrombocytopenia (CMS-HCC)         Relevant Problems   Cardiac   (+) Mitral valve stenosis   (+) Mitral valve stenosis, unspecified etiology   (+) New onset atrial fibrillation (Multi)        SURGICAL HISTORY:  Past Surgical History:   Procedure Laterality Date    CARDIAC CATHETERIZATION N/A 04/22/2024    Procedure: Left And Right Heart Cath, Without LV;  Surgeon: Yobani Soto MD;  Location: [place] [address] Cardiac Cath Lab;  Service: Cardiovascular;  Laterality: N/A;  Schedule at 3:30pm Dr. Soto    COLONOSCOPY      DENTAL SURGERY      TONSILLECTOMY          MEDS:  Current Outpatient Medications   Medication Instructions    dilTIAZem CD (CARDIZEM CD) 120 mg, oral, Every evening, AT 1700    furosemide (LASIX) 20 mg, oral, Daily    UNABLE TO FIND 1 capsule, oral, Daily, Med Name: IBS Clear supplement    warfarin (COUMADIN) 5 mg, oral, Nightly, Take as directed per After Visit Summary.     "    VITALS:      6/4/2024     3:11 PM 5/8/2024    12:15 PM 5/8/2024    12:00 PM   Vitals   Systolic 153     Diastolic 90     Heart Rate 72 69 67   Temp 36.1 °C (97 °F) 36.3 °C (97.3 °F)    Resp 16 16 16   Height (in) 1.676 m (5' 6\")     Weight (lb) 126.54     BMI 20.42 kg/m2     BSA (m2) 1.63 m2         LABS:   BMP   Lab Results   Component Value Date    GLUCOSE 112 (H) 05/08/2024    CALCIUM 9.5 05/08/2024     05/08/2024    K 4.2 05/08/2024    CO2 24 05/08/2024     05/08/2024    BUN 14 05/08/2024    CREATININE 1.19 05/08/2024   , LFT   Lab Results   Component Value Date    ALT 11 05/08/2024    AST 10 05/08/2024    ALKPHOS 88 05/08/2024    BILITOT 0.7 05/08/2024   , CBC  Lab Results   Component Value Date    WBC 6.9 05/08/2024    HGB 16.6 05/08/2024    HCT 51.3 05/08/2024    MCV 90 05/08/2024     (L) 05/08/2024          , Coags   Lab Results   Component Value Date/Time    PROTIME 21.4 (H) 05/08/2024 0918    INR 1.9 (H) 05/08/2024 0918    APTT 44 (H) 05/08/2024 0918      , A1C   Lab Results   Component Value Date    HGBA1C 5.7 (H) 05/06/2024    HGBA1C 5.7 (H) 05/06/2024       IMAGES:  EKG          Encounter Date: 05/08/24   ECG 12 lead   Result Value    Ventricular Rate 73    Atrial Rate 340    QRS Duration 82    QT Interval 378    QTC Calculation(Bazett) 416    R Axis 79    T Axis 49    QRS Count 12    Q Onset 222    T Offset 411    QTC Fredericia 403    Narrative    Atrial flutter with variable AV block  Abnormal ECG  When compared with ECG of 10-APR-2024 15:49,  No significant change was found  See ED provider note for full interpretation and clinical correlation  Confirmed by Elizabeth Rivas ([id number]) on 5/10/2024 11:20:07 AM      , ECHO         Transthoracic Echo (TTE) Complete With Contrast 03/28/2024    [facility], [address] [place] [address]  Tel [phone] and Fax [phone]    TRANSTHORACIC ECHOCARDIOGRAM REPORT      Patient Name:      MADYSON BERNARDO " ADRIANA Barclay Physician:    [id number] Morris Johnson MD  Study Date:        3/28/2024            Ordering Provider:    [id number] CHARISSA HILLIARD  MRN/PID:           [id number]             Fellow:  Accession#:        [id number]         Nurse:                Natalia Aleman RN  Date of Birth/Age: 1960 / 63 years Sonographer:          Helen Linda RDCS  Gender:            M                    Additional Staff:  Height:            167.64 cm            Admit Date:           3/27/2024  Weight:            57.15 kg             Admission Status:     Inpatient -  Routine  BSA / BMI:         1.64 m2 / 20.34      Encounter#:           [id number]  kg/m2  Department Location:  [facility] Non  Invasive  Blood Pressure: 124 /85 mmHg    Study Type:    TRANSTHORACIC ECHO (TTE) COMPLETE  Diagnosis/ICD: Unspecified atrial fibrillation-I48.91  Indication:    Atrial Fibrillation  CPT Code:      Echo Complete w Full [id number]    Patient History:  Smoker:            Current.  Pertinent History: Chest Pain and Dyspnea. Dizziness.    Study Detail: The following Echo studies were performed: 2D, M-Mode, Doppler and  color flow. Definity used as a contrast agent for endocardial  border definition. Total contrast used for this procedure was 1.0  mL via IV push. The patient was awake.      Critical Event  Critical Event: Test was completed as per department protocol.  Critical Finding: Mitral stenosis.  Time Test was Completed: 10:45:33 AM  Notified: Patricia Morrison NP.  Attending notification time: 11:01:31 AM    PHYSICIAN INTERPRETATION:  Left Ventricle: The left ventricular systolic function is mildly decreased, with an estimated ejection fraction of 45%. There is global hypokinesis of the left ventricle with minor regional variations. The left ventricular cavity size is mildly dilated. Left ventricular diastolic filling was indeterminate.  Left Atrium: The left atrium is severely dilated.  Right Ventricle: The right ventricle is  normal in size. There is normal right ventricular global systolic function.  Right Atrium: The right atrium is mildly dilated.  Aortic Valve: The aortic valve is trileaflet. There is mild aortic valve cusp calcification. There is trace to mild aortic valve regurgitation. The peak instantaneous gradient of the aortic valve is 3.1 mmHg. The mean gradient of the aortic valve is 2.0 mmHg.  Mitral Valve: The mitral valve is moderately thickened. 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 There is evidence of severe mitral valve stenosis. The doppler estimated mean and peak diastolic pressure gradients are 12.7 mmHg and 21.8 mmHg respectively. There is moderate mitral annular calcification. There is moderate mitral valve regurgitation.  Tricuspid Valve: The tricuspid valve is structurally normal. There is mild tricuspid regurgitation.  Pulmonic Valve: The pulmonic valve is not well visualized. The pulmonic valve regurgitation was not well visualized.  Pericardium: There is no pericardial effusion noted.  Aorta: The aortic root is normal.  Pulmonary Artery: The tricuspid regurgitant velocity is 3.32 m/s, and with an estimated right atrial pressure of 3 mmHg, the estimated pulmonary artery pressure is mild to moderately elevated with the RVSP at 47.1 mmHg.      CONCLUSIONS:  1. Left ventricular systolic function is mildly decreased with a 45% estimated ejection fraction.  2. The left atrium is severely dilated.  3. The mitral valve is moderately thickened. 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  4. There is moderate mitral annular calcification.  5. There is severe mitral valve stenosis.  6. Moderate mitral valve regurgitation.  7. Mild to moderately elevated pulmonary artery pressure.  8. There is global hypokinesis of the left ventricle with minor regional variations.    QUANTITATIVE DATA SUMMARY:  2D MEASUREMENTS:  Normal Ranges:  IVSd:          0.60 cm   (0.6-1.1cm)  LVPWd:         0.90 cm   (0.6-1.1cm)  LVIDd:          4.47 cm   (3.9-5.9cm)  LVIDs:         3.60 cm  LV Mass Index: 63.0 g/m2  LV % FS        19.4 %    LA VOLUME:  Normal Ranges:  LA Vol A4C:        71.6 ml    (22+/-6mL/m2)  LA Vol A2C:        67.4 ml  LA Vol BP:         70.7 ml  LA Vol Index A4C:  43.6 ml/m2  LA Vol Index A2C:  41.0 ml/m2  LA Vol Index BP:   43.0 ml/m2  LA Area A4C:       22.3 cm2  LA Area A2C:       22.0 cm2  LA Major Axis A4C: 5.9 cm  LA Major Axis A2C: 6.1 cm  LA Volume Index:   40.8 ml/m2  LA Vol A4C:        66.6 ml  LA Vol A2C:        64.7 ml    RA VOLUME BY A/L METHOD:  Normal Ranges:  RA Area A4C: 17.6 cm2    M-MODE MEASUREMENTS:  Normal Ranges:  Ao Root: 3.20 cm (2.0-3.7cm)  LAs:     5.42 cm (2.7-4.0cm)    AORTA MEASUREMENTS:  Normal Ranges:  Ao Sinus, d: 3.30 cm (2.1-3.5cm)  Asc Ao, d:   3.00 cm (2.1-3.4cm)    LV SYSTOLIC FUNCTION BY 2D PLANIMETRY (MOD):  Normal Ranges:  EF-A4C View: 41.3 % (>=55%)  EF-A2C View: 40.4 %  EF-Biplane:  41.5 %    LV DIASTOLIC FUNCTION:  Normal Ranges:  MV Peak E:      1.85 m/s   (0.7-1.2 m/s)  MV e'           0.05 m/s   (>8.0)  MV lateral e'   0.05 m/s  MV medial e'    0.04 m/s  E/e' Ratio:     36.95      (<8.0)  PulmV Sys Christian:  17.52 cm/s  PulmV Thompson Christian: 32.92 cm/s  PulmV S/D Christian:  0.53    MITRAL VALVE:  Normal Ranges:  MV Vmax:    2.33 m/s  (<=1.3m/s)  MV peak P.8 mmHg (<5mmHg)  MV mean P.7 mmHg (<2mmHg)  MV VTI:     60.69 cm  (10-13cm)    MITRAL INSUFFICIENCY:  Normal Ranges:  MR VTI:  150.95 cm  MR Vmax: 491.71 cm/s    AORTIC VALVE:  Normal Ranges:  AoV Vmax:                0.88 m/s (<=1.7m/s)  AoV Peak PG:             3.1 mmHg (<20mmHg)  AoV Mean P.0 mmHg (1.7-11.5mmHg)  LVOT Max Christian:            0.68 m/s (<=1.1m/s)  AoV VTI:                 14.53 cm (18-25cm)  LVOT VTI:                12.65 cm  LVOT Diameter:           1.98 cm  (1.8-2.4cm)  AoV Area, VTI:           2.69 cm2 (2.5-5.5cm2)  AoV Area,Vmax:           2.38 cm2 (2.5-4.5cm2)  AoV Dimensionless Index: 0.87      RIGHT  VENTRICLE:  RV Basal 3.10 cm  RV Mid   2.20 cm  RV Major 6.7 cm  TAPSE:   18.0 mm  RV s'    0.10 m/s    TRICUSPID VALVE/RVSP:  Normal Ranges:  Peak TR Velocity: 3.32 m/s  RV Syst Pressure: 47.1 mmHg (< 30mmHg)  IVC Diam:         2.21 cm    PULMONIC VALVE:  Normal Ranges:  PV Max Christian: 0.7 m/s  (0.6-0.9m/s)  PV Max P.7 mmHg    Pulmonary Veins:  PulmV Thompson Christian: 32.92 cm/s  PulmV S/D Christian:  0.53  PulmV Sys Christian:  17.52 cm/s    AORTA:  Asc Ao Diam 3.01 cm      [id number] Morris Johnson MD  Electronically signed on 3/28/2024 at 12:19:54 PM        ** Final **    , CARDIAC CATH      @[facility]@, CXR       XR chest 1 view 2024    Narrative  Interpreted By:  Bert Marrero,  STUDY:  XR CHEST 1 VIEW; 2024 9:47 am    INDICATION:  Signs/Symptoms:Chest Pain    COMPARISON:  04/10/2024    ACCESSION NUMBER(S):  [id number]    ORDERING CLINICIAN:  ANJUM ACKERMAN    FINDINGS:  The study is limited due to rotation and respiratory motion.  The cardiomediastinal silhouette is within normal limits for the  technique. Bilateral perihilar interstitial and mild alveolar  infiltrates have worsened since the previous exam. There is no  pneumothorax. The osseous structures are unchanged.    Impression  Worsening bilateral interstitial and mild alveolar infiltrates,  possibly due to pulmonary edema. Correlate clinically and follow-up  as needed.    Signed by: Bert Marrero 2024 9:57 AM  Dictation workstation:   [id number]    , CT Head/Neck    @[employer]@, CT Chest      No results found for this or any previous visit from the past 730 days.   , CT Abdomin     No results found for this or any previous visit from the past 730 days.    SOCIAL:  Social History     Tobacco Use   Smoking Status Every Day    Current packs/day: 0.50    Average packs/day: 0.5 packs/day for 49.4 years (24.7 ttl pk-yrs)    Types: Cigarettes    Start date: 1975    Passive exposure: Current   Smokeless Tobacco Never      Social History     Substance and  Sexual Activity   Alcohol Use Never      Social History     Substance and Sexual Activity   Drug Use Never        NPO STATUS:  NPO/Void Status  Carbohydrate Drink Given Prior to Surgery? : N  Date of Last Liquid: 06/03/24  Time of Last Liquid: 1930  Date of Last Solid: 06/03/24  Time of Last Solid: 1930  Last Intake Type: Solid meal  Time of Last Void: 1500        Clinical Areas Reviewed:   Tobacco  Allergies  Meds   Med Hx  Surg Hx   Fam Hx  Soc Hx      Physical Exam    Airway  Mallampati: I  TM distance: >3 FB  Neck ROM: full     Cardiovascular   Rate: abnormal  (+) murmur     Dental   (+) upper dentures, lower dentures     Pulmonary - normal exam     Abdominal - normal exam             Anesthesia Plan    History of general anesthesia?: yes  History of complications of general anesthesia?: no    ASA 4     general   (Risks, benefits, and alternatives to GETA, invasive monitoring (arterial line, CVC) and intraoperative ZACHERY were explained to the patient. The patient indicated understanding and agreed to proceed.  )  The patient is a current smoker.  Patient was previously instructed to abstain from smoking on day of procedure.  Patient smoked on day of procedure.    intravenous induction   Postoperative administration of opioids is intended.  Anesthetic plan and risks discussed with patient, spouse and mother.  Use of blood products discussed with patient, spouse and mother who.    Plan discussed with CAA and resident.

## 2024-06-04 NOTE — ANESTHESIA PROCEDURE NOTES
Central Venous Line:    Date/Time: 6/4/2024 6:30 PM    A central venous line was placed in the OR for the following indication(s): central venous access.  Staffing  Performed: [facility]   Authorized by: Tyler Jean MD    Performed by: Roger Cifuentes MD    Sterility preparation included the following: provider hand hygiene performed prior to central venous catheter insertion, all 5 sterile barriers used (gloves, gown, cap, mask, large sterile drape) during central venous catheter insertion, antiseptic used during central venous catheter insertion and skin prep agent completely dried prior to procedure.  The patient was placed in Trendelenburg position.    Right internal jugular vein was prepped.    The site was prepped with Chlorhexidine.  Size: 9 Fr   Catheter type: introducer   Number of Lumens: single lumen    Seldinger technique used.  Procedure performed using ultrasound guidance.  Sterile gel and probe cover used in ultrasound-guided central venous catheter insertion.    Intravenous verification was obtained by ultrasound and manometry.      Post insertion care included: all ports aspirated, all ports flushed easily, guidewire removed intact, Biopatch applied, line sutured in place and dressing applied.    During the procedure the patient experienced: patient tolerated procedure well with no complications.

## 2024-06-04 NOTE — ANESTHESIA PROCEDURE NOTES
Arterial Line:    Date/Time: 6/4/2024 6:05 PM    Staffing  Performed: [facility]   Authorized by: Tyler Jean MD    Performed by: Roger Cifuentes MD    An arterial line was placed. Procedure performed using ultrasound guidance.in the OR for the following indication(s): continuous blood pressure monitoring and blood sampling needed.    A  (size) (length), Angiocath (type) catheter was placed into the Left brachial artery, secured by Tegaderm,   Seldinger technique used.  Events:  patient tolerated procedure well with no complications.

## 2024-06-04 NOTE — ANESTHESIA PROCEDURE NOTES
Peripheral IV  Date/Time: 6/4/2024 6:25 PM      Placement  Needle size: 14 G  Laterality: right  Location: hand  Local anesthetic: none  Site prep: chlorhexidine  Technique: anatomical landmarks  Attempts: 1

## 2024-06-04 NOTE — ANESTHESIA PROCEDURE NOTES
Airway  Date/Time: 6/4/2024 6:15 PM  Urgency: elective    Airway not difficult    Staffing  Performed: [facility]   Authorized by: Tyler Jean MD    Performed by: CHANDLER Bloom  Patient location during procedure: OR    Indications and Patient Condition  Indications for airway management: anesthesia  Spontaneous Ventilation: absent  Sedation level: deep  Preoxygenated: yes  Patient position: sniffing  Mask difficulty assessment: 2 - vent by mask + OA or adjuvant +/- NMBA  No planned trial extubation    Final Airway Details  Final airway type: endotracheal airway      Successful airway: ETT  Cuffed: yes   Successful intubation technique: direct laryngoscopy  Endotracheal tube insertion site: oral  Blade: Nelda  Blade size: #4  ETT size (mm): 7.5  Cormack-Lehane Classification: grade I - full view of glottis  Placement verified by: chest auscultation and capnometry   Measured from: gums  ETT to gums (cm): 22  Number of attempts at approach: 1

## 2024-06-04 NOTE — ANESTHESIA PROCEDURE NOTES
Peripheral IV  Date/Time: 6/4/2024 6:20 PM      Placement  Needle size: 14 G  Laterality: left  Location: hand  Local anesthetic: none  Technique: anatomical landmarks  Attempts: 1

## 2024-06-05 ENCOUNTER — APPOINTMENT (OUTPATIENT)
Dept: RADIOLOGY | Facility: HOSPITAL | Age: 64
DRG: 219 | End: 2024-06-05
Payer: COMMERCIAL

## 2024-06-05 LAB
ALBUMIN SERPL BCP-MCNC: 3.7 G/DL (ref 3.4–5)
ALBUMIN SERPL BCP-MCNC: 3.8 G/DL (ref 3.4–5)
ANION GAP BLDA CALCULATED.4IONS-SCNC: 10 MMO/L (ref 10–25)
ANION GAP BLDA CALCULATED.4IONS-SCNC: 11 MMO/L (ref 10–25)
ANION GAP BLDA CALCULATED.4IONS-SCNC: 13 MMO/L (ref 10–25)
ANION GAP BLDA CALCULATED.4IONS-SCNC: 13 MMO/L (ref 10–25)
ANION GAP BLDV CALCULATED.4IONS-SCNC: 8 MMOL/L (ref 10–25)
ANION GAP BLDV CALCULATED.4IONS-SCNC: 9 MMOL/L (ref 10–25)
ANION GAP SERPL CALC-SCNC: 13 MMOL/L (ref 10–20)
ANION GAP SERPL CALC-SCNC: 15 MMOL/L (ref 10–20)
BASE EXCESS BLDA CALC-SCNC: -0.4 MMOL/L (ref -2–3)
BASE EXCESS BLDA CALC-SCNC: -2.2 MMOL/L (ref -2–3)
BASE EXCESS BLDA CALC-SCNC: -3.8 MMOL/L (ref -2–3)
BASE EXCESS BLDA CALC-SCNC: -3.9 MMOL/L (ref -2–3)
BASE EXCESS BLDV CALC-SCNC: -1.5 MMOL/L (ref -2–3)
BASE EXCESS BLDV CALC-SCNC: 0.6 MMOL/L (ref -2–3)
BODY TEMPERATURE: 37 DEGREES CELSIUS
BUN SERPL-MCNC: 17 MG/DL (ref 6–23)
BUN SERPL-MCNC: 17 MG/DL (ref 6–23)
CA-I BLD-SCNC: 0.96 MMOL/L (ref 1.1–1.33)
CA-I BLD-SCNC: 1.17 MMOL/L (ref 1.1–1.33)
CA-I BLDA-SCNC: 1.17 MMOL/L (ref 1.1–1.33)
CA-I BLDA-SCNC: 1.17 MMOL/L (ref 1.1–1.33)
CA-I BLDA-SCNC: 1.18 MMOL/L (ref 1.1–1.33)
CA-I BLDA-SCNC: 1.2 MMOL/L (ref 1.1–1.33)
CA-I BLDV-SCNC: 1.15 MMOL/L (ref 1.1–1.33)
CA-I BLDV-SCNC: 1.22 MMOL/L (ref 1.1–1.33)
CALCIUM SERPL-MCNC: 8.5 MG/DL (ref 8.6–10.6)
CALCIUM SERPL-MCNC: 8.5 MG/DL (ref 8.6–10.6)
CFT FORM KAOLIN IND BLD RES TEG: 2.1 MIN (ref 0.8–2.1)
CHLORIDE BLDA-SCNC: 105 MMOL/L (ref 98–107)
CHLORIDE BLDA-SCNC: 105 MMOL/L (ref 98–107)
CHLORIDE BLDA-SCNC: 106 MMOL/L (ref 98–107)
CHLORIDE BLDA-SCNC: 107 MMOL/L (ref 98–107)
CHLORIDE BLDV-SCNC: 107 MMOL/L (ref 98–107)
CHLORIDE BLDV-SCNC: 107 MMOL/L (ref 98–107)
CHLORIDE SERPL-SCNC: 107 MMOL/L (ref 98–107)
CHLORIDE SERPL-SCNC: 107 MMOL/L (ref 98–107)
CLOT ANGLE.KAOLIN INDUCED BLD RES TEG: 67 DEG (ref 63–78)
CLOT INIT KAO IND P HEP NEUT BLD RES TEG: 8.2 MIN (ref 4.3–8.3)
CLOT INIT KAO IND P HEP NEUT BLD RES TEG: 8.2 MIN (ref 4.6–9.1)
CO2 SERPL-SCNC: 21 MMOL/L (ref 21–32)
CO2 SERPL-SCNC: 22 MMOL/L (ref 21–32)
CREAT SERPL-MCNC: 0.92 MG/DL (ref 0.5–1.3)
CREAT SERPL-MCNC: 1.2 MG/DL (ref 0.5–1.3)
EGFRCR SERPLBLD CKD-EPI 2021: 68 ML/MIN/1.73M*2
EGFRCR SERPLBLD CKD-EPI 2021: >90 ML/MIN/1.73M*2
ERYTHROCYTE [DISTWIDTH] IN BLOOD BY AUTOMATED COUNT: 14.9 % (ref 11.5–14.5)
ERYTHROCYTE [DISTWIDTH] IN BLOOD BY AUTOMATED COUNT: 15.2 % (ref 11.5–14.5)
FIBRINOGEN BLD CALC-MCNC: 323 MG/DL (ref 278–581)
GLUCOSE BLD MANUAL STRIP-MCNC: 113 MG/DL (ref 74–99)
GLUCOSE BLD MANUAL STRIP-MCNC: 127 MG/DL (ref 74–99)
GLUCOSE BLD MANUAL STRIP-MCNC: 177 MG/DL (ref 74–99)
GLUCOSE BLDA-MCNC: 129 MG/DL (ref 74–99)
GLUCOSE BLDA-MCNC: 156 MG/DL (ref 74–99)
GLUCOSE BLDA-MCNC: 172 MG/DL (ref 74–99)
GLUCOSE BLDA-MCNC: 192 MG/DL (ref 74–99)
GLUCOSE BLDV-MCNC: 115 MG/DL (ref 74–99)
GLUCOSE BLDV-MCNC: 168 MG/DL (ref 74–99)
GLUCOSE SERPL-MCNC: 123 MG/DL (ref 74–99)
GLUCOSE SERPL-MCNC: 189 MG/DL (ref 74–99)
HCO3 BLDA-SCNC: 21.5 MMOL/L (ref 22–26)
HCO3 BLDA-SCNC: 22.2 MMOL/L (ref 22–26)
HCO3 BLDA-SCNC: 23.2 MMOL/L (ref 22–26)
HCO3 BLDA-SCNC: 24.1 MMOL/L (ref 22–26)
HCO3 BLDV-SCNC: 24.3 MMOL/L (ref 22–26)
HCO3 BLDV-SCNC: 26 MMOL/L (ref 22–26)
HCT VFR BLD AUTO: 39 % (ref 41–52)
HCT VFR BLD AUTO: 40.6 % (ref 41–52)
HCT VFR BLD EST: 39 % (ref 41–52)
HCT VFR BLD EST: 40 % (ref 41–52)
HCT VFR BLD EST: 40 % (ref 41–52)
HCT VFR BLD EST: 41 % (ref 41–52)
HCT VFR BLD EST: 43 % (ref 41–52)
HCT VFR BLD EST: 43 % (ref 41–52)
HGB BLD-MCNC: 12.9 G/DL (ref 13.5–17.5)
HGB BLD-MCNC: 13.3 G/DL (ref 13.5–17.5)
HGB BLDA-MCNC: 13.3 G/DL (ref 13.5–17.5)
HGB BLDA-MCNC: 13.6 G/DL (ref 13.5–17.5)
HGB BLDA-MCNC: 14.2 G/DL (ref 13.5–17.5)
HGB BLDA-MCNC: 14.2 G/DL (ref 13.5–17.5)
HGB BLDV-MCNC: 12.9 G/DL (ref 13.5–17.5)
HGB BLDV-MCNC: 13.4 G/DL (ref 13.5–17.5)
INHALED O2 CONCENTRATION: 32 %
INHALED O2 CONCENTRATION: 34 %
INHALED O2 CONCENTRATION: 38 %
INHALED O2 CONCENTRATION: 40 %
INHALED O2 CONCENTRATION: 40 %
INHALED O2 CONCENTRATION: 50 %
LACTATE BLDA-SCNC: 1 MMOL/L (ref 0.4–2)
LACTATE BLDA-SCNC: 1.6 MMOL/L (ref 0.4–2)
LACTATE BLDA-SCNC: 2 MMOL/L (ref 0.4–2)
LACTATE BLDA-SCNC: 2.2 MMOL/L (ref 0.4–2)
LACTATE BLDV-SCNC: 0.8 MMOL/L (ref 0.4–2)
LACTATE BLDV-SCNC: 1.9 MMOL/L (ref 0.4–2)
LDLC SERPL DIRECT ASSAY-MCNC: 88 MG/DL (ref 0–129)
MA KAOLIN BLD RES TEG: 53 MM (ref 52–69)
MA KAOLIN+TF BLD RES TEG: 55 MM (ref 52–70)
MA TF IND+IIB-IIIA INH BLD RES TEG: 18 MM (ref 15–32)
MAGNESIUM SERPL-MCNC: 2.26 MG/DL (ref 1.6–2.4)
MAGNESIUM SERPL-MCNC: 2.35 MG/DL (ref 1.6–2.4)
MCH RBC QN AUTO: 29.8 PG (ref 26–34)
MCH RBC QN AUTO: 30.4 PG (ref 26–34)
MCHC RBC AUTO-ENTMCNC: 31.8 G/DL (ref 32–36)
MCHC RBC AUTO-ENTMCNC: 34.1 G/DL (ref 32–36)
MCV RBC AUTO: 89 FL (ref 80–100)
MCV RBC AUTO: 94 FL (ref 80–100)
NRBC BLD-RTO: 0 /100 WBCS (ref 0–0)
NRBC BLD-RTO: 0 /100 WBCS (ref 0–0)
OXYHGB MFR BLDA: 88.8 % (ref 94–98)
OXYHGB MFR BLDA: 95.2 % (ref 94–98)
OXYHGB MFR BLDA: 95.9 % (ref 94–98)
OXYHGB MFR BLDA: 96.1 % (ref 94–98)
OXYHGB MFR BLDV: 65.4 % (ref 45–75)
OXYHGB MFR BLDV: 68.4 % (ref 45–75)
PCO2 BLDA: 38 MM HG (ref 38–42)
PCO2 BLDA: 39 MM HG (ref 38–42)
PCO2 BLDA: 41 MM HG (ref 38–42)
PCO2 BLDA: 43 MM HG (ref 38–42)
PCO2 BLDV: 44 MM HG (ref 41–51)
PCO2 BLDV: 44 MM HG (ref 41–51)
PH BLDA: 7.32 PH (ref 7.38–7.42)
PH BLDA: 7.35 PH (ref 7.38–7.42)
PH BLDA: 7.36 PH (ref 7.38–7.42)
PH BLDA: 7.41 PH (ref 7.38–7.42)
PH BLDV: 7.35 PH (ref 7.33–7.43)
PH BLDV: 7.38 PH (ref 7.33–7.43)
PHOSPHATE SERPL-MCNC: 2.6 MG/DL (ref 2.5–4.9)
PHOSPHATE SERPL-MCNC: 3.1 MG/DL (ref 2.5–4.9)
PLATELET # BLD AUTO: 81 X10*3/UL (ref 150–450)
PLATELET # BLD AUTO: 95 X10*3/UL (ref 150–450)
PO2 BLDA: 115 MM HG (ref 85–95)
PO2 BLDA: 62 MM HG (ref 85–95)
PO2 BLDA: 88 MM HG (ref 85–95)
PO2 BLDA: 90 MM HG (ref 85–95)
PO2 BLDV: 40 MM HG (ref 35–45)
PO2 BLDV: 45 MM HG (ref 35–45)
POTASSIUM BLDA-SCNC: 4.2 MMOL/L (ref 3.5–5.3)
POTASSIUM BLDA-SCNC: 4.2 MMOL/L (ref 3.5–5.3)
POTASSIUM BLDA-SCNC: 4.6 MMOL/L (ref 3.5–5.3)
POTASSIUM BLDA-SCNC: 4.7 MMOL/L (ref 3.5–5.3)
POTASSIUM BLDV-SCNC: 4.3 MMOL/L (ref 3.5–5.3)
POTASSIUM BLDV-SCNC: 4.6 MMOL/L (ref 3.5–5.3)
POTASSIUM SERPL-SCNC: 4.2 MMOL/L (ref 3.5–5.3)
POTASSIUM SERPL-SCNC: 4.9 MMOL/L (ref 3.5–5.3)
RBC # BLD AUTO: 4.33 X10*6/UL (ref 4.5–5.9)
RBC # BLD AUTO: 4.38 X10*6/UL (ref 4.5–5.9)
SAO2 % BLDA: 100 % (ref 94–100)
SAO2 % BLDA: 91 % (ref 94–100)
SAO2 % BLDA: 98 % (ref 94–100)
SAO2 % BLDA: 99 % (ref 94–100)
SAO2 % BLDV: 67 % (ref 45–75)
SAO2 % BLDV: 70 % (ref 45–75)
SODIUM BLDA-SCNC: 135 MMOL/L (ref 136–145)
SODIUM BLDA-SCNC: 136 MMOL/L (ref 136–145)
SODIUM BLDV-SCNC: 136 MMOL/L (ref 136–145)
SODIUM BLDV-SCNC: 136 MMOL/L (ref 136–145)
SODIUM SERPL-SCNC: 136 MMOL/L (ref 136–145)
SODIUM SERPL-SCNC: 140 MMOL/L (ref 136–145)
TEST COMMENT: NORMAL
WBC # BLD AUTO: 13.7 X10*3/UL (ref 4.4–11.3)
WBC # BLD AUTO: 15.1 X10*3/UL (ref 4.4–11.3)

## 2024-06-05 PROCEDURE — 99231 SBSQ HOSP IP/OBS SF/LOW 25: CPT

## 2024-06-05 PROCEDURE — 71045 X-RAY EXAM CHEST 1 VIEW: CPT

## 2024-06-05 PROCEDURE — 2500000001 HC RX 250 WO HCPCS SELF ADMINISTERED DRUGS (ALT 637 FOR MEDICARE OP): Performed by: NURSE PRACTITIONER

## 2024-06-05 PROCEDURE — 2500000005 HC RX 250 GENERAL PHARMACY W/O HCPCS: Performed by: STUDENT IN AN ORGANIZED HEALTH CARE EDUCATION/TRAINING PROGRAM

## 2024-06-05 PROCEDURE — 94660 CPAP INITIATION&MGMT: CPT

## 2024-06-05 PROCEDURE — 2500000005 HC RX 250 GENERAL PHARMACY W/O HCPCS: Performed by: NURSE PRACTITIONER

## 2024-06-05 PROCEDURE — 84132 ASSAY OF SERUM POTASSIUM: CPT | Performed by: NURSE PRACTITIONER

## 2024-06-05 PROCEDURE — 2500000004 HC RX 250 GENERAL PHARMACY W/ HCPCS (ALT 636 FOR OP/ED): Performed by: STUDENT IN AN ORGANIZED HEALTH CARE EDUCATION/TRAINING PROGRAM

## 2024-06-05 PROCEDURE — 94003 VENT MGMT INPAT SUBQ DAY: CPT

## 2024-06-05 PROCEDURE — 94667 MNPJ CHEST WALL 1ST: CPT

## 2024-06-05 PROCEDURE — 37799 UNLISTED PX VASCULAR SURGERY: CPT | Performed by: NURSE PRACTITIONER

## 2024-06-05 PROCEDURE — 99291 CRITICAL CARE FIRST HOUR: CPT | Performed by: STUDENT IN AN ORGANIZED HEALTH CARE EDUCATION/TRAINING PROGRAM

## 2024-06-05 PROCEDURE — 2020000001 HC ICU ROOM DAILY

## 2024-06-05 PROCEDURE — 2500000002 HC RX 250 W HCPCS SELF ADMINISTERED DRUGS (ALT 637 FOR MEDICARE OP, ALT 636 FOR OP/ED): Performed by: NURSE PRACTITIONER

## 2024-06-05 PROCEDURE — 83721 ASSAY OF BLOOD LIPOPROTEIN: CPT | Performed by: NURSE PRACTITIONER

## 2024-06-05 PROCEDURE — 97530 THERAPEUTIC ACTIVITIES: CPT | Mod: GP

## 2024-06-05 PROCEDURE — 83735 ASSAY OF MAGNESIUM: CPT | Performed by: NURSE PRACTITIONER

## 2024-06-05 PROCEDURE — 85027 COMPLETE CBC AUTOMATED: CPT | Performed by: NURSE PRACTITIONER

## 2024-06-05 PROCEDURE — 82947 ASSAY GLUCOSE BLOOD QUANT: CPT

## 2024-06-05 PROCEDURE — 2500000004 HC RX 250 GENERAL PHARMACY W/ HCPCS (ALT 636 FOR OP/ED)

## 2024-06-05 PROCEDURE — 2500000004 HC RX 250 GENERAL PHARMACY W/ HCPCS (ALT 636 FOR OP/ED): Performed by: NURSE PRACTITIONER

## 2024-06-05 PROCEDURE — 71045 X-RAY EXAM CHEST 1 VIEW: CPT | Performed by: RADIOLOGY

## 2024-06-05 PROCEDURE — 97161 PT EVAL LOW COMPLEX 20 MIN: CPT | Mod: GP

## 2024-06-05 PROCEDURE — 82330 ASSAY OF CALCIUM: CPT | Performed by: NURSE PRACTITIONER

## 2024-06-05 RX ORDER — MAGNESIUM SULFATE HEPTAHYDRATE 40 MG/ML
2 INJECTION, SOLUTION INTRAVENOUS ONCE
Status: COMPLETED | OUTPATIENT
Start: 2024-06-05 | End: 2024-06-05

## 2024-06-05 RX ORDER — HYDRALAZINE HYDROCHLORIDE 20 MG/ML
INJECTION INTRAMUSCULAR; INTRAVENOUS
Status: COMPLETED
Start: 2024-06-05 | End: 2024-06-05

## 2024-06-05 RX ORDER — NICOTINE 7MG/24HR
1 PATCH, TRANSDERMAL 24 HOURS TRANSDERMAL DAILY
Status: DISCONTINUED | OUTPATIENT
Start: 2024-07-31 | End: 2024-06-18 | Stop reason: HOSPADM

## 2024-06-05 RX ORDER — ACETAMINOPHEN 10 MG/ML
1000 INJECTION, SOLUTION INTRAVENOUS ONCE
Status: COMPLETED | OUTPATIENT
Start: 2024-06-05 | End: 2024-06-05

## 2024-06-05 RX ORDER — HYDROMORPHONE HYDROCHLORIDE 0.2 MG/ML
0.2 INJECTION INTRAMUSCULAR; INTRAVENOUS; SUBCUTANEOUS EVERY 2 HOUR PRN
Status: DISCONTINUED | OUTPATIENT
Start: 2024-06-05 | End: 2024-06-06

## 2024-06-05 RX ORDER — HYDRALAZINE HYDROCHLORIDE 20 MG/ML
10 INJECTION INTRAMUSCULAR; INTRAVENOUS ONCE
Status: COMPLETED | OUTPATIENT
Start: 2024-06-05 | End: 2024-06-05

## 2024-06-05 RX ORDER — INSULIN LISPRO 100 [IU]/ML
0-10 INJECTION, SOLUTION INTRAVENOUS; SUBCUTANEOUS
Status: DISCONTINUED | OUTPATIENT
Start: 2024-06-05 | End: 2024-06-07

## 2024-06-05 RX ORDER — LIDOCAINE 560 MG/1
1 PATCH PERCUTANEOUS; TOPICAL; TRANSDERMAL DAILY
Status: DISCONTINUED | OUTPATIENT
Start: 2024-06-05 | End: 2024-06-15

## 2024-06-05 RX ORDER — METHOCARBAMOL 500 MG/1
500 TABLET, FILM COATED ORAL EVERY 8 HOURS SCHEDULED
Status: DISPENSED | OUTPATIENT
Start: 2024-06-05 | End: 2024-06-07

## 2024-06-05 RX ORDER — HYDRALAZINE HYDROCHLORIDE 20 MG/ML
10 INJECTION INTRAMUSCULAR; INTRAVENOUS EVERY 4 HOURS PRN
Status: DISCONTINUED | OUTPATIENT
Start: 2024-06-05 | End: 2024-06-07

## 2024-06-05 RX ORDER — FENTANYL CITRATE 50 UG/ML
50 INJECTION, SOLUTION INTRAMUSCULAR; INTRAVENOUS EVERY 2 HOUR PRN
Status: DISCONTINUED | OUTPATIENT
Start: 2024-06-05 | End: 2024-06-05

## 2024-06-05 RX ORDER — HYDROMORPHONE HYDROCHLORIDE 1 MG/ML
0.4 INJECTION, SOLUTION INTRAMUSCULAR; INTRAVENOUS; SUBCUTANEOUS EVERY 4 HOURS PRN
Status: DISCONTINUED | OUTPATIENT
Start: 2024-06-05 | End: 2024-06-07

## 2024-06-05 RX ORDER — HEPARIN SODIUM 5000 [USP'U]/ML
5000 INJECTION, SOLUTION INTRAVENOUS; SUBCUTANEOUS EVERY 8 HOURS
Status: DISCONTINUED | OUTPATIENT
Start: 2024-06-05 | End: 2024-06-13

## 2024-06-05 RX ORDER — IBUPROFEN 200 MG
1 TABLET ORAL DAILY
Status: DISCONTINUED | OUTPATIENT
Start: 2024-07-17 | End: 2024-06-18 | Stop reason: HOSPADM

## 2024-06-05 RX ORDER — IBUPROFEN 200 MG
1 TABLET ORAL DAILY
Status: DISCONTINUED | OUTPATIENT
Start: 2024-06-05 | End: 2024-06-18 | Stop reason: HOSPADM

## 2024-06-05 RX ORDER — ACETAMINOPHEN 10 MG/ML
1000 INJECTION, SOLUTION INTRAVENOUS EVERY 8 HOURS
Status: DISCONTINUED | OUTPATIENT
Start: 2024-06-05 | End: 2024-06-05

## 2024-06-05 RX ORDER — HYDROMORPHONE HYDROCHLORIDE 1 MG/ML
0.2 INJECTION, SOLUTION INTRAMUSCULAR; INTRAVENOUS; SUBCUTANEOUS
Status: DISCONTINUED | OUTPATIENT
Start: 2024-06-05 | End: 2024-06-07

## 2024-06-05 RX ORDER — ACETAMINOPHEN 325 MG/1
975 TABLET ORAL EVERY 6 HOURS
Status: DISCONTINUED | OUTPATIENT
Start: 2024-06-05 | End: 2024-06-09

## 2024-06-05 RX ORDER — FENTANYL CITRATE 50 UG/ML
25 INJECTION, SOLUTION INTRAMUSCULAR; INTRAVENOUS EVERY 2 HOUR PRN
Status: DISCONTINUED | OUTPATIENT
Start: 2024-06-05 | End: 2024-06-05

## 2024-06-05 RX ORDER — POLYETHYLENE GLYCOL 3350 17 G/17G
17 POWDER, FOR SOLUTION ORAL DAILY PRN
Status: DISCONTINUED | OUTPATIENT
Start: 2024-06-05 | End: 2024-06-07

## 2024-06-05 RX ADMIN — HYDRALAZINE HYDROCHLORIDE 10 MG: 20 INJECTION INTRAMUSCULAR; INTRAVENOUS at 03:09

## 2024-06-05 RX ADMIN — FENTANYL CITRATE 50 MCG: 50 INJECTION, SOLUTION INTRAMUSCULAR; INTRAVENOUS at 03:24

## 2024-06-05 RX ADMIN — MUPIROCIN 1 APPLICATION: 20 OINTMENT TOPICAL at 20:35

## 2024-06-05 RX ADMIN — OXYCODONE HYDROCHLORIDE 10 MG: 5 TABLET ORAL at 23:07

## 2024-06-05 RX ADMIN — HYDROMORPHONE HYDROCHLORIDE 0.4 MG: 1 INJECTION, SOLUTION INTRAMUSCULAR; INTRAVENOUS; SUBCUTANEOUS at 20:30

## 2024-06-05 RX ADMIN — CEFAZOLIN SODIUM 2 G: 2 INJECTION, SOLUTION INTRAVENOUS at 02:17

## 2024-06-05 RX ADMIN — SODIUM CHLORIDE, POTASSIUM CHLORIDE, SODIUM LACTATE AND CALCIUM CHLORIDE 500 ML: 600; 310; 30; 20 INJECTION, SOLUTION INTRAVENOUS at 08:15

## 2024-06-05 RX ADMIN — MAGNESIUM SULFATE HEPTAHYDRATE 2 G: 40 INJECTION, SOLUTION INTRAVENOUS at 16:00

## 2024-06-05 RX ADMIN — HYDRALAZINE HYDROCHLORIDE 10 MG: 20 INJECTION INTRAMUSCULAR; INTRAVENOUS at 16:44

## 2024-06-05 RX ADMIN — ACETAMINOPHEN 975 MG: 325 TABLET ORAL at 23:08

## 2024-06-05 RX ADMIN — POLYETHYLENE GLYCOL 3350 17 G: 17 POWDER, FOR SOLUTION ORAL at 08:13

## 2024-06-05 RX ADMIN — HEPARIN SODIUM 5000 UNITS: 5000 INJECTION INTRAVENOUS; SUBCUTANEOUS at 16:01

## 2024-06-05 RX ADMIN — LIDOCAINE 1 PATCH: 4 PATCH TOPICAL at 10:53

## 2024-06-05 RX ADMIN — SENNOSIDES AND DOCUSATE SODIUM 2 TABLET: 8.6; 5 TABLET ORAL at 08:13

## 2024-06-05 RX ADMIN — INSULIN LISPRO 5 UNITS: 100 INJECTION, SOLUTION INTRAVENOUS; SUBCUTANEOUS at 00:25

## 2024-06-05 RX ADMIN — METHOCARBAMOL 500 MG: 500 TABLET ORAL at 21:06

## 2024-06-05 RX ADMIN — ACETAMINOPHEN 975 MG: 325 TABLET ORAL at 16:44

## 2024-06-05 RX ADMIN — HEPARIN SODIUM 5000 UNITS: 5000 INJECTION INTRAVENOUS; SUBCUTANEOUS at 08:13

## 2024-06-05 RX ADMIN — ACETAMINOPHEN 1000 MG: 10 INJECTION INTRAVENOUS at 11:04

## 2024-06-05 RX ADMIN — HYDROMORPHONE HYDROCHLORIDE 0.2 MG: 0.2 INJECTION, SOLUTION INTRAMUSCULAR; INTRAVENOUS; SUBCUTANEOUS at 12:01

## 2024-06-05 RX ADMIN — ONDANSETRON 4 MG: 2 INJECTION INTRAMUSCULAR; INTRAVENOUS at 17:28

## 2024-06-05 RX ADMIN — ASPIRIN 81 MG CHEWABLE TABLET 81 MG: 81 TABLET CHEWABLE at 08:12

## 2024-06-05 RX ADMIN — CEFAZOLIN SODIUM 2 G: 2 INJECTION, SOLUTION INTRAVENOUS at 10:53

## 2024-06-05 RX ADMIN — INSULIN LISPRO 10 UNITS: 100 INJECTION, SOLUTION INTRAVENOUS; SUBCUTANEOUS at 04:06

## 2024-06-05 RX ADMIN — CEFAZOLIN SODIUM 2 G: 2 INJECTION, SOLUTION INTRAVENOUS at 18:56

## 2024-06-05 RX ADMIN — INSULIN LISPRO 2 UNITS: 100 INJECTION, SOLUTION INTRAVENOUS; SUBCUTANEOUS at 09:38

## 2024-06-05 RX ADMIN — Medication 50 PERCENT: at 00:18

## 2024-06-05 RX ADMIN — OXYCODONE HYDROCHLORIDE 5 MG: 5 TABLET ORAL at 16:43

## 2024-06-05 RX ADMIN — MUPIROCIN 1 APPLICATION: 20 OINTMENT TOPICAL at 10:53

## 2024-06-05 RX ADMIN — METHOCARBAMOL 500 MG: 500 TABLET ORAL at 14:10

## 2024-06-05 RX ADMIN — CALCIUM GLUCONATE 2 G: 20 INJECTION, SOLUTION INTRAVENOUS at 14:10

## 2024-06-05 RX ADMIN — SENNOSIDES AND DOCUSATE SODIUM 2 TABLET: 8.6; 5 TABLET ORAL at 20:29

## 2024-06-05 SDOH — ECONOMIC STABILITY: INCOME INSECURITY: HOW HARD IS IT FOR YOU TO PAY FOR THE VERY BASICS LIKE FOOD, HOUSING, MEDICAL CARE, AND HEATING?: NOT HARD AT ALL

## 2024-06-05 SDOH — ECONOMIC STABILITY: INCOME INSECURITY: IN THE PAST 12 MONTHS, HAS THE ELECTRIC, GAS, OIL, OR WATER COMPANY THREATENED TO SHUT OFF SERVICE IN YOUR HOME?: NO

## 2024-06-05 SDOH — SOCIAL STABILITY: SOCIAL NETWORK
DO YOU BELONG TO ANY CLUBS OR ORGANIZATIONS SUCH AS CHURCH GROUPS UNIONS, FRATERNAL OR ATHLETIC GROUPS, OR SCHOOL GROUPS?: NO

## 2024-06-05 SDOH — HEALTH STABILITY: MENTAL HEALTH: HOW OFTEN DO YOU HAVE 6 OR MORE DRINKS ON ONE OCCASION?: NEVER

## 2024-06-05 SDOH — ECONOMIC STABILITY: FOOD INSECURITY: WITHIN THE PAST 12 MONTHS, THE FOOD YOU BOUGHT JUST DIDN'T LAST AND YOU DIDN'T HAVE MONEY TO GET MORE.: NEVER TRUE

## 2024-06-05 SDOH — SOCIAL STABILITY: SOCIAL INSECURITY: WITHIN THE LAST YEAR, HAVE YOU BEEN HUMILIATED OR EMOTIONALLY ABUSED IN OTHER WAYS BY YOUR PARTNER OR EX-PARTNER?: NO

## 2024-06-05 SDOH — SOCIAL STABILITY: SOCIAL INSECURITY
WITHIN THE LAST YEAR, HAVE YOU BEEN KICKED, HIT, SLAPPED, OR OTHERWISE PHYSICALLY HURT BY YOUR PARTNER OR EX-PARTNER?: NO

## 2024-06-05 SDOH — SOCIAL STABILITY: SOCIAL NETWORK: ARE YOU MARRIED, WIDOWED, DIVORCED, SEPARATED, NEVER MARRIED, OR LIVING WITH A PARTNER?: MARRIED

## 2024-06-05 SDOH — SOCIAL STABILITY: SOCIAL INSECURITY
WITHIN THE LAST YEAR, HAVE TO BEEN RAPED OR FORCED TO HAVE ANY KIND OF SEXUAL ACTIVITY BY YOUR PARTNER OR EX-PARTNER?: NO

## 2024-06-05 SDOH — SOCIAL STABILITY: SOCIAL NETWORK: IN A TYPICAL WEEK, HOW MANY TIMES DO YOU TALK ON THE PHONE WITH FAMILY, FRIENDS, OR NEIGHBORS?: THREE TIMES A WEEK

## 2024-06-05 SDOH — ECONOMIC STABILITY: INCOME INSECURITY: IN THE LAST 12 MONTHS, WAS THERE A TIME WHEN YOU WERE NOT ABLE TO PAY THE MORTGAGE OR RENT ON TIME?: NO

## 2024-06-05 SDOH — SOCIAL STABILITY: SOCIAL INSECURITY: WITHIN THE LAST YEAR, HAVE YOU BEEN AFRAID OF YOUR PARTNER OR EX-PARTNER?: NO

## 2024-06-05 SDOH — SOCIAL STABILITY: SOCIAL NETWORK: HOW OFTEN DO YOU ATTEND CHURCH OR RELIGIOUS SERVICES?: NEVER

## 2024-06-05 SDOH — ECONOMIC STABILITY: FOOD INSECURITY: WITHIN THE PAST 12 MONTHS, YOU WORRIED THAT YOUR FOOD WOULD RUN OUT BEFORE YOU GOT MONEY TO BUY MORE.: NEVER TRUE

## 2024-06-05 SDOH — HEALTH STABILITY: MENTAL HEALTH: HOW MANY STANDARD DRINKS CONTAINING ALCOHOL DO YOU HAVE ON A TYPICAL DAY?: PATIENT DOES NOT DRINK

## 2024-06-05 SDOH — HEALTH STABILITY: MENTAL HEALTH
HOW OFTEN DO YOU NEED TO HAVE SOMEONE HELP YOU WHEN YOU READ INSTRUCTIONS, PAMPHLETS, OR OTHER WRITTEN MATERIAL FROM YOUR DOCTOR OR PHARMACY?: NEVER

## 2024-06-05 SDOH — ECONOMIC STABILITY: HOUSING INSECURITY: IN THE LAST 12 MONTHS, HOW MANY PLACES HAVE YOU LIVED?: 1

## 2024-06-05 SDOH — SOCIAL STABILITY: SOCIAL NETWORK: HOW OFTEN DO YOU GET TOGETHER WITH FRIENDS OR RELATIVES?: THREE TIMES A WEEK

## 2024-06-05 SDOH — HEALTH STABILITY: PHYSICAL HEALTH: ON AVERAGE, HOW MANY DAYS PER WEEK DO YOU ENGAGE IN MODERATE TO STRENUOUS EXERCISE (LIKE A BRISK WALK)?: 0 DAYS

## 2024-06-05 SDOH — HEALTH STABILITY: MENTAL HEALTH
STRESS IS WHEN SOMEONE FEELS TENSE, NERVOUS, ANXIOUS, OR CAN'T SLEEP AT NIGHT BECAUSE THEIR MIND IS TROUBLED. HOW STRESSED ARE YOU?: NOT AT ALL

## 2024-06-05 SDOH — HEALTH STABILITY: PHYSICAL HEALTH: ON AVERAGE, HOW MANY MINUTES DO YOU ENGAGE IN EXERCISE AT THIS LEVEL?: 0 MIN

## 2024-06-05 SDOH — HEALTH STABILITY: MENTAL HEALTH: HOW OFTEN DO YOU HAVE A DRINK CONTAINING ALCOHOL?: NEVER

## 2024-06-05 SDOH — SOCIAL STABILITY: SOCIAL NETWORK: HOW OFTEN DO YOU ATTENT MEETINGS OF THE CLUB OR ORGANIZATION YOU BELONG TO?: NEVER

## 2024-06-05 ASSESSMENT — LIFESTYLE VARIABLES
SKIP TO QUESTIONS 9-10: 1
AUDIT-C TOTAL SCORE: 0

## 2024-06-05 ASSESSMENT — PAIN - FUNCTIONAL ASSESSMENT
PAIN_FUNCTIONAL_ASSESSMENT: 0-10
PAIN_FUNCTIONAL_ASSESSMENT: CPOT (CRITICAL CARE PAIN OBSERVATION TOOL)
PAIN_FUNCTIONAL_ASSESSMENT: 0-10
PAIN_FUNCTIONAL_ASSESSMENT: 0-10
PAIN_FUNCTIONAL_ASSESSMENT: CPOT (CRITICAL CARE PAIN OBSERVATION TOOL)
PAIN_FUNCTIONAL_ASSESSMENT: 0-10
PAIN_FUNCTIONAL_ASSESSMENT: CPOT (CRITICAL CARE PAIN OBSERVATION TOOL)
PAIN_FUNCTIONAL_ASSESSMENT: 0-10

## 2024-06-05 ASSESSMENT — PAIN SCALES - GENERAL
PAINLEVEL_OUTOF10: 10 - WORST POSSIBLE PAIN
PAINLEVEL_OUTOF10: 6
PAINLEVEL_OUTOF10: 5 - MODERATE PAIN
PAINLEVEL_OUTOF10: 7
PAINLEVEL_OUTOF10: 0 - NO PAIN
PAINLEVEL_OUTOF10: 6
PAINLEVEL_OUTOF10: 4
PAINLEVEL_OUTOF10: 0 - NO PAIN
PAINLEVEL_OUTOF10: 6
PAINLEVEL_OUTOF10: 6
PAINLEVEL_OUTOF10: 3
PAINLEVEL_OUTOF10: 0 - NO PAIN
PAINLEVEL_OUTOF10: 10 - WORST POSSIBLE PAIN
PAINLEVEL_OUTOF10: 0 - NO PAIN
PAINLEVEL_OUTOF10: 9
PAINLEVEL_OUTOF10: 0 - NO PAIN

## 2024-06-05 ASSESSMENT — COGNITIVE AND FUNCTIONAL STATUS - GENERAL
CLIMB 3 TO 5 STEPS WITH RAILING: A LITTLE
MOVING FROM LYING ON BACK TO SITTING ON SIDE OF FLAT BED WITH BEDRAILS: A LITTLE
TURNING FROM BACK TO SIDE WHILE IN FLAT BAD: A LITTLE
MOVING TO AND FROM BED TO CHAIR: A LITTLE
WALKING IN HOSPITAL ROOM: A LITTLE
MOBILITY SCORE: 18
STANDING UP FROM CHAIR USING ARMS: A LITTLE

## 2024-06-05 ASSESSMENT — PAIN DESCRIPTION - LOCATION
LOCATION: CHEST

## 2024-06-05 ASSESSMENT — ACTIVITIES OF DAILY LIVING (ADL)
ADL_ASSISTANCE: INDEPENDENT
ADLS_ADDRESSED: NO

## 2024-06-05 ASSESSMENT — PAIN DESCRIPTION - ORIENTATION
ORIENTATION: MID

## 2024-06-05 NOTE — OP NOTE
Operation Note    Date of the Operation: 06/04/2024  Name: Farooq Lang is a 63 y.o. year old male patient with severe mitral stenosis  referred for mitral valve replacement  .   MRN: [id number]    Preoperative Diagnosis:   #1 severe rheumatic mitral valve stenosis  #2 severe left atrial enlargement  #3 permanent atrial fibrillation    Postoperative Diagnosis:   The same  Operation Procedures:  #1 a standard median sternotomy  #2 standard central cannulation  #3 mitral valve replacement: 27 mm tissue Wale Schrader Mitris valve  #4 direct left atrial appendage closure    Surgeon: Lukasz Nugent MD    Assistants: Weston Tubbs    Anesthesia Type: General endotracheal anesthesia    Complications:  None     Estimated Blood loss: 200 cc    Indication for Surgery: This is a 63 years old male patient who has a very well-known diagnosed severe mitral stenosis secondary to rheumatic disease.  The patient was discussed in our structural valve meeting, and because he has severe retraction and thickening of the subvalvular apparatus, together with a low surgical risk we decided to offer him open heart surgery for mitral valve replacement.  We discussed the situation, he agreed to proceed.  He underwent preoperative coronary angiogram showing no significant coronary artery disease.  He is also on chronic atrial fibrillation on p.o. anticoagulation.  The echo shows a severe mitral stenosis, extreme thickening and shortening of the subvalvular apparatus, the LV and the RV functions are preserved, there is no significant TR.  The left atrial chamber is severely dilated.  For this reason I decided not to perform an atrial fibrillation surgery.  The consent was signed.    Operative Findings:    [place]: Severe mitral stenosis, normal LV and RV.  Severe left atrial enlargement.  No other significant findings.    Direct: The mediastinum was normal, the pericardium was normal, the pericardial fluid was normal, the ascending aorta  is normal size and soft.  The anatomy of the heart is grossly normal, the right atrium is normal, the atrial septum is normal, the left atrium is severely dilated.  The left atrial appendage is free of clots.  The mitral valve is extremely degenerated with the extremely thickened leaflets and extremely thickened subvalvular apparatus was also fused and shortened.    Operation Description: The patient was brought to the operating room in a stable condition, he was put on the table in the supine position and then he was monitored and general endotracheal anesthesia was induced and then he was prepped and draped as usual.  Standard median sternotomy was carried out.  Continued with the dissection of the mediastinum, we opened the pericardium and retracted to the skin.  The heparin was given and the patient was cannulated.  Antegrade cardioplegia cannula was placed.  We went on bypass, the aorta was crossclamped and 1 dose of antegrade [place] cardioplegia was given and the heart was totally arrested.  The heart did not need a second dose of cardioplegia.  We [place] around both vena cava's, we opened the right atrium, we accessed the left atrium through the septum and extending the incision to the dome of the left atrium.  The previous findings were confirmed.  I started closing the left atrial appendage with a direct closure with a 4-0 running prolene suture on a double layer.  Now we moved our attention to the mitral valve.  We started resecting the anterior leaflet, when we moved into the posterior leaflet we were able to save just a little piece of it since the entire leaflet and subvalvular apparatus have fused and severely thickened.  All the papillary muscles were resected.  The mitral annulus was measured and a 27 mm Mitris Wale-Schrader tissue valve was brought to the table.  We used 2 0 81 stitches with pledgets from the atrial to the ventricular side in an everting fashion.  Now the stitches were passed  through the sewing ring of the valve, the valve was lowered down and tied with core knots.  The valve looks well-seated, it was tested pressurized in the ventricle with no paravalvular leaks.  Continue with the closure of the left atriotomy with a continuous 4-0 running prolene suture.  The left side of the heart was the air, the cross-clamp was open, we started reperfusing the heart while we are closed in the right atriotomy with a 4-0 running prolene suture.  The heart reassume slow sinus rhythm, we placed bipolar ventricular wires and we paced at 80/min.  We were able to come off bypass immediately.  The echo was performed showing a well working valve with no air in the left ventricle so we gave the protamine and the patient was decannulated.  Hemostasis was achieved, all the counts were correct by circulating nurse.  2 chest tube were placed into the mediastinal cavity and then continue with the closure of the sternum and the wound in standard fashion.  The patient is a stable and transferred back to the CTICU to continue with his postoperative management.  All the specimen were sent for pathology.  Dictated by Dr. Lukasz Nugent.

## 2024-06-05 NOTE — SIGNIFICANT EVENT
06/05/24 0306   Daily Screen   Total RSBI 28   Weaning Parameters   Weaning Vital Capacity 920 mL   Negative Inspiratory Force (NIF) -24   Spontaneous Minute Volume (MV) 9   Weaning Tidal Volume 637 mL   Respiratory Depth/Rhythm Regular   Respiratory Effort Unlabored   Weaning Tolerance Excellent

## 2024-06-05 NOTE — CONSULTS
Farooq Lang is a 63 y.o. year old male patient who presents for MVR  with Dr Nugent . Acute Pain consulted for block for postoperative pain control.     Anticipated Postop Pain Issues -   Palliative: typically relieved with IV analgesics and regional local anesthetics  Provocative: typically with movement  Quality: typically burning and aching  Radiation: typically none  Severity: typically severe 8-10/10  Timing: typically constant    Past Medical History:   Diagnosis Date    A-fib (Multi)     Diverticulosis     Dizziness     Heart failure with mid-range ejection fraction (HFmEF) (Multi)     (45%, 3/28/24)    Irregular heart beat     afib on coumadin    Irritable bowel syndrome     Mitral valve stenosis     Other chest pain 03/10/2016    Chest discomfort    Pleurodynia 11/29/2017    Chest pain, pleuritic    Right lower quadrant pain 11/14/2018    Abdominal pain, acute, right lower quadrant    Shortness of breath     VANCE    Thrombocytopenia (CMS-HCC)         Past Surgical History:   Procedure Laterality Date    CARDIAC CATHETERIZATION N/A 04/22/2024    Procedure: Left And Right Heart Cath, Without LV;  Surgeon: Yobani Soto MD;  Location: [place] [address] [facility];  Service: Cardiovascular;  Laterality: N/A;  Schedule at 3:30pm Dr. Soto    COLONOSCOPY      DENTAL SURGERY      TONSILLECTOMY          Family History   Problem Relation Name Age of Onset    Cancer Father  74    Brain Aneurysm Father          Social History     Socioeconomic History    Marital status:      Spouse name: Not on file    Number of children: Not on file    Years of education: Not on file    Highest education level: Not on file   Occupational History    Not on file   Tobacco Use    Smoking status: Every Day     Current packs/day: 0.50     Average packs/day: 0.5 packs/day for 49.4 years (24.7 ttl pk-yrs)     Types: Cigarettes     Start date: 1/1/1975     Passive exposure: Current    Smokeless tobacco: Never   Vaping  Use    Vaping status: Never Used   Substance and Sexual Activity    Alcohol use: Never    Drug use: Never    Sexual activity: Defer   Other Topics Concern    Not on file   Social History Narrative    Not on file     Social Determinants of Health     Financial Resource Strain: Low Risk  (5/8/2024)    Overall Financial Resource Strain (CARDIA)     Difficulty of Paying Living Expenses: Not hard at all   Food Insecurity: No Food Insecurity (3/27/2024)    Hunger Vital Sign     Worried About Running Out of Food in the Last Year: Never true     Ran Out of Food in the Last Year: Never true   Transportation Needs: No Transportation Needs (5/8/2024)    PRAPARE - Transportation     Lack of Transportation (Medical): No     Lack of Transportation (Non-Medical): No   Physical Activity: Not on file   Stress: Patient Declined (3/27/2024)    [employer] - Occupational Stress Questionnaire     Feeling of Stress : Patient declined   Social Connections: Patient Declined (3/27/2024)    Social Connection and Isolation Panel [NHANES]     Frequency of Communication with Friends and Family: Patient declined     Frequency of Social Gatherings with Friends and Family: Patient declined     Attends [employer] Services: Patient declined     Active Member of Clubs or Organizations: Patient declined     Attends Club or Organization Meetings: Patient declined     Marital Status: Patient declined   Intimate Partner Violence: Patient Declined (3/27/2024)    Humiliation, Afraid, Rape, and Kick questionnaire     Fear of Current or Ex-Partner: Patient declined     Emotionally Abused: Patient declined     Physically Abused: Patient declined     Sexually Abused: Patient declined   Housing Stability: Low Risk  (5/8/2024)    Housing Stability Vital Sign     Unable to Pay for Housing in the Last Year: No     Number of Places Lived in the Last Year: 1     Unstable Housing in the Last Year: No        No Known Allergies      Review  of Systems  Gen: No fatigue, anorexia, insomnia, fever.   Eyes: No vision loss, double vision, drainage, eye pain.   ENT: No pharyngitis, dry mouth, no hearing changes or ear discharge  Cardiac: No chest pain, palpitations, syncope, near syncope.   Pulmonary: No shortness of breath, cough, hemoptysis.   Heme/lymph: No swollen glands, fever, bleeding.   GI: No abdominal pain, change in bowel habits, melena, hematemesis, hematochezia, nausea, vomiting, diarrhea.   : No discharge, dysuria, frequency, urgency, hematuria.  Endo: No polyuria or weight loss.   Musculoskeletal: Negative for any pain or loss of ROM/weakness  Skin: No rashes or lesions  Neuro: Normal speech, no numbness or weakness. No gait difficulties  Review of systems is otherwise negative unless stated above or in history of present illness.    Physical Exam:  Constitutional:  no distress, alert and cooperative  Eyes: clear sclera  Head/Neck: No apparent injury, trachea midline  Respiratory/Thorax: Patent airways, thorax symmetric, breathing comfortably  Cardiovascular: no pitting edema  Gastrointestinal: Nondistended  Musculoskeletal: ROM intact  Extremities: no clubbing  Neurological: alert, moran x4  Psychological: Appropriate affect    Results for orders placed or performed during the hospital encounter of 06/04/24 (from the past 24 hour(s))   Type And Screen   Result Value Ref Range    ABO TYPE A     Rh TYPE POS     ANTIBODY SCREEN NEG    ACTIVATED CLOTTING TIME HIGH   Result Value Ref Range    POCT Activated Clotting Time High Range 134 82 - 174 sec   Blood Gas Arterial Full Panel Unsolicited   Result Value Ref Range    POCT pH, Arterial 7.40 7.38 - 7.42 pH    POCT pCO2, Arterial 38 38 - 42 mm Hg    POCT pO2, Arterial 161 (H) 85 - 95 mm Hg    POCT SO2, Arterial 100 94 - 100 %    POCT Oxy Hemoglobin, Arterial 95.2 94.0 - 98.0 %    POCT Hematocrit Calculated, Arterial 47.0 41.0 - 52.0 %    POCT Sodium, Arterial 137 136 - 145 mmol/L    POCT Potassium,  Arterial 4.4 3.5 - 5.3 mmol/L    POCT Chloride, Arterial 107 98 - 107 mmol/L    POCT Ionized Calcium, Arterial 1.22 1.10 - 1.33 mmol/L    POCT Glucose, Arterial 96 74 - 99 mg/dL    POCT Lactate, Arterial 1.2 0.4 - 2.0 mmol/L    POCT Base Excess, Arterial -1.0 -2.0 - 3.0 mmol/L    POCT HCO3 Calculated, Arterial 23.5 22.0 - 26.0 mmol/L    POCT Hemoglobin, Arterial 15.7 13.5 - 17.5 g/dL    POCT Anion Gap, Arterial 11 10 - 25 mmo/L    Patient Temperature 37.0 degrees Celsius    FiO2 30 %   Coox Panel, Arterial Unsolicited   Result Value Ref Range    POCT Hemoglobin, Arterial 15.7 13.5 - 17.5 g/dL    POCT Oxy Hemoglobin, Arterial 95.2 94.0 - 98.0 %    POCT Carboxyhemoglobin, Arterial 3.7 %    POCT Methemoglobin, Arterial 1.0 0.0 - 1.5 %    POCT Deoxy Hemoglobin, Arterial 0.1 0.0 - 5.0 %   Blood Gas Arterial Full Panel Unsolicited   Result Value Ref Range    POCT pH, Arterial 7.27 (L) 7.38 - 7.42 pH    POCT pCO2, Arterial 42 38 - 42 mm Hg    POCT pO2, Arterial 45 (LL) 85 - 95 mm Hg    POCT SO2, Arterial 80 (L) 94 - 100 %    POCT Oxy Hemoglobin, Arterial 76.6 (L) 94.0 - 98.0 %    POCT Hematocrit Calculated, Arterial 38.0 (L) 41.0 - 52.0 %    POCT Sodium, Arterial 133 (L) 136 - 145 mmol/L    POCT Potassium, Arterial 3.7 3.5 - 5.3 mmol/L    POCT Chloride, Arterial 105 98 - 107 mmol/L    POCT Ionized Calcium, Arterial 0.96 (L) 1.10 - 1.33 mmol/L    POCT Glucose, Arterial 81 74 - 99 mg/dL    POCT Lactate, Arterial 0.9 0.4 - 2.0 mmol/L    POCT Base Excess, Arterial -7.3 (L) -2.0 - 3.0 mmol/L    POCT HCO3 Calculated, Arterial 19.3 (L) 22.0 - 26.0 mmol/L    POCT Hemoglobin, Arterial 12.8 (L) 13.5 - 17.5 g/dL    POCT Anion Gap, Arterial 12 10 - 25 mmo/L    Patient Temperature 37.0 degrees Celsius    FiO2 60 %   Coox Panel, Arterial Unsolicited   Result Value Ref Range    POCT Hemoglobin, Arterial 12.8 (L) 13.5 - 17.5 g/dL    POCT Oxy Hemoglobin, Arterial 76.6 (L) 94.0 - 98.0 %    POCT Carboxyhemoglobin, Arterial 3.2 %    POCT  Methemoglobin, Arterial 0.6 0.0 - 1.5 %    POCT Deoxy Hemoglobin, Arterial 19.6 (H) 0.0 - 5.0 %   Blood Gas Arterial Full Panel Unsolicited   Result Value Ref Range    POCT pH, Arterial 7.39 7.38 - 7.42 pH    POCT pCO2, Arterial 44 (H) 38 - 42 mm Hg    POCT pO2, Arterial 284 (H) 85 - 95 mm Hg    POCT SO2, Arterial 100 94 - 100 %    POCT Oxy Hemoglobin, Arterial 96.0 94.0 - 98.0 %    POCT Hematocrit Calculated, Arterial 38.0 (L) 41.0 - 52.0 %    POCT Sodium, Arterial 137 136 - 145 mmol/L    POCT Potassium, Arterial 4.4 3.5 - 5.3 mmol/L    POCT Chloride, Arterial 105 98 - 107 mmol/L    POCT Ionized Calcium, Arterial 1.03 (L) 1.10 - 1.33 mmol/L    POCT Glucose, Arterial 102 (H) 74 - 99 mg/dL    POCT Lactate, Arterial 1.0 0.4 - 2.0 mmol/L    POCT Base Excess, Arterial 1.3 -2.0 - 3.0 mmol/L    POCT HCO3 Calculated, Arterial 26.6 (H) 22.0 - 26.0 mmol/L    POCT Hemoglobin, Arterial 12.7 (L) 13.5 - 17.5 g/dL    POCT Anion Gap, Arterial 10 10 - 25 mmo/L    Patient Temperature 37.0 degrees Celsius    FiO2 70 %   Coox Panel, Arterial Unsolicited   Result Value Ref Range    POCT Hemoglobin, Arterial 12.7 (L) 13.5 - 17.5 g/dL    POCT Oxy Hemoglobin, Arterial 96.0 94.0 - 98.0 %    POCT Carboxyhemoglobin, Arterial 3.2 %    POCT Methemoglobin, Arterial 0.8 0.0 - 1.5 %    POCT Deoxy Hemoglobin, Arterial 0.0 0.0 - 5.0 %   Blood Gas Venous Full Panel Unsolicited   Result Value Ref Range    POCT pH, Venous 7.34 7.33 - 7.43 pH    POCT pCO2, Venous 50 41 - 51 mm Hg    POCT pO2, Venous 45 35 - 45 mm Hg    POCT SO2, Venous 82 (H) 45 - 75 %    POCT Oxy Hemoglobin, Venous 78.9 (H) 45.0 - 75.0 %    POCT Hematocrit Calculated, Venous 38.0 (L) 41.0 - 52.0 %    POCT Sodium, Venous 139 136 - 145 mmol/L    POCT Potassium, Venous 4.5 3.5 - 5.3 mmol/L    POCT Chloride, Venous 103 98 - 107 mmol/L    POCT Ionized Calicum, Venous 1.13 1.10 - 1.33 mmol/L    POCT Glucose, Venous 96 74 - 99 mg/dL    POCT Lactate, Venous 1.0 0.4 - 2.0 mmol/L    POCT Base  Excess, Venous 0.5 -2.0 - 3.0 mmol/L    POCT HCO3 Calculated, Venous 27.0 (H) 22.0 - 26.0 mmol/L    POCT Hemoglobin, Venous 12.5 (L) 13.5 - 17.5 g/dL    POCT Anion Gap, Venous 14.0 10.0 - 25.0 mmol/L    Patient Temperature 37.0 degrees Celsius    FiO2 75 %   Coox Panel, Venous Unsolicited   Result Value Ref Range    POCT Carboxyhemoglobin, Venous 3.5 %    POCT Methemoglobin, Venous 0.5 0.0 - 1.5 %   Blood Gas Arterial Full Panel Unsolicited   Result Value Ref Range    POCT pH, Arterial 7.37 (L) 7.38 - 7.42 pH    POCT pCO2, Arterial 40 38 - 42 mm Hg    POCT pO2, Arterial 282 (H) 85 - 95 mm Hg    POCT SO2, Arterial 100 94 - 100 %    POCT Oxy Hemoglobin, Arterial 95.8 94.0 - 98.0 %    POCT Hematocrit Calculated, Arterial 36.0 (L) 41.0 - 52.0 %    POCT Sodium, Arterial 137 136 - 145 mmol/L    POCT Potassium, Arterial 5.1 3.5 - 5.3 mmol/L    POCT Chloride, Arterial 105 98 - 107 mmol/L    POCT Ionized Calcium, Arterial 1.05 (L) 1.10 - 1.33 mmol/L    POCT Glucose, Arterial 103 (H) 74 - 99 mg/dL    POCT Lactate, Arterial 1.0 0.4 - 2.0 mmol/L    POCT Base Excess, Arterial -2.0 -2.0 - 3.0 mmol/L    POCT HCO3 Calculated, Arterial 23.1 22.0 - 26.0 mmol/L    POCT Hemoglobin, Arterial 12.0 (L) 13.5 - 17.5 g/dL    POCT Anion Gap, Arterial 14 10 - 25 mmo/L    Patient Temperature 37.0 degrees Celsius    FiO2 75 %   Coox Panel, Arterial Unsolicited   Result Value Ref Range    POCT Hemoglobin, Arterial 12.0 (L) 13.5 - 17.5 g/dL    POCT Oxy Hemoglobin, Arterial 95.8 94.0 - 98.0 %    POCT Carboxyhemoglobin, Arterial 3.5 %    POCT Methemoglobin, Arterial 0.8 0.0 - 1.5 %    POCT Deoxy Hemoglobin, Arterial 0.0 0.0 - 5.0 %   Blood Gas Arterial Full Panel Unsolicited   Result Value Ref Range    POCT pH, Arterial 7.35 (L) 7.38 - 7.42 pH    POCT pCO2, Arterial 43 (H) 38 - 42 mm Hg    POCT pO2, Arterial 281 (H) 85 - 95 mm Hg    POCT SO2, Arterial 100 94 - 100 %    POCT Oxy Hemoglobin, Arterial 96.1 94.0 - 98.0 %    POCT Hematocrit Calculated,  Arterial 37.0 (L) 41.0 - 52.0 %    POCT Sodium, Arterial 136 136 - 145 mmol/L    POCT Potassium, Arterial 5.0 3.5 - 5.3 mmol/L    POCT Chloride, Arterial 103 98 - 107 mmol/L    POCT Ionized Calcium, Arterial 1.07 (L) 1.10 - 1.33 mmol/L    POCT Glucose, Arterial 98 74 - 99 mg/dL    POCT Lactate, Arterial 0.9 0.4 - 2.0 mmol/L    POCT Base Excess, Arterial -2.0 -2.0 - 3.0 mmol/L    POCT HCO3 Calculated, Arterial 23.7 22.0 - 26.0 mmol/L    POCT Hemoglobin, Arterial 12.2 (L) 13.5 - 17.5 g/dL    POCT Anion Gap, Arterial 14 10 - 25 mmo/L    Patient Temperature 37.0 degrees Celsius    FiO2 80 %   Coox Panel, Arterial Unsolicited   Result Value Ref Range    POCT Hemoglobin, Arterial 12.2 (L) 13.5 - 17.5 g/dL    POCT Oxy Hemoglobin, Arterial 96.1 94.0 - 98.0 %    POCT Carboxyhemoglobin, Arterial 3.0 %    POCT Methemoglobin, Arterial 0.9 0.0 - 1.5 %    POCT Deoxy Hemoglobin, Arterial 0.0 0.0 - 5.0 %   Blood Gas Arterial Full Panel Unsolicited   Result Value Ref Range    POCT pH, Arterial 7.28 (L) 7.38 - 7.42 pH    POCT pCO2, Arterial 52 (H) 38 - 42 mm Hg    POCT pO2, Arterial 190 (H) 85 - 95 mm Hg    POCT SO2, Arterial 100 94 - 100 %    POCT Oxy Hemoglobin, Arterial 95.6 94.0 - 98.0 %    POCT Hematocrit Calculated, Arterial 37.0 (L) 41.0 - 52.0 %    POCT Sodium, Arterial 138 136 - 145 mmol/L    POCT Potassium, Arterial 5.2 3.5 - 5.3 mmol/L    POCT Chloride, Arterial 105 98 - 107 mmol/L    POCT Ionized Calcium, Arterial 1.50 (H) 1.10 - 1.33 mmol/L    POCT Glucose, Arterial 112 (H) 74 - 99 mg/dL    POCT Lactate, Arterial 1.2 0.4 - 2.0 mmol/L    POCT Base Excess, Arterial -2.9 (L) -2.0 - 3.0 mmol/L    POCT HCO3 Calculated, Arterial 24.4 22.0 - 26.0 mmol/L    POCT Hemoglobin, Arterial 12.4 (L) 13.5 - 17.5 g/dL    POCT Anion Gap, Arterial 14 10 - 25 mmo/L    Patient Temperature 37.0 degrees Celsius    FiO2 100 %   Coox Panel, Arterial Unsolicited   Result Value Ref Range    POCT Hemoglobin, Arterial 12.4 (L) 13.5 - 17.5 g/dL     POCT Oxy Hemoglobin, Arterial 95.6 94.0 - 98.0 %    POCT Carboxyhemoglobin, Arterial 3.3 %    POCT Methemoglobin, Arterial 1.0 0.0 - 1.5 %    POCT Deoxy Hemoglobin, Arterial 0.0 0.0 - 5.0 %   Calcium, Ionized   Result Value Ref Range    POCT Calcium, Ionized 1.22 1.1 - 1.33 mmol/L   Magnesium   Result Value Ref Range    Magnesium 2.85 (H) 1.60 - 2.40 mg/dL   Coagulation Screen   Result Value Ref Range    Protime 16.5 (H) 9.8 - 12.8 seconds    INR 1.5 (H) 0.9 - 1.1    aPTT 34 27 - 38 seconds   Fibrinogen   Result Value Ref Range    Fibrinogen 244 200 - 400 mg/dL   CBC   Result Value Ref Range    WBC 18.0 (H) 4.4 - 11.3 x10*3/uL    nRBC 0.0 0.0 - 0.0 /100 WBCs    RBC 4.73 4.50 - 5.90 x10*6/uL    Hemoglobin 14.0 13.5 - 17.5 g/dL    Hematocrit 42.3 41.0 - 52.0 %    MCV 89 80 - 100 fL    MCH 29.6 26.0 - 34.0 pg    MCHC 33.1 32.0 - 36.0 g/dL    RDW 14.8 (H) 11.5 - 14.5 %    Platelets 97 (L) 150 - 450 x10*3/uL   Renal Function Panel   Result Value Ref Range    Glucose 121 (H) 74 - 99 mg/dL    Sodium 142 136 - 145 mmol/L    Potassium 4.6 3.5 - 5.3 mmol/L    Chloride 109 (H) 98 - 107 mmol/L    Bicarbonate 23 21 - 32 mmol/L    Anion Gap 15 10 - 20 mmol/L    Urea Nitrogen 16 6 - 23 mg/dL    Creatinine 1.16 0.50 - 1.30 mg/dL    eGFR 70 >60 mL/min/1.73m*2    Calcium 8.6 8.6 - 10.6 mg/dL    Phosphorus 2.9 2.5 - 4.9 mg/dL    Albumin 3.4 3.4 - 5.0 g/dL   BLOOD GAS ARTERIAL FULL PANEL   Result Value Ref Range    POCT pH, Arterial 7.28 (L) 7.38 - 7.42 pH    POCT pCO2, Arterial 48 (H) 38 - 42 mm Hg    POCT pO2, Arterial 74 (L) 85 - 95 mm Hg    POCT SO2, Arterial 96 94 - 100 %    POCT Oxy Hemoglobin, Arterial 91.5 (L) 94.0 - 98.0 %    POCT Hematocrit Calculated, Arterial 44.0 41.0 - 52.0 %    POCT Sodium, Arterial 136 136 - 145 mmol/L    POCT Potassium, Arterial 4.5 3.5 - 5.3 mmol/L    POCT Chloride, Arterial 106 98 - 107 mmol/L    POCT Ionized Calcium, Arterial 1.21 1.10 - 1.33 mmol/L    POCT Glucose, Arterial 123 (H) 74 - 99 mg/dL     POCT Lactate, Arterial 1.5 0.4 - 2.0 mmol/L    POCT Base Excess, Arterial -4.5 (L) -2.0 - 3.0 mmol/L    POCT HCO3 Calculated, Arterial 22.6 22.0 - 26.0 mmol/L    POCT Hemoglobin, Arterial 14.5 13.5 - 17.5 g/dL    POCT Anion Gap, Arterial 12 10 - 25 mmo/L    Patient Temperature 37.0 degrees Celsius    FiO2 50 %   BLOOD GAS ARTERIAL FULL PANEL   Result Value Ref Range    POCT pH, Arterial 7.35 (L) 7.38 - 7.42 pH    POCT pCO2, Arterial 39 38 - 42 mm Hg    POCT pO2, Arterial 115 (H) 85 - 95 mm Hg    POCT SO2, Arterial 100 94 - 100 %    POCT Oxy Hemoglobin, Arterial 95.9 94.0 - 98.0 %    POCT Hematocrit Calculated, Arterial 43.0 41.0 - 52.0 %    POCT Sodium, Arterial 136 136 - 145 mmol/L    POCT Potassium, Arterial 4.7 3.5 - 5.3 mmol/L    POCT Chloride, Arterial 106 98 - 107 mmol/L    POCT Ionized Calcium, Arterial 1.18 1.10 - 1.33 mmol/L    POCT Glucose, Arterial 156 (H) 74 - 99 mg/dL    POCT Lactate, Arterial 1.6 0.4 - 2.0 mmol/L    POCT Base Excess, Arterial -3.8 (L) -2.0 - 3.0 mmol/L    POCT HCO3 Calculated, Arterial 21.5 (L) 22.0 - 26.0 mmol/L    POCT Hemoglobin, Arterial 14.2 13.5 - 17.5 g/dL    POCT Anion Gap, Arterial 13 10 - 25 mmo/L    Patient Temperature 37.0 degrees Celsius    FiO2 50 %   BLOOD GAS ARTERIAL FULL PANEL   Result Value Ref Range    POCT pH, Arterial 7.32 (L) 7.38 - 7.42 pH    POCT pCO2, Arterial 43 (H) 38 - 42 mm Hg    POCT pO2, Arterial 90 85 - 95 mm Hg    POCT SO2, Arterial 99 94 - 100 %    POCT Oxy Hemoglobin, Arterial 95.2 94.0 - 98.0 %    POCT Hematocrit Calculated, Arterial 43.0 41.0 - 52.0 %    POCT Sodium, Arterial 136 136 - 145 mmol/L    POCT Potassium, Arterial 4.2 3.5 - 5.3 mmol/L    POCT Chloride, Arterial 105 98 - 107 mmol/L    POCT Ionized Calcium, Arterial 1.17 1.10 - 1.33 mmol/L    POCT Glucose, Arterial 172 (H) 74 - 99 mg/dL    POCT Lactate, Arterial 2.2 (H) 0.4 - 2.0 mmol/L    POCT Base Excess, Arterial -3.9 (L) -2.0 - 3.0 mmol/L    POCT HCO3 Calculated, Arterial 22.2 22.0 -  26.0 mmol/L    POCT Hemoglobin, Arterial 14.2 13.5 - 17.5 g/dL    POCT Anion Gap, Arterial 13 10 - 25 mmo/L    Patient Temperature 37.0 degrees Celsius    FiO2 40 %   Calcium, Ionized   Result Value Ref Range    POCT Calcium, Ionized 1.17 1.1 - 1.33 mmol/L   CBC   Result Value Ref Range    WBC 15.1 (H) 4.4 - 11.3 x10*3/uL    nRBC 0.0 0.0 - 0.0 /100 WBCs    RBC 4.38 (L) 4.50 - 5.90 x10*6/uL    Hemoglobin 13.3 (L) 13.5 - 17.5 g/dL    Hematocrit 39.0 (L) 41.0 - 52.0 %    MCV 89 80 - 100 fL    MCH 30.4 26.0 - 34.0 pg    MCHC 34.1 32.0 - 36.0 g/dL    RDW 14.9 (H) 11.5 - 14.5 %    Platelets 95 (L) 150 - 450 x10*3/uL   Magnesium   Result Value Ref Range    Magnesium 2.35 1.60 - 2.40 mg/dL   Renal Function Panel   Result Value Ref Range    Glucose 189 (H) 74 - 99 mg/dL    Sodium 140 136 - 145 mmol/L    Potassium 4.2 3.5 - 5.3 mmol/L    Chloride 107 98 - 107 mmol/L    Bicarbonate 22 21 - 32 mmol/L    Anion Gap 15 10 - 20 mmol/L    Urea Nitrogen 17 6 - 23 mg/dL    Creatinine 1.20 0.50 - 1.30 mg/dL    eGFR 68 >60 mL/min/1.73m*2    Calcium 8.5 (L) 8.6 - 10.6 mg/dL    Phosphorus 2.6 2.5 - 4.9 mg/dL    Albumin 3.7 3.4 - 5.0 g/dL   BLOOD GAS ARTERIAL FULL PANEL   Result Value Ref Range    POCT pH, Arterial 7.36 (L) 7.38 - 7.42 pH    POCT pCO2, Arterial 41 38 - 42 mm Hg    POCT pO2, Arterial 62 (L) 85 - 95 mm Hg    POCT SO2, Arterial 91 (L) 94 - 100 %    POCT Oxy Hemoglobin, Arterial 88.8 (L) 94.0 - 98.0 %    POCT Hematocrit Calculated, Arterial 41.0 41.0 - 52.0 %    POCT Sodium, Arterial 136 136 - 145 mmol/L    POCT Potassium, Arterial 4.2 3.5 - 5.3 mmol/L    POCT Chloride, Arterial 107 98 - 107 mmol/L    POCT Ionized Calcium, Arterial 1.17 1.10 - 1.33 mmol/L    POCT Glucose, Arterial 192 (H) 74 - 99 mg/dL    POCT Lactate, Arterial 2.0 0.4 - 2.0 mmol/L    POCT Base Excess, Arterial -2.2 (L) -2.0 - 3.0 mmol/L    POCT HCO3 Calculated, Arterial 23.2 22.0 - 26.0 mmol/L    POCT Hemoglobin, Arterial 13.6 13.5 - 17.5 g/dL    POCT Anion  Gap, Arterial 10 10 - 25 mmo/L    Patient Temperature 37.0 degrees Celsius    FiO2 40 %      Farooq Lang is a 63 y.o. year old male patient who presents for MVR  with Dr Nugent . Acute Pain consulted for block for postoperative pain control.     Plan:    - Parasternal interfascial plane nerve block  SS performed post-operatively in OR on 6/4  - Please be aware of local anesthetic toxic dose and absorption variability before considering lidocaine patches  - Acute pain service will follow   - Rest of pain management per primary team    Acute Pain Resident  pg [phone] ph [phone]

## 2024-06-05 NOTE — PROGRESS NOTES
Farooq Lang is a 63 y.o. year old male patient who presents for MVR  with Dr Nugent . Acute Pain consulted for block for postoperative pain control.     Postop Pain HPI -   Palliative: relieved with IV analgesics and regional local anesthetics  Provocative: movement  Quality:  burning and aching  Radiation:  none  Severity: 10 /10  Timing: constant    24-HOUR OPIOID CONSUMPTION:  Fentanyl 50 mcg     Scheduled medications  acetaminophen, 650 mg, oral, q6h  albumin human, 12.5 g, intravenous, Once  aspirin, 81 mg, oral, Daily  ceFAZolin, 2 g, intravenous, q8h  insulin lispro, 0-15 Units, subcutaneous, q4h  mupirocin, 1 Application, Each Nostril, BID  pantoprazole, 40 mg, oral, Daily before breakfast   Or  pantoprazole, 40 mg, intravenous, Daily before breakfast  polyethylene glycol, 17 g, oral, Daily  sennosides-docusate sodium, 2 tablet, oral, BID      Continuous medications  EPINEPHrine, 0.01-1 mcg/kg/min, Last Rate: 0.02 mcg/kg/min (06/05/24 0600)  lactated Ringer's, 30 mL/hr, Last Rate: 30 mL/hr (06/05/24 0600)  lactated Ringer's, 5 mL/hr, Last Rate: 5 mL/hr (06/05/24 0600)  norepinephrine, 0-1 mcg/kg/min, Last Rate: Stopped (06/05/24 0311)  propofol, 0-50 mcg/kg/min, Last Rate: Stopped (06/05/24 0230)      PRN medications: alteplase, calcium gluconate, calcium gluconate, dextrose **OR** glucagon, fentaNYL, fentaNYL, HYDROmorphone, magnesium sulfate, magnesium sulfate, naloxone, ondansetron **OR** ondansetron, oxyCODONE, oxyCODONE, oxygen, potassium chloride, potassium chloride     Physical Exam:  Constitutional:  no distress, alert and cooperative  Eyes: clear sclera  Head/Neck: No apparent injury, trachea midline  Respiratory/Thorax: Patent airways, thorax symmetric, breathing comfortably  Cardiovascular: no pitting edema  Gastrointestinal: Nondistended  Musculoskeletal: ROM intact  Extremities: no clubbing  Neurological: alert, moran x4  Psychological: Appropriate affect    Results for orders placed or  performed during the hospital encounter of 06/04/24 (from the past 24 hour(s))   Type And Screen   Result Value Ref Range    ABO TYPE A     Rh TYPE POS     ANTIBODY SCREEN NEG    ACTIVATED CLOTTING TIME HIGH   Result Value Ref Range    POCT Activated Clotting Time High Range 134 82 - 174 sec   Blood Gas Arterial Full Panel Unsolicited   Result Value Ref Range    POCT pH, Arterial 7.40 7.38 - 7.42 pH    POCT pCO2, Arterial 38 38 - 42 mm Hg    POCT pO2, Arterial 161 (H) 85 - 95 mm Hg    POCT SO2, Arterial 100 94 - 100 %    POCT Oxy Hemoglobin, Arterial 95.2 94.0 - 98.0 %    POCT Hematocrit Calculated, Arterial 47.0 41.0 - 52.0 %    POCT Sodium, Arterial 137 136 - 145 mmol/L    POCT Potassium, Arterial 4.4 3.5 - 5.3 mmol/L    POCT Chloride, Arterial 107 98 - 107 mmol/L    POCT Ionized Calcium, Arterial 1.22 1.10 - 1.33 mmol/L    POCT Glucose, Arterial 96 74 - 99 mg/dL    POCT Lactate, Arterial 1.2 0.4 - 2.0 mmol/L    POCT Base Excess, Arterial -1.0 -2.0 - 3.0 mmol/L    POCT HCO3 Calculated, Arterial 23.5 22.0 - 26.0 mmol/L    POCT Hemoglobin, Arterial 15.7 13.5 - 17.5 g/dL    POCT Anion Gap, Arterial 11 10 - 25 mmo/L    Patient Temperature 37.0 degrees Celsius    FiO2 30 %   Coox Panel, Arterial Unsolicited   Result Value Ref Range    POCT Hemoglobin, Arterial 15.7 13.5 - 17.5 g/dL    POCT Oxy Hemoglobin, Arterial 95.2 94.0 - 98.0 %    POCT Carboxyhemoglobin, Arterial 3.7 %    POCT Methemoglobin, Arterial 1.0 0.0 - 1.5 %    POCT Deoxy Hemoglobin, Arterial 0.1 0.0 - 5.0 %   Blood Gas Arterial Full Panel Unsolicited   Result Value Ref Range    POCT pH, Arterial 7.27 (L) 7.38 - 7.42 pH    POCT pCO2, Arterial 42 38 - 42 mm Hg    POCT pO2, Arterial 45 (LL) 85 - 95 mm Hg    POCT SO2, Arterial 80 (L) 94 - 100 %    POCT Oxy Hemoglobin, Arterial 76.6 (L) 94.0 - 98.0 %    POCT Hematocrit Calculated, Arterial 38.0 (L) 41.0 - 52.0 %    POCT Sodium, Arterial 133 (L) 136 - 145 mmol/L    POCT Potassium, Arterial 3.7 3.5 - 5.3 mmol/L     POCT Chloride, Arterial 105 98 - 107 mmol/L    POCT Ionized Calcium, Arterial 0.96 (L) 1.10 - 1.33 mmol/L    POCT Glucose, Arterial 81 74 - 99 mg/dL    POCT Lactate, Arterial 0.9 0.4 - 2.0 mmol/L    POCT Base Excess, Arterial -7.3 (L) -2.0 - 3.0 mmol/L    POCT HCO3 Calculated, Arterial 19.3 (L) 22.0 - 26.0 mmol/L    POCT Hemoglobin, Arterial 12.8 (L) 13.5 - 17.5 g/dL    POCT Anion Gap, Arterial 12 10 - 25 mmo/L    Patient Temperature 37.0 degrees Celsius    FiO2 60 %   Coox Panel, Arterial Unsolicited   Result Value Ref Range    POCT Hemoglobin, Arterial 12.8 (L) 13.5 - 17.5 g/dL    POCT Oxy Hemoglobin, Arterial 76.6 (L) 94.0 - 98.0 %    POCT Carboxyhemoglobin, Arterial 3.2 %    POCT Methemoglobin, Arterial 0.6 0.0 - 1.5 %    POCT Deoxy Hemoglobin, Arterial 19.6 (H) 0.0 - 5.0 %   Blood Gas Arterial Full Panel Unsolicited   Result Value Ref Range    POCT pH, Arterial 7.39 7.38 - 7.42 pH    POCT pCO2, Arterial 44 (H) 38 - 42 mm Hg    POCT pO2, Arterial 284 (H) 85 - 95 mm Hg    POCT SO2, Arterial 100 94 - 100 %    POCT Oxy Hemoglobin, Arterial 96.0 94.0 - 98.0 %    POCT Hematocrit Calculated, Arterial 38.0 (L) 41.0 - 52.0 %    POCT Sodium, Arterial 137 136 - 145 mmol/L    POCT Potassium, Arterial 4.4 3.5 - 5.3 mmol/L    POCT Chloride, Arterial 105 98 - 107 mmol/L    POCT Ionized Calcium, Arterial 1.03 (L) 1.10 - 1.33 mmol/L    POCT Glucose, Arterial 102 (H) 74 - 99 mg/dL    POCT Lactate, Arterial 1.0 0.4 - 2.0 mmol/L    POCT Base Excess, Arterial 1.3 -2.0 - 3.0 mmol/L    POCT HCO3 Calculated, Arterial 26.6 (H) 22.0 - 26.0 mmol/L    POCT Hemoglobin, Arterial 12.7 (L) 13.5 - 17.5 g/dL    POCT Anion Gap, Arterial 10 10 - 25 mmo/L    Patient Temperature 37.0 degrees Celsius    FiO2 70 %   Coox Panel, Arterial Unsolicited   Result Value Ref Range    POCT Hemoglobin, Arterial 12.7 (L) 13.5 - 17.5 g/dL    POCT Oxy Hemoglobin, Arterial 96.0 94.0 - 98.0 %    POCT Carboxyhemoglobin, Arterial 3.2 %    POCT Methemoglobin,  Arterial 0.8 0.0 - 1.5 %    POCT Deoxy Hemoglobin, Arterial 0.0 0.0 - 5.0 %   Blood Gas Venous Full Panel Unsolicited   Result Value Ref Range    POCT pH, Venous 7.34 7.33 - 7.43 pH    POCT pCO2, Venous 50 41 - 51 mm Hg    POCT pO2, Venous 45 35 - 45 mm Hg    POCT SO2, Venous 82 (H) 45 - 75 %    POCT Oxy Hemoglobin, Venous 78.9 (H) 45.0 - 75.0 %    POCT Hematocrit Calculated, Venous 38.0 (L) 41.0 - 52.0 %    POCT Sodium, Venous 139 136 - 145 mmol/L    POCT Potassium, Venous 4.5 3.5 - 5.3 mmol/L    POCT Chloride, Venous 103 98 - 107 mmol/L    POCT Ionized Calicum, Venous 1.13 1.10 - 1.33 mmol/L    POCT Glucose, Venous 96 74 - 99 mg/dL    POCT Lactate, Venous 1.0 0.4 - 2.0 mmol/L    POCT Base Excess, Venous 0.5 -2.0 - 3.0 mmol/L    POCT HCO3 Calculated, Venous 27.0 (H) 22.0 - 26.0 mmol/L    POCT Hemoglobin, Venous 12.5 (L) 13.5 - 17.5 g/dL    POCT Anion Gap, Venous 14.0 10.0 - 25.0 mmol/L    Patient Temperature 37.0 degrees Celsius    FiO2 75 %   Coox Panel, Venous Unsolicited   Result Value Ref Range    POCT Carboxyhemoglobin, Venous 3.5 %    POCT Methemoglobin, Venous 0.5 0.0 - 1.5 %   Blood Gas Arterial Full Panel Unsolicited   Result Value Ref Range    POCT pH, Arterial 7.37 (L) 7.38 - 7.42 pH    POCT pCO2, Arterial 40 38 - 42 mm Hg    POCT pO2, Arterial 282 (H) 85 - 95 mm Hg    POCT SO2, Arterial 100 94 - 100 %    POCT Oxy Hemoglobin, Arterial 95.8 94.0 - 98.0 %    POCT Hematocrit Calculated, Arterial 36.0 (L) 41.0 - 52.0 %    POCT Sodium, Arterial 137 136 - 145 mmol/L    POCT Potassium, Arterial 5.1 3.5 - 5.3 mmol/L    POCT Chloride, Arterial 105 98 - 107 mmol/L    POCT Ionized Calcium, Arterial 1.05 (L) 1.10 - 1.33 mmol/L    POCT Glucose, Arterial 103 (H) 74 - 99 mg/dL    POCT Lactate, Arterial 1.0 0.4 - 2.0 mmol/L    POCT Base Excess, Arterial -2.0 -2.0 - 3.0 mmol/L    POCT HCO3 Calculated, Arterial 23.1 22.0 - 26.0 mmol/L    POCT Hemoglobin, Arterial 12.0 (L) 13.5 - 17.5 g/dL    POCT Anion Gap, Arterial 14 10  - 25 mmo/L    Patient Temperature 37.0 degrees Celsius    FiO2 75 %   Coox Panel, Arterial Unsolicited   Result Value Ref Range    POCT Hemoglobin, Arterial 12.0 (L) 13.5 - 17.5 g/dL    POCT Oxy Hemoglobin, Arterial 95.8 94.0 - 98.0 %    POCT Carboxyhemoglobin, Arterial 3.5 %    POCT Methemoglobin, Arterial 0.8 0.0 - 1.5 %    POCT Deoxy Hemoglobin, Arterial 0.0 0.0 - 5.0 %   Blood Gas Arterial Full Panel Unsolicited   Result Value Ref Range    POCT pH, Arterial 7.35 (L) 7.38 - 7.42 pH    POCT pCO2, Arterial 43 (H) 38 - 42 mm Hg    POCT pO2, Arterial 281 (H) 85 - 95 mm Hg    POCT SO2, Arterial 100 94 - 100 %    POCT Oxy Hemoglobin, Arterial 96.1 94.0 - 98.0 %    POCT Hematocrit Calculated, Arterial 37.0 (L) 41.0 - 52.0 %    POCT Sodium, Arterial 136 136 - 145 mmol/L    POCT Potassium, Arterial 5.0 3.5 - 5.3 mmol/L    POCT Chloride, Arterial 103 98 - 107 mmol/L    POCT Ionized Calcium, Arterial 1.07 (L) 1.10 - 1.33 mmol/L    POCT Glucose, Arterial 98 74 - 99 mg/dL    POCT Lactate, Arterial 0.9 0.4 - 2.0 mmol/L    POCT Base Excess, Arterial -2.0 -2.0 - 3.0 mmol/L    POCT HCO3 Calculated, Arterial 23.7 22.0 - 26.0 mmol/L    POCT Hemoglobin, Arterial 12.2 (L) 13.5 - 17.5 g/dL    POCT Anion Gap, Arterial 14 10 - 25 mmo/L    Patient Temperature 37.0 degrees Celsius    FiO2 80 %   Coox Panel, Arterial Unsolicited   Result Value Ref Range    POCT Hemoglobin, Arterial 12.2 (L) 13.5 - 17.5 g/dL    POCT Oxy Hemoglobin, Arterial 96.1 94.0 - 98.0 %    POCT Carboxyhemoglobin, Arterial 3.0 %    POCT Methemoglobin, Arterial 0.9 0.0 - 1.5 %    POCT Deoxy Hemoglobin, Arterial 0.0 0.0 - 5.0 %   Blood Gas Arterial Full Panel Unsolicited   Result Value Ref Range    POCT pH, Arterial 7.28 (L) 7.38 - 7.42 pH    POCT pCO2, Arterial 52 (H) 38 - 42 mm Hg    POCT pO2, Arterial 190 (H) 85 - 95 mm Hg    POCT SO2, Arterial 100 94 - 100 %    POCT Oxy Hemoglobin, Arterial 95.6 94.0 - 98.0 %    POCT Hematocrit Calculated, Arterial 37.0 (L) 41.0 -  52.0 %    POCT Sodium, Arterial 138 136 - 145 mmol/L    POCT Potassium, Arterial 5.2 3.5 - 5.3 mmol/L    POCT Chloride, Arterial 105 98 - 107 mmol/L    POCT Ionized Calcium, Arterial 1.50 (H) 1.10 - 1.33 mmol/L    POCT Glucose, Arterial 112 (H) 74 - 99 mg/dL    POCT Lactate, Arterial 1.2 0.4 - 2.0 mmol/L    POCT Base Excess, Arterial -2.9 (L) -2.0 - 3.0 mmol/L    POCT HCO3 Calculated, Arterial 24.4 22.0 - 26.0 mmol/L    POCT Hemoglobin, Arterial 12.4 (L) 13.5 - 17.5 g/dL    POCT Anion Gap, Arterial 14 10 - 25 mmo/L    Patient Temperature 37.0 degrees Celsius    FiO2 100 %   Coox Panel, Arterial Unsolicited   Result Value Ref Range    POCT Hemoglobin, Arterial 12.4 (L) 13.5 - 17.5 g/dL    POCT Oxy Hemoglobin, Arterial 95.6 94.0 - 98.0 %    POCT Carboxyhemoglobin, Arterial 3.3 %    POCT Methemoglobin, Arterial 1.0 0.0 - 1.5 %    POCT Deoxy Hemoglobin, Arterial 0.0 0.0 - 5.0 %   Calcium, Ionized   Result Value Ref Range    POCT Calcium, Ionized 1.22 1.1 - 1.33 mmol/L   Magnesium   Result Value Ref Range    Magnesium 2.85 (H) 1.60 - 2.40 mg/dL   Coagulation Screen   Result Value Ref Range    Protime 16.5 (H) 9.8 - 12.8 seconds    INR 1.5 (H) 0.9 - 1.1    aPTT 34 27 - 38 seconds   Fibrinogen   Result Value Ref Range    Fibrinogen 244 200 - 400 mg/dL   CBC   Result Value Ref Range    WBC 18.0 (H) 4.4 - 11.3 x10*3/uL    nRBC 0.0 0.0 - 0.0 /100 WBCs    RBC 4.73 4.50 - 5.90 x10*6/uL    Hemoglobin 14.0 13.5 - 17.5 g/dL    Hematocrit 42.3 41.0 - 52.0 %    MCV 89 80 - 100 fL    MCH 29.6 26.0 - 34.0 pg    MCHC 33.1 32.0 - 36.0 g/dL    RDW 14.8 (H) 11.5 - 14.5 %    Platelets 97 (L) 150 - 450 x10*3/uL   Renal Function Panel   Result Value Ref Range    Glucose 121 (H) 74 - 99 mg/dL    Sodium 142 136 - 145 mmol/L    Potassium 4.6 3.5 - 5.3 mmol/L    Chloride 109 (H) 98 - 107 mmol/L    Bicarbonate 23 21 - 32 mmol/L    Anion Gap 15 10 - 20 mmol/L    Urea Nitrogen 16 6 - 23 mg/dL    Creatinine 1.16 0.50 - 1.30 mg/dL    eGFR 70 >60  mL/min/1.73m*2    Calcium 8.6 8.6 - 10.6 mg/dL    Phosphorus 2.9 2.5 - 4.9 mg/dL    Albumin 3.4 3.4 - 5.0 g/dL   BLOOD GAS ARTERIAL FULL PANEL   Result Value Ref Range    POCT pH, Arterial 7.28 (L) 7.38 - 7.42 pH    POCT pCO2, Arterial 48 (H) 38 - 42 mm Hg    POCT pO2, Arterial 74 (L) 85 - 95 mm Hg    POCT SO2, Arterial 96 94 - 100 %    POCT Oxy Hemoglobin, Arterial 91.5 (L) 94.0 - 98.0 %    POCT Hematocrit Calculated, Arterial 44.0 41.0 - 52.0 %    POCT Sodium, Arterial 136 136 - 145 mmol/L    POCT Potassium, Arterial 4.5 3.5 - 5.3 mmol/L    POCT Chloride, Arterial 106 98 - 107 mmol/L    POCT Ionized Calcium, Arterial 1.21 1.10 - 1.33 mmol/L    POCT Glucose, Arterial 123 (H) 74 - 99 mg/dL    POCT Lactate, Arterial 1.5 0.4 - 2.0 mmol/L    POCT Base Excess, Arterial -4.5 (L) -2.0 - 3.0 mmol/L    POCT HCO3 Calculated, Arterial 22.6 22.0 - 26.0 mmol/L    POCT Hemoglobin, Arterial 14.5 13.5 - 17.5 g/dL    POCT Anion Gap, Arterial 12 10 - 25 mmo/L    Patient Temperature 37.0 degrees Celsius    FiO2 50 %   BLOOD GAS ARTERIAL FULL PANEL   Result Value Ref Range    POCT pH, Arterial 7.35 (L) 7.38 - 7.42 pH    POCT pCO2, Arterial 39 38 - 42 mm Hg    POCT pO2, Arterial 115 (H) 85 - 95 mm Hg    POCT SO2, Arterial 100 94 - 100 %    POCT Oxy Hemoglobin, Arterial 95.9 94.0 - 98.0 %    POCT Hematocrit Calculated, Arterial 43.0 41.0 - 52.0 %    POCT Sodium, Arterial 136 136 - 145 mmol/L    POCT Potassium, Arterial 4.7 3.5 - 5.3 mmol/L    POCT Chloride, Arterial 106 98 - 107 mmol/L    POCT Ionized Calcium, Arterial 1.18 1.10 - 1.33 mmol/L    POCT Glucose, Arterial 156 (H) 74 - 99 mg/dL    POCT Lactate, Arterial 1.6 0.4 - 2.0 mmol/L    POCT Base Excess, Arterial -3.8 (L) -2.0 - 3.0 mmol/L    POCT HCO3 Calculated, Arterial 21.5 (L) 22.0 - 26.0 mmol/L    POCT Hemoglobin, Arterial 14.2 13.5 - 17.5 g/dL    POCT Anion Gap, Arterial 13 10 - 25 mmo/L    Patient Temperature 37.0 degrees Celsius    FiO2 50 %   BLOOD GAS ARTERIAL FULL PANEL    Result Value Ref Range    POCT pH, Arterial 7.32 (L) 7.38 - 7.42 pH    POCT pCO2, Arterial 43 (H) 38 - 42 mm Hg    POCT pO2, Arterial 90 85 - 95 mm Hg    POCT SO2, Arterial 99 94 - 100 %    POCT Oxy Hemoglobin, Arterial 95.2 94.0 - 98.0 %    POCT Hematocrit Calculated, Arterial 43.0 41.0 - 52.0 %    POCT Sodium, Arterial 136 136 - 145 mmol/L    POCT Potassium, Arterial 4.2 3.5 - 5.3 mmol/L    POCT Chloride, Arterial 105 98 - 107 mmol/L    POCT Ionized Calcium, Arterial 1.17 1.10 - 1.33 mmol/L    POCT Glucose, Arterial 172 (H) 74 - 99 mg/dL    POCT Lactate, Arterial 2.2 (H) 0.4 - 2.0 mmol/L    POCT Base Excess, Arterial -3.9 (L) -2.0 - 3.0 mmol/L    POCT HCO3 Calculated, Arterial 22.2 22.0 - 26.0 mmol/L    POCT Hemoglobin, Arterial 14.2 13.5 - 17.5 g/dL    POCT Anion Gap, Arterial 13 10 - 25 mmo/L    Patient Temperature 37.0 degrees Celsius    FiO2 40 %   Calcium, Ionized   Result Value Ref Range    POCT Calcium, Ionized 1.17 1.1 - 1.33 mmol/L   CBC   Result Value Ref Range    WBC 15.1 (H) 4.4 - 11.3 x10*3/uL    nRBC 0.0 0.0 - 0.0 /100 WBCs    RBC 4.38 (L) 4.50 - 5.90 x10*6/uL    Hemoglobin 13.3 (L) 13.5 - 17.5 g/dL    Hematocrit 39.0 (L) 41.0 - 52.0 %    MCV 89 80 - 100 fL    MCH 30.4 26.0 - 34.0 pg    MCHC 34.1 32.0 - 36.0 g/dL    RDW 14.9 (H) 11.5 - 14.5 %    Platelets 95 (L) 150 - 450 x10*3/uL   Magnesium   Result Value Ref Range    Magnesium 2.35 1.60 - 2.40 mg/dL   Renal Function Panel   Result Value Ref Range    Glucose 189 (H) 74 - 99 mg/dL    Sodium 140 136 - 145 mmol/L    Potassium 4.2 3.5 - 5.3 mmol/L    Chloride 107 98 - 107 mmol/L    Bicarbonate 22 21 - 32 mmol/L    Anion Gap 15 10 - 20 mmol/L    Urea Nitrogen 17 6 - 23 mg/dL    Creatinine 1.20 0.50 - 1.30 mg/dL    eGFR 68 >60 mL/min/1.73m*2    Calcium 8.5 (L) 8.6 - 10.6 mg/dL    Phosphorus 2.6 2.5 - 4.9 mg/dL    Albumin 3.7 3.4 - 5.0 g/dL   BLOOD GAS ARTERIAL FULL PANEL   Result Value Ref Range    POCT pH, Arterial 7.36 (L) 7.38 - 7.42 pH    POCT  pCO2, Arterial 41 38 - 42 mm Hg    POCT pO2, Arterial 62 (L) 85 - 95 mm Hg    POCT SO2, Arterial 91 (L) 94 - 100 %    POCT Oxy Hemoglobin, Arterial 88.8 (L) 94.0 - 98.0 %    POCT Hematocrit Calculated, Arterial 41.0 41.0 - 52.0 %    POCT Sodium, Arterial 136 136 - 145 mmol/L    POCT Potassium, Arterial 4.2 3.5 - 5.3 mmol/L    POCT Chloride, Arterial 107 98 - 107 mmol/L    POCT Ionized Calcium, Arterial 1.17 1.10 - 1.33 mmol/L    POCT Glucose, Arterial 192 (H) 74 - 99 mg/dL    POCT Lactate, Arterial 2.0 0.4 - 2.0 mmol/L    POCT Base Excess, Arterial -2.2 (L) -2.0 - 3.0 mmol/L    POCT HCO3 Calculated, Arterial 23.2 22.0 - 26.0 mmol/L    POCT Hemoglobin, Arterial 13.6 13.5 - 17.5 g/dL    POCT Anion Gap, Arterial 10 10 - 25 mmo/L    Patient Temperature 37.0 degrees Celsius    FiO2 40 %         Farooq Lang is a 63 y.o. year old male patient who presents for MVR  with Dr Nugent . Acute Pain consulted for block for postoperative pain control.      Plan:   - Parasternal interfascial plane nerve block  SS performed post-operatively in OR on 6/4  - Patient unable to take pills , we recommend starting IV tylenol 1 g , IV robaxin 1g Q8hrs and IV Toradol 15 mg Q6hrs PRN   - Also recommend PRN IV Dilaudid Q4hrs   - Please be aware of local anesthetic toxic dose and absorption variability before considering lidocaine patches  - Rest of pain management per primary team  - Acute pain service will sign off      Acute Pain Resident  pg [phone] ph [phone]   PRN medications

## 2024-06-05 NOTE — PROGRESS NOTES
DC/SDOH Assessments completed with patient/ wife (Nancy). Verified info in EPIC for home address / PCP / pharmacy. Discussed HHC and disclosure statement for [facility]. Choice for  HHC where as per surgery [facility], Dr. Nugent follows for [facility]    Eva Marques (LSW, MSW) '

## 2024-06-05 NOTE — PROGRESS NOTES
"CTICU Progress Note  Farooq Lang/[id number]    Admit Date: 6/4/2024  Hospital Length of Stay: 1   ICU Length of Stay: 16h   CT SURGEON:     SUBJECTIVE:   Extubated and weaned off levo overnight. Remained on epi 0.02    MEDICATIONS  Infusions:  lactated Ringer's, Last Rate: 5 mL/hr (06/05/24 0700)      Scheduled:  acetaminophen, 975 mg, q6h  albumin human, 12.5 g, Once  aspirin, 81 mg, Daily  ceFAZolin, 2 g, q8h  heparin, 5,000 Units, q8h  insulin lispro, 0-10 Units, TID  lidocaine, 1 patch, Daily  methocarbamol, 500 mg, q8h AUGUSTINE  mupirocin, 1 Application, BID  polyethylene glycol, 17 g, Daily  sennosides-docusate sodium, 2 tablet, BID      PRN:  alteplase, 2 mg, PRN  calcium gluconate, 1 g, q6h PRN  calcium gluconate, 2 g, q6h PRN  dextrose, 25 g, q15 min PRN   Or  glucagon, 1 mg, q15 min PRN  HYDROmorphone, 0.2 mg, q2h PRN  magnesium sulfate, 2 g, q6h PRN  magnesium sulfate, 4 g, q6h PRN  naloxone, 0.2 mg, q5 min PRN  ondansetron, 4 mg, q8h PRN   Or  ondansetron, 4 mg, q8h PRN  oxyCODONE, 10 mg, q4h PRN  oxyCODONE, 5 mg, q4h PRN  oxygen, , Continuous PRN - O2/gases  potassium chloride, 20 mEq, q6h PRN  potassium chloride, 40 mEq, q6h PRN        PHYSICAL EXAM:   Visit Vitals  /90   Pulse 85   Temp 36.5 °C (97.7 °F)   Resp 13   Ht 1.676 m (5' 6\")   Wt 57.4 kg (126 lb 8.7 oz)   SpO2 98%   BMI 20.42 kg/m²   Smoking Status Every Day   BSA 1.63 m²     Wt Readings from Last 5 Encounters:   06/04/24 57.4 kg (126 lb 8.7 oz)   05/08/24 57.2 kg (126 lb)   05/06/24 60 kg (132 lb 4.8 oz)   04/22/24 57.6 kg (127 lb)   04/10/24 58 kg (127 lb 13.9 oz)     INTAKE/OUTPUT:  I/O last 3 completed shifts:  In: 2990.4 (52.1 mL/kg) [I.V.:521.9 (9.1 mL/kg); Blood:790; IV Piggyback:862.5]  Out: 1980 (34.5 mL/kg) [Urine:1610 (0.8 mL/kg/hr); Chest Tube:370]  Weight: 57.4 kg          Vent settings:  Vent Mode: Pressure support  S RR:  [16] 16  S VT:  [470 mL-510 mL] 510 mL  PEEP/CPAP (cm H2O):  [5 cm H20-8 cm H20] 5 cm H20  MI SUP:  [5 " cm H20] 5 cm H20  MAP (cm H2O):  [12] 12    Physical Exam:   - CONSTITUTION: Adult male in ICU bed, no acute distress  - NEUROLOGIC: Aox3, CAM negative  - CARDIOVASCULAR: Paced VVI @ 80. Underlying sinus bandar in the 50's, asymptomatic with frequent PACA  - RESPIRATORY: Diminished breath sounds, nonlabored. On hiflow NC  - GI: Soft nondistended, denies nausea  - : Marti urine in cr  - EXTREMITIES: no edema, pulses palpable  - SKIN: warm  - PSYCHIATRIC: WNL    Daily Risk Screen  Intubated:no  Central line: yes, remove today  Cr: yes, remove today    Images: 6/5  MPRESSION:  1. Postsurgical changes of median sternotomy with multiple medical  appliances in position as above.  2. Mild prominence of the pulmonary vasculature and perihilar  interstitial markings which may relate to mild interstitial edema.  Recommend correlation with patient volume status.  3. Band-like opacities in the right mid to lower lung likely  representing atelectasis/scarring.  Invasive Hemodynamics:    Most Recent Range Past 24hrs   BP (Art) 118/65 Arterial Line BP 1  Min: 91/51  Max: 160/86   MAP(Art) 83 mmHg Arterial Line MAP 1 (mmHg)   Min: 66 mmHg  Max: 206 mmHg       Assessment/Plan   This is a 63 year old male with a PMH significant for afib, HFrEF, SOB and thrombocytopenia. He presented to the ED last month complaining of chest pressure and fatigue. He was found to have severe mitral valve stenosis.   6/4 Today he presents s/p MVR bioprosthetic, MOISÉS w/ Dr. Nugent.     Plan:  NEURO:  No PMH. Patient is Aox3 CAM negative. Acute post operative pain.   -->  - Serial neuro and pain assessments   - Add robaxin and lidocaine patches  - Scheduled Tylenol   - PRN oxycodone  - PRN dilaudid for pain   - PT Consult, OOB to chair as tolerated, chair position if not tolerated   - CAM ICU score qshift  - Sleep/wake cycle hygiene        CV:  Patient has a history of HTN, afib and HFrEF. Is now status post MVR. Pre/Post EF: 45% Arrived to CTICU on  epi 0.02. SCVO2 66. Currently VVI@80 w/bradycardia and frequent PAC underneath-->  - Maintain goal MAP 70-85  - Mixed venous and CI Q4H  - Volume resuscitate as clinically indicated  - Shut epi off   - Maintain epicardial wires set VVI @ 80 to pace above afib  - Start ASA today  - No indication for statin  - Holdoff on metop due to bradycardia  - Hold home diltiazem AND METOP     PULM:  No history of pulmonary disease. Acute postop pulmonary insufficiency.  Currently on hiflow 6L Chest tubes 1 mediastinal chest tube, 1 right pleural chest tube no airleaks .-->  - Daily CXR  - Wean FiO2 maintaining SpO2 >92%.   - IS q1h and OOB to chair   - Bronchial hygiene.   - Chest tubes to wall suction.-remove when criteria met     GI: HX of IBS  Passed swallow eval->  - Regular diet  - Colace/senna BID and miralax prn  - Hold home IBS med     :  CSA-ELIE Risk Score Low.  No history of renal disease, baseline creatinine 1. Creatinine stable post-op. Cr in place and making adequate UOP. -->  - Remove cr today  - Goal UOP 0.5ml/kg/hr  - RFP as clinically indicated  - Replete electrolytes per CTICU protocol  - Hold home furosemide 20mg     ENDO:  PMH of none A1c: 5.7-->  - Maintain BG <180, insulin per CTICU protocol     HEME:  Acute blood loss anemia and thrombocytopenia.-->    - Monitor drain output volume and characteristics  - CBC, coags, and fibrinogen post op and as clinically indicated  - Start ASA   - When to resume coumadin for afib since LAAC??? Ask Dr. Nugent tomorrow  - H today   - SCDs for DVT prophylaxis.  - Hold home warfarin 5mg  - Last type and screen: 6/4     ID:  Afebrile, no current indications of infection. MRSA 5/6 MSSA positive.-->  - Trend temp q4h  - Periop cefazolin x 48hrs  - bactroban x5 days     Skin:  No active skin issues.  - preventative Mepilex dressings in place on sacrum and heels  - change preventative Mepilex weekly or more frequently as indicated (when moist/soiled)   - every shift skin  assessment per nursing and weekly ICU skin rounds  - moisture barrier to be applied with talha care  - active skin problems addressed with nursing on daily rounds     Proph:  SCDs  PPI     G:  Line  Right IJ MAC w [place] placed 6/4-remove  Left brachial a-line placed 6/4-remove  Dunn 6/4-remove    Code status: Full Code  Emergency contact: [email or web address]      I personally spent 45 minutes of critical care time directly and personally managing the patient exclusive of separately billable procedures.     A,B,C,D,E,F,G: reviewed     Dispo: CTICU care for now. T3 tomorrow  CTICU TEAM PHONE [id number]

## 2024-06-05 NOTE — PROGRESS NOTES
Physical Therapy    Physical Therapy Evaluation & Treatment    Patient Name: Farooq Lang  MRN: [id number]  Today's Date: 6/5/2024   Time Calculation  Start Time: 1013  Stop Time: 1050  Time Calculation (min): 37 min    Assessment/Plan   PT Assessment  PT Assessment Results: Decreased strength, Decreased endurance, Impaired balance, Decreased mobility, Pain  Rehab Prognosis: Good  Medical Staff Made Aware: Yes  End of Session Communication: Bedside nurse  Assessment Comment: Pt demonstrated ability to complete sit<>stand transfer and ambulation with thoracic walker. CGA provided with all functional mobility.  Increased pain throughout session.  Vitals stable.  End of Session Patient Position: Up in chair, Alarm off, not on at start of session   IP OR SWING BED PT PLAN  Inpatient or Swing Bed: Inpatient  PT Plan  Treatment/Interventions: Bed mobility, Transfer training, Gait training, Stair training, Balance training, Strengthening, Endurance training, Therapeutic exercise, Therapeutic activity  PT Plan: Skilled PT  PT Frequency: 3 times per week  PT Discharge Recommendations: Low intensity level of continued care  PT Recommended Transfer Status: Assist x1, Assistive device  PT - OK to Discharge: Yes      Subjective     General Visit Information:  General  Reason for Referral: MVR  Referred By: Dr. Nugent.  Past Medical History Relevant to Rehab: Afib, HFrEF, SOB and thrombocytopenia.  Missed Visit: No  Family/Caregiver Present: No  Prior to Session Communication: Bedside nurse  Patient Position Received: Up in chair, Alarm off, not on at start of session  Preferred Learning Style: auditory, verbal, visual  General Comment: Pt awake, alert and willing to participate in PT session  Home Living:  Home Living  Type of Home: House  Lives With: Spouse  Home Layout:  (6 DEEPTHI with 1 rail, bed/bath - 1st floor, tub/shower with shower chair)  Prior Level of Function:  Prior Function Per Pt/Caregiver Report  Level of  [place]: Independent with ADLs and functional transfers, Independent with homemaking with ambulation  ADL Assistance: Independent  Homemaking Assistance: Independent  Ambulatory Assistance: Independent  Vocational: Full time employment ()  Prior Function Comments: (+) drives, (-) falls  Precautions:  Precautions  Hearing/Visual Limitations: WFL  Medical Precautions: Cardiac precautions, Fall precautions, Oxygen therapy device and L/min, Chest tube  Post-Surgical Precautions: Move in the Tube  Precautions Comment: MAP 70-85, paced 90 @ VVI  Vital Signs:  Vital Signs  Heart Rate: 90 (Post: 90)  Heart Rate Source: Monitor  Resp: (!) 30 (Post: 25)  SpO2: 100 % (Post: 99)  BP: 142/73 (Post: 139/79)  BP Method: Arterial line  Patient Position: Sitting    Objective   Pain:  Pain Assessment  Pain Assessment: 0-10  Pain Score: 10 - Worst possible pain  Pain Type: Surgical pain  Pain Location: Chest  Cognition:  Cognition  Overall Cognitive Status: Within Functional Limits  Orientation Level: Oriented X4    General Assessments:  General Observation  General Observation: Wound vac, cr, chest tube x1, CVC< 6L, tele, judith, CVP     Activity Tolerance  Endurance: Tolerates 10 - 20 min exercise with multiple rests  Early Mobility/Exercise Safety Screen: Proceed with mobilization - No exclusion criteria met  Activity Tolerance Comments: Limited mobility d/t elevated pain with nausea    Sensation  Light Touch: No apparent deficits    Strength  Strength Comments: Not formally assessed however at least 3/5 shown through functional mobility     Static Standing Balance  Static Standing-Balance Support: Bilateral upper extremity supported  Static Standing-Level of Assistance: Contact guard  Static Standing-Comment/Number of Minutes: Thoracic walker  Functional Assessments:  ADL  ADL's Addressed: No    Bed Mobility  Bed Mobility: No    Transfers  Transfer: Yes  Transfer 1  Transfer From 1: Sit to, Stand to  Transfer to 1:  Sit, Stand  Technique 1: Sit to stand, Stand to sit  Transfer Device 1: Thoracic walker  Transfer Level of Assistance 1: Contact guard  Trials/Comments 1: Cues for hand placement and proper use of AD    Ambulation/Gait Training  Ambulation/Gait Training Performed: Yes  Ambulation/Gait Training 1  Surface 1: Level tile  Device 1: Thoracic walker  Assistance 1: Contact guard  Quality of Gait 1:  (Decreased fidel)  Comments/Distance (ft) 1: ~60ft x1 (Decreased fidel)    Stairs  Stairs: No  Extremity/Trunk Assessments:  RUE   RUE : Within Functional Limits  LUE   LUE: Within Functional Limits  RLE   RLE : Within Functional Limits  LLE   LLE : Within Functional Limits  Treatments:  Therapeutic Activity  Therapeutic Activity Performed: Yes  Therapeutic Activity 1: Increased time for skilled ICU line/tube management for safe functional mobility  Therapeutic Activity 2: Education: MITT (handout provided and reviewed), splinting, use of AD, use of IS  Therapeutic Activity 3: Pt completed ~3-4 min static standing with thoracic walker for UE support.  Pt completed standing mini marches.  No instability noted.  Outcome Measures:  [facility] Basic Mobility  Turning from your back to your side while in a flat bed without using bedrails: A little  Moving from lying on your back to sitting on the side of a flat bed without using bedrails: A little  Moving to and from bed to chair (including a wheelchair): A little  Standing up from a chair using your arms (e.g. wheelchair or bedside chair): A little  To walk in hospital room: A little  Climbing 3-5 steps with railing: A little  Basic Mobility - Total Score: 18    FSS-ICU  Ambulation: Walks >/ or equal to 50 feet with any assistance x1  Rolling: Supervision or set-up only  Sitting: Supervision or set-up only  Transfer Sit-to-Stand: Supervision or set-up only  Transfer Supine-to-Sit: Supervision or set-up only  Total Score: 22    ICU Mobility Screen  Early Mobility/Exercise Safety  Screen: Proceed with mobilization - No exclusion criteria met  ICU Mobility Scale: Walking with assistance of 1 person    Encounter Problems       Encounter Problems (Active)       Balance       Pt will demonstrated ability to score at least 24/28 on the Tinetti balance assessment tool to ensure safety upon D/C.  (Progressing)       Start:  06/05/24    Expected End:  06/19/24               Mobility       Pt will demonstrated ability to ambulate >/=250ft with proper form and no balance deficits for safe home going.   (Progressing)       Start:  06/05/24    Expected End:  06/19/24            Pt will demonstrate ability to ascend/descend 6 stairs with unilateral rail and no balance deficits for safe home going.  (Progressing)       Start:  06/05/24    Expected End:  06/19/24               PT Transfers       Pt will demonstrate ability to complete 5X STS in < 12 sec with good from and consistency between transfers for safe D/C.  (Progressing)       Start:  06/05/24    Expected End:  06/19/24                   Education Documentation  Handouts, taught by Mayelin Tucker PT at 6/5/2024  2:49 PM.  Learner: Patient  Readiness: Acceptance  Method: Explanation, Demonstration, Handout  Response: Demonstrated Understanding, Verbalizes Understanding    Precautions, taught by Mayelin Tucker PT at 6/5/2024  2:49 PM.  Learner: Patient  Readiness: Acceptance  Method: Explanation, Demonstration, Handout  Response: Demonstrated Understanding, Verbalizes Understanding    Body Mechanics, taught by Mayelin Tucker PT at 6/5/2024  2:49 PM.  Learner: Patient  Readiness: Acceptance  Method: Explanation, Demonstration, Handout  Response: Demonstrated Understanding, Verbalizes Understanding    Mobility Training, taught by Mayelin Tucker PT at 6/5/2024  2:49 PM.  Learner: Patient  Readiness: Acceptance  Method: Explanation, Demonstration, Handout  Response: Demonstrated Understanding, Verbalizes Understanding    Education Comments  No  comments found.

## 2024-06-05 NOTE — BRIEF OP NOTE
Date: 2024  OR Location: [facility] OR    Name: Farooq Lang, : 1960, Age: 63 y.o., MRN: [id number], Sex: male    Diagnosis  Pre-op Diagnosis     * Mitral valve stenosis, unspecified etiology [I05.0] Post-op Diagnosis     * Mitral valve stenosis, unspecified etiology [I05.0]     Procedures  Median sternotomy  Standard central cannulation  Mitral valve replacement with 27mm mitris bioprosthetic valve  Left atrial appendage ligation   Prevena dressing    Chest Tubes/Drains: 1 mediastinal chest tube, 1 right pleural chest tube        Temporary Pacing Wires: ventricular wires    -Settings: pacing at 80   -Underlying Rhythm:    Permanent pacer/ICD: No   -Preoperative settings:    -Intra-op/ Postoperative settings:    Sternotomy performed by: Dr. Nugent     Conduit Harvested by: n/a    Sternal Wires placed by: Arley JUÁREZ     Arm/Leg/Groin Closure/Cutdown performed by:     Cardio Pulmonary Bypass Time: 63 minutes  Cross-clamp Time: 47 minutes  Circulatory Arrest: No Time:     Is patient candidate for Emergency Re-sternotomy? Yes   -If yes, POD #10 is -  24    Surgeons      * Lukasz Nugent - Primary    Resident/Fellow/Other Assistant:  Surgeons and Role:  * No surgeons found with a matching role *    Procedure Summary  Anesthesia: General  ASA: ASA status not filed in the log.  Anesthesia Staff: Anesthesiologist: CHANDLER Bloom; Demetrius Tang MD  Anesthesia Resident: Roger Cifuentes MD  Perfusionist: Richard Mitchell  Estimated Blood Loss: 200mL  Intra-op Medications: Administrations occurring from 1715 to 2140 on 24:  * No intraprocedure medications in log *           Anesthesia Record               Intraprocedure I/O Totals          Intake    Norepinephrine Drip 0.00 mL    The total shown is the total volume documented since Anesthesia Start was filed.    Tranexamic Acid 0.00 mL    The total shown is the total volume documented since Anesthesia Start was filed.    Epinephrine  Drip 0.00 mL    The total shown is the total volume documented since Anesthesia Start was filed.    Autologous Blood 540 mL    Total Intake 540 mL       Output    Urine 180 mL    Total Output 180 mL       Net    Net Volume 360 mL          Specimen:   ID Type Source Tests Collected by Time   1 : mitral valve Tissue HEART VALVE - MITRAL SURGICAL PATHOLOGY EXAM Lukasz Nugent MD 6/4/2024 1937        Staff:   Circulator: Belinda Franklin Person: Carloz          Findings: Rheumatic mitral     Complications:  None; patient tolerated the procedure well.     Disposition: ICU - intubated and hemodynamically stable.  Condition: stable  Specimens Collected:   ID Type Source Tests Collected by Time   1 : mitral valve Tissue HEART VALVE - MITRAL SURGICAL PATHOLOGY EXAM Lukasz Nugent MD 6/4/2024 1937     Attending Attestation:     Lukasz Nugent  Phone Number: [phone]

## 2024-06-05 NOTE — H&P
CTICU History & Physical        Subjective   HPI:  This is a 63 year old male with a PMH significant for afib, HFrEF, SOB and thrombocytopenia. He presented to the ED last month complaining of chest pressure and fatigue. He was found to have severe mitral valve stenosis.   6/4 Today he presents s/p MVR w/ Dr. Nugent.     Cardiac Testing:      TTE: 3/28  CONCLUSIONS:   1. Left ventricular systolic function is mildly decreased with a 45% estimated ejection fraction.   2. The left atrium is severely dilated.   3. The mitral valve is moderately thickened. 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   4. There is moderate mitral annular calcification.   5. There is severe mitral valve stenosis.   6. Moderate mitral valve regurgitation.   7. Mild to moderately elevated pulmonary artery pressure.   8. There is global hypokinesis of the left ventricle with minor regional variations.     [facility]: 4/22  CONCLUSIONS:   1. Normal coronary arteries.     Procedure/Surgeon:   Median sternotomy  Standard central cannulation  Mitral valve replacement with 27mm mitris bioprosthetic valve  Left atrial appendage ligation   Prevena dressing     Frontliner/Anesthesia:    /       CPB time: 63 min  Cross clamp time: 47 mins  Circ arrest time: no  Echo Pre/Post: EF 45%  Chest Tubes/Drains:  1 mediastinal chest tube, 1 right pleural chest tube    Temporary wires location/setting: ventricular wires              -Settings: pacing at 80        Medical History        Past Medical History:   Diagnosis Date    A-fib (Multi)      Diverticulosis      Dizziness      Heart failure with mid-range ejection fraction (HFmEF) (Multi)       (45%, 3/28/24)    Irregular heart beat       afib on coumadin    Irritable bowel syndrome      Mitral valve stenosis      Other chest pain 03/10/2016     Chest discomfort    Pleurodynia 11/29/2017     Chest pain, pleuritic    Right lower quadrant pain 11/14/2018     Abdominal pain, acute, right lower  quadrant    Shortness of breath       VANCE    Thrombocytopenia (CMS-HCC)           Surgical History         Past Surgical History:   Procedure Laterality Date    CARDIAC CATHETERIZATION N/A 04/22/2024     Procedure: Left And Right Heart Cath, Without LV;  Surgeon: Yobani Soto MD;  Location: [place] [address] Cardiac Cath Lab;  Service: Cardiovascular;  Laterality: N/A;  Schedule at 3:30pm Dr. Soto    COLONOSCOPY        DENTAL SURGERY        TONSILLECTOMY             Prescriptions Prior to Admission           Medications Prior to Admission   Medication Sig Dispense Refill Last Dose    dilTIAZem CD (Cardizem CD) 120 mg 24 hr capsule Take 1 capsule (120 mg) by mouth once daily in the evening. AT 1700     6/3/2024    UNABLE TO FIND Take 1 capsule by mouth once daily. Med Name: IBS Clear supplement     6/3/2024    warfarin (Coumadin) 5 mg tablet Take 1 tablet (5 mg) by mouth once daily at bedtime for 10 days. Take as directed per After Visit Summary. (Patient taking differently: Current dosing as of 6/4/24:  1.5 tablets (7.5mg) once daily on Monday and Wednesdays, and 1 tablet (5mg) once daily on all other days of the week.) 10 tablet 0 5/30/2024    furosemide (Lasix) 40 mg tablet Take 0.5 tablets (20 mg) by mouth once daily. (Patient not taking: Reported on 6/4/2024) 30 tablet 1 Not Taking         Patient has no known allergies.  Social History            Tobacco Use    Smoking status: Every Day       Current packs/day: 0.50       Average packs/day: 0.5 packs/day for 49.4 years (24.7 ttl pk-yrs)       Types: Cigarettes       Start date: 1/1/1975       Passive exposure: Current    Smokeless tobacco: Never   Vaping Use    Vaping status: Never Used   Substance Use Topics    Alcohol use: Never    Drug use: Never      Family History          Family History   Problem Relation Name Age of Onset    Cancer Father   74    Brain Aneurysm Father                Review of Systems:  JEANNINE emerging from anesthesia          "  Objective   Vitals:  Most Recent:      Vitals:     06/04/24 1511   BP: 153/90   Pulse: 72   Resp: 16   Temp: 36.1 °C (97 °F)   SpO2: 99%         24hr Min/Max:  Temp  Min: 36.1 °C (97 °F)  Max: 36.1 °C (97 °F)  Pulse  Min: 72  Max: 72  BP  Min: 153/90  Max: 153/90  Resp  Min: 16  Max: 16  SpO2  Min: 99 %  Max: 99 %     I/O:  No intake/output data recorded.     LDA:        Physical Exam:   Constitutional: Male patient intubated and sedated in ICU bed on ventilator, in no acute distress, but not actively participating in care at this time  Skin: Warm and dry throughout  Eyes: Anicteric sclera, clear  Head/Neck: Right IJ MAC present - dressing clean, dry, and intact, no hematoma or bleeding  Respiratory/Thorax: Orally intubated, clear breath sounds throughout, minimal ETT/oral secretions - clear, 3 chest tubes to -20 suction, serosanguinous drainage. V pacing wires present  Cardiovascular: RRR, no murmurs appreciated, NSR on tele with HR 70s, sternum stable - dressing clean, dry, and intact   Gastrointestinal: OG tube with minimal output, abdomen soft, non-distended  Genitourinary: Dunn in place draining clear yellow urine  Extremities: Palpable radial pulses 2+, 1+ pulses to bilateral PT/DP, warm throughout, no pitting edema, left brachial arterial line present - dressing clean, dry, and intact, no hematoma or bleeding  Neurological: Intubated and sedated on propofol, PERRL - 2mm bilaterally.     Lab Review:             No lab exists for component: \"LABALBU\"             Most recent labs and imaging reviewed.     Daily Risk Screen  Intubated: Yes, plan for extubation later  Central line: Yes, vasoactive medications  Dunn: Yes, Critical need I/O           Assessment/Plan   Assessment:     Plan:  NEURO:  No PMH. Patient is intubated and sedated on propofol infusion. Acute post operative pain.   -->  - Serial neuro and pain assessments   - Continue propofol until NMB reversal, then daily sedation vacation at minimum "   - NMB reversal when normothermic and hemodynamically stable.    - Scheduled Tylenol   - PRN oxycodone  - PRN dilaudid for pain   - PT Consult, OOB to chair as tolerated, chair position if not tolerated   - CAM ICU score qshift  - Sleep/wake cycle hygiene        CV:  Patient has a history of HTN, afib and HFrEF. Is now status post MVR. Pre/Post EF: 45% Arrived to CTICU on VVI@80 epicardial wires set VVI @ 50.-->  - Maintain goal MAP 70-85  - Mixed venous and CI Q4H  - Volume resuscitate as clinically indicated  - Maintain epicardial wires set VVI @ 50  - Start ASA tomorrow  - Send lipid panel  - Hold home diltiazem AND METOP     PULM:  No history of pulmonary disease.  Currently intubated on ventilator. Chest tubes 1 mediastinal chest tube, 1 right pleural chest tube .-->  - F/u post op CXR  - Once reversed, wean ventilator settings towards CPAP & extubation   - Wean FiO2 maintaining SpO2 >92%.   - IS q1h and OOB to chair when extubated  - Chest tubes to wall suction.     GI: No PMH.  OG in place.-->  - Continue PPI until extubated  - NPO, will perform bedside swallow eval post extubation   - Colace/senna BID and miralax BID     :  CSA-ELIE Risk Score Low.  No history of renal disease, baseline creatinine 1. Creatinine stable post-op. Cr in place and making adequate UOP. -->  - Continue cr catheter for strict I/Os.  - Goal UOP 0.5ml/kg/hr  - RFP as clinically indicated  - Replete electrolytes per CTICU protocol  - Hold home furosemide 20mg     ENDO:  PMH of none A1c: 5.7-->  - Maintain BG <180, insulin per CTICU protocol     HEME:  Acute blood loss anemia and thrombocytopenia.-->    - Monitor drain output volume and characteristics  - CBC, coags, and fibrinogen post op and as clinically indicated  - Start ASA 6hrs post-op for CABG  - If CABG is 2/2 STEMI/unstable angina, will receive Plavix POD2  - SQH tomorrow   - SCDs for DVT prophylaxis.  - Hold home warfarin 5mg  - Last type and screen: 6/4     ID:   Afebrile, no current indications of infection. MRSA 5/6 MSSA positive.-->  - Trend temp q4h  - Periop cefazolin x 48hrs  - bactroban x5 days     Skin:  No active skin issues.  - preventative Mepilex dressings in place on sacrum and heels  - change preventative Mepilex weekly or more frequently as indicated (when moist/soiled)   - every shift skin assessment per nursing and weekly ICU skin rounds  - moisture barrier to be applied with talha care  - active skin problems addressed with nursing on daily rounds     Proph:  SCDs  PPI     G:  Line  Right IJ MAC w Minimac placed 6/4  Left brachial a-line placed 6/4  Dunn 6/4     The indications and risks/benefits of non-violent/non self-destructive restraints were discussed.      F: Family: will update at bedside postoperatively.     A,B,C,D,E,F,G: reviewed     I personally spent 55 minutes of critical care time directly and personally managing the patient exclusive of separately billable procedures.     Dispo: CTICU care for now.    CTICU TEAM PHONE [phone]

## 2024-06-05 NOTE — ANESTHESIA PROCEDURE NOTES
Peripheral Block    Patient location during procedure: OR  Start time: 6/4/2024 9:10 PM  End time: 6/4/2024 9:15 PM  Reason for block: at surgeon's request and post-op pain management  Staffing  Performed: resident   Authorized by: Demetrius Tang MD    Performed by: Demetrius Tang MD  Preanesthetic Checklist  Completed: patient identified, IV checked, site marked, risks and benefits discussed, surgical consent, monitors and equipment checked, pre-op evaluation and timeout performed   Timeout performed at: 6/4/2024 9:09 PM  Peripheral Block  Patient position: laying flat  Prep: ChloraPrep  Patient monitoring: heart rate, continuous pulse ox and cardiac monitor  Block type: PIF  Laterality: B/L  Injection technique: single-shot  Guidance: ultrasound guided  Local infiltration: ropivacaine  Infiltration strength: 0.5 %  Dose: 30 mL  Needle  Needle type: [place].   Needle gauge: 26 G  Needle length: 8 cm  Needle localization: ultrasound guidance  Assessment  Injection assessment: negative aspiration for heme, no paresthesia on injection, incremental injection and local visualized surrounding nerve on ultrasound  Additional Notes  Parasternal interfascial plane nerve block: Informed consent obtained.  Risk, benefits, alternatives discussed.  ASA monitors placed, timeout performed.  Patient positioned, prepped with chlorhexidine, and draped with sterile towels.  Ultrasound guidance was used to visualize the needle throughout duration of the procedure.  Aspiration was negative.  A total of 30 cc of 0.5% ropivacaine, dexamethasone 4 mg, and 1:200,000 epinephrine was injected between both sides. Patient tolerated procedure well.    Timeout by javier

## 2024-06-05 NOTE — ANESTHESIA POSTPROCEDURE EVALUATION
Patient: Farooq Lang    Procedure Summary       Date: 06/04/24 Room / Location: [facility] OR 20 / Virtual [facility]    Anesthesia Start: 1752 Anesthesia Stop:     Procedure: Replacement Mitral Valve Diagnosis:       Mitral valve stenosis, unspecified etiology      (Mitral valve stenosis, unspecified etiology [I05.0])    Surgeons: Lukasz Nugent MD Responsible Provider: Demetrius Tang MD    Anesthesia Type: general ASA Status: 4            Anesthesia Type: No value filed.    Vitals Value Taken Time   /70 06/04/24 2140   Temp 36 06/04/24 2140   Pulse 82 06/04/24 2139   Resp 16 06/04/24 2139   SpO2 100 % 06/04/24 2130   Vitals shown include unfiled device data.    Anesthesia Post Evaluation    Patient location during evaluation: ICU  Patient participation: complete - patient cannot participate  Level of consciousness: sedated  Pain score: 0  Pain management: satisfactory to patient  Multimodal analgesia pain management approach  Airway patency: patent  Cardiovascular status: acceptable and hemodynamically stable  Respiratory status: ETT and ventilator  Hydration status: acceptable  Postoperative Nausea and Vomiting: none        No notable events documented.

## 2024-06-05 NOTE — PROGRESS NOTES
CTICU Attending Note     Patient seen, evaluated, and discussed with the NIOK.  I have personally obtained key components of the history and physical and have performed my own medical decision making.      63 year old male with history of HFrEF and atrial fibrillation who presented with dyspnea and was found to have severe mitral stenosis now s/p MVR/KONRAD ligation with Dr. Nugent on 6/4. Function reportedly 45% pre and post-bypass. Weaning vasopressors and oxygen overnight.     Neuro: Pain control, delirium precautions. Out of bed, ambulated today.   CV: History of afib. Sinus bradycardia with PACs today. Epinephrine weaned off, tolerated. Wean vasopressors as able.   Pulm: Tolerating wean of oxygen, continue. Encourage IS and out of bed. Monitor chest tubes and assess for removal.   : Monitor urine output, optimize electrolytes.   GI: Diet, bowel regimen. Reporting nausea this afternoon, will continue to monitor and treat as needed.   Heme: Stable thrombocytopenia. SCDs, SQH for DVT prophylaxis. Maintain active type and screen. Discuss anticoagulation if needed for history of afib. Continue aspirin, statin not indicated.   ID: Perioperative antibiotics.   Endo: SSI.     Dispo: Continue CTICU care.     This critically ill patient continues to be at risk for clinically significant deterioration / failure due to the above mentioned dysfunctional, unstable organ systems.  I have personally identified and managed all complex critical care issues to prevent aforementioned clinical deterioration.  Critical care time is spent at bedside and/or the immediate area and has included, but is not limited to, the review of diagnostic tests, labs, radiographs, serial assessments of hemodynamics, respiratory status, ventilatory management, review of consult team recommendations, and family updates.  Time spent in procedures and teaching are reported separately. Critical Care Time: 34 minutes.       06/05/24 at 5:50 PM oY RAMOS  Uriah, DO

## 2024-06-06 LAB
ALBUMIN SERPL BCP-MCNC: 3.5 G/DL (ref 3.4–5)
ANION GAP BLDA CALCULATED.4IONS-SCNC: 7 MMO/L (ref 10–25)
ANION GAP SERPL CALC-SCNC: 13 MMOL/L (ref 10–20)
BASE EXCESS BLDA CALC-SCNC: 3 MMOL/L (ref -2–3)
BODY TEMPERATURE: 37 DEGREES CELSIUS
BUN SERPL-MCNC: 20 MG/DL (ref 6–23)
CA-I BLD-SCNC: 1.15 MMOL/L (ref 1.1–1.33)
CA-I BLDA-SCNC: 1.21 MMOL/L (ref 1.1–1.33)
CALCIUM SERPL-MCNC: 8.6 MG/DL (ref 8.6–10.6)
CHLORIDE BLDA-SCNC: 103 MMOL/L (ref 98–107)
CHLORIDE SERPL-SCNC: 102 MMOL/L (ref 98–107)
CO2 SERPL-SCNC: 26 MMOL/L (ref 21–32)
CREAT SERPL-MCNC: 0.86 MG/DL (ref 0.5–1.3)
EGFRCR SERPLBLD CKD-EPI 2021: >90 ML/MIN/1.73M*2
ERYTHROCYTE [DISTWIDTH] IN BLOOD BY AUTOMATED COUNT: 15.1 % (ref 11.5–14.5)
GLUCOSE BLD MANUAL STRIP-MCNC: 106 MG/DL (ref 74–99)
GLUCOSE BLD MANUAL STRIP-MCNC: 110 MG/DL (ref 74–99)
GLUCOSE BLDA-MCNC: 107 MG/DL (ref 74–99)
GLUCOSE SERPL-MCNC: 109 MG/DL (ref 74–99)
HCO3 BLDA-SCNC: 27.1 MMOL/L (ref 22–26)
HCT VFR BLD AUTO: 37 % (ref 41–52)
HCT VFR BLD EST: 38 % (ref 41–52)
HGB BLD-MCNC: 12 G/DL (ref 13.5–17.5)
HGB BLDA-MCNC: 12.5 G/DL (ref 13.5–17.5)
INHALED O2 CONCENTRATION: 40 %
LACTATE BLDA-SCNC: 0.9 MMOL/L (ref 0.4–2)
MAGNESIUM SERPL-MCNC: 2 MG/DL (ref 1.6–2.4)
MCH RBC QN AUTO: 29.1 PG (ref 26–34)
MCHC RBC AUTO-ENTMCNC: 32.4 G/DL (ref 32–36)
MCV RBC AUTO: 90 FL (ref 80–100)
NRBC BLD-RTO: 0 /100 WBCS (ref 0–0)
OXYHGB MFR BLDA: 92.6 % (ref 94–98)
PCO2 BLDA: 39 MM HG (ref 38–42)
PH BLDA: 7.45 PH (ref 7.38–7.42)
PHOSPHATE SERPL-MCNC: 2.8 MG/DL (ref 2.5–4.9)
PLATELET # BLD AUTO: 78 X10*3/UL (ref 150–450)
PO2 BLDA: 67 MM HG (ref 85–95)
POTASSIUM BLDA-SCNC: 4.8 MMOL/L (ref 3.5–5.3)
POTASSIUM SERPL-SCNC: 4.5 MMOL/L (ref 3.5–5.3)
RBC # BLD AUTO: 4.12 X10*6/UL (ref 4.5–5.9)
SAO2 % BLDA: 95 % (ref 94–100)
SODIUM BLDA-SCNC: 132 MMOL/L (ref 136–145)
SODIUM SERPL-SCNC: 136 MMOL/L (ref 136–145)
STAPHYLOCOCCUS SPEC CULT: ABNORMAL
WBC # BLD AUTO: 15.9 X10*3/UL (ref 4.4–11.3)

## 2024-06-06 PROCEDURE — 82947 ASSAY GLUCOSE BLOOD QUANT: CPT

## 2024-06-06 PROCEDURE — S4991 NICOTINE PATCH NONLEGEND: HCPCS | Performed by: NURSE PRACTITIONER

## 2024-06-06 PROCEDURE — 2500000005 HC RX 250 GENERAL PHARMACY W/O HCPCS: Performed by: NURSE PRACTITIONER

## 2024-06-06 PROCEDURE — 2500000001 HC RX 250 WO HCPCS SELF ADMINISTERED DRUGS (ALT 637 FOR MEDICARE OP): Performed by: NURSE PRACTITIONER

## 2024-06-06 PROCEDURE — 1200000002 HC GENERAL ROOM WITH TELEMETRY DAILY

## 2024-06-06 PROCEDURE — 82330 ASSAY OF CALCIUM: CPT | Performed by: NURSE PRACTITIONER

## 2024-06-06 PROCEDURE — 2500000004 HC RX 250 GENERAL PHARMACY W/ HCPCS (ALT 636 FOR OP/ED): Performed by: NURSE PRACTITIONER

## 2024-06-06 PROCEDURE — 2500000005 HC RX 250 GENERAL PHARMACY W/O HCPCS: Performed by: STUDENT IN AN ORGANIZED HEALTH CARE EDUCATION/TRAINING PROGRAM

## 2024-06-06 PROCEDURE — 99233 SBSQ HOSP IP/OBS HIGH 50: CPT | Performed by: STUDENT IN AN ORGANIZED HEALTH CARE EDUCATION/TRAINING PROGRAM

## 2024-06-06 PROCEDURE — 2500000002 HC RX 250 W HCPCS SELF ADMINISTERED DRUGS (ALT 637 FOR MEDICARE OP, ALT 636 FOR OP/ED): Performed by: NURSE PRACTITIONER

## 2024-06-06 PROCEDURE — 2500000004 HC RX 250 GENERAL PHARMACY W/ HCPCS (ALT 636 FOR OP/ED): Mod: JZ | Performed by: NURSE PRACTITIONER

## 2024-06-06 PROCEDURE — 37799 UNLISTED PX VASCULAR SURGERY: CPT | Performed by: NURSE PRACTITIONER

## 2024-06-06 PROCEDURE — 2500000004 HC RX 250 GENERAL PHARMACY W/ HCPCS (ALT 636 FOR OP/ED): Performed by: STUDENT IN AN ORGANIZED HEALTH CARE EDUCATION/TRAINING PROGRAM

## 2024-06-06 PROCEDURE — 83735 ASSAY OF MAGNESIUM: CPT | Performed by: NURSE PRACTITIONER

## 2024-06-06 PROCEDURE — 84132 ASSAY OF SERUM POTASSIUM: CPT | Performed by: NURSE PRACTITIONER

## 2024-06-06 PROCEDURE — 51701 INSERT BLADDER CATHETER: CPT

## 2024-06-06 PROCEDURE — 97129 THER IVNTJ 1ST 15 MIN: CPT | Mod: GO

## 2024-06-06 PROCEDURE — 94668 MNPJ CHEST WALL SBSQ: CPT

## 2024-06-06 PROCEDURE — 85027 COMPLETE CBC AUTOMATED: CPT | Performed by: NURSE PRACTITIONER

## 2024-06-06 PROCEDURE — 97165 OT EVAL LOW COMPLEX 30 MIN: CPT | Mod: GO

## 2024-06-06 RX ORDER — MULTIVIT-MIN/IRON FUM/FOLIC AC 7.5 MG-4
1 TABLET ORAL DAILY
Status: DISCONTINUED | OUTPATIENT
Start: 2024-06-07 | End: 2024-06-18 | Stop reason: HOSPADM

## 2024-06-06 RX ORDER — SIMETHICONE 80 MG
160 TABLET,CHEWABLE ORAL ONCE
Status: COMPLETED | OUTPATIENT
Start: 2024-06-06 | End: 2024-06-06

## 2024-06-06 RX ORDER — CALCIUM CARBONATE 200(500)MG
500 TABLET,CHEWABLE ORAL 4 TIMES DAILY PRN
Status: DISCONTINUED | OUTPATIENT
Start: 2024-06-06 | End: 2024-06-18 | Stop reason: HOSPADM

## 2024-06-06 RX ORDER — IRON POLYSACCHARIDE COMPLEX 150 MG
150 CAPSULE ORAL DAILY
Status: DISCONTINUED | OUTPATIENT
Start: 2024-06-07 | End: 2024-06-07

## 2024-06-06 RX ORDER — ACETAMINOPHEN 500 MG
10 TABLET ORAL NIGHTLY
Status: DISCONTINUED | OUTPATIENT
Start: 2024-06-06 | End: 2024-06-09

## 2024-06-06 RX ADMIN — Medication 3 L/MIN: at 09:01

## 2024-06-06 RX ADMIN — METHOCARBAMOL 500 MG: 500 TABLET ORAL at 20:20

## 2024-06-06 RX ADMIN — HYDROMORPHONE HYDROCHLORIDE 0.4 MG: 1 INJECTION, SOLUTION INTRAMUSCULAR; INTRAVENOUS; SUBCUTANEOUS at 02:25

## 2024-06-06 RX ADMIN — CEFAZOLIN SODIUM 2 G: 2 INJECTION, SOLUTION INTRAVENOUS at 02:26

## 2024-06-06 RX ADMIN — SIMETHICONE 160 MG: 80 TABLET, CHEWABLE ORAL at 11:08

## 2024-06-06 RX ADMIN — SENNOSIDES AND DOCUSATE SODIUM 2 TABLET: 8.6; 5 TABLET ORAL at 08:35

## 2024-06-06 RX ADMIN — CEFAZOLIN SODIUM 2 G: 2 INJECTION, SOLUTION INTRAVENOUS at 20:19

## 2024-06-06 RX ADMIN — NICOTINE 1 PATCH: 21 PATCH, EXTENDED RELEASE TRANSDERMAL at 08:36

## 2024-06-06 RX ADMIN — NICOTINE 1 PATCH: 21 PATCH, EXTENDED RELEASE TRANSDERMAL at 17:59

## 2024-06-06 RX ADMIN — HEPARIN SODIUM 5000 UNITS: 5000 INJECTION INTRAVENOUS; SUBCUTANEOUS at 18:00

## 2024-06-06 RX ADMIN — CEFAZOLIN SODIUM 2 G: 2 INJECTION, SOLUTION INTRAVENOUS at 11:08

## 2024-06-06 RX ADMIN — ASPIRIN 81 MG CHEWABLE TABLET 81 MG: 81 TABLET CHEWABLE at 08:35

## 2024-06-06 RX ADMIN — ACETAMINOPHEN 975 MG: 325 TABLET ORAL at 11:08

## 2024-06-06 RX ADMIN — HEPARIN SODIUM 5000 UNITS: 5000 INJECTION INTRAVENOUS; SUBCUTANEOUS at 08:35

## 2024-06-06 RX ADMIN — Medication 10 MG: at 20:20

## 2024-06-06 RX ADMIN — METHOCARBAMOL 500 MG: 500 TABLET ORAL at 13:05

## 2024-06-06 RX ADMIN — MUPIROCIN 1 APPLICATION: 20 OINTMENT TOPICAL at 20:19

## 2024-06-06 RX ADMIN — HEPARIN SODIUM 5000 UNITS: 5000 INJECTION INTRAVENOUS; SUBCUTANEOUS at 00:06

## 2024-06-06 RX ADMIN — LIDOCAINE 1 PATCH: 4 PATCH TOPICAL at 08:35

## 2024-06-06 RX ADMIN — ACETAMINOPHEN 975 MG: 325 TABLET ORAL at 22:30

## 2024-06-06 RX ADMIN — MUPIROCIN 1 APPLICATION: 20 OINTMENT TOPICAL at 08:37

## 2024-06-06 RX ADMIN — ACETAMINOPHEN 975 MG: 325 TABLET ORAL at 18:00

## 2024-06-06 RX ADMIN — OXYCODONE HYDROCHLORIDE 10 MG: 5 TABLET ORAL at 08:36

## 2024-06-06 ASSESSMENT — PAIN SCALES - GENERAL
PAINLEVEL_OUTOF10: 0 - NO PAIN
PAINLEVEL_OUTOF10: 8
PAINLEVEL_OUTOF10: 7
PAINLEVEL_OUTOF10: 0 - NO PAIN
PAINLEVEL_OUTOF10: 7
PAINLEVEL_OUTOF10: 0 - NO PAIN
PAINLEVEL_OUTOF10: 0 - NO PAIN
PAINLEVEL_OUTOF10: 2
PAINLEVEL_OUTOF10: 2
PAINLEVEL_OUTOF10: 5 - MODERATE PAIN

## 2024-06-06 ASSESSMENT — COGNITIVE AND FUNCTIONAL STATUS - GENERAL
DAILY ACTIVITIY SCORE: 19
PERSONAL GROOMING: A LITTLE
DRESSING REGULAR UPPER BODY CLOTHING: A LITTLE
MOVING TO AND FROM BED TO CHAIR: A LITTLE
CLIMB 3 TO 5 STEPS WITH RAILING: A LITTLE
MOBILITY SCORE: 18
HELP NEEDED FOR BATHING: A LITTLE
HELP NEEDED FOR BATHING: A LITTLE
DRESSING REGULAR LOWER BODY CLOTHING: A LITTLE
DRESSING REGULAR UPPER BODY CLOTHING: A LITTLE
TURNING FROM BACK TO SIDE WHILE IN FLAT BAD: A LITTLE
STANDING UP FROM CHAIR USING ARMS: A LITTLE
TOILETING: A LITTLE
WALKING IN HOSPITAL ROOM: A LITTLE
DAILY ACTIVITIY SCORE: 19
TOILETING: A LITTLE
MOVING FROM LYING ON BACK TO SITTING ON SIDE OF FLAT BED WITH BEDRAILS: A LITTLE
PERSONAL GROOMING: A LITTLE
DRESSING REGULAR LOWER BODY CLOTHING: A LITTLE

## 2024-06-06 ASSESSMENT — PAIN - FUNCTIONAL ASSESSMENT
PAIN_FUNCTIONAL_ASSESSMENT: 0-10

## 2024-06-06 ASSESSMENT — PAIN DESCRIPTION - ORIENTATION: ORIENTATION: MID

## 2024-06-06 ASSESSMENT — ACTIVITIES OF DAILY LIVING (ADL): BATHING_ASSISTANCE: MINIMAL

## 2024-06-06 ASSESSMENT — PAIN DESCRIPTION - DESCRIPTORS: DESCRIPTORS: ACHING

## 2024-06-06 ASSESSMENT — PAIN DESCRIPTION - LOCATION
LOCATION: CHEST
LOCATION: CHEST

## 2024-06-06 NOTE — PROGRESS NOTES
Occupational Therapy    Evaluation/Treatment    Patient Name: Farooq Lang  MRN: [id number]  : 1960  Today's Date: 24  Time Calculation  Start Time: 1146  Stop Time: 1224  Time Calculation (min): 38 min       Assessment:  OT Assessment: Patient demo difficulty coping with CLOF and pain yet motivated to return to PLOF. Completing bed mobility with MIN A and sit<>stand and transfer with CGA. Patient demo good UE/LE ROM to complete ADL tasks. Recommend no OT when medically appropriate for discharge; continue acute care OT services to increase functional independence and safety.  Prognosis: Good  Barriers to Discharge: None  Evaluation/Treatment Tolerance: Patient limited by pain  End of Session Communication: Bedside nurse  End of Session Patient Position: Up in chair, Alarm off, not on at start of session, Alarm off, caregiver present  OT Assessment Results: Decreased ADL status, Decreased endurance, Decreased functional mobility  Prognosis: Good  Barriers to Discharge: None  Evaluation/Treatment Tolerance: Patient limited by pain  Strengths: Ability to acquire knowledge, Premorbid level of function, Support of Caregivers  Barriers to Participation: Comorbidities  Plan:  Treatment Interventions: ADL retraining, UE strengthening/ROM, Endurance training, Cognitive reorientation, Visual perceptual retraining, Functional transfer training, Patient/family training, Neuromuscular reeducation, Fine motor coordination activities, Compensatory technique education, Equipment evaluation/education  OT Frequency: 3 times per week  OT Discharge Recommendations: No OT needed after discharge  OT Recommended Transfer Status: Assist of 1  OT - OK to Discharge: Yes (when medically appropriate)  Treatment Interventions: ADL retraining, UE strengthening/ROM, Endurance training, Cognitive reorientation, Visual perceptual retraining, Functional transfer training, Patient/family training, Neuromuscular reeducation, Fine motor  "coordination activities, Compensatory technique education, Equipment evaluation/education    Subjective   Current Problem:  1. Mitral valve stenosis, unspecified etiology  Anesthesia Intraoperative Transesophageal Echocardiogram    Anesthesia Intraoperative Transesophageal Echocardiogram    Surgical Pathology Exam    Surgical Pathology Exam      2. Rheumatic mitral valve disease, unspecified  Anesthesia Intraoperative Transesophageal Echocardiogram        General:   OT Received On: 06/06/24  General  Reason for Referral: 64 y/o M s/p MVR bioprosthetic, LAAL on 6/4.  Past Medical History Relevant to Rehab: Afib, HFrEF, SOB and thrombocytopenia.  Family/Caregiver Present: No  Prior to Session Communication: Bedside nurse  Patient Position Received: Bed, 3 rail up, Alarm off, not on at start of session  Preferred Learning Style: auditory, verbal, visual  General Comment: Agreeable to OT eval. Difficulty coping with CLOF and pain. \"You came to see me on my worst day.\"  Precautions:  Hearing/Visual Limitations: WFL; has glasses on table (not wearing during session)  Medical Precautions: Cardiac precautions, Fall precautions, Oxygen therapy device and L/min, Chest tube (on 3L NC)  Post-Surgical Precautions: Move in the Tube  Precautions Comment: MAP 70-85, intermittently utilizing pacer, VVI @45.  Vital Signs:  Heart Rate: (!) 47 (post: 45)  SpO2: 94 %  BP: 105/60 (post: 98/62)  MAP (mmHg): 75 (post: 73)  Pain:  Pain Assessment  Pain Assessment: 0-10  Pain Score: 7  Pain Type: Surgical pain  Pain Location: Chest  Pain Orientation: Mid  Pain Descriptors: Aching  Pain Frequency: Constant/continuous  Pain Onset: Ongoing  Pain Interventions: Repositioned, Distraction, Emotional support  Response to Interventions: tolerated OT; continued pain.    Objective   Cognition:  Arousal/Alertness: Appropriate responses to stimuli  Orientation Level: Oriented X4  Following Commands: Follows one step commands with repetition  Cognition " Comments: Difficulty coping with CLOF and decreased insight into progress made.  Impulsive: Mildly        Bonilla Agitation Sedation Scale  Bonilla Agitation Sedation Scale (RASS): Alert and calm  Home Living:  Type of Home: House  Lives With: Spouse  Home Layout:  (6 DEEPTHI with 1 rail, bed/bath - 1st floor, tub/shower with shower chair (was not using chair PTA))  Prior Function:  Level of [place]: Independent with ADLs and functional transfers  Vocational: Full time employment ()  Prior Function Comments: (+) drives, (-) falls. Pt. reporting current smoker; reporting decreased from 20-25 to 10-15 cigarettes per day and motivated to continue to decrease and eventually quit smoking.       ADL:  Eating Assistance: Modified independent (Device)  Eating Deficit: Setup  Grooming Assistance: Stand by  Grooming Deficit: Supervision/safety  Bathing Assistance: Minimal  UE Dressing Assistance: Minimal  LE Dressing Assistance: Minimal  Toileting Assistance with Device: Minimal       Bed Mobility/Transfers: Bed Mobility  Bed Mobility: Yes  Bed Mobility 1  Bed Mobility 1: Supine to sitting  Level of Assistance 1: Minimum assistance, Minimal verbal cues, Minimal tactile cues  Bed Mobility Comments 1: Cues for body positioning, sequencing and assist with line management.    Transfers  Transfer: Yes  Transfer 1  Technique 1: Sit to stand, Stand to sit, Stand pivot  Transfer Device 1:  (hand held)  Transfer Level of Assistance 1: Contact guard  Trials/Comments 1: CGA overall and assist for line management.    Functional Mobility:  Functional Mobility  Functional Mobility Performed: No (deferred 2/2 pain; encouragement needed for OOB this date.)  Sitting Balance:  Static Sitting Balance  Static Sitting-Comment/Number of Minutes: SBA  Dynamic Sitting Balance  Dynamic Sitting-Comments: SBA  Standing Balance:  Static Standing Balance  Static Standing-Comment/Number of Minutes: CGA  Dynamic Standing Balance  Dynamic  Standing-Comments: CGA       Cognitive Skill Development:  Cognitive Skill Development Activity 1: Reviewed post op precautions and application to ADLs.  Cognitive Skill Development Activity 2: Active listening regarding coping with CLOF and understanding of progress he has made.  Cognitive Skill Development Activity 3: Educated pt on benefit of OOB activity for overall progress with recovery; receptive with encouragement.       Vision:Vision - Basic Assessment  Current Vision:  (Glasses on table; not wearing during session)       Strength:  Strength Comments: BUEs at least 3+/5       Coordination:  Movements are Fluid and Coordinated: Yes   Hand Function:  Hand Function  Gross Grasp: Functional  Coordination: Functional  Extremities: RUE   RUE : Within Functional Limits and LUE   LUE: Within Functional Limits    Outcome Measures: [facility] Daily Activity  Putting on and taking off regular lower body clothing: A little  Bathing (including washing, rinsing, drying): A little  Putting on and taking off regular upper body clothing: A little  Toileting, which includes using toilet, bedpan or urinal: A little  Taking care of personal grooming such as brushing teeth: A little  Eating Meals: None  Daily Activity - Total Score: 19     Confusion Assessment Method-ICU (CAM-ICU)  Feature 1: Acute Onset or Fluctuating Course: Negative  Feature 3: Altered Level of Consciousness: Negative  Overall CAM-ICU: Negative    Education Documentation  Body Mechanics, taught by Miranda Jung OT at 6/6/2024  3:00 PM.  Learner: Patient  Readiness: Acceptance  Method: Explanation  Response: Verbalizes Understanding, Needs Reinforcement  Comment: Educated pt on post op precautions and application to ADLs.    Precautions, taught by Miranda Jung OT at 6/6/2024  3:00 PM.  Learner: Patient  Readiness: Acceptance  Method: Explanation  Response: Verbalizes Understanding, Needs Reinforcement  Comment: Educated pt on post op precautions and  application to ADLs.    ADL Training, taught by Miranda Jung OT at 6/6/2024  3:00 PM.  Learner: Patient  Readiness: Acceptance  Method: Explanation  Response: Verbalizes Understanding, Needs Reinforcement  Comment: Educated pt on post op precautions and application to ADLs.    Education Comments  No comments found.      Goals:  Encounter Problems       Encounter Problems (Active)       ADLs       Patient will complete LB dressing with MOD I.   (Progressing)       Start:  06/06/24    Expected End:  06/20/24            Patient will complete UB dressing with MOD I.   (Progressing)       Start:  06/06/24    Expected End:  06/20/24            Patient will complete toileting with MOD I.   (Progressing)       Start:  06/06/24    Expected End:  06/20/24            Patient will complete grooming with MOD I.   (Progressing)       Start:  06/06/24    Expected End:  06/20/24                   MOBILITY       Patient will complete bed mobility with MOD I.    (Progressing)       Start:  06/06/24    Expected End:  06/20/24            Pt. Will demo household distance functional mobility with MOD I using LRAD.   (Progressing)       Start:  06/06/24    Expected End:  06/20/24               TRANSFERS       Pt. Will complete stand pivot transfer with MOD I using LRAD.   (Progressing)       Start:  06/06/24    Expected End:  06/20/24                 Miranda Jung OT  06/06/2024

## 2024-06-06 NOTE — SIGNIFICANT EVENT
Rapid Response RN Note    Rapid response RN at bedside for RADAR score 6 due to the recent VS listed below:     Vitals:    06/06/24 1100 06/06/24 1200 06/06/24 1300 06/06/24 1337   BP: 100/81 98/62 102/51 106/63   BP Location:    Left arm   Patient Position:    Sitting   Pulse: (!) 45 (!) 47 (!) 49 (!) 45   Resp: 17 19 17 18   Temp:    36.6 °C (97.9 °F)   TempSrc:    Temporal   SpO2: 94% 95% 92% 94%   Weight:       Height:            Reviewed above VS with bedside RN.  Pt sitting in up in chair.  States that pain has been an issue.  VS within patient's current trends.  No distress.  No interventions by rapid response team indicated at this time.  Patient denied shortness of breath, dizziness or lightheadedness.  Staff to page rapid response for any concerns or acute change in condition/VS.

## 2024-06-06 NOTE — PROGRESS NOTES
"Farooq Lang is a 63 y.o. male on day 2 of admission presenting with Mitral valve stenosis.    Subjective   No acute events overnight.       Objective     Physical Exam  Constitutional:       Appearance: Normal appearance.   HENT:      Head: Normocephalic.   Cardiovascular:      Pulses: Normal pulses.      Comments: Paced with external pacemaker  Pulmonary:      Effort: Pulmonary effort is normal.      Breath sounds: Normal breath sounds.   Abdominal:      General: Abdomen is flat.      Palpations: Abdomen is soft.   Musculoskeletal:      Cervical back: Normal range of motion and neck supple.   Skin:     General: Skin is warm.   Neurological:      General: No focal deficit present.      Mental Status: He is alert and oriented to person, place, and time.   Psychiatric:         Mood and Affect: Mood normal.         Last Recorded Vitals  Blood pressure 138/69, pulse 87, temperature 36.5 °C (97.7 °F), resp. rate 15, height 1.676 m (5' 6\"), weight 57.4 kg (126 lb 8.7 oz), SpO2 94%.  Intake/Output last 3 Shifts:  I/O last 3 completed shifts:  In: 3474 (60.5 mL/kg) [I.V.:705.5 (12.3 mL/kg); Blood:790; IV Piggyback:1162.5]  Out: 2710 (47.2 mL/kg) [Urine:2140 (1 mL/kg/hr); Chest Tube:570]  Weight: 57.4 kg     Relevant Results                             Assessment/Plan   Principal Problem:    Mitral valve stenosis  Active Problems:    Mitral valve stenosis, unspecified etiology     63 year old male POD2  s/p MVR bioprosthetic, MOISÉS w/ Dr. Nugent. PMH significant for afib, HFrEF, SOB and thrombocytopenia. He presented to the ED last month complaining of chest pressure and fatigue. He was found to have severe mitral valve stenosis.        Plan:  NEURO:  No PMH. Patient is Aox3 CAM negative. Acute post operative pain.   -->  - Serial neuro and pain assessments   - Add robaxin and lidocaine patches  - Scheduled Tylenol   - PRN oxycodone  - PRN dilaudid for pain   - PT Consult, OOB to chair as tolerated, chair position if not " tolerated   - CAM ICU score qshift  - Sleep/wake cycle hygiene        CV:  Patient has a history of HTN, afib and HFrEF. Is now status post MVR. Pre/Post EF: 45% Arrived to CTICU on epi 0.02. SCVO2 66. Currently VVI@80 w/bradycardia and frequent PAC underneath-->  - Maintain goal MAP 70-85  - Volume resuscitate as clinically indicated  - Maintain epicardial wires set VVI @ 80 to pace above afib  - Continue ASA  - Holdoff on metop due to bradycardia  - Hold home diltiazem AND METOP     PULM:  No history of pulmonary disease. Acute postop pulmonary insufficiency.  Currently on 3L NC.  Chest tubes 1 mediastinal chest tube, 1 right pleural chest tube no airleaks .-->  - Daily CXR  - Wean FiO2 maintaining SpO2 >92%.   - IS q1h and OOB to chair   - Bronchial hygiene.   - Chest tubes to wall suction.-remove when criteria met     GI: HX of IBS  Passed swallow eval->  - Regular diet  - Colace/senna BID and miralax prn  - Hold home IBS med     :  CSA-ELIE Risk Score Low.  No history of renal disease, baseline creatinine 1. Creatinine stable post-op. Cr in place and making adequate UOP. -->  - Remove cr today  - Goal UOP 0.5ml/kg/hr  - RFP as clinically indicated  - Replete electrolytes per CTICU protocol  - Hold home furosemide 20mg     ENDO:  PMH of none A1c: 5.7-->  - Maintain BG <180, insulin per CTICU protocol     HEME:  Acute blood loss anemia and thrombocytopenia.-->    - Monitor drain output volume and characteristics  - CBC, coags, and fibrinogen post op and as clinically indicated  - Start ASA   - When to resume coumadin for afib since LAAC??? Ask Dr. Nugent tomorrow  - Continue SQH   - SCDs for DVT prophylaxis.  - Hold home warfarin 5mg  - Last type and screen: 6/4     ID:  Afebrile, no current indications of infection. MRSA 5/6 MSSA positive.-->  - Trend temp q4h  - Periop cefazolin x 48hrs  - bactroban x5 days     Skin:  No active skin issues.  - preventative Mepilex dressings in place on sacrum and heels  -  change preventative Mepilex weekly or more frequently as indicated (when moist/soiled)   - every shift skin assessment per nursing and weekly ICU skin rounds  - moisture barrier to be applied with talha care  - active skin problems addressed with nursing on daily rounds     Proph:  SCDs  PPI     G:  Line  Right IJ MAC w [place] placed 6/4-removed  Left brachial a-line placed 6/4-removed  [place] 6/4-removed     Code status: Full Code          A,B,C,D,E,F,G: reviewed     Dispo: T3   CTICU TEAM PHONE [phone]            Contreras Jurado MD

## 2024-06-07 ENCOUNTER — APPOINTMENT (OUTPATIENT)
Dept: RADIOLOGY | Facility: HOSPITAL | Age: 64
DRG: 219 | End: 2024-06-07
Payer: COMMERCIAL

## 2024-06-07 LAB
ALBUMIN SERPL BCP-MCNC: 3.4 G/DL (ref 3.4–5)
ANION GAP SERPL CALC-SCNC: 11 MMOL/L (ref 10–20)
BUN SERPL-MCNC: 23 MG/DL (ref 6–23)
CALCIUM SERPL-MCNC: 8.7 MG/DL (ref 8.6–10.6)
CHLORIDE SERPL-SCNC: 99 MMOL/L (ref 98–107)
CO2 SERPL-SCNC: 26 MMOL/L (ref 21–32)
CREAT SERPL-MCNC: 0.89 MG/DL (ref 0.5–1.3)
EGFRCR SERPLBLD CKD-EPI 2021: >90 ML/MIN/1.73M*2
ERYTHROCYTE [DISTWIDTH] IN BLOOD BY AUTOMATED COUNT: 14.7 % (ref 11.5–14.5)
GLUCOSE SERPL-MCNC: 105 MG/DL (ref 74–99)
HCT VFR BLD AUTO: 33.1 % (ref 41–52)
HGB BLD-MCNC: 10.8 G/DL (ref 13.5–17.5)
MAGNESIUM SERPL-MCNC: 1.77 MG/DL (ref 1.6–2.4)
MCH RBC QN AUTO: 29.2 PG (ref 26–34)
MCHC RBC AUTO-ENTMCNC: 32.6 G/DL (ref 32–36)
MCV RBC AUTO: 90 FL (ref 80–100)
NRBC BLD-RTO: 0 /100 WBCS (ref 0–0)
PHOSPHATE SERPL-MCNC: 2.2 MG/DL (ref 2.5–4.9)
PLATELET # BLD AUTO: 75 X10*3/UL (ref 150–450)
POTASSIUM SERPL-SCNC: 4.3 MMOL/L (ref 3.5–5.3)
RBC # BLD AUTO: 3.7 X10*6/UL (ref 4.5–5.9)
SODIUM SERPL-SCNC: 132 MMOL/L (ref 136–145)
WBC # BLD AUTO: 12 X10*3/UL (ref 4.4–11.3)

## 2024-06-07 PROCEDURE — 71045 X-RAY EXAM CHEST 1 VIEW: CPT

## 2024-06-07 PROCEDURE — 2500000002 HC RX 250 W HCPCS SELF ADMINISTERED DRUGS (ALT 637 FOR MEDICARE OP, ALT 636 FOR OP/ED): Performed by: NURSE PRACTITIONER

## 2024-06-07 PROCEDURE — 94668 MNPJ CHEST WALL SBSQ: CPT

## 2024-06-07 PROCEDURE — 83735 ASSAY OF MAGNESIUM: CPT | Performed by: NURSE PRACTITIONER

## 2024-06-07 PROCEDURE — 2500000001 HC RX 250 WO HCPCS SELF ADMINISTERED DRUGS (ALT 637 FOR MEDICARE OP): Performed by: NURSE PRACTITIONER

## 2024-06-07 PROCEDURE — S4991 NICOTINE PATCH NONLEGEND: HCPCS | Performed by: NURSE PRACTITIONER

## 2024-06-07 PROCEDURE — 80069 RENAL FUNCTION PANEL: CPT | Performed by: NURSE PRACTITIONER

## 2024-06-07 PROCEDURE — 36415 COLL VENOUS BLD VENIPUNCTURE: CPT | Performed by: NURSE PRACTITIONER

## 2024-06-07 PROCEDURE — 2500000005 HC RX 250 GENERAL PHARMACY W/O HCPCS: Performed by: NURSE PRACTITIONER

## 2024-06-07 PROCEDURE — 71045 X-RAY EXAM CHEST 1 VIEW: CPT | Performed by: RADIOLOGY

## 2024-06-07 PROCEDURE — 2500000004 HC RX 250 GENERAL PHARMACY W/ HCPCS (ALT 636 FOR OP/ED)

## 2024-06-07 PROCEDURE — 2500000001 HC RX 250 WO HCPCS SELF ADMINISTERED DRUGS (ALT 637 FOR MEDICARE OP)

## 2024-06-07 PROCEDURE — 2500000004 HC RX 250 GENERAL PHARMACY W/ HCPCS (ALT 636 FOR OP/ED): Performed by: NURSE PRACTITIONER

## 2024-06-07 PROCEDURE — 85027 COMPLETE CBC AUTOMATED: CPT | Performed by: NURSE PRACTITIONER

## 2024-06-07 PROCEDURE — 74018 RADEX ABDOMEN 1 VIEW: CPT | Performed by: RADIOLOGY

## 2024-06-07 PROCEDURE — 1200000002 HC GENERAL ROOM WITH TELEMETRY DAILY

## 2024-06-07 RX ORDER — METHOCARBAMOL 500 MG/1
500 TABLET, FILM COATED ORAL EVERY 8 HOURS PRN
Status: DISCONTINUED | OUTPATIENT
Start: 2024-06-07 | End: 2024-06-07

## 2024-06-07 RX ORDER — POLYETHYLENE GLYCOL 3350 17 G/17G
17 POWDER, FOR SOLUTION ORAL 2 TIMES DAILY
Status: DISCONTINUED | OUTPATIENT
Start: 2024-06-07 | End: 2024-06-08

## 2024-06-07 RX ORDER — CHLORPROMAZINE HYDROCHLORIDE 25 MG/ML
12.5 INJECTION INTRAMUSCULAR ONCE
Status: COMPLETED | OUTPATIENT
Start: 2024-06-07 | End: 2024-06-07

## 2024-06-07 RX ORDER — TRAZODONE HYDROCHLORIDE 50 MG/1
25 TABLET ORAL NIGHTLY PRN
Status: DISCONTINUED | OUTPATIENT
Start: 2024-06-07 | End: 2024-06-18 | Stop reason: HOSPADM

## 2024-06-07 RX ORDER — BISACODYL 10 MG/1
10 SUPPOSITORY RECTAL ONCE AS NEEDED
Status: DISCONTINUED | OUTPATIENT
Start: 2024-06-07 | End: 2024-06-18 | Stop reason: HOSPADM

## 2024-06-07 RX ORDER — FUROSEMIDE 10 MG/ML
20 INJECTION INTRAMUSCULAR; INTRAVENOUS ONCE
Status: COMPLETED | OUTPATIENT
Start: 2024-06-07 | End: 2024-06-07

## 2024-06-07 RX ORDER — TRAMADOL HYDROCHLORIDE 50 MG/1
50 TABLET ORAL EVERY 6 HOURS PRN
Status: DISCONTINUED | OUTPATIENT
Start: 2024-06-07 | End: 2024-06-18 | Stop reason: HOSPADM

## 2024-06-07 RX ORDER — CHLORPROMAZINE HYDROCHLORIDE 25 MG/ML
25 INJECTION INTRAMUSCULAR ONCE
Status: DISCONTINUED | OUTPATIENT
Start: 2024-06-07 | End: 2024-06-09

## 2024-06-07 RX ORDER — LANOLIN ALCOHOL/MO/W.PET/CERES
400 CREAM (GRAM) TOPICAL ONCE
Status: COMPLETED | OUTPATIENT
Start: 2024-06-07 | End: 2024-06-07

## 2024-06-07 RX ORDER — TRAMADOL HYDROCHLORIDE 50 MG/1
25 TABLET ORAL EVERY 6 HOURS PRN
Status: DISCONTINUED | OUTPATIENT
Start: 2024-06-07 | End: 2024-06-18 | Stop reason: HOSPADM

## 2024-06-07 RX ADMIN — MUPIROCIN 1 APPLICATION: 20 OINTMENT TOPICAL at 20:52

## 2024-06-07 RX ADMIN — ACETAMINOPHEN 975 MG: 325 TABLET ORAL at 20:54

## 2024-06-07 RX ADMIN — Medication 1 TABLET: at 09:39

## 2024-06-07 RX ADMIN — SENNOSIDES AND DOCUSATE SODIUM 2 TABLET: 8.6; 5 TABLET ORAL at 09:39

## 2024-06-07 RX ADMIN — ACETAMINOPHEN 975 MG: 325 TABLET ORAL at 06:21

## 2024-06-07 RX ADMIN — HEPARIN SODIUM 5000 UNITS: 5000 INJECTION INTRAVENOUS; SUBCUTANEOUS at 09:39

## 2024-06-07 RX ADMIN — ACETAMINOPHEN 975 MG: 325 TABLET ORAL at 13:01

## 2024-06-07 RX ADMIN — OXYCODONE HYDROCHLORIDE 5 MG: 5 TABLET ORAL at 02:52

## 2024-06-07 RX ADMIN — HEPARIN SODIUM 5000 UNITS: 5000 INJECTION INTRAVENOUS; SUBCUTANEOUS at 01:45

## 2024-06-07 RX ADMIN — ASPIRIN 81 MG CHEWABLE TABLET 81 MG: 81 TABLET CHEWABLE at 09:00

## 2024-06-07 RX ADMIN — Medication 10 MG: at 20:55

## 2024-06-07 RX ADMIN — FUROSEMIDE 20 MG: 10 INJECTION, SOLUTION INTRAMUSCULAR; INTRAVENOUS at 09:39

## 2024-06-07 RX ADMIN — POLYSACCHARIDE-IRON COMPLEX 150 MG: 150 CAPSULE ORAL at 09:38

## 2024-06-07 RX ADMIN — MUPIROCIN 1 APPLICATION: 20 OINTMENT TOPICAL at 09:50

## 2024-06-07 RX ADMIN — Medication 28 PERCENT: at 20:46

## 2024-06-07 RX ADMIN — POLYETHYLENE GLYCOL 3350 17 G: 17 POWDER, FOR SOLUTION ORAL at 13:01

## 2024-06-07 RX ADMIN — HEPARIN SODIUM 5000 UNITS: 5000 INJECTION INTRAVENOUS; SUBCUTANEOUS at 17:44

## 2024-06-07 RX ADMIN — TRAMADOL HYDROCHLORIDE 50 MG: 50 TABLET ORAL at 21:58

## 2024-06-07 RX ADMIN — Medication 400 MG: at 09:38

## 2024-06-07 RX ADMIN — NICOTINE 1 PATCH: 21 PATCH, EXTENDED RELEASE TRANSDERMAL at 17:43

## 2024-06-07 RX ADMIN — CHLORPROMAZINE HYDROCHLORIDE 12.5 MG: 25 INJECTION INTRAMUSCULAR at 13:02

## 2024-06-07 RX ADMIN — METHOCARBAMOL 500 MG: 500 TABLET ORAL at 06:28

## 2024-06-07 ASSESSMENT — PAIN DESCRIPTION - LOCATION
LOCATION: BACK

## 2024-06-07 ASSESSMENT — PAIN - FUNCTIONAL ASSESSMENT: PAIN_FUNCTIONAL_ASSESSMENT: 0-10

## 2024-06-07 ASSESSMENT — PAIN SCALES - GENERAL
PAINLEVEL_OUTOF10: 2
PAINLEVEL_OUTOF10: 6
PAINLEVEL_OUTOF10: 8

## 2024-06-07 NOTE — CARE PLAN
The patient's goals for the shift include pain management.    The clinical goals for the shift include pt will remain hds thru shift end    Over the shift, the patient sat up in the chair and ambulated in the hallway. Patient encouraged to use the I.S. every hour x10 and C&DB. Patient had no c/o pain once CT's removed, continue to monitor. Patient kept up to date on plan of care.

## 2024-06-07 NOTE — PROGRESS NOTES
Met w/ pt to introduce myself, discuss role & discharge planning. Confirmed demographics. Spouse-Nancy. Denies recent falls. Independent in ADL's. Feels safe at home. No SW needs at this time.     Home care: agreeable to [facility] (confirmed 6/7 [facility] plan in network with [facility])    PCP: Dr. Amado   Pharmacy: [facility] ([place], [place])    This TCC will continue to follow for home going needs and safe DC plan.     Mayelin Parsons RN

## 2024-06-07 NOTE — HOSPITAL COURSE
Farooq Lang is a 63 year old male with a PMHx of mitral valve stenosis secondary to rheumatic disease, afib (on coumadin), HFrEF, and thrombocytopenia, who presented to [facility] on 6/4/2024 for planned cardiac surgery with Dr. Nugent.  .     As an outpatient, he presented to the emergency department with complaints of chest pain for which he was found to have severe mitral valve stenosis.  He underwent preoperative coronary angiogram showing no significant coronary artery disease.  He is also on chronic atrial fibrillation on p.o. anticoagulation.  The echo shows a severe mitral stenosis, extreme thickening and shortening of the subvalvular apparatus, the LV and the RV functions are preserved, there is no significant TR, for which Dr. Nugent and the patient agreed to proceed with mitral valve surgery.      OPERATION/PROCEDURE: 6/4/2024 with Lukasz Nugent   - Median sternotomy  - Standard central cannulation  - Mitral valve replacement with 27mm mitris bioprosthetic valve  - Left atrial appendage ligation   - Prevena dressing    CTICU Course: uneventful, postoperative pain  Transfer to LT3: 6/6/2024  ==================    Floor Course:  - Patient was diuresed for fluid volume overload post cardiac surgery; Preop weight: ***kg, discharge wt: ***kg  - On ASA, statin, BB, *** by discharge  - Epicardial wires CUT on ***  - Telemetry at discharge ***  - 2v CXR done ***  - Postop echo done ***  - Cardiac rehab referral was placed  - PT recs home and low intensity therapy  - Anticipate discharge to home with homecare    On day of discharge, vital signs were stable and no acute distress was noted. All questions were answered. After VS and labs were reviewed it was determined the patient was stable for discharge.     Discharged on ***  ========================  Hospital day of discharge management- spent >30 minutes coordinating the discharge and counseling/educating patient and family regarding discharge  instructions.     Past Medical History:  - A-fib on coumadin    - Diverticulosis    - Heart failure with mid-range ejection fraction (HFmEF) (Multi)      (45%, 3/28/24)  - Irritable bowel syndrome    - Mitral valve stenosis    - Thrombocytopenia         Past Surgical History:  - CARDIAC CATHETERIZATION: Left And Right Heart Cath, Without LV;  Surgeon: Yobani Soto MD;  Location: [place] [address] Cardiac Cath Lab (04/22/2024)  - COLONOSCOPY      - DENTAL SURGERY      - TONSILLECTOMY                  Medications Prior to Admission:  - dilTIAZem CD (Cardizem CD) 120 mg 24 hr capsule; Take 1 capsule (120 mg) by mouth once daily in the evening.   - UNABLE TO FIND; Take 1 capsule by mouth once daily. Med Name: IBS Clear supplement   - warfarin (Coumadin) 5 mg tablet; Take 1.5 tablets (7.5mg) once daily on Monday and Wednesdays, and; 1 tablet (5mg) once daily on all other days of the week.)   - furosemide (Lasix) 40 mg tablet; Take 0.5 tablets (20 mg) by mouth once daily. (Patient not taking: Reported on 6/4/2024)   Patient has no known allergies.    Social History:       - Smoking status: Every Day; 0.5 PPD/49 years   - Smokeless tobacco: Never  - Vaping status: Never Used  - Alcohol use: Never  - Drug use: Never         Family History:  - Father: Cancer, brain aneurysm

## 2024-06-07 NOTE — SIGNIFICANT EVENT
Procedure explained to patient and patient understood.  Chest tube sites cleaned with chlorhexidene and sutures removed without difficulty.  No air leaks present and patient breathing comfortably on 2L NC saturating well in the mid 90's for SPO2.      1 right pleural and 1 mediastinal chest tube removed without difficulty and tip intact.  Occlusive Vaseline gauze dressing applied. Patient tolerated well.    Stat 1V CXR ordered.    Annie Gallegos, APRN-CNP  Cardiac Surgery [facility]  [facility]  Team Pager [phone]

## 2024-06-07 NOTE — PROGRESS NOTES
"CARDIAC SURGERY DAILY PROGRESS NOTE    Farooq Lang is a 63 year old male with a PMHx of mitral valve stenosis secondary to rheumatic disease, afib (on coumadin), HFrEF, and thrombocytopenia, who presented to [facility] on 6/4/2024 for planned cardiac surgery with Dr. Nugent.  .     As an outpatient, he presented to the emergency department with complaints of chest pain for which he was found to have severe mitral valve stenosis.  He underwent preoperative coronary angiogram showing no significant coronary artery disease.  He is also on chronic atrial fibrillation on p.o. anticoagulation.  The echo shows a severe mitral stenosis, extreme thickening and shortening of the subvalvular apparatus, the LV and the RV functions are preserved, there is no significant TR, for which Dr. Nugent and the patient agreed to proceed with mitral valve surgery.      OPERATION/PROCEDURE: 6/4/2024 with Lukasz Nugent   - Median sternotomy  - Standard central cannulation  - Mitral valve replacement with 27mm mitris bioprosthetic valve  - Left atrial appendage ligation   - Prevena dressing    CTICU Course: uneventful, postoperative pain  Transfer to LT3: 6/6/2024  ==================    Interval History:   New out of ICU; no acute events overnight.    - Chest tubes removed today without complicated  - Tele: AFIB rate controlled int he 60's to 70's with intermittent periods of sinus bradycardia in the 50's; currently holding BB.     SUBJECTIVE:  Complains of upper back pain and being unable to sleep.      Objective   /73 (BP Location: Right arm, Patient Position: Sitting)   Pulse 59   Temp 36.6 °C (97.9 °F) (Temporal)   Resp 18   Ht 1.676 m (5' 6\")   Wt 57.4 kg (126 lb 8.7 oz)   SpO2 96%   BMI 20.42 kg/m²   Pain Score: 2   3 Day Weight Change: Unable to Calculate    Intake and Output    Intake/Output Summary (Last 24 hours) at 6/7/2024 1127  Last data filed at 6/7/2024 0951  Gross per 24 hour   Intake 720 ml "   Output 1120 ml   Net -400 ml       Physical Exam  Vitals and nursing note reviewed.   Constitutional:       General: He is not in acute distress.     Appearance: Normal appearance. He is not ill-appearing.      Comments: Patient alert and awake sitting up in chair, in no acute distress.     HENT:      Head: Atraumatic.      Nose: Nose normal.      Mouth/Throat:      Pharynx: Oropharynx is clear.   Eyes:      Conjunctiva/sclera: Conjunctivae normal.      Pupils: Pupils are equal, round, and reactive to light.   Neck:      Comments: Trachea Midline.   Cardiovascular:      Rate and Rhythm: Rhythm irregular.      Pulses: Normal pulses.      Heart sounds: Normal heart sounds. No murmur heard.     No gallop.      Comments: Tele: AFIB rate controlled in the 60's to 70's with intermittent periods of sinus bradycardia in the 50's.    +2 equal and even pulses in all extremities.   V wires - connected to external pacemaker VVI @ 50.     Pulmonary:      Effort: Pulmonary effort is normal. No respiratory distress.      Breath sounds: Normal breath sounds. No wheezing.      Comments: Equal and even chest expansion; thorax symmetric.    Diminished breath sounds in bilateral base.    Good inspiratory effort on 2L NC.  Sternum Stable  - pravena dressing removed on 6/7.   Abdominal:      General: Bowel sounds are normal. There is no distension.      Palpations: Abdomen is soft.      Tenderness: There is no abdominal tenderness.      Comments: Awaiting postop BM; +bowel sounds in all quadrants    Genitourinary:     Comments: Voids independently   Musculoskeletal:      Cervical back: Neck supple.      Right lower leg: Edema present.      Left lower leg: Edema present.      Comments: RIZVI; 5/5 strength in all extremities   Ambulates with stand-by assist; ambulating well.    Skin:     General: Skin is warm and dry.      Capillary Refill: Capillary refill takes less than 2 seconds.      Coloration: Skin is not jaundiced or pale.       Findings: Bruising (arms) present.      Comments: Mid-Sternal Incision: well approximated, no s/s of infection or bleeding, no crepitus - RE      Neurological:      General: No focal deficit present.      Mental Status: He is alert and oriented to person, place, and time.   Psychiatric:         Mood and Affect: Mood normal.         Behavior: Behavior normal.       Medications  Scheduled medications  acetaminophen, 975 mg, oral, q6h  aspirin, 81 mg, oral, Daily  chlorproMAZINE, 12.5 mg, intramuscular, Once  heparin, 5,000 Units, subcutaneous, q8h  lidocaine, 1 patch, transdermal, Daily  melatonin, 10 mg, oral, Nightly  methocarbamol, 500 mg, oral, q8h AUGUSTINE  multivitamin with minerals, 1 tablet, oral, Daily  mupirocin, 1 Application, Each Nostril, BID  nicotine, 1 patch, transdermal, Daily   Followed by  [START ON 7/17/2024] nicotine, 1 patch, transdermal, Daily   Followed by  [START ON 7/31/2024] nicotine, 1 patch, transdermal, Daily  sennosides-docusate sodium, 2 tablet, oral, BID    Continuous medications   PRN medications  PRN medications: calcium carbonate, dextrose **OR** glucagon, naloxone, [DISCONTINUED] ondansetron **OR** ondansetron, oxygen, polyethylene glycol, traMADol, traMADol, traZODone    LABS:  CMP:  Results from last 7 days   Lab Units 06/07/24  0719 06/06/24  0233 06/05/24  1217 06/05/24  0358 06/04/24  2142   SODIUM mmol/L 132* 136 136 140 142   POTASSIUM mmol/L 4.3 4.5 4.9 4.2 4.6   CHLORIDE mmol/L 99 102 107 107 109*   CO2 mmol/L 26 26 21 22 23   ANION GAP mmol/L 11 13 13 15 15   BUN mg/dL 23 20 17 17 16   CREATININE mg/dL 0.89 0.86 0.92 1.20 1.16   EGFR mL/min/1.73m*2 >90 >90 >90 68 70   MAGNESIUM mg/dL 1.77 2.00 2.26 2.35 2.85*   ALBUMIN g/dL 3.4 3.5 3.8 3.7 3.4     CBC:  Results from last 7 days   Lab Units 06/07/24  0719 06/06/24  0233 06/05/24  1217 06/05/24  0358 06/04/24  2142   WBC AUTO x10*3/uL 12.0* 15.9* 13.7* 15.1* 18.0*   HEMOGLOBIN g/dL 10.8* 12.0* 12.9* 13.3* 14.0   HEMATOCRIT %  33.1* 37.0* 40.6* 39.0* 42.3   PLATELETS AUTO x10*3/uL 75* 78* 81* 95* 97*   MCV fL 90 90 94 89 89     COAG:   Results from last 7 days   Lab Units 06/04/24 2142   INR  1.5*     HEME/ENDO:     CARDIAC:          IMPRESSION & PLAN:  POD # 3 s/p MVR and KONRAD clipping with Dr. Nugent   - Increase activity/ ambulation; PT/OT  - Encourage IS, C/DB; respiratory therapy; wean O2 as zo - on 2L NC   - Cardiac rehab referral   - Continue cardiac meds: ASA, statin   - No BB due to bradycardia underlying pacer rhythm.   - Pain and anticonstipation meds  - 6/7:  1 right pleural and 1 mediastinal Chest tubes removed without difficulty, and postop removal CXR ordered.    - 2v CXR ordered for 6/8.   - Postop echo ordered on 6/7.   - CUT epicardial wires prior to discharge; per surgeon preference.    - Tele until discharge    Rhythm  Hx of Atrial Fibrillation: home meds: Coumadin 5mg daily; Diltiazem 120mg daily    - Tele: AFIB rate controlled in the 60's to 70's with intermittent periods of sinus bradycardia in the 50's.   - Continue holding BB due to bradycardia   - Continue holding home coumadin; discussed with Dr. Nugent about resuming; awaiting response.   - continue holding home diltiazem   - Adjust medications as tolerated    Acute Blood Loss Anemia - Resolved   Recent Labs     06/07/24  0719 06/06/24  0233 06/05/24 1217 06/05/24 0358 06/04/24 2142 05/08/24 0918 05/06/24  1306   HGB 10.8* 12.0* 12.9* 13.3* 14.0 16.6 15.1   HCT 33.1* 37.0* 40.6* 39.0* 42.3 51.3 45.8   - MV, PO Iron x1mo  - Daily labs, transfuse as indicated    Hx of Thrombocytopenia - currently stable   Recent Labs     06/07/24  0719 06/06/24  0233 06/05/24  1217 06/05/24  0358 06/04/24 2142 05/08/24 0918 05/06/24  1306   PLT 75* 78* 81* 95* 97* 147* 140*   - Etiology likely postop/CPB related  - 4T score low probability of HIT; will continue to assess.   - Trend with daily CBCs      Chronic diastolic heart failure with EF 45 % on echo done 3/28.   Home  Meds: Furosemide 20mg Daily   Volume/Electrolyte Status: Preop wt Weight: 57.4 kg (126 lb 8.7 oz)   Vitals:    24 1511   Weight: 57.4 kg (126 lb 8.7 oz)     - Continue medication optimization/ GDMT:   ---holding BB due to bradycardia; will resume as indicated/tolerated   --- continue diuresis with IV Lasix   - Weight: X   - Adjust diuresis for postop cardiac surgery hypervolemia  - : Lasix 20mg IV x1; replaced Mg   - Replete electrolytes for hypokalemia/hypomagnesemia  - Daily weights/strict I&Os  - Daily RFP     Leukocytosis - Improving   Recent Labs     24  0719 24  0233 24  1217 24  0358 24  2142 24  0918 24  1306   WBC 12.0* 15.9* 13.7* 15.1* 18.0* 6.9 5.9     Temp (36hrs), Av.4 °C (97.5 °F), Min:36 °C (96.8 °F), Max:36.6 °C (97.9 °F)     - aggressive pulmonary hygiene with IS/EZ pap   - monitor for s/s infection; currently afebrile without any current s/s and down-trending WBC count   - likely atelectasis/ postoperative in etiology  - daily CBC     Diverticulosis/Irritable Bowel Syndrome   Home meds: supplement; unable to find for IBS   - Diet as tolerated   - continue bowel regimen: Miralax and per colace     Tobacco use/ Current smoker: 0.5 PPD x49 years   -encourage smoking cessation  -continue nicotine replacement via patch   -aggressive pulmonary hygiene  -wean O2 for O2 sats > 92% - on 2L NC     VTE Prophylaxis: SCDs/TEDs, ambulation, SQ heparin  Code Status: Full Code    Dispo  - PT/OT recs low intensity level of continued care; home   - Would benefit from homecare for cardiac surgery carepath and RN visits  - Anticipate discharge 2-3 days, pending postop imaging, supplemental O2 wean, ambulation/mobility, HR control.   - Will continue to assess discharge needs    LENY Daly-CNP  Cardiac Surgery NIKO  [facility]  Team Phone [phone]    2024  11:27 AM

## 2024-06-08 ENCOUNTER — APPOINTMENT (OUTPATIENT)
Dept: CARDIOLOGY | Facility: HOSPITAL | Age: 64
DRG: 219 | End: 2024-06-08
Payer: COMMERCIAL

## 2024-06-08 ENCOUNTER — APPOINTMENT (OUTPATIENT)
Dept: RADIOLOGY | Facility: HOSPITAL | Age: 64
DRG: 219 | End: 2024-06-08
Payer: COMMERCIAL

## 2024-06-08 LAB
ALBUMIN SERPL BCP-MCNC: 3.1 G/DL (ref 3.4–5)
ANION GAP SERPL CALC-SCNC: 12 MMOL/L (ref 10–20)
AORTIC VALVE PEAK VELOCITY: 1.26 M/S
AV PEAK GRADIENT: 6.4 MMHG
AVA (PEAK VEL): 2.02 CM2
BUN SERPL-MCNC: 19 MG/DL (ref 6–23)
CALCIUM SERPL-MCNC: 8.5 MG/DL (ref 8.6–10.6)
CHLORIDE SERPL-SCNC: 99 MMOL/L (ref 98–107)
CO2 SERPL-SCNC: 27 MMOL/L (ref 21–32)
CREAT SERPL-MCNC: 0.79 MG/DL (ref 0.5–1.3)
EGFRCR SERPLBLD CKD-EPI 2021: >90 ML/MIN/1.73M*2
EJECTION FRACTION APICAL 4 CHAMBER: 50.1
ERYTHROCYTE [DISTWIDTH] IN BLOOD BY AUTOMATED COUNT: 14.3 % (ref 11.5–14.5)
GLUCOSE SERPL-MCNC: 86 MG/DL (ref 74–99)
HCT VFR BLD AUTO: 30.6 % (ref 41–52)
HGB BLD-MCNC: 10.2 G/DL (ref 13.5–17.5)
LEFT ATRIUM VOLUME AREA LENGTH INDEX BSA: 69.5 ML/M2
LEFT VENTRICLE INTERNAL DIMENSION DIASTOLE: 4.6 CM (ref 3.5–6)
LEFT VENTRICULAR OUTFLOW TRACT DIAMETER: 1.8 CM
LV EJECTION FRACTION BIPLANE: 61 %
MAGNESIUM SERPL-MCNC: 1.76 MG/DL (ref 1.6–2.4)
MCH RBC QN AUTO: 29.5 PG (ref 26–34)
MCHC RBC AUTO-ENTMCNC: 33.3 G/DL (ref 32–36)
MCV RBC AUTO: 88 FL (ref 80–100)
NRBC BLD-RTO: 0 /100 WBCS (ref 0–0)
PHOSPHATE SERPL-MCNC: 1.9 MG/DL (ref 2.5–4.9)
PLATELET # BLD AUTO: 88 X10*3/UL (ref 150–450)
POTASSIUM SERPL-SCNC: 3.9 MMOL/L (ref 3.5–5.3)
RBC # BLD AUTO: 3.46 X10*6/UL (ref 4.5–5.9)
RIGHT VENTRICLE PEAK SYSTOLIC PRESSURE: 26.8 MMHG
SODIUM SERPL-SCNC: 134 MMOL/L (ref 136–145)
WBC # BLD AUTO: 8.1 X10*3/UL (ref 4.4–11.3)

## 2024-06-08 PROCEDURE — 83735 ASSAY OF MAGNESIUM: CPT | Performed by: NURSE PRACTITIONER

## 2024-06-08 PROCEDURE — 93306 TTE W/DOPPLER COMPLETE: CPT

## 2024-06-08 PROCEDURE — 85027 COMPLETE CBC AUTOMATED: CPT | Performed by: NURSE PRACTITIONER

## 2024-06-08 PROCEDURE — 2500000001 HC RX 250 WO HCPCS SELF ADMINISTERED DRUGS (ALT 637 FOR MEDICARE OP): Performed by: NURSE PRACTITIONER

## 2024-06-08 PROCEDURE — 2500000002 HC RX 250 W HCPCS SELF ADMINISTERED DRUGS (ALT 637 FOR MEDICARE OP, ALT 636 FOR OP/ED): Performed by: NURSE PRACTITIONER

## 2024-06-08 PROCEDURE — 71046 X-RAY EXAM CHEST 2 VIEWS: CPT | Performed by: RADIOLOGY

## 2024-06-08 PROCEDURE — 80069 RENAL FUNCTION PANEL: CPT | Performed by: NURSE PRACTITIONER

## 2024-06-08 PROCEDURE — 2500000004 HC RX 250 GENERAL PHARMACY W/ HCPCS (ALT 636 FOR OP/ED): Performed by: NURSE PRACTITIONER

## 2024-06-08 PROCEDURE — 1200000002 HC GENERAL ROOM WITH TELEMETRY DAILY

## 2024-06-08 PROCEDURE — 94668 MNPJ CHEST WALL SBSQ: CPT

## 2024-06-08 PROCEDURE — 2500000004 HC RX 250 GENERAL PHARMACY W/ HCPCS (ALT 636 FOR OP/ED)

## 2024-06-08 PROCEDURE — S4991 NICOTINE PATCH NONLEGEND: HCPCS | Performed by: NURSE PRACTITIONER

## 2024-06-08 PROCEDURE — 71046 X-RAY EXAM CHEST 2 VIEWS: CPT

## 2024-06-08 PROCEDURE — 2500000001 HC RX 250 WO HCPCS SELF ADMINISTERED DRUGS (ALT 637 FOR MEDICARE OP)

## 2024-06-08 PROCEDURE — 2500000002 HC RX 250 W HCPCS SELF ADMINISTERED DRUGS (ALT 637 FOR MEDICARE OP, ALT 636 FOR OP/ED)

## 2024-06-08 PROCEDURE — 93306 TTE W/DOPPLER COMPLETE: CPT | Performed by: INTERNAL MEDICINE

## 2024-06-08 RX ORDER — AMOXICILLIN 250 MG
2 CAPSULE ORAL 2 TIMES DAILY PRN
Status: DISCONTINUED | OUTPATIENT
Start: 2024-06-08 | End: 2024-06-11

## 2024-06-08 RX ORDER — FUROSEMIDE 40 MG/1
20 TABLET ORAL DAILY
Status: DISCONTINUED | OUTPATIENT
Start: 2024-06-08 | End: 2024-06-18 | Stop reason: HOSPADM

## 2024-06-08 RX ORDER — METOPROLOL TARTRATE 25 MG/1
12.5 TABLET, FILM COATED ORAL 2 TIMES DAILY
Status: DISCONTINUED | OUTPATIENT
Start: 2024-06-08 | End: 2024-06-12

## 2024-06-08 RX ORDER — LANOLIN ALCOHOL/MO/W.PET/CERES
800 CREAM (GRAM) TOPICAL ONCE
Status: COMPLETED | OUTPATIENT
Start: 2024-06-08 | End: 2024-06-08

## 2024-06-08 RX ORDER — POTASSIUM CHLORIDE 20 MEQ/1
40 TABLET, EXTENDED RELEASE ORAL ONCE
Status: COMPLETED | OUTPATIENT
Start: 2024-06-08 | End: 2024-06-08

## 2024-06-08 RX ORDER — ATORVASTATIN CALCIUM 20 MG/1
20 TABLET, FILM COATED ORAL NIGHTLY
Status: DISCONTINUED | OUTPATIENT
Start: 2024-06-08 | End: 2024-06-09

## 2024-06-08 RX ORDER — ADHESIVE BANDAGE
30 BANDAGE TOPICAL ONCE
Status: COMPLETED | OUTPATIENT
Start: 2024-06-08 | End: 2024-06-08

## 2024-06-08 RX ORDER — POLYETHYLENE GLYCOL 3350 17 G/17G
17 POWDER, FOR SOLUTION ORAL DAILY PRN
Status: DISCONTINUED | OUTPATIENT
Start: 2024-06-08 | End: 2024-06-11

## 2024-06-08 RX ORDER — HYDROXYZINE HYDROCHLORIDE 25 MG/1
25 TABLET, FILM COATED ORAL EVERY 6 HOURS PRN
Status: DISCONTINUED | OUTPATIENT
Start: 2024-06-08 | End: 2024-06-09

## 2024-06-08 RX ADMIN — Medication 800 MG: at 10:12

## 2024-06-08 RX ADMIN — SENNOSIDES AND DOCUSATE SODIUM 2 TABLET: 8.6; 5 TABLET ORAL at 09:25

## 2024-06-08 RX ADMIN — NICOTINE 1 PATCH: 21 PATCH, EXTENDED RELEASE TRANSDERMAL at 17:44

## 2024-06-08 RX ADMIN — ACETAMINOPHEN 975 MG: 325 TABLET ORAL at 14:52

## 2024-06-08 RX ADMIN — ASPIRIN 81 MG CHEWABLE TABLET 81 MG: 81 TABLET CHEWABLE at 09:25

## 2024-06-08 RX ADMIN — METOPROLOL TARTRATE 12.5 MG: 25 TABLET, FILM COATED ORAL at 20:08

## 2024-06-08 RX ADMIN — HEPARIN SODIUM 5000 UNITS: 5000 INJECTION INTRAVENOUS; SUBCUTANEOUS at 09:25

## 2024-06-08 RX ADMIN — MUPIROCIN 1 APPLICATION: 20 OINTMENT TOPICAL at 20:08

## 2024-06-08 RX ADMIN — ACETAMINOPHEN 975 MG: 325 TABLET ORAL at 09:25

## 2024-06-08 RX ADMIN — POLYETHYLENE GLYCOL 3350 17 G: 17 POWDER, FOR SOLUTION ORAL at 09:25

## 2024-06-08 RX ADMIN — HEPARIN SODIUM 5000 UNITS: 5000 INJECTION INTRAVENOUS; SUBCUTANEOUS at 02:18

## 2024-06-08 RX ADMIN — POTASSIUM CHLORIDE 40 MEQ: 1500 TABLET, EXTENDED RELEASE ORAL at 10:12

## 2024-06-08 RX ADMIN — HEPARIN SODIUM 5000 UNITS: 5000 INJECTION INTRAVENOUS; SUBCUTANEOUS at 17:45

## 2024-06-08 RX ADMIN — CALCIUM CARBONATE (ANTACID) CHEW TAB 500 MG 500 MG: 500 CHEW TAB at 18:39

## 2024-06-08 RX ADMIN — MAGNESIUM HYDROXIDE 30 ML: 400 SUSPENSION ORAL at 10:26

## 2024-06-08 RX ADMIN — ATORVASTATIN CALCIUM 20 MG: 20 TABLET, FILM COATED ORAL at 20:08

## 2024-06-08 RX ADMIN — MUPIROCIN 1 APPLICATION: 20 OINTMENT TOPICAL at 09:25

## 2024-06-08 RX ADMIN — ACETAMINOPHEN 975 MG: 325 TABLET ORAL at 20:08

## 2024-06-08 RX ADMIN — Medication 1 TABLET: at 09:24

## 2024-06-08 RX ADMIN — FUROSEMIDE 20 MG: 40 TABLET ORAL at 14:52

## 2024-06-08 ASSESSMENT — COGNITIVE AND FUNCTIONAL STATUS - GENERAL
DAILY ACTIVITIY SCORE: 24
CLIMB 3 TO 5 STEPS WITH RAILING: A LITTLE
MOBILITY SCORE: 23

## 2024-06-08 ASSESSMENT — PAIN SCALES - GENERAL
PAINLEVEL_OUTOF10: 0 - NO PAIN

## 2024-06-08 ASSESSMENT — PAIN - FUNCTIONAL ASSESSMENT
PAIN_FUNCTIONAL_ASSESSMENT: 0-10
PAIN_FUNCTIONAL_ASSESSMENT: 0-10

## 2024-06-08 NOTE — PROGRESS NOTES
"CARDIAC SURGERY DAILY PROGRESS NOTE    Farooq Lang is a 63 year old male with a PMHx of mitral valve stenosis secondary to rheumatic disease, afib (on coumadin), HFrEF, and thrombocytopenia, who presented to [facility] [place] on 6/4/2024 for planned cardiac surgery with Dr. Nugent.  .     As an outpatient, he presented to the emergency department with complaints of chest pain for which he was found to have severe mitral valve stenosis.  He underwent preoperative coronary angiogram showing no significant coronary artery disease.  He is also on chronic atrial fibrillation on p.o. anticoagulation.  The echo shows a severe mitral stenosis, extreme thickening and shortening of the subvalvular apparatus, the LV and the RV functions are preserved, there is no significant TR, for which Dr. Nugent and the patient agreed to proceed with mitral valve surgery.      OPERATION/PROCEDURE: 6/4/2024 with Lukasz Nugent   - Median sternotomy  - Standard central cannulation  - Mitral valve replacement with 27mm mitris bioprosthetic valve  - Left atrial appendage ligation   - Prevena dressing    CTICU Course: uneventful, postoperative pain  Transfer to LT3: 6/6/2024  ==================    Interval History:   No acute events   - replaced electrolytes   - started low dose BB as Tele has been stable with rate in the 60's     SUBJECTIVE:  Complains of nasal congestion.     Objective   /60 (BP Location: Right arm, Patient Position: Lying)   Pulse 61   Temp 36.6 °C (97.9 °F) (Temporal)   Resp 18   Ht 1.676 m (5' 6\")   Wt 56.8 kg (125 lb 3.2 oz)   SpO2 96%   BMI 20.21 kg/m²   Pain Score: 0 - No pain   3 Day Weight Change: -0.203 kg (-7.2 oz) per day    Intake and Output    Intake/Output Summary (Last 24 hours) at 6/8/2024 1134  Last data filed at 6/8/2024 0924  Gross per 24 hour   Intake 780 ml   Output 575 ml   Net 205 ml       Physical Exam  Vitals and nursing note reviewed.   Constitutional:       General: He is not " in acute distress.     Appearance: Normal appearance. He is not ill-appearing.      Comments: Patient alert and awake sitting up in chair, in no acute distress.     HENT:      Head: Atraumatic.      Nose: Nose normal.      Mouth/Throat:      Pharynx: Oropharynx is clear.   Eyes:      Conjunctiva/sclera: Conjunctivae normal.      Pupils: Pupils are equal, round, and reactive to light.   Neck:      Comments: Trachea Midline.   Cardiovascular:      Rate and Rhythm: Rhythm irregular.      Pulses: Normal pulses.      Heart sounds: Normal heart sounds. No murmur heard.     No gallop.      Comments: Tele: AFIB rate controlled in the 60's to 70's with intermittent periods of sinus rhythm in the 60's   +2 equal and even pulses in all extremities.   V wires - connected to external pacemaker VVI @ 50.     Pulmonary:      Effort: Pulmonary effort is normal. No respiratory distress.      Breath sounds: Normal breath sounds. No wheezing.      Comments: Equal and even chest expansion; thorax symmetric.    Diminished breath sounds in bilateral base.    Good inspiratory effort on RA  Sternum Stable  - pravena dressing removed on 6/7.   Abdominal:      General: Bowel sounds are normal. There is no distension.      Palpations: Abdomen is soft.      Tenderness: There is no abdominal tenderness.      Comments: Awaiting postop BM; +bowel sounds in all quadrants    Genitourinary:     Comments: Voids independently   Musculoskeletal:      Cervical back: Neck supple.      Right lower leg: Edema present.      Left lower leg: Edema present.      Comments: RIZVI; 5/5 strength in all extremities   Ambulates independently without assistance or a gait aide.    Skin:     General: Skin is warm and dry.      Capillary Refill: Capillary refill takes less than 2 seconds.      Coloration: Skin is not jaundiced or pale.      Findings: Bruising (arms) present.      Comments: Mid-Sternal Incision: well approximated, no s/s of infection or bleeding, no  crepitus - RE      Neurological:      General: No focal deficit present.      Mental Status: He is alert and oriented to person, place, and time.   Psychiatric:         Mood and Affect: Mood normal.         Behavior: Behavior normal.       Medications  Scheduled medications  acetaminophen, 975 mg, oral, q6h  aspirin, 81 mg, oral, Daily  atorvastatin, 20 mg, oral, Nightly  chlorproMAZINE, 25 mg, intramuscular, Once  heparin, 5,000 Units, subcutaneous, q8h  lactated Ringer's, 500 mL, intravenous, Once  lidocaine, 1 patch, transdermal, Daily  melatonin, 10 mg, oral, Nightly  metoprolol tartrate, 12.5 mg, oral, BID  multivitamin with minerals, 1 tablet, oral, Daily  mupirocin, 1 Application, Each Nostril, BID  nicotine, 1 patch, transdermal, Daily   Followed by  [START ON 7/17/2024] nicotine, 1 patch, transdermal, Daily   Followed by  [START ON 7/31/2024] nicotine, 1 patch, transdermal, Daily  polyethylene glycol, 17 g, oral, BID  sennosides-docusate sodium, 2 tablet, oral, BID    Continuous medications   PRN medications  PRN medications: bisacodyl, calcium carbonate, dextrose **OR** glucagon, naloxone, [DISCONTINUED] ondansetron **OR** ondansetron, oxygen, traMADol, traMADol, traZODone    LABS:  CMP:  Results from last 7 days   Lab Units 06/08/24  0558 06/07/24  0719 06/06/24  0233 06/05/24  1217 06/05/24  0358 06/04/24  2142   SODIUM mmol/L 134* 132* 136 136 140 142   POTASSIUM mmol/L 3.9 4.3 4.5 4.9 4.2 4.6   CHLORIDE mmol/L 99 99 102 107 107 109*   CO2 mmol/L 27 26 26 21 22 23   ANION GAP mmol/L 12 11 13 13 15 15   BUN mg/dL 19 23 20 17 17 16   CREATININE mg/dL 0.79 0.89 0.86 0.92 1.20 1.16   EGFR mL/min/1.73m*2 >90 >90 >90 >90 68 70   MAGNESIUM mg/dL 1.76 1.77 2.00 2.26 2.35 2.85*   ALBUMIN g/dL 3.1* 3.4 3.5 3.8 3.7 3.4     CBC:  Results from last 7 days   Lab Units 06/08/24  0558 06/07/24  0719 06/06/24  0233 06/05/24  1217 06/05/24  0358 06/04/24  2142   WBC AUTO x10*3/uL 8.1 12.0* 15.9* 13.7* 15.1* 18.0*    HEMOGLOBIN g/dL 10.2* 10.8* 12.0* 12.9* 13.3* 14.0   HEMATOCRIT % 30.6* 33.1* 37.0* 40.6* 39.0* 42.3   PLATELETS AUTO x10*3/uL 88* 75* 78* 81* 95* 97*   MCV fL 88 90 90 94 89 89     COAG:   Results from last 7 days   Lab Units 06/04/24 2142   INR  1.5*     HEME/ENDO:     CARDIAC:          IMPRESSION & PLAN:  POD # 4 s/p MVR and KONRAD clipping with Dr. Nugent   - Increase activity/ ambulation; PT/OT  - Encourage IS, C/DB; respiratory therapy; wean O2 as zo - on Room air   - Cardiac rehab referral   - Continue cardiac meds: ASA, statin, BB  - Pain and anticonstipation meds  - 6/7:  1 right pleural and 1 mediastinal Chest tubes removed without difficulty, and postop removal CXR ordered.    - 2v CXR ordered for 6/8.   - Postop echo ordered on 6/7.   - CUT epicardial wires prior to discharge; per surgeon preference.    - Tele until discharge    Rhythm  Hx of Atrial Fibrillation: home meds: Coumadin 5mg daily; Diltiazem 120mg daily    - Tele: AFIB rate controlled in the 60's to 70's with intermittent periods of sinus rhythm in the 60's   - Continue BB: 6/8: initiated low dose metoprolol tartrate 12.5mg BID   - Continue holding home coumadin; discussed with Dr. Nugent about resuming; awaiting response.   - continue holding home diltiazem   - Adjust medications as tolerated    Acute Blood Loss Anemia - Resolved   Recent Labs     06/08/24  0558 06/07/24  0719 06/06/24  0233 06/05/24  1217 06/05/24  0358 06/04/24  2142 05/08/24  0918   HGB 10.2* 10.8* 12.0* 12.9* 13.3* 14.0 16.6   HCT 30.6* 33.1* 37.0* 40.6* 39.0* 42.3 51.3   - MV, PO Iron x1mo  - Daily labs, transfuse as indicated    Hx of Thrombocytopenia - currently stable   Recent Labs     06/08/24  0558 06/07/24  0719 06/06/24  0233 06/05/24  1217 06/05/24  0358 06/04/24  2142 05/08/24  0918   PLT 88* 75* 78* 81* 95* 97* 147*   - Etiology likely postop/CPB related  - 4T score low probability of HIT; will continue to assess.   - Trend with daily CBCs      Chronic  diastolic heart failure with EF 45 % on echo done 3/28.   Home Meds: Furosemide 20mg Daily   Volume/Electrolyte Status: Preop wt Weight: 57.4 kg (126 lb 8.7 oz)   Vitals:    24 0614   Weight: 56.8 kg (125 lb 3.2 oz)     - Continue medication optimization/ GDMT:   ---continue BB  --- continue diuresis with IV Lasix   - Weight: X, 56.8 kg   - Adjust diuresis for postop cardiac surgery hypervolemia  - : Lasix 20mg IV x1; replaced Mg   - : Lasix 20mg PO x1; Replaced K & Mg  - Replete electrolytes for hypokalemia/hypomagnesemia  - Daily weights/strict I&Os  - Daily RFP     Leukocytosis - Resolved  Recent Labs     24  0558 24  0719 24  0233 24  1217 24  0358 24  2142 24  0918   WBC 8.1 12.0* 15.9* 13.7* 15.1* 18.0* 6.9     Temp (36hrs), Av.4 °C (97.6 °F), Min:35.8 °C (96.4 °F), Max:36.9 °C (98.4 °F)     - aggressive pulmonary hygiene with IS/EZ pap   - monitor for s/s infection; currently afebrile without any current s/s and down-trending WBC count   - likely atelectasis/ postoperative in etiology  -daily CBC     Diverticulosis/Irritable Bowel Syndrome   Home meds: supplement; unable to find for IBS   - Diet as tolerated   - continue bowel regimen: Miralax and talha/colace, MOM    Tobacco use/ Current smoker: 0.5 PPD x49 years   -encourage smoking cessation  -continue nicotine replacement via patch   -aggressive pulmonary hygiene  -wean O2 for O2 sats > 92% - on Room Air     VTE Prophylaxis: SCDs/TEDs, ambulation, SQ heparin  Code Status: Full Code    Dispo  - PT/OT recs low intensity level of continued care; home   - Would benefit from homecare for cardiac surgery carepath and RN visits  - Anticipate discharge 1-2 days, pending postop imaging, postop bowel movement, ambulation/mobility, HR control.   - Will continue to assess discharge needs    Valencia C El, APRN-CNP  Cardiac Surgery NIKO  [facility]  Team Phone [phone]    2024  11:34 AM

## 2024-06-08 NOTE — CARE PLAN
Problem: Pain - Adult  Goal: Verbalizes/displays adequate comfort level or baseline comfort level  Outcome: Progressing     Problem: Safety - Adult  Goal: Free from fall injury  Outcome: Progressing     Problem: Discharge Planning  Goal: Discharge to home or other facility with appropriate resources  Outcome: Progressing     Problem: Chronic Conditions and Co-morbidities  Goal: Patient's chronic conditions and co-morbidity symptoms are monitored and maintained or improved  Outcome: Progressing     Problem: Fall/Injury  Goal: Not fall by end of shift  Outcome: Progressing  Goal: Be free from injury by end of the shift  Outcome: Progressing  Goal: Verbalize understanding of personal risk factors for fall in the hospital  Outcome: Progressing  Goal: Verbalize understanding of risk factor reduction measures to prevent injury from fall in the home  Outcome: Progressing  Goal: Use assistive devices by end of the shift  Outcome: Progressing  Goal: Pace activities to prevent fatigue by end of the shift  Outcome: Progressing     Problem: Pain  Goal: Takes deep breaths with improved pain control throughout the shift  Outcome: Progressing  Goal: Turns in bed with improved pain control throughout the shift  Outcome: Progressing  Goal: Walks with improved pain control throughout the shift  Outcome: Progressing  Goal: Performs ADL's with improved pain control throughout shift  Outcome: Progressing  Goal: Participates in PT with improved pain control throughout the shift  Outcome: Progressing  Goal: Free from opioid side effects throughout the shift  Outcome: Progressing  Goal: Free from acute confusion related to pain meds throughout the shift  Outcome: Progressing   The patient's goals for the shift include

## 2024-06-09 LAB
ALBUMIN SERPL BCP-MCNC: 3.2 G/DL (ref 3.4–5)
ANION GAP SERPL CALC-SCNC: 14 MMOL/L (ref 10–20)
BUN SERPL-MCNC: 17 MG/DL (ref 6–23)
CALCIUM SERPL-MCNC: 8.5 MG/DL (ref 8.6–10.6)
CHLORIDE SERPL-SCNC: 101 MMOL/L (ref 98–107)
CO2 SERPL-SCNC: 24 MMOL/L (ref 21–32)
CREAT SERPL-MCNC: 0.8 MG/DL (ref 0.5–1.3)
EGFRCR SERPLBLD CKD-EPI 2021: >90 ML/MIN/1.73M*2
ERYTHROCYTE [DISTWIDTH] IN BLOOD BY AUTOMATED COUNT: 14.3 % (ref 11.5–14.5)
GLUCOSE SERPL-MCNC: 98 MG/DL (ref 74–99)
HCT VFR BLD AUTO: 29.4 % (ref 41–52)
HGB BLD-MCNC: 9.9 G/DL (ref 13.5–17.5)
MAGNESIUM SERPL-MCNC: 1.93 MG/DL (ref 1.6–2.4)
MCH RBC QN AUTO: 29.8 PG (ref 26–34)
MCHC RBC AUTO-ENTMCNC: 33.7 G/DL (ref 32–36)
MCV RBC AUTO: 89 FL (ref 80–100)
NRBC BLD-RTO: 0 /100 WBCS (ref 0–0)
PHOSPHATE SERPL-MCNC: 2.2 MG/DL (ref 2.5–4.9)
PLATELET # BLD AUTO: 130 X10*3/UL (ref 150–450)
POTASSIUM SERPL-SCNC: 4 MMOL/L (ref 3.5–5.3)
RBC # BLD AUTO: 3.32 X10*6/UL (ref 4.5–5.9)
SODIUM SERPL-SCNC: 135 MMOL/L (ref 136–145)
WBC # BLD AUTO: 6.6 X10*3/UL (ref 4.4–11.3)

## 2024-06-09 PROCEDURE — 2500000002 HC RX 250 W HCPCS SELF ADMINISTERED DRUGS (ALT 637 FOR MEDICARE OP, ALT 636 FOR OP/ED): Performed by: NURSE PRACTITIONER

## 2024-06-09 PROCEDURE — 1200000002 HC GENERAL ROOM WITH TELEMETRY DAILY

## 2024-06-09 PROCEDURE — S4991 NICOTINE PATCH NONLEGEND: HCPCS | Performed by: NURSE PRACTITIONER

## 2024-06-09 PROCEDURE — 2500000001 HC RX 250 WO HCPCS SELF ADMINISTERED DRUGS (ALT 637 FOR MEDICARE OP): Performed by: NURSE PRACTITIONER

## 2024-06-09 PROCEDURE — 2500000001 HC RX 250 WO HCPCS SELF ADMINISTERED DRUGS (ALT 637 FOR MEDICARE OP)

## 2024-06-09 PROCEDURE — 2500000004 HC RX 250 GENERAL PHARMACY W/ HCPCS (ALT 636 FOR OP/ED): Performed by: NURSE PRACTITIONER

## 2024-06-09 PROCEDURE — 85027 COMPLETE CBC AUTOMATED: CPT | Performed by: NURSE PRACTITIONER

## 2024-06-09 PROCEDURE — 80069 RENAL FUNCTION PANEL: CPT | Performed by: NURSE PRACTITIONER

## 2024-06-09 PROCEDURE — 83735 ASSAY OF MAGNESIUM: CPT | Performed by: NURSE PRACTITIONER

## 2024-06-09 RX ORDER — ACETAMINOPHEN 325 MG/1
650 TABLET ORAL EVERY 6 HOURS
Status: DISCONTINUED | OUTPATIENT
Start: 2024-06-09 | End: 2024-06-17

## 2024-06-09 RX ORDER — WARFARIN 3 MG/1
3 TABLET ORAL DAILY
Status: DISCONTINUED | OUTPATIENT
Start: 2024-06-09 | End: 2024-06-12

## 2024-06-09 RX ORDER — ACETAMINOPHEN 500 MG
10 TABLET ORAL DAILY
Status: DISCONTINUED | OUTPATIENT
Start: 2024-06-09 | End: 2024-06-18 | Stop reason: HOSPADM

## 2024-06-09 RX ADMIN — METOPROLOL TARTRATE 12.5 MG: 25 TABLET, FILM COATED ORAL at 21:00

## 2024-06-09 RX ADMIN — ASPIRIN 81 MG CHEWABLE TABLET 81 MG: 81 TABLET CHEWABLE at 08:35

## 2024-06-09 RX ADMIN — HEPARIN SODIUM 5000 UNITS: 5000 INJECTION INTRAVENOUS; SUBCUTANEOUS at 11:05

## 2024-06-09 RX ADMIN — ACETAMINOPHEN 650 MG: 325 TABLET ORAL at 15:21

## 2024-06-09 RX ADMIN — HEPARIN SODIUM 5000 UNITS: 5000 INJECTION INTRAVENOUS; SUBCUTANEOUS at 04:20

## 2024-06-09 RX ADMIN — ACETAMINOPHEN 975 MG: 325 TABLET ORAL at 04:19

## 2024-06-09 RX ADMIN — CALCIUM CARBONATE (ANTACID) CHEW TAB 500 MG 500 MG: 500 CHEW TAB at 21:01

## 2024-06-09 RX ADMIN — FUROSEMIDE 20 MG: 40 TABLET ORAL at 08:36

## 2024-06-09 RX ADMIN — MUPIROCIN 1 APPLICATION: 20 OINTMENT TOPICAL at 08:37

## 2024-06-09 RX ADMIN — Medication 1 TABLET: at 08:35

## 2024-06-09 RX ADMIN — METOPROLOL TARTRATE 12.5 MG: 25 TABLET, FILM COATED ORAL at 08:35

## 2024-06-09 RX ADMIN — NICOTINE 1 PATCH: 21 PATCH, EXTENDED RELEASE TRANSDERMAL at 16:41

## 2024-06-09 RX ADMIN — ACETAMINOPHEN 975 MG: 325 TABLET ORAL at 10:08

## 2024-06-09 RX ADMIN — HEPARIN SODIUM 5000 UNITS: 5000 INJECTION INTRAVENOUS; SUBCUTANEOUS at 17:16

## 2024-06-09 RX ADMIN — ACETAMINOPHEN 650 MG: 325 TABLET ORAL at 21:00

## 2024-06-09 RX ADMIN — WARFARIN SODIUM 3 MG: 3 TABLET ORAL at 17:16

## 2024-06-09 ASSESSMENT — PAIN SCALES - GENERAL
PAINLEVEL_OUTOF10: 0 - NO PAIN
PAINLEVEL_OUTOF10: 0 - NO PAIN

## 2024-06-09 NOTE — CARE PLAN
The patient's goals for the shift include      The clinical goals for the shift include pt will remain HDS.

## 2024-06-09 NOTE — CARE PLAN
The patient's goals for the shift include rest    The clinical goals for the shift include rest    Problem: Discharge Planning  Goal: Discharge to home or other facility with appropriate resources  Outcome: Progressing     Problem: Chronic Conditions and Co-morbidities  Goal: Patient's chronic conditions and co-morbidity symptoms are monitored and maintained or improved  Outcome: Progressing     Problem: Fall/Injury  Goal: Not fall by end of shift  Outcome: Progressing  Goal: Be free from injury by end of the shift  Outcome: Progressing  Goal: Verbalize understanding of personal risk factors for fall in the hospital  Outcome: Progressing  Goal: Verbalize understanding of risk factor reduction measures to prevent injury from fall in the home  Outcome: Progressing  Goal: Use assistive devices by end of the shift  Outcome: Progressing  Goal: Pace activities to prevent fatigue by end of the shift  Outcome: Progressing     Problem: Pain  Goal: Takes deep breaths with improved pain control throughout the shift  Outcome: Progressing  Goal: Turns in bed with improved pain control throughout the shift  Outcome: Progressing  Goal: Walks with improved pain control throughout the shift  Outcome: Progressing  Goal: Performs ADL's with improved pain control throughout shift  Outcome: Progressing  Goal: Participates in PT with improved pain control throughout the shift  Outcome: Progressing  Goal: Free from opioid side effects throughout the shift  Outcome: Progressing  Goal: Free from acute confusion related to pain meds throughout the shift  Outcome: Progressing

## 2024-06-09 NOTE — PROGRESS NOTES
"CARDIAC SURGERY DAILY PROGRESS NOTE    Farooq Lang is a 63 year old male with a PMHx of mitral valve stenosis secondary to rheumatic disease, afib (on coumadin), HFrEF, and thrombocytopenia, who presented to [facility] on 6/4/2024 for planned cardiac surgery with Dr. Nugent.  .     As an outpatient, he presented to the emergency department with complaints of chest pain for which he was found to have severe mitral valve stenosis.  He underwent preoperative coronary angiogram showing no significant coronary artery disease.  He is also on chronic atrial fibrillation on p.o. anticoagulation.  The echo shows a severe mitral stenosis, extreme thickening and shortening of the subvalvular apparatus, the LV and the RV functions are preserved, there is no significant TR, for which Dr. Nugent and the patient agreed to proceed with mitral valve surgery.      OPERATION/PROCEDURE: 6/4/2024 with Lukasz Nugent   - Median sternotomy  - Standard central cannulation  - Mitral valve replacement with 27mm mitris bioprosthetic valve  - Left atrial appendage ligation   - Prevena dressing    CTICU Course: uneventful, postoperative pain  Transfer to LT3: 6/6/2024  ==================    Interval History:   No acute events   - replaced electrolytes   - started low dose BB as Tele has been stable with rate in the 60's     SUBJECTIVE:  Complains of nasal congestion.     Objective   /65 (BP Location: Left arm, Patient Position: Sitting)   Pulse 63   Temp 36.5 °C (97.7 °F) (Temporal)   Resp 18   Ht 1.676 m (5' 6\")   Wt 56.8 kg (125 lb 1.8 oz)   SpO2 99%   BMI 20.19 kg/m²   Pain Score: 0 - No pain   3 Day Weight Change: Unable to Calculate    Intake and Output    Intake/Output Summary (Last 24 hours) at 6/9/2024 1520  Last data filed at 6/9/2024 1307  Gross per 24 hour   Intake 920 ml   Output 0 ml   Net 920 ml       Physical Exam  Vitals and nursing note reviewed.   Constitutional:       General: He is not in acute " distress.     Appearance: Normal appearance. He is not ill-appearing.      Comments: Patient alert and awake sitting up in chair, in no acute distress.     HENT:      Head: Atraumatic.      Mouth/Throat:      Mouth: Mucous membranes are moist.   Eyes:      Conjunctiva/sclera: Conjunctivae normal.   Neck:      Comments: Trachea Midline.   Cardiovascular:      Rate and Rhythm: Rhythm irregular.      Pulses: Normal pulses.      Heart sounds: Normal heart sounds.      Comments: Tele: AFIB rate controlled in the 60's to 70's with intermittent periods of sinus rhythm in the 60's   +2 equal and even pulses in all extremities.   V wires - connected to external pacemaker VVI @ 50.     Pulmonary:      Effort: Pulmonary effort is normal. No respiratory distress.      Breath sounds: Normal breath sounds. No wheezing.      Comments: Equal and even chest expansion; thorax symmetric.    Diminished breath sounds in bilateral base.    Good inspiratory effort on RA  Sternum Stable  - pravena dressing removed on 6/7.   Abdominal:      General: Bowel sounds are normal. There is no distension.      Palpations: Abdomen is soft.      Tenderness: There is no abdominal tenderness.      Comments: postop BM 6/8   Genitourinary:     Comments: Voids independently   Musculoskeletal:      Cervical back: Neck supple.      Right lower leg: No edema.      Left lower leg: No edema.      Comments: RIZVI; 5/5 strength in all extremities   Ambulates independently without assistance or a gait aide.    Skin:     General: Skin is warm and dry.      Capillary Refill: Capillary refill takes less than 2 seconds.      Findings: Bruising (arms) present.      Comments: Mid-Sternal Incision: well approximated, no s/s of infection or bleeding, no crepitus - RE      Neurological:      General: No focal deficit present.      Mental Status: He is alert and oriented to person, place, and time.   Psychiatric:         Mood and Affect: Mood normal.         Behavior:  Behavior normal.       Medications  Scheduled medications  acetaminophen, 650 mg, oral, q6h  aspirin, 81 mg, oral, Daily  atorvastatin, 20 mg, oral, Nightly  furosemide, 20 mg, oral, Daily  heparin, 5,000 Units, subcutaneous, q8h  lactated Ringer's, 500 mL, intravenous, Once  lidocaine, 1 patch, transdermal, Daily  melatonin, 10 mg, oral, Daily  metoprolol tartrate, 12.5 mg, oral, BID  multivitamin with minerals, 1 tablet, oral, Daily  nicotine, 1 patch, transdermal, Daily   Followed by  [START ON 7/17/2024] nicotine, 1 patch, transdermal, Daily   Followed by  [START ON 7/31/2024] nicotine, 1 patch, transdermal, Daily  warfarin, 3 mg, oral, Daily    Continuous medications   PRN medications  PRN medications: bisacodyl, calcium carbonate, dextrose **OR** glucagon, naloxone, [DISCONTINUED] ondansetron **OR** ondansetron, oxygen, polyethylene glycol, sennosides-docusate sodium, traMADol, traMADol, traZODone    LABS:  CMP:  Results from last 7 days   Lab Units 06/09/24  0624 06/08/24  0558 06/07/24  0719 06/06/24  0233 06/05/24  1217 06/05/24  0358 06/04/24  2142   SODIUM mmol/L 135* 134* 132* 136 136 140 142   POTASSIUM mmol/L 4.0 3.9 4.3 4.5 4.9 4.2 4.6   CHLORIDE mmol/L 101 99 99 102 107 107 109*   CO2 mmol/L 24 27 26 26 21 22 23   ANION GAP mmol/L 14 12 11 13 13 15 15   BUN mg/dL 17 19 23 20 17 17 16   CREATININE mg/dL 0.80 0.79 0.89 0.86 0.92 1.20 1.16   EGFR mL/min/1.73m*2 >90 >90 >90 >90 >90 68 70   MAGNESIUM mg/dL 1.93 1.76 1.77 2.00 2.26 2.35 2.85*   ALBUMIN g/dL 3.2* 3.1* 3.4 3.5 3.8 3.7 3.4     CBC:  Results from last 7 days   Lab Units 06/09/24  0624 06/08/24  0558 06/07/24  0719 06/06/24  0233 06/05/24  1217 06/05/24  0358 06/04/24  2142   WBC AUTO x10*3/uL 6.6 8.1 12.0* 15.9* 13.7* 15.1* 18.0*   HEMOGLOBIN g/dL 9.9* 10.2* 10.8* 12.0* 12.9* 13.3* 14.0   HEMATOCRIT % 29.4* 30.6* 33.1* 37.0* 40.6* 39.0* 42.3   PLATELETS AUTO x10*3/uL 130* 88* 75* 78* 81* 95* 97*   MCV fL 89 88 90 90 94 89 89     COAG:    Results from last 7 days   Lab Units 06/04/24  2142   INR  1.5*     HEME/ENDO:     CARDIAC:              IMAGING/ DIAGNOSTIC TESTING:  I have personally reviewed the following test result(s):          XR chest 2 views 06/08/2024  Impression  1.  Bibasilar atelectasis and small bilateral pleural effusions. No  evidence of pneumothorax.      Transthoracic Echo (TTE) Complete 06/08/2024  CONCLUSIONS:  1. Left ventricular systolic function is normal with a 55-60% estimated ejection fraction.  2. Abnormal septal motion consistent with post-operative status and abnormal septal motion consistent with RV pacemaker.  3. The left atrium is severely dilated.  4. Peak and mean mitral diastolic gradients are 14.5 and 5.4 mmHg at a heart rate of 72 bpm. The DI is ~ 2. The pressure half-time is ~ 90 ms, resulting in a mitral orifice area of 2.4.  5. The patient is in atrial fibrillation which may influence the estimate of left ventricular function and transvalvular flows.           IMPRESSION & PLAN:  POD # 5 s/p MVR and KONRAD clipping with Dr. Nugent   - Increase activity/ ambulation; PT/OT  - Encourage IS, C/DB; respiratory therapy; wean O2 as zo - on Room air   - Cardiac rehab referral   - Continue cardiac meds: ASA, BB  - Pain and anticonstipation meds  - 2v CXR 6/8  - Postop echo 6/8  - CUT epicardial wires prior to discharge; per surgeon preference.    - Tele until discharge    Rhythm  Hx of Atrial Fibrillation: home meds: Coumadin 5mg daily except 7.5mg on Mon & Wed; Diltiazem 120mg daily    - Tele: AFIB rate controlled in the 60's to 70's with intermittent periods of sinus rhythm in the 60's   - Continue BB: 6/8: initiated low dose metoprolol tartrate 12.5mg BID   - 6/9 continues to have controlled rate afib; resumed coumadin at reduced dose of 3mg this evening  - continue holding home diltiazem   - Adjust medications as tolerated    Acute Blood Loss Anemia - Resolved   Recent Labs     06/09/24  0624 06/08/24  4823  24  0719 24  0233 24  1217 24  0358 24  2142   HGB 9.9* 10.2* 10.8* 12.0* 12.9* 13.3* 14.0   HCT 29.4* 30.6* 33.1* 37.0* 40.6* 39.0* 42.3   - MV, PO Iron x1mo  - Daily labs, transfuse as indicated    Hx of Thrombocytopenia - improving   Recent Labs     24  0624 24  0558 24  0719 24  0233 24  1217 24  0358 24  2142   * 88* 75* 78* 81* 95* 97*   - Etiology likely postop/CPB related  - 4T score low probability of HIT; will continue to assess.   - Trend with daily CBCs      Chronic diastolic heart failure with EF 45 % on echo done 3/28.   Home Meds: Furosemide 20mg Daily   Volume/Electrolyte Status: Preop wt Weight: 57.4 kg (126 lb 8.7 oz)   Vitals:    24 0500   Weight: 56.8 kg (125 lb 1.8 oz)   - Weight: X, 56.8 kg   - Adjust diuresis for postop cardiac surgery hypervolemia  - : Lasix 20mg IV x1; replaced Mg   - : Lasix 20mg PO x1; Replaced K & Mg  - Replete electrolytes for hypokalemia/hypomagnesemia  - Daily weights/strict I&Os  - Daily RFP     Leukocytosis - Resolved  Recent Labs     24  0624 24  0558 24  0719 24  0233 24  1217 24  0358 24  2142   WBC 6.6 8.1 12.0* 15.9* 13.7* 15.1* 18.0*     Temp (36hrs), Av.4 °C (97.6 °F), Min:35.6 °C (96.1 °F), Max:36.9 °C (98.4 °F)  - aggressive pulmonary hygiene with IS/EZ pap   - monitor for s/s infection; currently afebrile without any current s/s and down-trending WBC count   - likely atelectasis/ postoperative in etiology  -daily CBC     Diverticulosis/ Irritable Bowel Syndrome   Home meds: supplement; unable to find for IBS   - Diet as tolerated   - continue bowel regimen: Miralax and talha/colace, MOM    Tobacco use/ Current smoker: 0.5 PPD x49 years   -encourage smoking cessation  -continue nicotine replacement via patch   -aggressive pulmonary hygiene  -wean O2 for O2 sats > 92% - on Room Air     VTE Prophylaxis: SCDs/TEDs, ambulation,  SQ heparin  Code Status: Full Code    Dispo  - PT/OT recs low intensity level of continued care; home   - Would benefit from homecare for cardiac surgery carepath and RN visits  - Anticipate discharge 2-3 days, pending stable rhythm,    - Will continue to assess discharge needs    LENY Lopez-CNP  Cardiac Surgery NIKO  [facility]  Team Phone [phone]    6/9/2024  3:20 PM

## 2024-06-10 LAB
ALBUMIN SERPL BCP-MCNC: 3.3 G/DL (ref 3.4–5)
ANION GAP SERPL CALC-SCNC: 14 MMOL/L (ref 10–20)
BUN SERPL-MCNC: 18 MG/DL (ref 6–23)
CALCIUM SERPL-MCNC: 8.5 MG/DL (ref 8.6–10.6)
CHLORIDE SERPL-SCNC: 101 MMOL/L (ref 98–107)
CO2 SERPL-SCNC: 23 MMOL/L (ref 21–32)
CREAT SERPL-MCNC: 0.79 MG/DL (ref 0.5–1.3)
EGFRCR SERPLBLD CKD-EPI 2021: >90 ML/MIN/1.73M*2
ERYTHROCYTE [DISTWIDTH] IN BLOOD BY AUTOMATED COUNT: 14.3 % (ref 11.5–14.5)
GLUCOSE SERPL-MCNC: 93 MG/DL (ref 74–99)
HCT VFR BLD AUTO: 28.9 % (ref 41–52)
HGB BLD-MCNC: 9.6 G/DL (ref 13.5–17.5)
INR PPP: 1.2 (ref 0.9–1.1)
MAGNESIUM SERPL-MCNC: 1.97 MG/DL (ref 1.6–2.4)
MCH RBC QN AUTO: 29.2 PG (ref 26–34)
MCHC RBC AUTO-ENTMCNC: 33.2 G/DL (ref 32–36)
MCV RBC AUTO: 88 FL (ref 80–100)
NRBC BLD-RTO: 0 /100 WBCS (ref 0–0)
PHOSPHATE SERPL-MCNC: 2.8 MG/DL (ref 2.5–4.9)
PLATELET # BLD AUTO: 155 X10*3/UL (ref 150–450)
POTASSIUM SERPL-SCNC: 3.7 MMOL/L (ref 3.5–5.3)
PROTHROMBIN TIME: 13.9 SECONDS (ref 9.8–12.8)
RBC # BLD AUTO: 3.29 X10*6/UL (ref 4.5–5.9)
SODIUM SERPL-SCNC: 134 MMOL/L (ref 136–145)
WBC # BLD AUTO: 6.3 X10*3/UL (ref 4.4–11.3)

## 2024-06-10 PROCEDURE — 83735 ASSAY OF MAGNESIUM: CPT | Performed by: NURSE PRACTITIONER

## 2024-06-10 PROCEDURE — 2500000002 HC RX 250 W HCPCS SELF ADMINISTERED DRUGS (ALT 637 FOR MEDICARE OP, ALT 636 FOR OP/ED): Performed by: NURSE PRACTITIONER

## 2024-06-10 PROCEDURE — 85027 COMPLETE CBC AUTOMATED: CPT | Performed by: NURSE PRACTITIONER

## 2024-06-10 PROCEDURE — 85610 PROTHROMBIN TIME: CPT | Performed by: NURSE PRACTITIONER

## 2024-06-10 PROCEDURE — 2500000001 HC RX 250 WO HCPCS SELF ADMINISTERED DRUGS (ALT 637 FOR MEDICARE OP): Performed by: NURSE PRACTITIONER

## 2024-06-10 PROCEDURE — S4991 NICOTINE PATCH NONLEGEND: HCPCS | Performed by: NURSE PRACTITIONER

## 2024-06-10 PROCEDURE — 80069 RENAL FUNCTION PANEL: CPT | Performed by: NURSE PRACTITIONER

## 2024-06-10 PROCEDURE — 94668 MNPJ CHEST WALL SBSQ: CPT

## 2024-06-10 PROCEDURE — 2500000004 HC RX 250 GENERAL PHARMACY W/ HCPCS (ALT 636 FOR OP/ED): Performed by: NURSE PRACTITIONER

## 2024-06-10 PROCEDURE — 1200000002 HC GENERAL ROOM WITH TELEMETRY DAILY

## 2024-06-10 PROCEDURE — 2500000001 HC RX 250 WO HCPCS SELF ADMINISTERED DRUGS (ALT 637 FOR MEDICARE OP)

## 2024-06-10 RX ORDER — POTASSIUM CHLORIDE 20 MEQ/1
40 TABLET, EXTENDED RELEASE ORAL ONCE
Status: COMPLETED | OUTPATIENT
Start: 2024-06-10 | End: 2024-06-10

## 2024-06-10 RX ADMIN — FUROSEMIDE 20 MG: 40 TABLET ORAL at 09:09

## 2024-06-10 RX ADMIN — ASPIRIN 81 MG CHEWABLE TABLET 81 MG: 81 TABLET CHEWABLE at 09:09

## 2024-06-10 RX ADMIN — ACETAMINOPHEN 650 MG: 325 TABLET ORAL at 18:09

## 2024-06-10 RX ADMIN — HEPARIN SODIUM 5000 UNITS: 5000 INJECTION INTRAVENOUS; SUBCUTANEOUS at 18:09

## 2024-06-10 RX ADMIN — ACETAMINOPHEN 650 MG: 325 TABLET ORAL at 04:48

## 2024-06-10 RX ADMIN — METOPROLOL TARTRATE 12.5 MG: 25 TABLET, FILM COATED ORAL at 09:09

## 2024-06-10 RX ADMIN — POTASSIUM CHLORIDE 40 MEQ: 1500 TABLET, EXTENDED RELEASE ORAL at 09:20

## 2024-06-10 RX ADMIN — Medication 1 TABLET: at 09:09

## 2024-06-10 RX ADMIN — Medication 10 MG: at 20:49

## 2024-06-10 RX ADMIN — WARFARIN SODIUM 3 MG: 3 TABLET ORAL at 18:09

## 2024-06-10 RX ADMIN — NICOTINE 1 PATCH: 21 PATCH, EXTENDED RELEASE TRANSDERMAL at 18:08

## 2024-06-10 RX ADMIN — HEPARIN SODIUM 5000 UNITS: 5000 INJECTION INTRAVENOUS; SUBCUTANEOUS at 09:10

## 2024-06-10 RX ADMIN — HEPARIN SODIUM 5000 UNITS: 5000 INJECTION INTRAVENOUS; SUBCUTANEOUS at 02:19

## 2024-06-10 ASSESSMENT — COGNITIVE AND FUNCTIONAL STATUS - GENERAL
CLIMB 3 TO 5 STEPS WITH RAILING: A LITTLE
DAILY ACTIVITIY SCORE: 24
MOBILITY SCORE: 23

## 2024-06-10 ASSESSMENT — PAIN SCALES - GENERAL
PAINLEVEL_OUTOF10: 2
PAINLEVEL_OUTOF10: 0 - NO PAIN

## 2024-06-10 ASSESSMENT — PAIN - FUNCTIONAL ASSESSMENT: PAIN_FUNCTIONAL_ASSESSMENT: 0-10

## 2024-06-10 NOTE — PROGRESS NOTES
"CARDIAC SURGERY DAILY PROGRESS NOTE    Farooq Lang is a 63 year old male with a PMHx of mitral valve stenosis secondary to rheumatic disease, afib (on coumadin), HFrEF, and thrombocytopenia, who presented to [facility] on 6/4/2024 for planned cardiac surgery with Dr. Nugent.  .     As an outpatient, he presented to the emergency department with complaints of chest pain for which he was found to have severe mitral valve stenosis.  He underwent preoperative coronary angiogram showing no significant coronary artery disease.  He is also on chronic atrial fibrillation on p.o. anticoagulation.  The echo shows a severe mitral stenosis, extreme thickening and shortening of the subvalvular apparatus, the LV and the RV functions are preserved, there is no significant TR, for which Dr. Nugent and the patient agreed to proceed with mitral valve surgery.      OPERATION/PROCEDURE: 6/4/2024 with Lukasz Nugent   - Median sternotomy  - Standard central cannulation  - Mitral valve replacement with 27mm mitris bioprosthetic valve  - Left atrial appendage ligation   - Prevena dressing    CTICU Course: uneventful, postoperative pain  Transfer to LT3: 6/6/2024  ==================    Interval History:   No acute events   - replaced electrolytes   - started low dose BB as Tele has been stable with rate in the 60's     SUBJECTIVE:  Complains of nasal congestion.     Objective   /61 (BP Location: Left arm, Patient Position: Sitting)   Pulse 71   Temp 36.6 °C (97.9 °F) (Temporal)   Resp 18   Ht 1.676 m (5' 6\")   Wt 56.2 kg (124 lb)   SpO2 95%   BMI 20.01 kg/m²   Pain Score: 0 - No pain   3 Day Weight Change: Unable to Calculate    Intake and Output    Intake/Output Summary (Last 24 hours) at 6/10/2024 1303  Last data filed at 6/10/2024 0859  Gross per 24 hour   Intake 540 ml   Output 0 ml   Net 540 ml       Physical Exam  Vitals and nursing note reviewed.   Constitutional:       General: He is not in acute " distress.     Appearance: Normal appearance. He is not ill-appearing.      Comments: Patient alert and awake sitting up in chair, in no acute distress.     HENT:      Head: Atraumatic.      Mouth/Throat:      Mouth: Mucous membranes are moist.   Eyes:      Conjunctiva/sclera: Conjunctivae normal.   Neck:      Comments: Trachea Midline.   Cardiovascular:      Rate and Rhythm: Rhythm irregular.      Pulses: Normal pulses.      Heart sounds: Normal heart sounds.      Comments: Tele: AFIB rate controlled in the 50s-70s  V wires - connected to external pacemaker VVI @ 40 with occasional V pacing.     Pulmonary:      Effort: Pulmonary effort is normal. No respiratory distress.      Breath sounds: Normal breath sounds. No wheezing.      Comments: Equal and even chest expansion; thorax symmetric.    Diminished breath sounds in bilateral base.    Good inspiratory effort on RA  Sternum Stable  - pravena dressing removed on 6/7.   Abdominal:      General: Bowel sounds are normal. There is no distension.      Palpations: Abdomen is soft.      Tenderness: There is no abdominal tenderness.      Comments: postop BM 6/8, 6/9   Genitourinary:     Comments: Voids independently   Musculoskeletal:      Cervical back: Neck supple.      Right lower leg: No edema.      Left lower leg: No edema.      Comments: RIZVI; 5/5 strength in all extremities   Ambulates independently without assistance or a gait aide.    Skin:     General: Skin is warm and dry.      Capillary Refill: Capillary refill takes less than 2 seconds.      Findings: Bruising (arms) present.      Comments: Mid-Sternal Incision: well approximated, no s/s of infection or bleeding, no crepitus - RE      Neurological:      General: No focal deficit present.      Mental Status: He is alert and oriented to person, place, and time.   Psychiatric:         Mood and Affect: Mood normal.         Behavior: Behavior normal.       Medications  Scheduled medications  acetaminophen, 650 mg,  oral, q6h  aspirin, 81 mg, oral, Daily  furosemide, 20 mg, oral, Daily  heparin, 5,000 Units, subcutaneous, q8h  lactated Ringer's, 500 mL, intravenous, Once  lidocaine, 1 patch, transdermal, Daily  melatonin, 10 mg, oral, Daily  [Held by provider] metoprolol tartrate, 12.5 mg, oral, BID  multivitamin with minerals, 1 tablet, oral, Daily  nicotine, 1 patch, transdermal, Daily   Followed by  [START ON 7/17/2024] nicotine, 1 patch, transdermal, Daily   Followed by  [START ON 7/31/2024] nicotine, 1 patch, transdermal, Daily  warfarin, 3 mg, oral, Daily    Continuous medications   PRN medications  PRN medications: bisacodyl, calcium carbonate, dextrose **OR** glucagon, naloxone, [DISCONTINUED] ondansetron **OR** ondansetron, oxygen, polyethylene glycol, sennosides-docusate sodium, traMADol, traMADol, traZODone    LABS:  CMP:  Results from last 7 days   Lab Units 06/10/24  0601 06/09/24  0624 06/08/24  0558 06/07/24  0719 06/06/24  0233 06/05/24  1217 06/05/24  0358 06/04/24  2142   SODIUM mmol/L 134* 135* 134* 132* 136 136 140 142   POTASSIUM mmol/L 3.7 4.0 3.9 4.3 4.5 4.9 4.2 4.6   CHLORIDE mmol/L 101 101 99 99 102 107 107 109*   CO2 mmol/L 23 24 27 26 26 21 22 23   ANION GAP mmol/L 14 14 12 11 13 13 15 15   BUN mg/dL 18 17 19 23 20 17 17 16   CREATININE mg/dL 0.79 0.80 0.79 0.89 0.86 0.92 1.20 1.16   EGFR mL/min/1.73m*2 >90 >90 >90 >90 >90 >90 68 70   MAGNESIUM mg/dL 1.97 1.93 1.76 1.77 2.00 2.26 2.35 2.85*   ALBUMIN g/dL 3.3* 3.2* 3.1* 3.4 3.5 3.8 3.7 3.4     CBC:  Results from last 7 days   Lab Units 06/10/24  0601 06/09/24  0624 06/08/24  0558 06/07/24  0719 06/06/24  0233 06/05/24  1217 06/05/24  0358 06/04/24  2142   WBC AUTO x10*3/uL 6.3 6.6 8.1 12.0* 15.9* 13.7* 15.1* 18.0*   HEMOGLOBIN g/dL 9.6* 9.9* 10.2* 10.8* 12.0* 12.9* 13.3* 14.0   HEMATOCRIT % 28.9* 29.4* 30.6* 33.1* 37.0* 40.6* 39.0* 42.3   PLATELETS AUTO x10*3/uL 155 130* 88* 75* 78* 81* 95* 97*   MCV fL 88 89 88 90 90 94 89 89     COAG:   Results from  last 7 days   Lab Units 06/10/24  0916 06/04/24  2142   INR  1.2* 1.5*     HEME/ENDO:     CARDIAC:              IMAGING/ DIAGNOSTIC TESTING:  I have personally reviewed the following test result(s):          XR chest 2 views 06/08/2024  Impression  1.  Bibasilar atelectasis and small bilateral pleural effusions. No  evidence of pneumothorax.      Transthoracic Echo (TTE) Complete 06/08/2024  CONCLUSIONS:  1. Left ventricular systolic function is normal with a 55-60% estimated ejection fraction.  2. Abnormal septal motion consistent with post-operative status and abnormal septal motion consistent with RV pacemaker.  3. The left atrium is severely dilated.  4. Peak and mean mitral diastolic gradients are 14.5 and 5.4 mmHg at a heart rate of 72 bpm. The DI is ~ 2. The pressure half-time is ~ 90 ms, resulting in a mitral orifice area of 2.4.  5. The patient is in atrial fibrillation which may influence the estimate of left ventricular function and transvalvular flows.           IMPRESSION & PLAN:  POD # 6 s/p MVR and KONRAD clipping with Dr. Nugent   - Increase activity/ ambulation; PT/OT  - Encourage IS, C/DB; respiratory therapy; wean O2 as zo - on Room air   - Cardiac rehab referral   - Continue cardiac meds: ASA  - Pain and anticonstipation meds  - 2v CXR 6/8  - Postop echo 6/8  - CUT epicardial wires prior to discharge; per surgeon preference.    - Tele until discharge    Rhythm  Hx of Atrial Fibrillation: home meds: Coumadin 5mg daily except 7.5mg on Mon & Wed; Diltiazem 120mg daily    - Tele: AFIB rate controlled in the 50s to 70's; occasional Vpacing at VVI 40 backup  - hold metoprolol 6/10 due to Vpacing at 40   - 6/9 continues to have controlled rate afib; resumed coumadin at reduced dose of 3mg this evening  - continue holding home diltiazem   - Adjust medications as tolerated    Acute Blood Loss Anemia - Resolved   Recent Labs     06/10/24  0601 06/09/24  0624 06/08/24  0558 06/07/24  0719 06/06/24  0233  24  1217 24  0358   HGB 9.6* 9.9* 10.2* 10.8* 12.0* 12.9* 13.3*   HCT 28.9* 29.4* 30.6* 33.1* 37.0* 40.6* 39.0*   - MV, PO Iron x1mo  - Daily labs, transfuse as indicated    Hx of Thrombocytopenia - improving   Recent Labs     06/10/24  0601 24  0624 24  0558 24  0719 24  0233 24  1217 24  0358    130* 88* 75* 78* 81* 95*   - Etiology likely postop/CPB related  - 4T score low probability of HIT; will continue to assess.   - Trend with daily CBCs      Chronic diastolic heart failure with EF 45 % on echo done 3/28.   Home Meds: Furosemide 20mg Daily   Volume/Electrolyte Status: Preop wt Weight: 57.4 kg (126 lb 8.7 oz)   Vitals:    06/10/24 0019   Weight: 56.2 kg (124 lb)   - Weight: 56.2, X, 56.8 kg   - Adjust diuresis for postop cardiac surgery hypervolemia  - : Lasix 20mg IV x1; replaced Mg   - : Lasix 20mg PO x1; Replaced K & Mg  - Replete electrolytes for hypokalemia/hypomagnesemia  - Daily weights/strict I&Os  - Daily RFP     Leukocytosis - Resolved  Recent Labs     06/10/24  0601 24  0624 24  0558 24  0719 24  0233 24  1217 24  0358   WBC 6.3 6.6 8.1 12.0* 15.9* 13.7* 15.1*     Temp (36hrs), Av.6 °C (97.9 °F), Min:36.3 °C (97.3 °F), Max:37 °C (98.6 °F)  - aggressive pulmonary hygiene with IS/EZ pap   - monitor for s/s infection; currently afebrile without any current s/s and down-trending WBC count   - likely atelectasis/ postoperative in etiology  -daily CBC     Diverticulosis/ Irritable Bowel Syndrome   Home meds: supplement; unable to find for IBS   - Diet as tolerated   - continue bowel regimen: Miralax and talha/colace, MOM    Tobacco use/ Current smoker: 0.5 PPD x49 years   -encourage smoking cessation  -continue nicotine replacement via patch   -aggressive pulmonary hygiene  -wean O2 for O2 sats > 92% - on Room Air     VTE Prophylaxis: SCDs/TEDs, ambulation, SQ heparin  Code Status: Full Code    Dispo  -  PT/OT recs low intensity level of continued care; home   - Would benefit from homecare for cardiac surgery carepath and RN visits  - Anticipate discharge 1-2 days, pending stable rhythm  - Will continue to assess discharge needs    LENY Lopez-CNP  Cardiac Surgery NIKO  [facility]  Team Phone [phone]    6/10/2024  1:03 PM

## 2024-06-10 NOTE — PROGRESS NOTES
Transitional Care Coordination Progress Note: interdisciplinary rounds     Team members present: TCC, Cardiac-NP     Plan per Medical/Surgical team: Cardiac Pathway     Discharge disposition: Home with [facility]    Status:     Payer: [facility]     Potential Barriers: pending echo, unstable rhythm    ADOD: 6/12      BRIAN Morley RN

## 2024-06-11 ENCOUNTER — HOME HEALTH ADMISSION (OUTPATIENT)
Dept: HOME HEALTH SERVICES | Facility: HOME HEALTH | Age: 64
End: 2024-06-11
Payer: COMMERCIAL

## 2024-06-11 VITALS
SYSTOLIC BLOOD PRESSURE: 106 MMHG | HEIGHT: 66 IN | DIASTOLIC BLOOD PRESSURE: 70 MMHG | OXYGEN SATURATION: 96 % | TEMPERATURE: 96.8 F | HEART RATE: 73 BPM | BODY MASS INDEX: 19.53 KG/M2 | WEIGHT: 121.5 LBS | RESPIRATION RATE: 16 BRPM

## 2024-06-11 LAB
ALBUMIN SERPL BCP-MCNC: 3.3 G/DL (ref 3.4–5)
ANION GAP SERPL CALC-SCNC: 12 MMOL/L (ref 10–20)
BUN SERPL-MCNC: 21 MG/DL (ref 6–23)
CALCIUM SERPL-MCNC: 8.6 MG/DL (ref 8.6–10.6)
CHLORIDE SERPL-SCNC: 103 MMOL/L (ref 98–107)
CO2 SERPL-SCNC: 24 MMOL/L (ref 21–32)
CREAT SERPL-MCNC: 0.83 MG/DL (ref 0.5–1.3)
EGFRCR SERPLBLD CKD-EPI 2021: >90 ML/MIN/1.73M*2
ERYTHROCYTE [DISTWIDTH] IN BLOOD BY AUTOMATED COUNT: 14.6 % (ref 11.5–14.5)
GLUCOSE SERPL-MCNC: 85 MG/DL (ref 74–99)
HCT VFR BLD AUTO: 29.8 % (ref 41–52)
HGB BLD-MCNC: 9.8 G/DL (ref 13.5–17.5)
INR PPP: 1.3 (ref 0.9–1.1)
MAGNESIUM SERPL-MCNC: 1.84 MG/DL (ref 1.6–2.4)
MCH RBC QN AUTO: 29 PG (ref 26–34)
MCHC RBC AUTO-ENTMCNC: 32.9 G/DL (ref 32–36)
MCV RBC AUTO: 88 FL (ref 80–100)
NRBC BLD-RTO: 0 /100 WBCS (ref 0–0)
PHOSPHATE SERPL-MCNC: 3.3 MG/DL (ref 2.5–4.9)
PLATELET # BLD AUTO: 202 X10*3/UL (ref 150–450)
POTASSIUM SERPL-SCNC: 4.3 MMOL/L (ref 3.5–5.3)
PROTHROMBIN TIME: 14.6 SECONDS (ref 9.8–12.8)
RBC # BLD AUTO: 3.38 X10*6/UL (ref 4.5–5.9)
SODIUM SERPL-SCNC: 135 MMOL/L (ref 136–145)
WBC # BLD AUTO: 7 X10*3/UL (ref 4.4–11.3)

## 2024-06-11 PROCEDURE — 85610 PROTHROMBIN TIME: CPT | Performed by: NURSE PRACTITIONER

## 2024-06-11 PROCEDURE — 83735 ASSAY OF MAGNESIUM: CPT | Performed by: NURSE PRACTITIONER

## 2024-06-11 PROCEDURE — 85027 COMPLETE CBC AUTOMATED: CPT | Performed by: NURSE PRACTITIONER

## 2024-06-11 PROCEDURE — 2500000001 HC RX 250 WO HCPCS SELF ADMINISTERED DRUGS (ALT 637 FOR MEDICARE OP): Performed by: NURSE PRACTITIONER

## 2024-06-11 PROCEDURE — 80069 RENAL FUNCTION PANEL: CPT | Performed by: NURSE PRACTITIONER

## 2024-06-11 PROCEDURE — 2500000002 HC RX 250 W HCPCS SELF ADMINISTERED DRUGS (ALT 637 FOR MEDICARE OP, ALT 636 FOR OP/ED): Performed by: NURSE PRACTITIONER

## 2024-06-11 PROCEDURE — 2500000001 HC RX 250 WO HCPCS SELF ADMINISTERED DRUGS (ALT 637 FOR MEDICARE OP)

## 2024-06-11 PROCEDURE — 2500000004 HC RX 250 GENERAL PHARMACY W/ HCPCS (ALT 636 FOR OP/ED): Performed by: NURSE PRACTITIONER

## 2024-06-11 PROCEDURE — S4991 NICOTINE PATCH NONLEGEND: HCPCS | Performed by: NURSE PRACTITIONER

## 2024-06-11 PROCEDURE — 1200000002 HC GENERAL ROOM WITH TELEMETRY DAILY

## 2024-06-11 PROCEDURE — 97530 THERAPEUTIC ACTIVITIES: CPT | Mod: GP,CQ

## 2024-06-11 RX ADMIN — HEPARIN SODIUM 5000 UNITS: 5000 INJECTION INTRAVENOUS; SUBCUTANEOUS at 10:03

## 2024-06-11 RX ADMIN — ASPIRIN 81 MG CHEWABLE TABLET 81 MG: 81 TABLET CHEWABLE at 08:19

## 2024-06-11 RX ADMIN — FUROSEMIDE 20 MG: 40 TABLET ORAL at 08:19

## 2024-06-11 RX ADMIN — HEPARIN SODIUM 5000 UNITS: 5000 INJECTION INTRAVENOUS; SUBCUTANEOUS at 17:25

## 2024-06-11 RX ADMIN — NICOTINE 1 PATCH: 21 PATCH, EXTENDED RELEASE TRANSDERMAL at 17:25

## 2024-06-11 RX ADMIN — ACETAMINOPHEN 650 MG: 325 TABLET ORAL at 01:43

## 2024-06-11 RX ADMIN — HEPARIN SODIUM 5000 UNITS: 5000 INJECTION INTRAVENOUS; SUBCUTANEOUS at 01:43

## 2024-06-11 RX ADMIN — Medication 1 TABLET: at 08:19

## 2024-06-11 RX ADMIN — ACETAMINOPHEN 650 MG: 325 TABLET ORAL at 20:39

## 2024-06-11 RX ADMIN — WARFARIN SODIUM 3 MG: 3 TABLET ORAL at 17:25

## 2024-06-11 ASSESSMENT — COGNITIVE AND FUNCTIONAL STATUS - GENERAL
MOVING TO AND FROM BED TO CHAIR: A LITTLE
MOVING FROM LYING ON BACK TO SITTING ON SIDE OF FLAT BED WITH BEDRAILS: A LITTLE
MOBILITY SCORE: 18
CLIMB 3 TO 5 STEPS WITH RAILING: A LITTLE
WALKING IN HOSPITAL ROOM: A LITTLE
DAILY ACTIVITIY SCORE: 24
TURNING FROM BACK TO SIDE WHILE IN FLAT BAD: A LITTLE
STANDING UP FROM CHAIR USING ARMS: A LITTLE
MOBILITY SCORE: 22
CLIMB 3 TO 5 STEPS WITH RAILING: A LITTLE
WALKING IN HOSPITAL ROOM: A LITTLE

## 2024-06-11 ASSESSMENT — PAIN - FUNCTIONAL ASSESSMENT
PAIN_FUNCTIONAL_ASSESSMENT: 0-10

## 2024-06-11 ASSESSMENT — PAIN SCALES - GENERAL
PAINLEVEL_OUTOF10: 0 - NO PAIN
PAINLEVEL_OUTOF10: 1
PAINLEVEL_OUTOF10: 3

## 2024-06-11 ASSESSMENT — PAIN DESCRIPTION - ORIENTATION: ORIENTATION: MID

## 2024-06-11 ASSESSMENT — PAIN DESCRIPTION - LOCATION: LOCATION: BACK

## 2024-06-11 NOTE — PROGRESS NOTES
"CARDIAC SURGERY DAILY PROGRESS NOTE    Farooq Lang is a 63 year old male with a PMHx of mitral valve stenosis secondary to rheumatic disease, afib (on coumadin), HFrEF, and thrombocytopenia, who presented to [facility] on 6/4/2024 for planned cardiac surgery with Dr. Nugent.  .     As an outpatient, he presented to the emergency department with complaints of chest pain for which he was found to have severe mitral valve stenosis.  He underwent preoperative coronary angiogram showing no significant coronary artery disease.  He is also on chronic atrial fibrillation on p.o. anticoagulation.  The echo shows a severe mitral stenosis, extreme thickening and shortening of the subvalvular apparatus, the LV and the RV functions are preserved, there is no significant TR, for which Dr. Nugent and the patient agreed to proceed with mitral valve surgery.      OPERATION/PROCEDURE: 6/4/2024 with Lukasz Nugent   - Median sternotomy  - Standard central cannulation  - Mitral valve replacement with 27mm mitris bioprosthetic valve  - Left atrial appendage ligation   - Prevena dressing    CTICU Course: uneventful, postoperative pain  Transfer to LT3: 6/6/2024  ==================    Interval History:   No acute events   - replaced electrolytes   - started low dose BB as Tele has been stable with rate in the 60's     SUBJECTIVE:  Has had roughly 2 days of diarrhea with any food intake  Pacemaker kicked in with HR <40 a few times    Objective   /61 (BP Location: Right arm, Patient Position: Sitting)   Pulse 79   Temp 36.5 °C (97.7 °F) (Temporal)   Resp 16   Ht 1.676 m (5' 6\")   Wt 55.1 kg (121 lb 8 oz)   SpO2 98%   BMI 19.61 kg/m²   Pain Score: 0 - No pain   3 Day Weight Change: -0.559 kg (-1 lb 3.7 oz) per day    Intake and Output    Intake/Output Summary (Last 24 hours) at 6/11/2024 1536  Last data filed at 6/11/2024 0100  Gross per 24 hour   Intake 420 ml   Output --   Net 420 ml       Physical " Exam  Vitals and nursing note reviewed.   Constitutional:       General: He is not in acute distress.     Appearance: Normal appearance. He is not ill-appearing.      Comments: Patient alert and awake sitting up in chair, in no acute distress.     HENT:      Head: Atraumatic.      Mouth/Throat:      Mouth: Mucous membranes are moist.   Eyes:      Conjunctiva/sclera: Conjunctivae normal.   Neck:      Comments: Trachea Midline.   Cardiovascular:      Rate and Rhythm: Rhythm irregular.      Pulses: Normal pulses.      Heart sounds: Normal heart sounds.      Comments: Tele: AFIB rate controlled in the 50s-70s  V wires - connected to external pacemaker VVI @ 40 with occasional V pacing.     Pulmonary:      Effort: Pulmonary effort is normal. No respiratory distress.      Breath sounds: Normal breath sounds. No wheezing.      Comments: Equal and even chest expansion; thorax symmetric.    Diminished breath sounds in bilateral base.    Good inspiratory effort on RA  Sternum Stable   Abdominal:      General: Bowel sounds are normal. There is no distension.      Palpations: Abdomen is soft.      Tenderness: There is no abdominal tenderness.   Genitourinary:     Comments: Voids independently   Musculoskeletal:      Cervical back: Neck supple.      Right lower leg: No edema.      Left lower leg: No edema.      Comments: RIZVI; 5/5 strength in all extremities   Ambulates independently without assistance or a gait aide.    Skin:     General: Skin is warm and dry.      Capillary Refill: Capillary refill takes less than 2 seconds.      Findings: Bruising (arms) present.      Comments: Mid-Sternal Incision: well approximated, no s/s of infection or bleeding, no crepitus - RE      Neurological:      General: No focal deficit present.      Mental Status: He is alert and oriented to person, place, and time.   Psychiatric:         Mood and Affect: Mood normal.         Behavior: Behavior normal.       Medications  Scheduled  medications  acetaminophen, 650 mg, oral, q6h  aspirin, 81 mg, oral, Daily  furosemide, 20 mg, oral, Daily  heparin, 5,000 Units, subcutaneous, q8h  lactated Ringer's, 500 mL, intravenous, Once  lidocaine, 1 patch, transdermal, Daily  melatonin, 10 mg, oral, Daily  [Held by provider] metoprolol tartrate, 12.5 mg, oral, BID  multivitamin with minerals, 1 tablet, oral, Daily  nicotine, 1 patch, transdermal, Daily   Followed by  [START ON 7/17/2024] nicotine, 1 patch, transdermal, Daily   Followed by  [START ON 7/31/2024] nicotine, 1 patch, transdermal, Daily  warfarin, 3 mg, oral, Daily    Continuous medications   PRN medications  PRN medications: bisacodyl, calcium carbonate, dextrose **OR** glucagon, naloxone, [DISCONTINUED] ondansetron **OR** ondansetron, oxygen, traMADol, traMADol, traZODone    LABS:  CMP:  Results from last 7 days   Lab Units 06/11/24  0635 06/10/24  0601 06/09/24  0624 06/08/24  0558 06/07/24  0719 06/06/24  0233 06/05/24  1217 06/05/24  0358 06/04/24  2142   SODIUM mmol/L 135* 134* 135* 134* 132* 136 136 140 142   POTASSIUM mmol/L 4.3 3.7 4.0 3.9 4.3 4.5 4.9 4.2 4.6   CHLORIDE mmol/L 103 101 101 99 99 102 107 107 109*   CO2 mmol/L 24 23 24 27 26 26 21 22 23   ANION GAP mmol/L 12 14 14 12 11 13 13 15 15   BUN mg/dL 21 18 17 19 23 20 17 17 16   CREATININE mg/dL 0.83 0.79 0.80 0.79 0.89 0.86 0.92 1.20 1.16   EGFR mL/min/1.73m*2 >90 >90 >90 >90 >90 >90 >90 68 70   MAGNESIUM mg/dL 1.84 1.97 1.93 1.76 1.77 2.00 2.26 2.35 2.85*   ALBUMIN g/dL 3.3* 3.3* 3.2* 3.1* 3.4 3.5 3.8 3.7 3.4     CBC:  Results from last 7 days   Lab Units 06/11/24  0635 06/10/24  0601 06/09/24  0624 06/08/24  0558 06/07/24  0719 06/06/24  0233 06/05/24  1217 06/05/24  0358   WBC AUTO x10*3/uL 7.0 6.3 6.6 8.1 12.0* 15.9* 13.7* 15.1*   HEMOGLOBIN g/dL 9.8* 9.6* 9.9* 10.2* 10.8* 12.0* 12.9* 13.3*   HEMATOCRIT % 29.8* 28.9* 29.4* 30.6* 33.1* 37.0* 40.6* 39.0*   PLATELETS AUTO x10*3/uL 202 155 130* 88* 75* 78* 81* 95*   MCV fL 88 88  89 88 90 90 94 89     COAG:   Results from last 7 days   Lab Units 06/11/24  0636 06/10/24  0916 06/04/24  2142   INR  1.3* 1.2* 1.5*     HEME/ENDO:     CARDIAC:              IMAGING/ DIAGNOSTIC TESTING:  I have personally reviewed the following test result(s):          XR chest 2 views 06/08/2024  Impression  1.  Bibasilar atelectasis and small bilateral pleural effusions. No  evidence of pneumothorax.      Transthoracic Echo (TTE) Complete 06/08/2024  CONCLUSIONS:  1. Left ventricular systolic function is normal with a 55-60% estimated ejection fraction.  2. Abnormal septal motion consistent with post-operative status and abnormal septal motion consistent with RV pacemaker.  3. The left atrium is severely dilated.  4. Peak and mean mitral diastolic gradients are 14.5 and 5.4 mmHg at a heart rate of 72 bpm. The DI is ~ 2. The pressure half-time is ~ 90 ms, resulting in a mitral orifice area of 2.4.  5. The patient is in atrial fibrillation which may influence the estimate of left ventricular function and transvalvular flows.           IMPRESSION & PLAN:  POD # 7 s/p MVR Bioprosthetic Valve and KONRAD clipping with Dr. Nugent   - Increase activity/ ambulation; PT/OT  - Encourage IS, C/DB; respiratory therapy; wean O2 as zo - on Room air   - Cardiac rehab referral   - Continue cardiac meds: ASA  - Pain and anticonstipation meds  - 2v CXR 6/8  - Postop echo 6/8  - CUT epicardial wires prior to discharge; per surgeon preference.    - Tele until discharge      Atrial Fibrillation: home meds: Coumadin 5mg daily except 7.5mg on Mon & Wed; Diltiazem 120mg daily    - Tele: AFIB rate controlled in the 50s to 70's; occasional Vpacing at VVI 40 backup  - Continue to hold metoprolol 6/10 due to pacemaker backup kicking in  - 6/9 continues to have controlled rate afib; resumed coumadin at reduced dose of 3mg this evening  - continue holding home diltiazem  - Adjust medications as tolerated    Acute Blood Loss Anemia - Resolved    Recent Labs     24  0635 06/10/24  0601 24  0624 24  0558 24  0719 24  0233 24  1217   HGB 9.8* 9.6* 9.9* 10.2* 10.8* 12.0* 12.9*   HCT 29.8* 28.9* 29.4* 30.6* 33.1* 37.0* 40.6*   - MV, PO Iron x1mo  - Daily labs, transfuse as indicated    Hx of Thrombocytopenia - resolved  Recent Labs     24  0635 06/10/24  0601 24  0624 24  0558 24  0719 24  0233 24  1217    155 130* 88* 75* 78* 81*   - Etiology likely postop/CPB related  - Trend with daily CBCs      Chronic diastolic heart failure with EF 45 % on echo done 3/28.   Home Meds: Furosemide 20mg Daily   Volume/Electrolyte Status: Preop wt Weight: 57.4 kg (126 lb 8.7 oz)   Vitals:    24 0139   Weight: 55.1 kg (121 lb 8 oz)   - Weight: 56.2, X, 56.8 kg   - Adjust diuresis for postop cardiac surgery hypervolemia  - : Lasix 20mg IV x1; replaced Mg   - : Lasix 20mg PO x1; Replaced K & Mg  - Replete electrolytes for hypokalemia/hypomagnesemia  - Daily weights/strict I&Os  - Daily RFP   - Hold diuresis today given diarrhea    Leukocytosis - Resolved  Recent Labs     24  0635 06/10/24  0601 24  0624 24  0558 24  0719 24  0233 24  1217   WBC 7.0 6.3 6.6 8.1 12.0* 15.9* 13.7*     Temp (36hrs), Av.6 °C (97.8 °F), Min:36.2 °C (97.2 °F), Max:37 °C (98.6 °F)  - aggressive pulmonary hygiene with IS/EZ pap   - monitor for s/s infection; currently afebrile without any current s/s and down-trending WBC count   - likely atelectasis/ postoperative in etiology  -daily CBC     Diverticulosis/ Irritable Bowel Syndrome   Diarrhea last 2 days- per patient his IBS is not diarrhea type  Home meds: supplement IBS Clear OTC (per patient keeps him regular at home)  - Diet as tolerated   - Hold all bowel regimen at this time given diarrhea  - No indication for Cdiff testing at this time    Tobacco use/ Current smoker: 0.5 PPD x49 years   -encourage smoking  cessation  -continue nicotine replacement via patch   -aggressive pulmonary hygiene  -wean O2 for O2 sats > 92% - on Room Air     VTE Prophylaxis: SCDs/TEDs, ambulation, SQ heparin  Code Status: Full Code    Dispo  - PT/OT recs low intensity level of continued care; home   - Would benefit from homecare for cardiac surgery carepath and RN visits  - Anticipate discharge 1-2 days, pending stable rhythm and resolution of diarrhea  - Will continue to assess discharge needs    Uziel Agudelo PA-C  Cardiac Surgery [facility]  [facility]  Team Phone [phone]    6/11/2024  3:36 PM

## 2024-06-11 NOTE — PROGRESS NOTES
Physical Therapy    Physical Therapy Treatment    Patient Name: Farooq Lang  MRN: [id number]  Today's Date: 6/11/2024  Time Calculation  Start Time: 0850  Stop Time: 0901  Time Calculation (min): 11 min    Assessment/Plan   PT Assessment  PT Assessment Results: Decreased endurance  Rehab Prognosis: Good  Evaluation/Treatment Tolerance: Patient limited by fatigue  Medical Staff Made Aware: Yes  Strengths: Ability to acquire knowledge, Attitude of self, Support of Caregivers  End of Session Communication: Bedside nurse  Assessment Comment: Mobility limited this date due to pateitn with c/o increased fatigue. States that he will amb in hallway once he eat some breakfast and gets rest. Remains on track for safe d/c home.  End of Session Patient Position: Up in chair, Alarm off, not on at start of session, Alarm off, caregiver present     PT Plan  Treatment/Interventions: Bed mobility, Transfer training, Gait training, Stair training, Balance training, Strengthening, Endurance training, Therapeutic exercise, Therapeutic activity  PT Plan: Skilled PT  PT Frequency: 3 times per week  PT Discharge Recommendations: Low intensity level of continued care  PT Recommended Transfer Status: Assist x1, Assistive device  PT - OK to Discharge: Yes      General Visit Information:   PT  Visit  PT Received On: 06/11/24  Response to Previous Treatment: Patient with no complaints from previous session.  General  Family/Caregiver Present: No  Prior to Session Communication: Bedside nurse  Patient Position Received: Up in chair, Alarm off, not on at start of session  Preferred Learning Style: visual, verbal  General Comment: Patient stating that he had a rough night due to bowel issues and feels very fatigued this morning.    Subjective   Precautions:  Precautions  Medical Precautions: Fall precautions, Cardiac precautions (ext pacer VVI @40)  Post-Surgical Precautions: Move in the Tube  Vital Signs:  Vital Signs  Heart Rate:   (74bpm-94bpm during mobility)  Heart Rate Source: Monitor    Objective   Pain:  Pain Assessment  Pain Assessment: 0-10  Pain Score: 0 - No pain  Cognition:  Cognition  Orientation Level: Oriented X4  Coordination:     Postural Control:  Static Standing Balance  Static Standing-Balance Support: No upper extremity supported  Static Standing-Level of Assistance: Close supervision  Static Standing-Comment/Number of Minutes: narrow RAVI eyes open/closed for :15 sec ea  Dynamic Standing Balance  Dynamic Standing-Balance Support: No upper extremity supported  Dynamic Standing-Balance: Turning  Dynamic Standing-Comments: close sup    Treatments:       Ambulation/Gait Training  Ambulation/Gait Training Performed: Yes  Ambulation/Gait Training 1  Surface 1: Level tile  Device 1: No device  Assistance 1: Close supervision  Quality of Gait 1: Decreased step length  Comments/Distance (ft) 1: 15 ft within room  Transfers  Transfer: Yes  Transfer 1  Transfer From 1: Sit to, Stand to  Transfer to 1: Sit, Stand  Technique 1: Sit to stand, Stand to sit  Transfer Level of Assistance 1: Close supervision    Outcome Measures:  [facility] Basic Mobility  Turning from your back to your side while in a flat bed without using bedrails: A little  Moving from lying on your back to sitting on the side of a flat bed without using bedrails: A little  Moving to and from bed to chair (including a wheelchair): A little  Standing up from a chair using your arms (e.g. wheelchair or bedside chair): A little  To walk in hospital room: A little  Climbing 3-5 steps with railing: A little  Basic Mobility - Total Score: 18    Tinetti  Sitting Balance: Steady, safe  Arises: Able without using arms  Attempts to Arise: Able to arise, one attempt  Immediate Standing Balance (First 5 Seconds): Steady without walker or other support  Standing Balance: Narrow stance without support  Nudged: Steady without walker or other support  Eyes Closed: Steady  Turned 360  Degrees: Steadiness: Steady  Turned 360 Degrees: Continuity of Steps: Continuous  Sitting Down: Safe, smooth motion  Balance Score: 16  Initiation of Gait: No hesitancy  Step Height: R Swing Foot: Right foot complete clears floor  Step Length: R Swing Foot: Passes left stance foot  Step Height: L Swing Foot: Left foot complete clears floor  Step Length: L Swing Foot: Passes right stance foot  Step Symmetry: Right and left step appear equal  Step Continuity: Steps appear continuous  Path: Straight without walking aid  Trunk: No sway, no flexion, no use of arms, no walking aid  Walking Time: Heels almost touching while walking  Gait Score: 12  Total Score: 28    Education Documentation  Handouts, taught by Rachel Khan PTA at 6/11/2024 10:13 AM.  Learner: Patient  Readiness: Acceptance  Method: Explanation  Response: Verbalizes Understanding  Comment: Able to verbally state MITT precautions    Precautions, taught by Rachel Khan PTA at 6/11/2024 10:13 AM.  Learner: Patient  Readiness: Acceptance  Method: Explanation  Response: Verbalizes Understanding  Comment: Able to verbally state MITT precautions    Body Mechanics, taught by Rachel Khan PTA at 6/11/2024 10:13 AM.  Learner: Patient  Readiness: Acceptance  Method: Explanation  Response: Verbalizes Understanding  Comment: Able to verbally state MITT precautions    Mobility Training, taught by Rachel Khan PTA at 6/11/2024 10:13 AM.  Learner: Patient  Readiness: Acceptance  Method: Explanation  Response: Verbalizes Understanding  Comment: Able to verbally state MITT precautions    Handouts, taught by Rachel Khan PTA at 6/11/2024 10:13 AM.  Learner: Patient  Readiness: Acceptance  Method: Explanation  Response: Verbalizes Understanding    Precautions, taught by Rachel Khan PTA at 6/11/2024 10:13 AM.  Learner: Patient  Readiness: Acceptance  Method: Explanation  Response: Verbalizes Understanding    Body Mechanics, taught by Rachel Khan  PTA at 6/11/2024 10:13 AM.  Learner: Patient  Readiness: Acceptance  Method: Explanation  Response: Verbalizes Understanding    Mobility Training, taught by Rachel Khan, PTA at 6/11/2024 10:13 AM.  Learner: Patient  Readiness: Acceptance  Method: Explanation  Response: Verbalizes Understanding    Education Comments  No comments found.        OP EDUCATION:       Encounter Problems       Encounter Problems (Active)       Balance       Pt will demonstrated ability to score at least 24/28 on the Tinetti balance assessment tool to ensure safety upon D/C.  (Progressing)       Start:  06/05/24    Expected End:  06/19/24               Mobility       Pt will demonstrated ability to ambulate >/=250ft with proper form and no balance deficits for safe home going.   (Progressing)       Start:  06/05/24    Expected End:  06/19/24            Pt will demonstrate ability to ascend/descend 6 stairs with unilateral rail and no balance deficits for safe home going.  (Progressing)       Start:  06/05/24    Expected End:  06/19/24               PT Transfers       Pt will demonstrate ability to complete 5X STS in < 12 sec with good from and consistency between transfers for safe D/C.  (Progressing)       Start:  06/05/24    Expected End:  06/19/24               Pain - Adult

## 2024-06-12 ENCOUNTER — APPOINTMENT (OUTPATIENT)
Dept: CARDIOLOGY | Facility: HOSPITAL | Age: 64
DRG: 219 | End: 2024-06-12
Payer: COMMERCIAL

## 2024-06-12 LAB
ALBUMIN SERPL BCP-MCNC: 3.4 G/DL (ref 3.4–5)
ANION GAP SERPL CALC-SCNC: 13 MMOL/L (ref 10–20)
ATRIAL RATE: 44 BPM
BUN SERPL-MCNC: 16 MG/DL (ref 6–23)
CALCIUM SERPL-MCNC: 8.9 MG/DL (ref 8.6–10.6)
CHLORIDE SERPL-SCNC: 103 MMOL/L (ref 98–107)
CO2 SERPL-SCNC: 26 MMOL/L (ref 21–32)
CREAT SERPL-MCNC: 0.86 MG/DL (ref 0.5–1.3)
EGFRCR SERPLBLD CKD-EPI 2021: >90 ML/MIN/1.73M*2
ERYTHROCYTE [DISTWIDTH] IN BLOOD BY AUTOMATED COUNT: 14.5 % (ref 11.5–14.5)
GLUCOSE SERPL-MCNC: 90 MG/DL (ref 74–99)
HCT VFR BLD AUTO: 32.6 % (ref 41–52)
HGB BLD-MCNC: 10.9 G/DL (ref 13.5–17.5)
INR PPP: 1.3 (ref 0.9–1.1)
MAGNESIUM SERPL-MCNC: 1.98 MG/DL (ref 1.6–2.4)
MCH RBC QN AUTO: 29.6 PG (ref 26–34)
MCHC RBC AUTO-ENTMCNC: 33.4 G/DL (ref 32–36)
MCV RBC AUTO: 89 FL (ref 80–100)
NRBC BLD-RTO: 0 /100 WBCS (ref 0–0)
PHOSPHATE SERPL-MCNC: 3.1 MG/DL (ref 2.5–4.9)
PLATELET # BLD AUTO: 255 X10*3/UL (ref 150–450)
POTASSIUM SERPL-SCNC: 3.9 MMOL/L (ref 3.5–5.3)
PROTHROMBIN TIME: 14.6 SECONDS (ref 9.8–12.8)
Q ONSET: 223 MS
QRS COUNT: 11 BEATS
QRS DURATION: 82 MS
QT INTERVAL: 432 MS
QTC CALCULATION(BAZETT): 456 MS
QTC FREDERICIA: 448 MS
R AXIS: 7 DEGREES
RBC # BLD AUTO: 3.68 X10*6/UL (ref 4.5–5.9)
SODIUM SERPL-SCNC: 138 MMOL/L (ref 136–145)
T AXIS: -21 DEGREES
T OFFSET: 439 MS
VENTRICULAR RATE: 67 BPM
WBC # BLD AUTO: 6.8 X10*3/UL (ref 4.4–11.3)

## 2024-06-12 PROCEDURE — 2500000002 HC RX 250 W HCPCS SELF ADMINISTERED DRUGS (ALT 637 FOR MEDICARE OP, ALT 636 FOR OP/ED): Performed by: NURSE PRACTITIONER

## 2024-06-12 PROCEDURE — 2500000005 HC RX 250 GENERAL PHARMACY W/O HCPCS: Performed by: NURSE PRACTITIONER

## 2024-06-12 PROCEDURE — 85027 COMPLETE CBC AUTOMATED: CPT | Performed by: NURSE PRACTITIONER

## 2024-06-12 PROCEDURE — 82565 ASSAY OF CREATININE: CPT | Performed by: NURSE PRACTITIONER

## 2024-06-12 PROCEDURE — 2500000004 HC RX 250 GENERAL PHARMACY W/ HCPCS (ALT 636 FOR OP/ED): Performed by: NURSE PRACTITIONER

## 2024-06-12 PROCEDURE — 94668 MNPJ CHEST WALL SBSQ: CPT

## 2024-06-12 PROCEDURE — 99222 1ST HOSP IP/OBS MODERATE 55: CPT | Performed by: STUDENT IN AN ORGANIZED HEALTH CARE EDUCATION/TRAINING PROGRAM

## 2024-06-12 PROCEDURE — 85610 PROTHROMBIN TIME: CPT | Performed by: NURSE PRACTITIONER

## 2024-06-12 PROCEDURE — 2500000001 HC RX 250 WO HCPCS SELF ADMINISTERED DRUGS (ALT 637 FOR MEDICARE OP): Performed by: NURSE PRACTITIONER

## 2024-06-12 PROCEDURE — 83735 ASSAY OF MAGNESIUM: CPT | Performed by: NURSE PRACTITIONER

## 2024-06-12 PROCEDURE — 2500000001 HC RX 250 WO HCPCS SELF ADMINISTERED DRUGS (ALT 637 FOR MEDICARE OP)

## 2024-06-12 PROCEDURE — 93010 ELECTROCARDIOGRAM REPORT: CPT | Performed by: INTERNAL MEDICINE

## 2024-06-12 PROCEDURE — 93005 ELECTROCARDIOGRAM TRACING: CPT

## 2024-06-12 PROCEDURE — S4991 NICOTINE PATCH NONLEGEND: HCPCS | Performed by: NURSE PRACTITIONER

## 2024-06-12 PROCEDURE — 1200000002 HC GENERAL ROOM WITH TELEMETRY DAILY

## 2024-06-12 RX ORDER — WARFARIN SODIUM 5 MG/1
5 TABLET ORAL DAILY
Status: DISCONTINUED | OUTPATIENT
Start: 2024-06-12 | End: 2024-06-13

## 2024-06-12 RX ORDER — METOPROLOL TARTRATE 25 MG/1
6.25 TABLET, FILM COATED ORAL 2 TIMES DAILY
Status: DISCONTINUED | OUTPATIENT
Start: 2024-06-12 | End: 2024-06-18

## 2024-06-12 RX ORDER — METOPROLOL TARTRATE 25 MG/1
12.5 TABLET, FILM COATED ORAL 2 TIMES DAILY
Status: DISCONTINUED | OUTPATIENT
Start: 2024-06-12 | End: 2024-06-12

## 2024-06-12 ASSESSMENT — COGNITIVE AND FUNCTIONAL STATUS - GENERAL
CLIMB 3 TO 5 STEPS WITH RAILING: A LITTLE
DAILY ACTIVITIY SCORE: 24
MOBILITY SCORE: 23

## 2024-06-12 ASSESSMENT — PAIN SCALES - GENERAL
PAINLEVEL_OUTOF10: 1
PAINLEVEL_OUTOF10: 0 - NO PAIN

## 2024-06-12 ASSESSMENT — PAIN DESCRIPTION - LOCATION: LOCATION: BACK

## 2024-06-12 ASSESSMENT — PAIN - FUNCTIONAL ASSESSMENT: PAIN_FUNCTIONAL_ASSESSMENT: 0-10

## 2024-06-12 ASSESSMENT — PAIN DESCRIPTION - ORIENTATION: ORIENTATION: UPPER;MID

## 2024-06-12 NOTE — CONSULTS
Reason For Consult  bradycardia    History Of Present Illness  Farooq Lang is a 63 y.o. 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 Nugent on 6/4. EP consulted given concern for complete heart block.  Appears patient first presented with new onset Afib to [facility] in3/24. TTE showed severe MS which led to workup and eventual valve replacement. Has never had a DCCV before.     Cardiac Testing:    -EKG 6/8: Aflutter with variable conduction (rates 67). Was on metop tartrate 6.25mg BID (last dose this am)  -Preop EKG from 3/24 with Afib but higher HR (rates 95)    Tele consistent with Aflutter - rates 60-80s with activity. 2-3 paced beats    TTE 6/24:   1. Left ventricular systolic function is normal with a 55-60% estimated ejection fraction.   2. Abnormal septal motion consistent with post-operative status and abnormal septal motion consistent with RV pacemaker.   3. The left atrium is severely dilated.   4. Peak and mean mitral diastolic gradients are 14.5 and 5.4 mmHg at a heart rate of 72 bpm. The DI is ~ 2. The pressure half-time is ~ 90 ms, resulting in a mitral orifice area of 2.4.   5. The patient is in atrial fibrillation which may influence the estimate of left ventricular function and transvalvular flows.           Past Medical History  He has a past medical history of A-fib (Multi), Diverticulosis, Dizziness, Heart failure with mid-range ejection fraction (HFmEF) (Multi), Irregular heart beat, Irritable bowel syndrome, Mitral valve stenosis, Other chest pain (03/10/2016), Pleurodynia (11/29/2017), Right lower quadrant pain (11/14/2018), Shortness of breath, and Thrombocytopenia (CMS-HCC).    Surgical History  He has a past surgical history that includes Tonsillectomy; Cardiac catheterization (N/A, 04/22/2024); Colonoscopy; and Dental surgery.     Social History  He reports that he has been smoking cigarettes. He started smoking about 49 years ago. He has a 24.7  "pack-year smoking history. He has been exposed to tobacco smoke. He has never used smokeless tobacco. He reports that he does not drink alcohol and does not use drugs.    Family History  Family History   Problem Relation Name Age of Onset    Cancer Father  74    Brain Aneurysm Father          Allergies  Patient has no known allergies.    Review of Systems  12 point ROS reviewed and -ve       Physical Exam  Gen: well appearing  Neuro: AAOx3, CN 2-12 intact, no FND  HEENT: PEERL, EOMI, sclera anicteric, no conjunctival injection, MMM, no oropharyngeal lesions  Neck: no elevated JVD  Resp: CTAB, normal breath sounds, no wheezing/crackles/ rales  CV: RRR, normal S1/S2, no murmurs/ rubs/gallops  GI: non-tender, non-distended, normal BSs in all 4 quadrants  MSK: warm and well perfused, no edema  Skin: warm and dry, no lesions, no rashes            Last Recorded Vitals  Blood pressure 109/63, pulse 69, temperature 36.1 °C (97 °F), temperature source Temporal, resp. rate 20, height 1.676 m (5' 6\"), weight 54.2 kg (119 lb 7.8 oz), SpO2 98%.    Relevant Results  LABS:  CMP:  Results from last 7 days   Lab Units 06/12/24  0552 06/11/24  0635 06/10/24  0601 06/09/24  0624 06/08/24  0558 06/07/24  0719 06/06/24  0233   SODIUM mmol/L 138 135* 134* 135* 134* 132* 136   POTASSIUM mmol/L 3.9 4.3 3.7 4.0 3.9 4.3 4.5   CHLORIDE mmol/L 103 103 101 101 99 99 102   CO2 mmol/L 26 24 23 24 27 26 26   ANION GAP mmol/L 13 12 14 14 12 11 13   BUN mg/dL 16 21 18 17 19 23 20   CREATININE mg/dL 0.86 0.83 0.79 0.80 0.79 0.89 0.86   EGFR mL/min/1.73m*2 >90 >90 >90 >90 >90 >90 >90   MAGNESIUM mg/dL 1.98 1.84 1.97 1.93 1.76 1.77 2.00   ALBUMIN g/dL 3.4 3.3* 3.3* 3.2* 3.1* 3.4 3.5     CBC:  Results from last 7 days   Lab Units 06/12/24  0552 06/11/24  0635 06/10/24  0601 06/09/24  0624 06/08/24  0558 06/07/24  0719 06/06/24  0233   WBC AUTO x10*3/uL 6.8 7.0 6.3 6.6 8.1 12.0* 15.9*   HEMOGLOBIN g/dL 10.9* 9.8* 9.6* 9.9* 10.2* 10.8* 12.0*   HEMATOCRIT " "% 32.6* 29.8* 28.9* 29.4* 30.6* 33.1* 37.0*   PLATELETS AUTO x10*3/uL 255 202 155 130* 88* 75* 78*   MCV fL 89 88 88 89 88 90 90     COAG:   Results from last 7 days   Lab Units 06/12/24  0552 06/11/24  0636 06/10/24  0916   INR  1.3* 1.3* 1.2*     ABO: No results found for: \"ABO\"  HEME/ENDO:     CARDIAC:       No lab exists for component: \"CK\", \"CKMBP\"No results for input(s): \"CHOL\", \"LDLF\", \"LDL\", \"LDLCALC\", \"HDL\", \"TRIG\" in the last 12089 hours.     Scheduled medications  acetaminophen, 650 mg, oral, q6h  aspirin, 81 mg, oral, Daily  furosemide, 20 mg, oral, Daily  heparin, 5,000 Units, subcutaneous, q8h  lidocaine, 1 patch, transdermal, Daily  melatonin, 10 mg, oral, Daily  [Held by provider] metoprolol tartrate, 6.25 mg, oral, BID  multivitamin with minerals, 1 tablet, oral, Daily  nicotine, 1 patch, transdermal, Daily   Followed by  [START ON 7/17/2024] nicotine, 1 patch, transdermal, Daily   Followed by  [START ON 7/31/2024] nicotine, 1 patch, transdermal, Daily  warfarin, 5 mg, oral, Daily      Continuous medications     PRN medications  PRN medications: benzocaine-menthol, bisacodyl, calcium carbonate, dextrose **OR** glucagon, naloxone, [DISCONTINUED] ondansetron **OR** ondansetron, oxygen, traMADol, traMADol, traZODone       Assessment/Plan   Farooq Lang is a 63 y.o. 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 Nugent on 6/4. EP consulted given Aflutter with slow ventricular response and possible consideration for PPM    -Patient is in Aflutter on EKG with variable conduction. With activity, his HR does increase from 60 to 80 suggesting chronotropic competence. There are only 3 beats in the last 24h that were paced  -Patient has never been cardioverted. Pt has a severely dilated left atrium on TTE but we would like to make an attempt at cardioversion to assess underlying sinus node dysfunction. This will help decide if he needs a PPM prior to discharge  -Even " though he got a KONRAD ligation as part of surgery, recommend obtaining CT Watchman to rule out KONRAD thrombus   -If there is no KONRAD thrombus, will proceed with DCCV tomorrow   -Ensure patient is NPO after MN  -Patient has been off anticoagulation since surgery - warfarin to be resumed in the evening today  -Given plan for DCCV tomorrow, will start heparin drip today to assess he can tolerate anticoagulation and given high risk of stroke in the time immediately after DCCV, I would favor bridging heparin to coumadin  -Continue to hold metoprolol  -Consider starting amiodarone tomorrow if DCCV is successful  -Maintain TVP (VVI 30)  -Continue telemetry    Thank you for the consult. Plan discussed with Dr. Meraz     I spent 45 minutes in the professional and overall care of this patient.      Rosi Min MD

## 2024-06-12 NOTE — CARE PLAN
Problem: Discharge Planning  Goal: Discharge to home or other facility with appropriate resources  Outcome: Progressing     Problem: Chronic Conditions and Co-morbidities  Goal: Patient's chronic conditions and co-morbidity symptoms are monitored and maintained or improved  Outcome: Progressing     Problem: Fall/Injury  Goal: Not fall by end of shift  Outcome: Progressing  Goal: Be free from injury by end of the shift  Outcome: Progressing  Goal: Verbalize understanding of personal risk factors for fall in the hospital  Outcome: Progressing  Goal: Verbalize understanding of risk factor reduction measures to prevent injury from fall in the home  Outcome: Progressing  Goal: Use assistive devices by end of the shift  Outcome: Progressing  Goal: Pace activities to prevent fatigue by end of the shift  Outcome: Progressing     Problem: Pain  Goal: Takes deep breaths with improved pain control throughout the shift  Outcome: Progressing  Goal: Turns in bed with improved pain control throughout the shift  Outcome: Progressing  Goal: Walks with improved pain control throughout the shift  Outcome: Progressing  Goal: Performs ADL's with improved pain control throughout shift  Outcome: Progressing  Goal: Participates in PT with improved pain control throughout the shift  Outcome: Progressing  Goal: Free from opioid side effects throughout the shift  Outcome: Progressing  Goal: Free from acute confusion related to pain meds throughout the shift  Outcome: Progressing   The patient's goals for the shift include      The clinical goals for the shift include Pt will remain HDS throughout this shift

## 2024-06-12 NOTE — PROGRESS NOTES
"CARDIAC SURGERY DAILY PROGRESS NOTE    Farooq Lang is a 63 year old male with a PMHx of mitral valve stenosis secondary to rheumatic disease, afib (on coumadin), HFrEF, and thrombocytopenia, who presented to [facility] on 6/4/2024 for planned cardiac surgery with Dr. Nugent.  .     As an outpatient, he presented to the emergency department with complaints of chest pain for which he was found to have severe mitral valve stenosis.  He underwent preoperative coronary angiogram showing no significant coronary artery disease.  He is also on chronic atrial fibrillation on p.o. anticoagulation.  The echo shows a severe mitral stenosis, extreme thickening and shortening of the subvalvular apparatus, the LV and the RV functions are preserved, there is no significant TR, for which Dr. Nugent and the patient agreed to proceed with mitral valve surgery.      OPERATION/PROCEDURE: 6/4/2024 with Lukasz Nugent   - Median sternotomy  - Standard central cannulation  - Mitral valve replacement with 27mm mitris bioprosthetic valve  - Left atrial appendage ligation   - Prevena dressing    CTICU Course: uneventful, postoperative pain  Transfer to LT3: 6/6/2024  ==================    Interval History:   No acute events , stable tele 70s-80s with pacer back-up VVI 30  -restarted Metoprolol at lower dose 6.25 mg BID    SUBJECTIVE:  Continue complain of diarrhea x last 2-3 days, decreased as of this morning      Objective   /71 (BP Location: Right arm, Patient Position: Lying)   Pulse 86   Temp 36.4 °C (97.5 °F) (Temporal)   Resp 19   Ht 1.676 m (5' 6\")   Wt 54.2 kg (119 lb 7.8 oz)   SpO2 96%   BMI 19.29 kg/m²   Pain Score: 1   3 Day Weight Change: -0.85 kg (-1 lb 14 oz) per day    Intake and Output    Intake/Output Summary (Last 24 hours) at 6/12/2024 1031  Last data filed at 6/11/2024 2039  Gross per 24 hour   Intake 120 ml   Output --   Net 120 ml       Physical Exam  Vitals and nursing note reviewed. "   Constitutional:       General: He is not in acute distress.     Appearance: Normal appearance. He is not ill-appearing.      Comments: Patient alert and awake , sitting on the side of the bed during exam   HENT:      Head: Normocephalic and atraumatic.      Mouth/Throat:      Mouth: Mucous membranes are moist.      Pharynx: No oropharyngeal exudate.   Eyes:      Conjunctiva/sclera: Conjunctivae normal.   Neck:      Comments: Trachea Midline.   Cardiovascular:      Rate and Rhythm: Rhythm irregular.      Pulses: Normal pulses.      Heart sounds: Normal heart sounds.      Comments: Tele: AFIB rate controlled in the 70s-80s  V wires - connected to external pacemaker VVI @ 30     Pulmonary:      Effort: Pulmonary effort is normal. No respiratory distress.      Breath sounds: Normal breath sounds. No wheezing or rales.      Comments:   Stable on RA  Sternum Stable   Abdominal:      General: Bowel sounds are normal. There is no distension.      Palpations: Abdomen is soft.      Tenderness: There is no abdominal tenderness.      Comments: Complaints of diarrhea    Genitourinary:     Comments: Voids independently   Musculoskeletal:         General: Normal range of motion.      Cervical back: Neck supple.      Right lower leg: No edema.      Left lower leg: No edema.   Skin:     General: Skin is warm and dry.      Capillary Refill: Capillary refill takes less than 2 seconds.      Findings: Bruising (arms) present.      Comments: Mid-Sternal Incision: well approximated, no s/s of infection or bleeding, no crepitus - RE      Neurological:      General: No focal deficit present.      Mental Status: He is alert and oriented to person, place, and time.   Psychiatric:         Mood and Affect: Mood normal.         Behavior: Behavior normal.       Medications  Scheduled medications  acetaminophen, 650 mg, oral, q6h  aspirin, 81 mg, oral, Daily  furosemide, 20 mg, oral, Daily  heparin, 5,000 Units, subcutaneous, q8h  lidocaine, 1  patch, transdermal, Daily  melatonin, 10 mg, oral, Daily  metoprolol tartrate, 6.25 mg, oral, BID  multivitamin with minerals, 1 tablet, oral, Daily  nicotine, 1 patch, transdermal, Daily   Followed by  [START ON 7/17/2024] nicotine, 1 patch, transdermal, Daily   Followed by  [START ON 7/31/2024] nicotine, 1 patch, transdermal, Daily  warfarin, 5 mg, oral, Daily      Continuous medications     PRN medications  PRN medications: bisacodyl, calcium carbonate, dextrose **OR** glucagon, naloxone, [DISCONTINUED] ondansetron **OR** ondansetron, oxygen, traMADol, traMADol, traZODone     LABS:  CMP:  Results from last 7 days   Lab Units 06/12/24  0552 06/11/24  0635 06/10/24  0601 06/09/24  0624 06/08/24  0558 06/07/24  0719 06/06/24  0233 06/05/24  1217   SODIUM mmol/L 138 135* 134* 135* 134* 132* 136 136   POTASSIUM mmol/L 3.9 4.3 3.7 4.0 3.9 4.3 4.5 4.9   CHLORIDE mmol/L 103 103 101 101 99 99 102 107   CO2 mmol/L 26 24 23 24 27 26 26 21   ANION GAP mmol/L 13 12 14 14 12 11 13 13   BUN mg/dL 16 21 18 17 19 23 20 17   CREATININE mg/dL 0.86 0.83 0.79 0.80 0.79 0.89 0.86 0.92   EGFR mL/min/1.73m*2 >90 >90 >90 >90 >90 >90 >90 >90   MAGNESIUM mg/dL 1.98 1.84 1.97 1.93 1.76 1.77 2.00 2.26   ALBUMIN g/dL 3.4 3.3* 3.3* 3.2* 3.1* 3.4 3.5 3.8     CBC:  Results from last 7 days   Lab Units 06/12/24  0552 06/11/24  0635 06/10/24  0601 06/09/24  0624 06/08/24  0558 06/07/24  0719 06/06/24  0233 06/05/24  1217   WBC AUTO x10*3/uL 6.8 7.0 6.3 6.6 8.1 12.0* 15.9* 13.7*   HEMOGLOBIN g/dL 10.9* 9.8* 9.6* 9.9* 10.2* 10.8* 12.0* 12.9*   HEMATOCRIT % 32.6* 29.8* 28.9* 29.4* 30.6* 33.1* 37.0* 40.6*   PLATELETS AUTO x10*3/uL 255 202 155 130* 88* 75* 78* 81*   MCV fL 89 88 88 89 88 90 90 94     COAG:   Results from last 7 days   Lab Units 06/12/24  0552 06/11/24  0636 06/10/24  0916   INR  1.3* 1.3* 1.2*       IMAGING/ DIAGNOSTIC TESTING:  I have personally reviewed the following test result(s):      XR chest 2 views 06/08/2024  Impression  1.   Bibasilar atelectasis and small bilateral pleural effusions. No  evidence of pneumothorax.      Transthoracic Echo (TTE) Complete 06/08/2024  CONCLUSIONS:  1. Left ventricular systolic function is normal with a 55-60% estimated ejection fraction.  2. Abnormal septal motion consistent with post-operative status and abnormal septal motion consistent with RV pacemaker.  3. The left atrium is severely dilated.  4. Peak and mean mitral diastolic gradients are 14.5 and 5.4 mmHg at a heart rate of 72 bpm. The DI is ~ 2. The pressure half-time is ~ 90 ms, resulting in a mitral orifice area of 2.4.  5. The patient is in atrial fibrillation which may influence the estimate of left ventricular function and transvalvular flows.      IMPRESSION & PLAN:  POD # 8 s/p MVR Bioprosthetic Valve and KONRAD clipping with Dr. Nugent   - Increase activity/ ambulation; PT/OT  - Encourage IS, C/DB; respiratory therapy; wean O2 as zo - on Room air   - Cardiac rehab referral   - Continue cardiac meds: ASA, BB  - Pain and anticonstipation meds  - 2v CXR 6/8  - Postop echo 6/8  - CUT epicardial wires prior to discharge; per surgeon preference.    - Tele until discharge      Atrial Fibrillation: home meds: Coumadin 5mg daily except 7.5mg on Mon & Wed; Diltiazem 120mg daily    - Tele: AFIB rate controlled in the 70s-80's; VVI 30 backup  -  held metoprolol since 6/10 due to pacemaker backup kicking in, restarting today at 6.25 mg BID  - Coumadin was resumed on 6/9  at reduced dose of 3mg , dose increased today 6/12 to 5 mg (with INR 1.3)  - continue holding home diltiazem  - Adjust medications as tolerated    Acute Blood Loss Anemia - Resolved   Recent Labs     06/12/24  0552 06/11/24  0635 06/10/24  0601 06/09/24  0624 06/08/24  0558 06/07/24  0719 06/06/24  0233   HGB 10.9* 9.8* 9.6* 9.9* 10.2* 10.8* 12.0*   HCT 32.6* 29.8* 28.9* 29.4* 30.6* 33.1* 37.0*   - MV, PO Iron x1mo  - Daily labs, transfuse as indicated    Hx of Thrombocytopenia -  resolved  Recent Labs     24  0552 24  0635 06/10/24  0601 24  0624 24  0558 24  0719 24  0233    202 155 130* 88* 75* 78*   - Etiology likely postop/CPB related  - Trend with daily CBCs      Chronic diastolic heart failure with EF 45 % on echo done 3/28.   Home Meds: Furosemide 20mg Daily   Volume/Electrolyte Status: Preop wt Weight: 57.4 kg (126 lb 8.7 oz)   Vitals:    24 0521   Weight: 54.2 kg (119 lb 7.8 oz)   - Weight: 54.2 , 56.2, X, 56.8 kg   - Adjust diuresis for postop cardiac surgery hypervolemia  - : Lasix 20mg IV x1; replaced Mg   - : Lasix 20mg PO x1; Replaced K & Mg  -on scheduled diuresis 20 mg daily since   - Replete electrolytes for hypokalemia/hypomagnesemia  - Daily weights/strict I&Os  - Daily RFP   -     Leukocytosis - Resolved  Recent Labs     24  0552 24  0635 06/10/24  0601 24  0624 24  0558 24  0719 24  0233   WBC 6.8 7.0 6.3 6.6 8.1 12.0* 15.9*     Temp (36hrs), Av.4 °C (97.5 °F), Min:36 °C (96.8 °F), Max:37 °C (98.6 °F)  - aggressive pulmonary hygiene with IS/EZ pap   - monitor for s/s infection; currently afebrile without any current s/s and down-trending WBC count   - likely atelectasis/ postoperative in etiology  -daily CBC     Diverticulosis/ Irritable Bowel Syndrome   Diarrhea last 3 days- per patient his IBS is not diarrhea type  Home meds: supplement IBS Clear OTC (per patient keeps him regular at home)  - Diet as tolerated   - Hold all bowel regimen at this time given diarrhea  - No indication for Cdiff testing at this time  -consider adding imodium if diarrhea persists     Tobacco use/ Current smoker: 0.5 PPD x49 years   -encourage smoking cessation  -continue nicotine replacement via patch   -aggressive pulmonary hygiene  -wean O2 for O2 sats > 92% - on Room Air     VTE Prophylaxis: SCDs/TEDs, ambulation, SQ heparin  Code Status: Full Code    Dispo  - PT/OT recs low intensity level  of continued care; home   - Would benefit from homecare for cardiac surgery carepath and RN visits  - Anticipate discharge 1-2 days, pending stable rhythm and resolution of diarrhea  - Will continue to assess discharge needs    LENY Rojas-CNP  Cardiac Surgery [facility]  [facility]  Team Phone [phone]    6/12/2024  10:31 AM

## 2024-06-12 NOTE — PROGRESS NOTES
Transitional Care Coordination Progress Note: Interdisciplinary Rounds    Team members present: TCC, Charge RN, Cardiac NP    Plan per Medical/Surgical team: cardiac care path    Discharge disposition: Home w/ [facility]    Status: IP    Payer: [facility]     Potential Barriers: increased episodes of diarrhea, pacing issues - EP to see pt      ADOD: 6/14    This TCC will continue to follow for home going needs and safe DC plan.    Mayelin Parsons RN

## 2024-06-13 ENCOUNTER — ANESTHESIA (OUTPATIENT)
Dept: CARDIOLOGY | Facility: HOSPITAL | Age: 64
DRG: 219 | End: 2024-06-13
Payer: COMMERCIAL

## 2024-06-13 ENCOUNTER — APPOINTMENT (OUTPATIENT)
Dept: RADIOLOGY | Facility: HOSPITAL | Age: 64
DRG: 219 | End: 2024-06-13
Payer: COMMERCIAL

## 2024-06-13 ENCOUNTER — ANESTHESIA EVENT (OUTPATIENT)
Dept: CARDIOLOGY | Facility: HOSPITAL | Age: 64
DRG: 219 | End: 2024-06-13
Payer: COMMERCIAL

## 2024-06-13 ENCOUNTER — APPOINTMENT (OUTPATIENT)
Dept: CARDIAC SURGERY | Facility: HOSPITAL | Age: 64
End: 2024-06-13
Payer: COMMERCIAL

## 2024-06-13 LAB
ALBUMIN SERPL BCP-MCNC: 3.4 G/DL (ref 3.4–5)
ANION GAP SERPL CALC-SCNC: 14 MMOL/L (ref 10–20)
BUN SERPL-MCNC: 15 MG/DL (ref 6–23)
CALCIUM SERPL-MCNC: 8.9 MG/DL (ref 8.6–10.6)
CHLORIDE SERPL-SCNC: 104 MMOL/L (ref 98–107)
CO2 SERPL-SCNC: 24 MMOL/L (ref 21–32)
CREAT SERPL-MCNC: 0.83 MG/DL (ref 0.5–1.3)
EGFRCR SERPLBLD CKD-EPI 2021: >90 ML/MIN/1.73M*2
ERYTHROCYTE [DISTWIDTH] IN BLOOD BY AUTOMATED COUNT: 14.6 % (ref 11.5–14.5)
GLUCOSE SERPL-MCNC: 80 MG/DL (ref 74–99)
HCT VFR BLD AUTO: 32.3 % (ref 41–52)
HGB BLD-MCNC: 10.6 G/DL (ref 13.5–17.5)
INR PPP: 1.3 (ref 0.9–1.1)
MAGNESIUM SERPL-MCNC: 1.91 MG/DL (ref 1.6–2.4)
MCH RBC QN AUTO: 29.4 PG (ref 26–34)
MCHC RBC AUTO-ENTMCNC: 32.8 G/DL (ref 32–36)
MCV RBC AUTO: 90 FL (ref 80–100)
NRBC BLD-RTO: 0 /100 WBCS (ref 0–0)
PHOSPHATE SERPL-MCNC: 3.3 MG/DL (ref 2.5–4.9)
PLATELET # BLD AUTO: 296 X10*3/UL (ref 150–450)
POTASSIUM SERPL-SCNC: 3.8 MMOL/L (ref 3.5–5.3)
PROTHROMBIN TIME: 14.9 SECONDS (ref 9.8–12.8)
RBC # BLD AUTO: 3.6 X10*6/UL (ref 4.5–5.9)
SODIUM SERPL-SCNC: 138 MMOL/L (ref 136–145)
UFH PPP CHRO-ACNC: 0.2 IU/ML
UFH PPP CHRO-ACNC: 0.2 IU/ML
UFH PPP CHRO-ACNC: 0.3 IU/ML
WBC # BLD AUTO: 7.1 X10*3/UL (ref 4.4–11.3)

## 2024-06-13 PROCEDURE — 2500000002 HC RX 250 W HCPCS SELF ADMINISTERED DRUGS (ALT 637 FOR MEDICARE OP, ALT 636 FOR OP/ED): Performed by: NURSE PRACTITIONER

## 2024-06-13 PROCEDURE — 2500000001 HC RX 250 WO HCPCS SELF ADMINISTERED DRUGS (ALT 637 FOR MEDICARE OP)

## 2024-06-13 PROCEDURE — 2550000001 HC RX 255 CONTRASTS: Performed by: NURSE PRACTITIONER

## 2024-06-13 PROCEDURE — 2500000001 HC RX 250 WO HCPCS SELF ADMINISTERED DRUGS (ALT 637 FOR MEDICARE OP): Performed by: NURSE PRACTITIONER

## 2024-06-13 PROCEDURE — 75572 CT HRT W/3D IMAGE: CPT

## 2024-06-13 PROCEDURE — 2500000004 HC RX 250 GENERAL PHARMACY W/ HCPCS (ALT 636 FOR OP/ED): Performed by: NURSE PRACTITIONER

## 2024-06-13 PROCEDURE — 85610 PROTHROMBIN TIME: CPT | Performed by: NURSE PRACTITIONER

## 2024-06-13 PROCEDURE — 83735 ASSAY OF MAGNESIUM: CPT | Performed by: NURSE PRACTITIONER

## 2024-06-13 PROCEDURE — 85520 HEPARIN ASSAY: CPT | Performed by: NURSE PRACTITIONER

## 2024-06-13 PROCEDURE — 36415 COLL VENOUS BLD VENIPUNCTURE: CPT | Performed by: NURSE PRACTITIONER

## 2024-06-13 PROCEDURE — S4991 NICOTINE PATCH NONLEGEND: HCPCS | Performed by: NURSE PRACTITIONER

## 2024-06-13 PROCEDURE — 1200000002 HC GENERAL ROOM WITH TELEMETRY DAILY

## 2024-06-13 PROCEDURE — 99231 SBSQ HOSP IP/OBS SF/LOW 25: CPT | Performed by: STUDENT IN AN ORGANIZED HEALTH CARE EDUCATION/TRAINING PROGRAM

## 2024-06-13 PROCEDURE — 85027 COMPLETE CBC AUTOMATED: CPT | Performed by: NURSE PRACTITIONER

## 2024-06-13 PROCEDURE — 80069 RENAL FUNCTION PANEL: CPT | Performed by: NURSE PRACTITIONER

## 2024-06-13 RX ORDER — LANOLIN ALCOHOL/MO/W.PET/CERES
400 CREAM (GRAM) TOPICAL DAILY
Status: DISCONTINUED | OUTPATIENT
Start: 2024-06-13 | End: 2024-06-18 | Stop reason: HOSPADM

## 2024-06-13 RX ORDER — HEPARIN SODIUM 10000 [USP'U]/100ML
0-4000 INJECTION, SOLUTION INTRAVENOUS CONTINUOUS
Status: DISCONTINUED | OUTPATIENT
Start: 2024-06-13 | End: 2024-06-18

## 2024-06-13 RX ORDER — WARFARIN SODIUM 5 MG/1
7.5 TABLET ORAL DAILY
Status: DISCONTINUED | OUTPATIENT
Start: 2024-06-13 | End: 2024-06-15

## 2024-06-13 RX ORDER — POTASSIUM CHLORIDE 20 MEQ/1
20 TABLET, EXTENDED RELEASE ORAL ONCE
Status: COMPLETED | OUTPATIENT
Start: 2024-06-13 | End: 2024-06-13

## 2024-06-13 ASSESSMENT — COGNITIVE AND FUNCTIONAL STATUS - GENERAL
DAILY ACTIVITIY SCORE: 24
MOBILITY SCORE: 24

## 2024-06-13 ASSESSMENT — PAIN - FUNCTIONAL ASSESSMENT: PAIN_FUNCTIONAL_ASSESSMENT: 0-10

## 2024-06-13 ASSESSMENT — PAIN SCALES - GENERAL
PAINLEVEL_OUTOF10: 0 - NO PAIN
PAINLEVEL_OUTOF10: 0 - NO PAIN

## 2024-06-13 NOTE — PROGRESS NOTES
Transitional Care Coordination Progress Note: Interdisciplinary Rounds     Team members present: TCC, Charge RN, Cardiac NP     Plan per Medical/Surgical team: cardiac care path     Discharge disposition: Home w/ [facility]     Status: IP     Payer: [facility]      Potential Barriers: diarrhea resolving, pacing issues - cardioversion scheduled for 6/14       ADOD: 6/15     This TCC will continue to follow for home going needs and safe DC plan.     Mayelin Parsons RN

## 2024-06-13 NOTE — CARE PLAN
The patient's goals for the shift include safety    The clinical goals for the shift include safety    Problem: Discharge Planning  Goal: Discharge to home or other facility with appropriate resources  Outcome: Progressing     Problem: Chronic Conditions and Co-morbidities  Goal: Patient's chronic conditions and co-morbidity symptoms are monitored and maintained or improved  Outcome: Progressing     Problem: Fall/Injury  Goal: Not fall by end of shift  Outcome: Progressing  Goal: Be free from injury by end of the shift  Outcome: Progressing  Goal: Verbalize understanding of personal risk factors for fall in the hospital  Outcome: Progressing  Goal: Verbalize understanding of risk factor reduction measures to prevent injury from fall in the home  Outcome: Progressing  Goal: Use assistive devices by end of the shift  Outcome: Progressing  Goal: Pace activities to prevent fatigue by end of the shift  Outcome: Progressing     Problem: Pain  Goal: Takes deep breaths with improved pain control throughout the shift  Outcome: Progressing  Goal: Turns in bed with improved pain control throughout the shift  Outcome: Progressing  Goal: Walks with improved pain control throughout the shift  Outcome: Progressing  Goal: Performs ADL's with improved pain control throughout shift  Outcome: Progressing  Goal: Participates in PT with improved pain control throughout the shift  Outcome: Progressing  Goal: Free from opioid side effects throughout the shift  Outcome: Progressing  Goal: Free from acute confusion related to pain meds throughout the shift  Outcome: Progressing

## 2024-06-13 NOTE — PROGRESS NOTES
"CARDIAC SURGERY DAILY PROGRESS NOTE    Farooq Lang is a 63 year old male with a PMHx of mitral valve stenosis secondary to rheumatic disease, afib (on coumadin), HFrEF, and thrombocytopenia, who presented to [facility] on 6/4/2024 for planned cardiac surgery with Dr. Nugent.  .     As an outpatient, he presented to the emergency department with complaints of chest pain for which he was found to have severe mitral valve stenosis.  He underwent preoperative coronary angiogram showing no significant coronary artery disease.  He is also on chronic atrial fibrillation on p.o. anticoagulation.  The echo shows a severe mitral stenosis, extreme thickening and shortening of the subvalvular apparatus, the LV and the RV functions are preserved, there is no significant TR, for which Dr. Nugent and the patient agreed to proceed with mitral valve surgery.      OPERATION/PROCEDURE: 6/4/2024 with Lukasz Nugent   - Median sternotomy  - Standard central cannulation  - Mitral valve replacement with 27mm mitris bioprosthetic valve  - Left atrial appendage ligation   - Prevena dressing    CTICU Course: uneventful, postoperative pain  Transfer to LT3: 6/6/2024  ==================    Interval History:   No acute events.     SUBJECTIVE:  No diarrhea in the last 24 hours. Patient states that he has been careful with what he has been eating over the last 24 hours to not aggravate his IBS and potentially increase diarrhea.     Objective   /65 (BP Location: Right arm, Patient Position: Sitting)   Pulse 90   Temp 36.4 °C (97.5 °F) (Temporal)   Resp 16   Ht 1.676 m (5' 6\")   Wt 54.2 kg (119 lb 7.8 oz)   SpO2 95%   BMI 19.29 kg/m²   Pain Score: 0 - No pain   3 Day Weight Change: Unable to Calculate    Intake and Output    Intake/Output Summary (Last 24 hours) at 6/13/2024 1223  Last data filed at 6/12/2024 1336  Gross per 24 hour   Intake 240 ml   Output --   Net 240 ml       Physical Exam  Vitals and nursing note " reviewed.   Constitutional:       Appearance: Normal appearance.      Comments: Patient alert and awake, sitting up in chair during exam.   States that he has been walking every hour around the unit.      HENT:      Head: Normocephalic and atraumatic.      Mouth/Throat:      Mouth: Mucous membranes are moist.   Eyes:      Conjunctiva/sclera: Conjunctivae normal.   Neck:      Comments: Trachea Midline.   Cardiovascular:      Rate and Rhythm: Rhythm irregular.      Pulses: Normal pulses.      Heart sounds: Normal heart sounds.      Comments: Tele: AFIB ate 82 bpm (range 59-91 bpm)  V wires - connected to external pacemaker VVI @ 30, mA 8  Patient understands that a cardioversion is planned for tomorrow and that he will be NPO after MN.     Pulmonary:      Effort: Pulmonary effort is normal.      Comments: Stable on RA  Sternum stable  Decreased in RLL - encouraged use of IS, using at 1.5L   Abdominal:      General: Bowel sounds are normal.      Palpations: Abdomen is soft.      Comments: No further diarrhea the past 24 hours; expects to have a BM soon; however, he states that he has been careful to not eat anything that would typically aggravate his diarrhea.   Good appetite.    Genitourinary:     Comments: Voids independently   Musculoskeletal:         General: Normal range of motion.      Cervical back: Neck supple.      Comments: Strong 5/5 upper and lower extremities  Ambulating without difficulty around unit   Skin:     General: Skin is warm and dry.      Capillary Refill: Capillary refill takes less than 2 seconds.      Findings: Bruising (arms) present.      Comments: Mid-Sternal Incision: well approximated, no s/s of infection or bleeding, no crepitus - RE      Neurological:      General: No focal deficit present.      Mental Status: He is alert and oriented to person, place, and time.   Psychiatric:         Mood and Affect: Mood normal.         Behavior: Behavior normal.       Medications  Scheduled  medications  acetaminophen, 650 mg, oral, q6h  aspirin, 81 mg, oral, Daily  furosemide, 20 mg, oral, Daily  lidocaine, 1 patch, transdermal, Daily  magnesium oxide, 400 mg, oral, Daily  melatonin, 10 mg, oral, Daily  [Held by provider] metoprolol tartrate, 6.25 mg, oral, BID  multivitamin with minerals, 1 tablet, oral, Daily  nicotine, 1 patch, transdermal, Daily   Followed by  [START ON 7/17/2024] nicotine, 1 patch, transdermal, Daily   Followed by  [START ON 7/31/2024] nicotine, 1 patch, transdermal, Daily  potassium chloride CR, 20 mEq, oral, Once  warfarin, 7.5 mg, oral, Daily      Continuous medications  heparin, 0-4,000 Units/hr, Last Rate: 700 Units/hr (06/13/24 1027)      PRN medications  PRN medications: benzocaine-menthol, bisacodyl, calcium carbonate, dextrose **OR** glucagon, naloxone, [DISCONTINUED] ondansetron **OR** ondansetron, oxygen, traMADol, traMADol, traZODone     LABS:  CMP:  Results from last 7 days   Lab Units 06/13/24  0543 06/12/24  0552 06/11/24  0635 06/10/24  0601 06/09/24  0624 06/08/24  0558 06/07/24  0719   SODIUM mmol/L 138 138 135* 134* 135* 134* 132*   POTASSIUM mmol/L 3.8 3.9 4.3 3.7 4.0 3.9 4.3   CHLORIDE mmol/L 104 103 103 101 101 99 99   CO2 mmol/L 24 26 24 23 24 27 26   ANION GAP mmol/L 14 13 12 14 14 12 11   BUN mg/dL 15 16 21 18 17 19 23   CREATININE mg/dL 0.83 0.86 0.83 0.79 0.80 0.79 0.89   EGFR mL/min/1.73m*2 >90 >90 >90 >90 >90 >90 >90   MAGNESIUM mg/dL 1.91 1.98 1.84 1.97 1.93 1.76 1.77   ALBUMIN g/dL 3.4 3.4 3.3* 3.3* 3.2* 3.1* 3.4     CBC:  Results from last 7 days   Lab Units 06/13/24  0543 06/12/24  0552 06/11/24  0635 06/10/24  0601 06/09/24  0624 06/08/24  0558 06/07/24  0719   WBC AUTO x10*3/uL 7.1 6.8 7.0 6.3 6.6 8.1 12.0*   HEMOGLOBIN g/dL 10.6* 10.9* 9.8* 9.6* 9.9* 10.2* 10.8*   HEMATOCRIT % 32.3* 32.6* 29.8* 28.9* 29.4* 30.6* 33.1*   PLATELETS AUTO x10*3/uL 296 255 202 155 130* 88* 75*   MCV fL 90 89 88 88 89 88 90     COAG:   Results from last 7 days   Lab  Units 06/13/24  0543 06/12/24  0552 06/11/24  0636 06/10/24  0916   INR  1.3* 1.3* 1.3* 1.2*       IMAGING/ DIAGNOSTIC TESTING:  I have personally reviewed the following test result(s):      XR chest 2 views 06/08/2024  Impression  1.  Bibasilar atelectasis and small bilateral pleural effusions. No  evidence of pneumothorax.    6/8/24 TTE - Complete  CONCLUSIONS:  1. Left ventricular systolic function is normal with a 55-60% estimated ejection fraction.  2. Abnormal septal motion consistent with post-operative status and abnormal septal motion consistent with RV pacemaker.  3. The left atrium is severely dilated.  4. Peak and mean mitral diastolic gradients are 14.5 and 5.4 mmHg at a heart rate of 72 bpm. The DI is ~ 2. The pressure half-time is ~ 90 ms, resulting in a mitral orifice area of 2.4.  5. The patient is in atrial fibrillation which may influence the estimate of left ventricular function and transvalvular flows.      IMPRESSION & PLAN:  POD # 9 s/p MVR Bioprosthetic Valve and KONRAD clipping with Dr. Nugent   - Increase activity/ ambulation; PT/OT  - Encourage IS, C/DB; respiratory therapy; wean O2 as zo - on Room air   - Cardiac rehab referral   - Continue cardiac meds: ASA, BB, Coumadin  - Pain and anticonstipation meds  - 6/8/24 2V CXR   - 6/8/24 Postop ECHO  - CUT epicardial wires prior to discharge; per surgeon preference.    - Tele until discharge    Atrial Fibrillation: home meds: Coumadin 5mg daily except 7.5mg on Mon & Wed; Diltiazem 120mg daily    - Tele: AFIB rate controlled in the 70s-80's; VVI 30 backup, mA 8  - Held metoprolol since 6/10 due to pacemaker backup kicking in, restarting today at 6.25 mg BID  6/13 Continue holding metoprolol  - Coumadin was resumed on 6/9  at reduced dose of 3mg , dose increased today 6/12 to 5 mg (with INR 1.3); 6/13/24 Coumadin 7.5 mg ordered (INR 1.3)  - Continue holding home diltiazem  - Adjust medications as tolerated  - EP planning cardioversion on 6/14;  ordered CT Watchman today and heparin infusion bridging following approval from Dr. Nugent per EP recommendations  - NPO after MN ordered    Acute Blood Loss Anemia - Resolved   Recent Labs     24  0543 24  0552 24  0635 06/10/24  0601 24  0624 24  0558 24  0719   HGB 10.6* 10.9* 9.8* 9.6* 9.9* 10.2* 10.8*   HCT 32.3* 32.6* 29.8* 28.9* 29.4* 30.6* 33.1*   - MV, PO x1mo  - Daily labs, transfuse as indicated    Hx of Thrombocytopenia - Resolved  Recent Labs     24  0543 24  0552 24  0635 06/10/24  0601 24  0624 24  0558 24  0719    255 202 155 130* 88* 75*   - Trend with daily CBCs    Chronic diastolic heart failure with EF 45 % on echo done 3/28.   Home Meds: Furosemide 20mg Daily   Volume/Electrolyte Status: Preop wt Weight: 57.4 kg (126 lb 8.7 oz)   Vitals:    24 1248   Weight: 54.3 kg (119 lb 11.2 oz)   - Weight: 54.3, 54.2 , 56.2, X, 56.8 kg   - Adjust diuresis for postop cardiac surgery hypervolemia  - : Lasix 20mg IV x1; replaced Mg   - : Lasix 20mg PO x1; Replaced K & Mg => lasix 20 mg Daily scheduled  - Replete electrolytes for hypokalemia/hypomagnesemia;  K and Mg replaced  - Daily weights/strict I&Os  - Daily RFP     Leukocytosis - Resolved  Recent Labs     24  0543 24  0552 24  0635 06/10/24  0601 24  0624 24  0558 24  0719   WBC 7.1 6.8 7.0 6.3 6.6 8.1 12.0*   Temp (36hrs), Av.4 °C (97.6 °F), Min:36 °C (96.8 °F), Max:37.4 °C (99.3 °F)  - Continue aggressive pulmonary hygiene with IS/EZ pap   - Monitor daily CBC     Diverticulosis/ Irritable Bowel Syndrome   Diarrhea last 3 days- per patient his IBS is not diarrhea type - resolved  Home meds: supplement IBS Clear OTC (per patient keeps him regular at home)  - Diet as tolerated   - Hold all bowel regimen at this time given diarrhea  - No indication for Cdiff testing at this time  - Consider adding imodium if diarrhea persists      Tobacco use/ Current smoker: 0.5 PPD x49 years   - Encourage smoking cessation  - Continue nicotine replacement via patch   - Aggressive pulmonary hygiene  - Wean O2 for O2 sats > 92% - on Room Air     VTE Prophylaxis: SCDs/TEDs, ambulation, SQ heparin  Code Status: Full Code    Dispo  - PT/OT recs low intensity level of continued care; home   - Would benefit from homecare for cardiac surgery carepath and RN visits  - Anticipate discharge 2 days, pending stable rhythm, s/p cardioversion, and therapeutic INR with Coumadin  - Will continue to assess discharge needs    Gwen Baltazar, APRN-CNP, APRN-CNS  Cardiac Surgery [facility]  [facility]  Team Phone [phone]    6/13/2024  12:23 PM

## 2024-06-13 NOTE — PROGRESS NOTES
Subjective Data/updates:  CT watchman:  Large left atrial thrombus best seen on delayed monoenergetic  images. The left atrial thrombus measures 21 cm cubed in volume and  occupies most of the left atrial appendage.    Discussed case with imaging attending. Patient with ligated KONRAD during his cardiac surgery. No obvious communication between the LA and left atrial appendage with no contrast seen seeping into the KONRAD and as such safe to proceed with DCCV tomorrow.      Objective Data:  Last Recorded Vitals:  Vitals:    06/13/24 0905 06/13/24 1248 06/13/24 1325 06/13/24 1723   BP: 106/65  109/64 109/69   BP Location: Right arm  Right arm Right arm   Patient Position: Sitting  Sitting Sitting   Pulse: 90  70 69   Resp: 16  18 16   Temp: 36.4 °C (97.5 °F)  36.1 °C (97 °F) 36.4 °C (97.5 °F)   TempSrc: Temporal  Temporal Temporal   SpO2: 95%  99% 99%   Weight:  54.3 kg (119 lb 11.2 oz)     Height:           Last Labs:  CBC - 6/13/2024:  5:43 AM  7.1 10.6 296    32.3      CMP - 6/13/2024:  5:43 AM  8.9 7.5 10 --- 0.7   3.3 3.4 11 88      PTT - 6/4/2024:  9:42 PM  1.3   14.9 34     TROPHS   Date/Time Value Ref Range Status   05/08/2024 10:25 AM 35 0 - 20 ng/L Final   05/08/2024 09:18 AM 38 0 - 20 ng/L Final   04/10/2024 03:45 PM 14 0 - 20 ng/L Final     BNP   Date/Time Value Ref Range Status   04/10/2024 02:23  0 - 99 pg/mL Final   03/27/2024 10:21  0 - 99 pg/mL Final     HGBA1C   Date/Time Value Ref Range Status   05/06/2024 01:06 PM 5.7 see below % Final   05/06/2024 01:06 PM 5.7 see below % Final      Last I/O:  I/O last 3 completed shifts:  In: 360 (6.6 mL/kg) [P.O.:360]  Out: - (0 mL/kg)   Weight: 54.2 kg       Inpatient Medications:  Scheduled medications   Medication Dose Route Frequency    acetaminophen  650 mg oral q6h    aspirin  81 mg oral Daily    furosemide  20 mg oral Daily    lidocaine  1 patch transdermal Daily    magnesium oxide  400 mg oral Daily    melatonin  10 mg oral Daily    [Held by  provider] metoprolol tartrate  6.25 mg oral BID    multivitamin with minerals  1 tablet oral Daily    nicotine  1 patch transdermal Daily    Followed by    [START ON 7/17/2024] nicotine  1 patch transdermal Daily    Followed by    [START ON 7/31/2024] nicotine  1 patch transdermal Daily    warfarin  7.5 mg oral Daily     PRN medications   Medication    benzocaine-menthol    bisacodyl    calcium carbonate    dextrose    Or    glucagon    naloxone    ondansetron    oxygen    traMADol    traMADol    traZODone     Continuous Medications   Medication Dose Last Rate    heparin  0-4,000 Units/hr 700 Units/hr (06/13/24 1533)       Physical Exam:  Gen: well appearing  Neuro: AAOx3, CN 2-12 intact, no FND  HEENT: PEERL, EOMI, sclera anicteric, no conjunctival injection, MMM, no oropharyngeal lesions  Neck: no elevated JVD  Resp: CTAB, normal breath sounds, no wheezing/crackles/ rales  CV: RRR, normal S1/S2, no murmurs/ rubs/gallops  GI: non-tender, non-distended, normal BSs in all 4 quadrants  MSK: warm and well perfused, no edema  Skin: warm and dry, no lesions, no rashes      Assessment/Plan     Farooq Lang is a 63 y.o. 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 Nugent on 6/4. EP consulted given Aflutter with slow ventricular response and possible consideration for PPM     -Patient is in Aflutter on EKG with variable conduction. With activity, his HR does increase from 60 to 80 suggesting some chronotropic competence. There are only 3 beats in the last 24h that were paced  -Patient has never been cardioverted. Pt has a severely dilated left atrium on TTE but we would like to make an attempt at cardioversion to assess underlying sinus node dysfunction. This will help decide if he needs a PPM prior to discharge    Discussed case with imaging attending. Patient with ligated KONRAD during his cardiac surgery. No obvious communication between the LA and left atrial appendage with no  contrast seen seeping into the KONRAD and as such safe to proceed with DCCV tomorrow.     Recommendations:  -Plan for DCCV tomorrow   -Ensure patient is NPO after MN  -Please continue heparin drip    -Continue to hold metoprolol  -Maintain TVP (VVI 30)  -Continue telemetry     Thank you for the consult. Plan discussed with Dr. Kt BOJORQUEZ will continue to follow        Cas Birmingham MD

## 2024-06-14 LAB
ALBUMIN SERPL BCP-MCNC: 3.3 G/DL (ref 3.4–5)
ANION GAP SERPL CALC-SCNC: 12 MMOL/L (ref 10–20)
BUN SERPL-MCNC: 17 MG/DL (ref 6–23)
CALCIUM SERPL-MCNC: 8.8 MG/DL (ref 8.6–10.6)
CHLORIDE SERPL-SCNC: 104 MMOL/L (ref 98–107)
CO2 SERPL-SCNC: 24 MMOL/L (ref 21–32)
CREAT SERPL-MCNC: 0.78 MG/DL (ref 0.5–1.3)
EGFRCR SERPLBLD CKD-EPI 2021: >90 ML/MIN/1.73M*2
ERYTHROCYTE [DISTWIDTH] IN BLOOD BY AUTOMATED COUNT: 14.6 % (ref 11.5–14.5)
GLUCOSE SERPL-MCNC: 89 MG/DL (ref 74–99)
HCT VFR BLD AUTO: 31.8 % (ref 41–52)
HGB BLD-MCNC: 10.6 G/DL (ref 13.5–17.5)
INR PPP: 1.5 (ref 0.9–1.1)
MAGNESIUM SERPL-MCNC: 1.83 MG/DL (ref 1.6–2.4)
MCH RBC QN AUTO: 29.4 PG (ref 26–34)
MCHC RBC AUTO-ENTMCNC: 33.3 G/DL (ref 32–36)
MCV RBC AUTO: 88 FL (ref 80–100)
NRBC BLD-RTO: 0 /100 WBCS (ref 0–0)
PHOSPHATE SERPL-MCNC: 3.4 MG/DL (ref 2.5–4.9)
PLATELET # BLD AUTO: 306 X10*3/UL (ref 150–450)
POTASSIUM SERPL-SCNC: 3.9 MMOL/L (ref 3.5–5.3)
PROTHROMBIN TIME: 16.6 SECONDS (ref 9.8–12.8)
RBC # BLD AUTO: 3.61 X10*6/UL (ref 4.5–5.9)
SODIUM SERPL-SCNC: 136 MMOL/L (ref 136–145)
UFH PPP CHRO-ACNC: 0.2 IU/ML
UFH PPP CHRO-ACNC: 0.2 IU/ML
UFH PPP CHRO-ACNC: 0.3 IU/ML
UFH PPP CHRO-ACNC: 0.3 IU/ML
WBC # BLD AUTO: 7.3 X10*3/UL (ref 4.4–11.3)

## 2024-06-14 PROCEDURE — 2500000002 HC RX 250 W HCPCS SELF ADMINISTERED DRUGS (ALT 637 FOR MEDICARE OP, ALT 636 FOR OP/ED): Performed by: NURSE PRACTITIONER

## 2024-06-14 PROCEDURE — 2500000005 HC RX 250 GENERAL PHARMACY W/O HCPCS: Performed by: NURSE PRACTITIONER

## 2024-06-14 PROCEDURE — S4991 NICOTINE PATCH NONLEGEND: HCPCS | Performed by: NURSE PRACTITIONER

## 2024-06-14 PROCEDURE — 80069 RENAL FUNCTION PANEL: CPT | Performed by: NURSE PRACTITIONER

## 2024-06-14 PROCEDURE — 85027 COMPLETE CBC AUTOMATED: CPT | Performed by: NURSE PRACTITIONER

## 2024-06-14 PROCEDURE — 2500000001 HC RX 250 WO HCPCS SELF ADMINISTERED DRUGS (ALT 637 FOR MEDICARE OP): Performed by: NURSE PRACTITIONER

## 2024-06-14 PROCEDURE — 85610 PROTHROMBIN TIME: CPT | Performed by: NURSE PRACTITIONER

## 2024-06-14 PROCEDURE — 85520 HEPARIN ASSAY: CPT | Performed by: NURSE PRACTITIONER

## 2024-06-14 PROCEDURE — 83735 ASSAY OF MAGNESIUM: CPT | Performed by: NURSE PRACTITIONER

## 2024-06-14 PROCEDURE — 2500000004 HC RX 250 GENERAL PHARMACY W/ HCPCS (ALT 636 FOR OP/ED): Performed by: NURSE PRACTITIONER

## 2024-06-14 PROCEDURE — 2500000001 HC RX 250 WO HCPCS SELF ADMINISTERED DRUGS (ALT 637 FOR MEDICARE OP)

## 2024-06-14 PROCEDURE — 1200000002 HC GENERAL ROOM WITH TELEMETRY DAILY

## 2024-06-14 RX ORDER — POTASSIUM CHLORIDE 20 MEQ/1
20 TABLET, EXTENDED RELEASE ORAL ONCE
Status: COMPLETED | OUTPATIENT
Start: 2024-06-14 | End: 2024-06-14

## 2024-06-14 ASSESSMENT — COGNITIVE AND FUNCTIONAL STATUS - GENERAL
MOBILITY SCORE: 23
CLIMB 3 TO 5 STEPS WITH RAILING: A LITTLE

## 2024-06-14 ASSESSMENT — PAIN SCALES - GENERAL
PAINLEVEL_OUTOF10: 0 - NO PAIN
PAINLEVEL_OUTOF10: 0 - NO PAIN
PAINLEVEL_OUTOF10: 7
PAINLEVEL_OUTOF10: 0 - NO PAIN

## 2024-06-14 ASSESSMENT — PAIN - FUNCTIONAL ASSESSMENT: PAIN_FUNCTIONAL_ASSESSMENT: 0-10

## 2024-06-14 ASSESSMENT — PAIN DESCRIPTION - ORIENTATION: ORIENTATION: UPPER;MID

## 2024-06-14 ASSESSMENT — PAIN DESCRIPTION - LOCATION: LOCATION: BACK

## 2024-06-14 NOTE — PROGRESS NOTES
Transitional Care Coordination Progress Note: Interdisciplinary Rounds     Team members present: TCC, Charge RN, Cardiac NP     Plan per Medical/Surgical team: cardiac care path     Discharge disposition: Home w/ [facility]     Status:      Payer: [facility]      Potential Barriers: cardioversion 6/14, stable rhythm        ADOD: 6/16 or 6/17     This TCC will continue to follow for home going needs and safe DC plan.     Mayelin Parsons RN

## 2024-06-14 NOTE — PROGRESS NOTES
"CARDIAC SURGERY DAILY PROGRESS NOTE    Farooq Lang is a 63 year old male with a PMHx of mitral valve stenosis secondary to rheumatic disease, afib (on coumadin), HFrEF, and thrombocytopenia, who presented to [facility] on 6/4/2024 for planned cardiac surgery with Dr. Nugent.  .     As an outpatient, he presented to the emergency department with complaints of chest pain for which he was found to have severe mitral valve stenosis.  He underwent preoperative coronary angiogram showing no significant coronary artery disease.  He is also on chronic atrial fibrillation on p.o. anticoagulation.  The echo shows a severe mitral stenosis, extreme thickening and shortening of the subvalvular apparatus, the LV and the RV functions are preserved, there is no significant TR, for which Dr. Nugent and the patient agreed to proceed with mitral valve surgery.      OPERATION/PROCEDURE: 6/4/2024 with Lukasz Nugent   - Median sternotomy  - Standard central cannulation  - Mitral valve replacement with 27mm mitris bioprosthetic valve  - Left atrial appendage ligation   - Prevena dressing    CTICU Course: uneventful, postoperative pain  Transfer to LT3: 6/6/2024  ==================      Interval History:   No acute events overnight  Cardioversion cancelled today due to unavailability of anesthesia      SUBJECTIVE:  No specific complaints        Objective   /74 (BP Location: Right arm, Patient Position: Sitting)   Pulse 88   Temp 36.2 °C (97.2 °F) (Temporal)   Resp 18   Ht 1.676 m (5' 6\")   Wt 54.3 kg (119 lb 11.2 oz)   SpO2 98%   BMI 19.32 kg/m²   Pain Score: 7   3 Day Weight Change: -0.272 kg (-9.6 oz) per day    Intake and Output  No intake or output data in the 24 hours ending 06/14/24 1826      Physical Exam  Vitals and nursing note reviewed.   Constitutional:       Appearance: Normal appearance.      Comments: Patient alert and awake, sitting up in chair during exam.   States that he has been walking " every hour around the unit.      HENT:      Head: Normocephalic and atraumatic.      Mouth/Throat:      Mouth: Mucous membranes are moist.   Eyes:      Conjunctiva/sclera: Conjunctivae normal.   Neck:      Comments: Trachea Midline.   Cardiovascular:      Rate and Rhythm: Rhythm irregular.      Pulses: Normal pulses.      Heart sounds: Normal heart sounds.      Comments: Tele: AFIB 50s-90s  V wires - connected to external pacemaker VVI @ 30    Pulmonary:      Effort: Pulmonary effort is normal.      Comments: On room air  Abdominal:      General: Bowel sounds are normal.      Palpations: Abdomen is soft.      Comments: No further diarrhea the past 24 hours   Genitourinary:     Comments: Voids independently   Musculoskeletal:         General: Normal range of motion.      Cervical back: Neck supple.      Comments: Ambulating without difficulty around unit   Skin:     General: Skin is warm and dry.      Capillary Refill: Capillary refill takes less than 2 seconds.      Findings: Bruising (arms) present.      Comments: Mid-Sternal Incision: well approximated, no s/s of infection or bleeding, no crepitus - RE      Neurological:      General: No focal deficit present.      Mental Status: He is alert and oriented to person, place, and time.   Psychiatric:         Mood and Affect: Mood normal.         Behavior: Behavior normal.       Medications  Scheduled medications  acetaminophen, 650 mg, oral, q6h  aspirin, 81 mg, oral, Daily  furosemide, 20 mg, oral, Daily  lidocaine, 1 patch, transdermal, Daily  magnesium oxide, 400 mg, oral, Daily  melatonin, 10 mg, oral, Daily  [Held by provider] metoprolol tartrate, 6.25 mg, oral, BID  multivitamin with minerals, 1 tablet, oral, Daily  nicotine, 1 patch, transdermal, Daily   Followed by  [START ON 7/17/2024] nicotine, 1 patch, transdermal, Daily   Followed by  [START ON 7/31/2024] nicotine, 1 patch, transdermal, Daily  warfarin, 7.5 mg, oral, Daily      Continuous  medications  heparin, 0-4,000 Units/hr, Last Rate: 1,100 Units/hr (06/14/24 1142)      PRN medications  PRN medications: benzocaine-menthol, bisacodyl, calcium carbonate, dextrose **OR** glucagon, naloxone, [DISCONTINUED] ondansetron **OR** ondansetron, oxygen, traMADol, traMADol, traZODone     LABS:  CMP:  Results from last 7 days   Lab Units 06/14/24  0409 06/13/24  0543 06/12/24  0552 06/11/24  0635 06/10/24  0601 06/09/24  0624 06/08/24  0558   SODIUM mmol/L 136 138 138 135* 134* 135* 134*   POTASSIUM mmol/L 3.9 3.8 3.9 4.3 3.7 4.0 3.9   CHLORIDE mmol/L 104 104 103 103 101 101 99   CO2 mmol/L 24 24 26 24 23 24 27   ANION GAP mmol/L 12 14 13 12 14 14 12   BUN mg/dL 17 15 16 21 18 17 19   CREATININE mg/dL 0.78 0.83 0.86 0.83 0.79 0.80 0.79   EGFR mL/min/1.73m*2 >90 >90 >90 >90 >90 >90 >90   MAGNESIUM mg/dL 1.83 1.91 1.98 1.84 1.97 1.93 1.76   ALBUMIN g/dL 3.3* 3.4 3.4 3.3* 3.3* 3.2* 3.1*     CBC:  Results from last 7 days   Lab Units 06/14/24  0409 06/13/24  0543 06/12/24  0552 06/11/24  0635 06/10/24  0601 06/09/24  0624 06/08/24  0558   WBC AUTO x10*3/uL 7.3 7.1 6.8 7.0 6.3 6.6 8.1   HEMOGLOBIN g/dL 10.6* 10.6* 10.9* 9.8* 9.6* 9.9* 10.2*   HEMATOCRIT % 31.8* 32.3* 32.6* 29.8* 28.9* 29.4* 30.6*   PLATELETS AUTO x10*3/uL 306 296 255 202 155 130* 88*   MCV fL 88 90 89 88 88 89 88     COAG:   Results from last 7 days   Lab Units 06/14/24  0409 06/13/24  0543 06/12/24  0552 06/11/24  0636 06/10/24  0916   INR  1.5* 1.3* 1.3* 1.3* 1.2*       IMAGING/ DIAGNOSTIC TESTING:  I have personally reviewed the following test result(s):      XR chest 2 views 06/08/2024  Impression  1.  Bibasilar atelectasis and small bilateral pleural effusions. No  evidence of pneumothorax.    6/8/24 TTE - Complete  CONCLUSIONS:  1. Left ventricular systolic function is normal with a 55-60% estimated ejection fraction.  2. Abnormal septal motion consistent with post-operative status and abnormal septal motion consistent with RV pacemaker.  3.  The left atrium is severely dilated.  4. Peak and mean mitral diastolic gradients are 14.5 and 5.4 mmHg at a heart rate of 72 bpm. The DI is ~ 2. The pressure half-time is ~ 90 ms, resulting in a mitral orifice area of 2.4.  5. The patient is in atrial fibrillation which may influence the estimate of left ventricular function and transvalvular flows.      IMPRESSION & PLAN:  POD # 10 s/p MVR Bioprosthetic Valve and KONRAD clipping with Dr. Nugent   - Increase activity/ ambulation; PT/OT  - Encourage IS, C/DB; respiratory therapy; wean O2 as zo - on Room air   - Cardiac rehab referral   - Continue cardiac meds: ASA, BB, Coumadin  - Pain and anticonstipation meds  - 6/8/24 2V CXR   - 6/8/24 Postop ECHO  - CUT epicardial wires prior to discharge; per surgeon preference.    - Tele until discharge    Atrial Fibrillation: home meds: Coumadin 5mg daily except 7.5mg on Mon & Wed; Diltiazem 120mg daily    - Tele: AFIB rate controlled in the 70s-80's; VVI 30 backup, mA 8  - Held metoprolol since 6/10 due to pacemaker backup kicking in, restarting today at 6.25 mg BID  6/13 Continue holding metoprolol  - Coumadin was resumed on 6/9  at reduced dose of 3mg , dose increased today 6/12 to 5 mg (with INR 1.3); 6/13/24 Coumadin 7.5 mg ordered (INR 1.3)  - Continue holding home diltiazem  - Adjust medications as tolerated  - EP planning cardioversion on 6/14; ordered CT Watchman today and heparin infusion bridging following approval from Dr. Nugent per EP recommendations  - 6/14 unable to perform cardioversion today due to unavailability of anesthesia; will likely reschedule for Monday     Acute Blood Loss Anemia - Resolved   Recent Labs     06/14/24  0409 06/13/24  0543 06/12/24  0552 06/11/24  0635 06/10/24  0601 06/09/24  0624 06/08/24  0558   HGB 10.6* 10.6* 10.9* 9.8* 9.6* 9.9* 10.2*   HCT 31.8* 32.3* 32.6* 29.8* 28.9* 29.4* 30.6*   - MV, PO x1mo  - Daily labs, transfuse as indicated    Hx of Thrombocytopenia - Resolved  Recent  Labs     24  0409 24  0543 24  0552 24  0635 06/10/24  0601 24  0624 24  0558    296 255 202 155 130* 88*   - Trend with daily CBCs    Chronic diastolic heart failure with EF 45 % on echo done 3/28.   Home Meds: Furosemide 20mg Daily   Volume/Electrolyte Status: Preop wt Weight: 57.4 kg (126 lb 8.7 oz)   Vitals:    24 1248   Weight: 54.3 kg (119 lb 11.2 oz)   - Weight: 54.3, 54.2 , 56.2, X, 56.8 kg   - Adjust diuresis for postop cardiac surgery hypervolemia  - : Lasix 20mg IV x1; replaced Mg   - : Lasix 20mg PO x1; Replaced K & Mg => lasix 20 mg Daily scheduled  - Replete electrolytes for hypokalemia/hypomagnesemia;  K and Mg replaced  - Daily weights/strict I&Os  - Daily RFP     Leukocytosis - Resolved  Recent Labs     24  0409 24  0543 24  0552 24  0635 06/10/24  0601 24  0624 24  0558   WBC 7.3 7.1 6.8 7.0 6.3 6.6 8.1   Temp (36hrs), Av.4 °C (97.5 °F), Min:36.1 °C (97 °F), Max:37 °C (98.6 °F)  - Continue aggressive pulmonary hygiene with IS/EZ pap   - Monitor daily CBC     Diverticulosis/ Irritable Bowel Syndrome   Home meds: supplement IBS Clear OTC (per patient keeps him regular at home)  - Diet as tolerated   - Hold all bowel regimen at this time given diarrhea  - No indication for Cdiff testing at this time  - Consider adding imodium if diarrhea persists     Tobacco use/ Current smoker: 0.5 PPD x49 years   - Encourage smoking cessation  - Continue nicotine replacement via patch   - Aggressive pulmonary hygiene  - Wean O2 for O2 sats > 92% - on Room Air     VTE Prophylaxis: SCDs/TEDs, ambulation, SQ heparin  Code Status: Full Code    Dispo  - PT/OT recs low intensity level of continued care; home   - Would benefit from homecare for cardiac surgery carepath and RN visits  - Anticipate discharge early next week, pending stable rhythm, s/p cardioversion, and therapeutic INR with Coumadin  - Will continue to assess  discharge needs    LENY Lopez-CNP  Cardiac Surgery [facility]  [facility]  Team Phone [phone]    6/14/2024  6:26 PM

## 2024-06-14 NOTE — CARE PLAN
The patient's goals for the shift include      The clinical goals for the shift include pt will have cardioversion today.  Plan for cardioversion delayed due to lack of anesthesia staff. Pt aware and understanding of situation. NPO order lifted. Continue to titrate heparin gtt.    Problem: Discharge Planning  Goal: Discharge to home or other facility with appropriate resources  Outcome: Progressing     Problem: Chronic Conditions and Co-morbidities  Goal: Patient's chronic conditions and co-morbidity symptoms are monitored and maintained or improved  Outcome: Progressing     Problem: Pain  Goal: Takes deep breaths with improved pain control throughout the shift  Outcome: Progressing  Goal: Turns in bed with improved pain control throughout the shift  Outcome: Progressing

## 2024-06-14 NOTE — PROGRESS NOTES
Subjective Data/updates:  Tele showing rate controlled Afib.   Was unable to be cardioverted today due to scheduling issues. Plan for Monday       Objective Data:  Last Recorded Vitals:  Vitals:    06/14/24 0604 06/14/24 1000 06/14/24 1422 06/14/24 1734   BP: 98/64 97/55 114/67 117/74   BP Location: Right arm Right arm Right arm Right arm   Patient Position: Lying Sitting Sitting Sitting   Pulse: 71 57 73 88   Resp: 18 18 18 18   Temp: 36.1 °C (97 °F) 36.5 °C (97.7 °F) 37 °C (98.6 °F) 36.2 °C (97.2 °F)   TempSrc: Temporal Temporal Temporal Temporal   SpO2: 94% 98% 96% 98%   Weight:       Height:           Last Labs:  CBC - 6/14/2024:  4:09 AM  7.3 10.6 306    31.8      CMP - 6/14/2024:  4:09 AM  8.8 7.5 10 --- 0.7   3.4 3.3 11 88      PTT - 6/4/2024:  9:42 PM  1.5   16.6 34     TROPHS   Date/Time Value Ref Range Status   05/08/2024 10:25 AM 35 0 - 20 ng/L Final   05/08/2024 09:18 AM 38 0 - 20 ng/L Final   04/10/2024 03:45 PM 14 0 - 20 ng/L Final     BNP   Date/Time Value Ref Range Status   04/10/2024 02:23  0 - 99 pg/mL Final   03/27/2024 10:21  0 - 99 pg/mL Final     HGBA1C   Date/Time Value Ref Range Status   05/06/2024 01:06 PM 5.7 see below % Final   05/06/2024 01:06 PM 5.7 see below % Final      Last I/O:  No intake/output data recorded.      Inpatient Medications:  Scheduled medications   Medication Dose Route Frequency    acetaminophen  650 mg oral q6h    aspirin  81 mg oral Daily    furosemide  20 mg oral Daily    lidocaine  1 patch transdermal Daily    magnesium oxide  400 mg oral Daily    melatonin  10 mg oral Daily    [Held by provider] metoprolol tartrate  6.25 mg oral BID    multivitamin with minerals  1 tablet oral Daily    nicotine  1 patch transdermal Daily    Followed by    [START ON 7/17/2024] nicotine  1 patch transdermal Daily    Followed by    [START ON 7/31/2024] nicotine  1 patch transdermal Daily    warfarin  7.5 mg oral Daily     PRN medications   Medication    benzocaine-menthol     bisacodyl    calcium carbonate    dextrose    Or    glucagon    naloxone    ondansetron    oxygen    traMADol    traMADol    traZODone     Continuous Medications   Medication Dose Last Rate    heparin  0-4,000 Units/hr 1,100 Units/hr (06/14/24 1142)       Physical Exam:  Gen: well appearing  Neuro: AAOx3, CN 2-12 intact, no FND  HEENT: PEERL, EOMI, sclera anicteric, no conjunctival injection, MMM, no oropharyngeal lesions  Neck: no elevated JVD  Resp: CTAB, normal breath sounds, no wheezing/crackles/ rales  CV: RRR, normal S1/S2, no murmurs/ rubs/gallops  GI: non-tender, non-distended, normal BSs in all 4 quadrants  MSK: warm and well perfused, no edema  Skin: warm and dry, no lesions, no rashes      Assessment/Plan     Farooq Lang is a 63 y.o. 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 Nugent on 6/4. EP consulted given Aflutter with slow ventricular response and possible consideration for PPM     -Patient is in Aflutter on EKG with variable conduction. With activity, his HR does increase from 60 to 80 suggesting some chronotropic competence. There are only 3 beats in the last 24h that were paced  -Patient has never been cardioverted. Pt has a severely dilated left atrium on TTE but we would like to make an attempt at cardioversion to assess underlying sinus node dysfunction. This will help decide if he needs a PPM prior to discharge    Discussed case with imaging attending. Patient with ligated KONRAD during his cardiac surgery. No obvious communication between the LA and left atrial appendage with no contrast seen seeping into the KONRAD and as such safe to proceed with DCCV.     Was unable to be cardioverted today due to scheduling issues.     Recommendations:  -Plan for DCCV on Monday   -Ensure patient is NPO after MN on sunday  -Please continue heparin drip    -Continue to hold metoprolol  -Maintain TVP (VVI 30)  -Continue telemetry     Thank you for the consult. Plan  discussed with Dr. Kt BOJORQUEZ will continue to follow        Cas Birmingham MD

## 2024-06-15 LAB
ALBUMIN SERPL BCP-MCNC: 3.4 G/DL (ref 3.4–5)
ANION GAP SERPL CALC-SCNC: 13 MMOL/L (ref 10–20)
BUN SERPL-MCNC: 15 MG/DL (ref 6–23)
CALCIUM SERPL-MCNC: 8.9 MG/DL (ref 8.6–10.6)
CHLORIDE SERPL-SCNC: 105 MMOL/L (ref 98–107)
CO2 SERPL-SCNC: 23 MMOL/L (ref 21–32)
CREAT SERPL-MCNC: 0.86 MG/DL (ref 0.5–1.3)
EGFRCR SERPLBLD CKD-EPI 2021: >90 ML/MIN/1.73M*2
ERYTHROCYTE [DISTWIDTH] IN BLOOD BY AUTOMATED COUNT: 14.7 % (ref 11.5–14.5)
GLUCOSE SERPL-MCNC: 104 MG/DL (ref 74–99)
HCT VFR BLD AUTO: 32.9 % (ref 41–52)
HGB BLD-MCNC: 10.9 G/DL (ref 13.5–17.5)
INR PPP: 1.9 (ref 0.9–1.1)
MAGNESIUM SERPL-MCNC: 2.22 MG/DL (ref 1.6–2.4)
MCH RBC QN AUTO: 29.6 PG (ref 26–34)
MCHC RBC AUTO-ENTMCNC: 33.1 G/DL (ref 32–36)
MCV RBC AUTO: 89 FL (ref 80–100)
NRBC BLD-RTO: 0 /100 WBCS (ref 0–0)
PHOSPHATE SERPL-MCNC: 3.2 MG/DL (ref 2.5–4.9)
PLATELET # BLD AUTO: 333 X10*3/UL (ref 150–450)
POTASSIUM SERPL-SCNC: 4.4 MMOL/L (ref 3.5–5.3)
PROTHROMBIN TIME: 21.9 SECONDS (ref 9.8–12.8)
RBC # BLD AUTO: 3.68 X10*6/UL (ref 4.5–5.9)
SODIUM SERPL-SCNC: 137 MMOL/L (ref 136–145)
UFH PPP CHRO-ACNC: 0.3 IU/ML
WBC # BLD AUTO: 7.8 X10*3/UL (ref 4.4–11.3)

## 2024-06-15 PROCEDURE — 83735 ASSAY OF MAGNESIUM: CPT | Performed by: NURSE PRACTITIONER

## 2024-06-15 PROCEDURE — 2500000002 HC RX 250 W HCPCS SELF ADMINISTERED DRUGS (ALT 637 FOR MEDICARE OP, ALT 636 FOR OP/ED): Performed by: NURSE PRACTITIONER

## 2024-06-15 PROCEDURE — 1200000002 HC GENERAL ROOM WITH TELEMETRY DAILY

## 2024-06-15 PROCEDURE — 80069 RENAL FUNCTION PANEL: CPT | Performed by: NURSE PRACTITIONER

## 2024-06-15 PROCEDURE — S4991 NICOTINE PATCH NONLEGEND: HCPCS | Performed by: NURSE PRACTITIONER

## 2024-06-15 PROCEDURE — 85027 COMPLETE CBC AUTOMATED: CPT | Performed by: NURSE PRACTITIONER

## 2024-06-15 PROCEDURE — 2500000001 HC RX 250 WO HCPCS SELF ADMINISTERED DRUGS (ALT 637 FOR MEDICARE OP): Performed by: NURSE PRACTITIONER

## 2024-06-15 PROCEDURE — 2500000002 HC RX 250 W HCPCS SELF ADMINISTERED DRUGS (ALT 637 FOR MEDICARE OP, ALT 636 FOR OP/ED): Performed by: PHYSICIAN ASSISTANT

## 2024-06-15 PROCEDURE — 2500000004 HC RX 250 GENERAL PHARMACY W/ HCPCS (ALT 636 FOR OP/ED): Performed by: NURSE PRACTITIONER

## 2024-06-15 PROCEDURE — 85610 PROTHROMBIN TIME: CPT | Performed by: NURSE PRACTITIONER

## 2024-06-15 PROCEDURE — 85520 HEPARIN ASSAY: CPT | Performed by: NURSE PRACTITIONER

## 2024-06-15 PROCEDURE — 2500000001 HC RX 250 WO HCPCS SELF ADMINISTERED DRUGS (ALT 637 FOR MEDICARE OP)

## 2024-06-15 PROCEDURE — 2500000005 HC RX 250 GENERAL PHARMACY W/O HCPCS: Performed by: PHYSICIAN ASSISTANT

## 2024-06-15 RX ORDER — LIDOCAINE 560 MG/1
1 PATCH PERCUTANEOUS; TOPICAL; TRANSDERMAL DAILY
Status: DISCONTINUED | OUTPATIENT
Start: 2024-06-15 | End: 2024-06-18 | Stop reason: HOSPADM

## 2024-06-15 RX ORDER — WARFARIN SODIUM 5 MG/1
5 TABLET ORAL DAILY
Status: DISCONTINUED | OUTPATIENT
Start: 2024-06-15 | End: 2024-06-17

## 2024-06-15 RX ORDER — POTASSIUM CHLORIDE 20 MEQ/1
40 TABLET, EXTENDED RELEASE ORAL ONCE
Status: COMPLETED | OUTPATIENT
Start: 2024-06-15 | End: 2024-06-15

## 2024-06-15 ASSESSMENT — COGNITIVE AND FUNCTIONAL STATUS - GENERAL
DRESSING REGULAR LOWER BODY CLOTHING: A LITTLE
DRESSING REGULAR LOWER BODY CLOTHING: A LITTLE
HELP NEEDED FOR BATHING: A LITTLE
EATING MEALS: A LITTLE
PERSONAL GROOMING: A LITTLE
DAILY ACTIVITIY SCORE: 18
CLIMB 3 TO 5 STEPS WITH RAILING: A LITTLE
CLIMB 3 TO 5 STEPS WITH RAILING: A LITTLE
MOBILITY SCORE: 23
DRESSING REGULAR UPPER BODY CLOTHING: A LITTLE
MOBILITY SCORE: 23
TOILETING: A LITTLE
TOILETING: A LITTLE
EATING MEALS: A LITTLE
PERSONAL GROOMING: A LITTLE
DRESSING REGULAR UPPER BODY CLOTHING: A LITTLE
HELP NEEDED FOR BATHING: A LITTLE
DAILY ACTIVITIY SCORE: 18

## 2024-06-15 ASSESSMENT — PAIN SCALES - GENERAL
PAINLEVEL_OUTOF10: 3
PAINLEVEL_OUTOF10: 3
PAINLEVEL_OUTOF10: 0 - NO PAIN

## 2024-06-15 NOTE — CARE PLAN
The patient's goals for the shift include      The clinical goals for the shift include pt will have cardioversion today.    Over the shift, the patient did not make progress toward the following goals. Barriers to progression include . Recommendations to address these barriers include .

## 2024-06-15 NOTE — PROGRESS NOTES
"CARDIAC SURGERY DAILY PROGRESS NOTE    Farooq Lang is a 63 year old male with a PMHx of mitral valve stenosis secondary to rheumatic disease, afib (on coumadin), HFrEF, and thrombocytopenia, who presented to [facility] on 6/4/2024 for planned cardiac surgery with Dr. Nugent.  .     As an outpatient, he presented to the emergency department with complaints of chest pain for which he was found to have severe mitral valve stenosis.  He underwent preoperative coronary angiogram showing no significant coronary artery disease.  He is also on chronic atrial fibrillation on p.o. anticoagulation.  The echo shows a severe mitral stenosis, extreme thickening and shortening of the subvalvular apparatus, the LV and the RV functions are preserved, there is no significant TR, for which Dr. Nugent and the patient agreed to proceed with mitral valve surgery.      OPERATION/PROCEDURE: 6/4/2024 with Lukasz Nugent   - Median sternotomy  - Standard central cannulation  - Mitral valve replacement with 27mm mitris bioprosthetic valve  - Left atrial appendage ligation   - Prevena dressing    CTICU Course: uneventful, postoperative pain  Transfer to LT3: 6/6/2024  ==================      Interval History:   No acute events overnight  Cardioversion cancelled today due to unavailability of anesthesia      SUBJECTIVE:  No specific complaints        Objective   BP 97/58 (BP Location: Right arm, Patient Position: Sitting)   Pulse 70   Temp 36.3 °C (97.3 °F) (Temporal)   Resp 18   Ht 1.676 m (5' 6\")   Wt 53 kg (116 lb 14.4 oz)   SpO2 98%   BMI 18.87 kg/m²   Pain Score: 0 - No pain   3 Day Weight Change: -0.392 kg (-13.8 oz) per day    Intake and Output    Intake/Output Summary (Last 24 hours) at 6/15/2024 1003  Last data filed at 6/14/2024 2155  Gross per 24 hour   Intake --   Output 0 ml   Net 0 ml         Physical Exam  Vitals and nursing note reviewed.   Constitutional:       Appearance: Normal appearance.      " Comments: Patient alert and awake, sitting up in chair during exam.   States that he has been walking every hour around the unit.      HENT:      Head: Normocephalic and atraumatic.      Mouth/Throat:      Mouth: Mucous membranes are moist.   Eyes:      Conjunctiva/sclera: Conjunctivae normal.   Neck:      Comments: Trachea Midline.   Cardiovascular:      Rate and Rhythm: Rhythm irregular.      Pulses: Normal pulses.      Heart sounds: Normal heart sounds.      Comments: Tele: AFIB 50s-90s  V wires - connected to external pacemaker VVI @ 30    Pulmonary:      Effort: Pulmonary effort is normal.      Comments: On room air  Abdominal:      General: Bowel sounds are normal.      Palpations: Abdomen is soft.      Comments: No further diarrhea the past 24 hours   Genitourinary:     Comments: Voids independently   Musculoskeletal:         General: Normal range of motion.      Cervical back: Neck supple.      Comments: Ambulating without difficulty around unit   Skin:     General: Skin is warm and dry.      Capillary Refill: Capillary refill takes less than 2 seconds.      Findings: Bruising (arms) present.      Comments: Mid-Sternal Incision: well approximated, no s/s of infection or bleeding, no crepitus - RE      Neurological:      General: No focal deficit present.      Mental Status: He is alert and oriented to person, place, and time.   Psychiatric:         Mood and Affect: Mood normal.         Behavior: Behavior normal.       Medications  Scheduled medications  acetaminophen, 650 mg, oral, q6h  aspirin, 81 mg, oral, Daily  furosemide, 20 mg, oral, Daily  lidocaine, 1 patch, transdermal, Daily  magnesium oxide, 400 mg, oral, Daily  melatonin, 10 mg, oral, Daily  [Held by provider] metoprolol tartrate, 6.25 mg, oral, BID  multivitamin with minerals, 1 tablet, oral, Daily  nicotine, 1 patch, transdermal, Daily   Followed by  [START ON 7/17/2024] nicotine, 1 patch, transdermal, Daily   Followed by  [START ON 7/31/2024]  nicotine, 1 patch, transdermal, Daily  warfarin, 7.5 mg, oral, Daily      Continuous medications  heparin, 0-4,000 Units/hr, Last Rate: 1,100 Units/hr (06/15/24 0850)      PRN medications  PRN medications: benzocaine-menthol, bisacodyl, calcium carbonate, dextrose **OR** glucagon, naloxone, [DISCONTINUED] ondansetron **OR** ondansetron, oxygen, traMADol, traMADol, traZODone     LABS:  CMP:  Results from last 7 days   Lab Units 06/15/24  0612 06/14/24  0409 06/13/24  0543 06/12/24  0552 06/11/24  0635 06/10/24  0601 06/09/24  0624   SODIUM mmol/L 137 136 138 138 135* 134* 135*   POTASSIUM mmol/L 4.4 3.9 3.8 3.9 4.3 3.7 4.0   CHLORIDE mmol/L 105 104 104 103 103 101 101   CO2 mmol/L 23 24 24 26 24 23 24   ANION GAP mmol/L 13 12 14 13 12 14 14   BUN mg/dL 15 17 15 16 21 18 17   CREATININE mg/dL 0.86 0.78 0.83 0.86 0.83 0.79 0.80   EGFR mL/min/1.73m*2 >90 >90 >90 >90 >90 >90 >90   MAGNESIUM mg/dL 2.22 1.83 1.91 1.98 1.84 1.97 1.93   ALBUMIN g/dL 3.4 3.3* 3.4 3.4 3.3* 3.3* 3.2*     CBC:  Results from last 7 days   Lab Units 06/15/24  0612 06/14/24  0409 06/13/24  0543 06/12/24  0552 06/11/24  0635 06/10/24  0601 06/09/24  0624   WBC AUTO x10*3/uL 7.8 7.3 7.1 6.8 7.0 6.3 6.6   HEMOGLOBIN g/dL 10.9* 10.6* 10.6* 10.9* 9.8* 9.6* 9.9*   HEMATOCRIT % 32.9* 31.8* 32.3* 32.6* 29.8* 28.9* 29.4*   PLATELETS AUTO x10*3/uL 333 306 296 255 202 155 130*   MCV fL 89 88 90 89 88 88 89     COAG:   Results from last 7 days   Lab Units 06/15/24  0612 06/14/24  0409 06/13/24  0543 06/12/24  0552 06/11/24  0636 06/10/24  0916   INR  1.9* 1.5* 1.3* 1.3* 1.3* 1.2*       IMAGING/ DIAGNOSTIC TESTING:  I have personally reviewed the following test result(s):      XR chest 2 views 06/08/2024  Impression  1.  Bibasilar atelectasis and small bilateral pleural effusions. No  evidence of pneumothorax.    6/8/24 TTE - Complete  CONCLUSIONS:  1. Left ventricular systolic function is normal with a 55-60% estimated ejection fraction.  2. Abnormal septal  motion consistent with post-operative status and abnormal septal motion consistent with RV pacemaker.  3. The left atrium is severely dilated.  4. Peak and mean mitral diastolic gradients are 14.5 and 5.4 mmHg at a heart rate of 72 bpm. The DI is ~ 2. The pressure half-time is ~ 90 ms, resulting in a mitral orifice area of 2.4.  5. The patient is in atrial fibrillation which may influence the estimate of left ventricular function and transvalvular flows.      IMPRESSION & PLAN:  POD # 11 s/p MVR Bioprosthetic Valve and KONRAD clipping with Dr. Nugent   - Increase activity/ ambulation; PT/OT  - Encourage IS, C/DB; respiratory therapy; wean O2 as zo - on Room air   - Cardiac rehab referral   - Continue cardiac meds: ASA, BB, Coumadin  - Pain and anticonstipation meds  - 6/8/24 2V CXR   - 6/8/24 Postop ECHO with 55-60% EF, severely dilated LA, Mitral gradients 14.5 peak and 5.4 mean  - CUT epicardial wires prior to discharge; per surgeon preference.    - Tele until discharge    Atrial Fibrillation/Atrial Flutter: home meds: Coumadin 5mg daily except 7.5mg on Mon & Wed; Diltiazem 120mg daily    - Tele: AFIB/Aflutter rate controlled in the 70s-80's; VVI 30 backup, mA 8  - Continue to hold metoprolol, did need pacing overnight 6/14  - Continue holding home diltiazem  - EP planning cardioversion on 6/14; ordered CT Watchman and heparin infusion bridging following approval from Dr. Nugent per EP recommendations- unable to perform cardioversion today due to unavailability of anesthesia; will likely reschedule for Monday   - INR 1.9 today- EP recs to continue heparin gtt until INR 2 or greater    Acute Blood Loss Anemia - Resolved   Recent Labs     06/15/24  0612 06/14/24  0409 06/13/24  0543 06/12/24  0552 06/11/24  0635 06/10/24  0601 06/09/24  0624   HGB 10.9* 10.6* 10.6* 10.9* 9.8* 9.6* 9.9*   HCT 32.9* 31.8* 32.3* 32.6* 29.8* 28.9* 29.4*   - MV, PO x1mo  - Daily labs, transfuse as indicated      Chronic diastolic heart  failure with EF 45 % on echo done 3/28  Home Meds: Furosemide 20mg Daily   Volume/Electrolyte Status: Preop wt Weight: 57.4 kg (126 lb 8.7 oz)   Vitals:    06/15/24 0600   Weight: 53 kg (116 lb 14.4 oz)   - Weight: 54.3, 54.2 , 56.2, X, 56.8 kg   - Adjust diuresis for postop cardiac surgery hypervolemia  - Lasix 20 mg Daily scheduled  - Daily weights/strict I&Os  - Daily RFP     Diverticulosis/ Irritable Bowel Syndrome   Diarrhea resolved  Home meds: supplement IBS Clear OTC (per patient keeps him regular at home)  - Diet as tolerated   - Hold all bowel regimen at this time given recent diarrhea     Tobacco use/ Current smoker: 0.5 PPD x49 years   Oxygenating well on RA  - Encourage smoking cessation  - Continue nicotine replacement via patch   - Aggressive pulmonary hygiene    VTE Prophylaxis: SCDs/TEDs, ambulation, heparin gtt bridging to coumadin  Code Status: Full Code    Dispo  - PT/OT recs low intensity level of continued care; home   - Would benefit from homecare for cardiac surgery carepath and RN visits  - Discharge pending further EP workup/cardioversion  - Will continue to assess discharge needs    Uziel Agudelo PA-C  Cardiac Surgery NIKO  [facility]  Team Phone [phone]    6/15/2024  10:03 AM

## 2024-06-16 VITALS
BODY MASS INDEX: 18.88 KG/M2 | DIASTOLIC BLOOD PRESSURE: 58 MMHG | TEMPERATURE: 97.9 F | WEIGHT: 117.5 LBS | HEART RATE: 71 BPM | OXYGEN SATURATION: 97 % | SYSTOLIC BLOOD PRESSURE: 108 MMHG | RESPIRATION RATE: 17 BRPM | HEIGHT: 66 IN

## 2024-06-16 LAB
ALBUMIN SERPL BCP-MCNC: 3.4 G/DL (ref 3.4–5)
ANION GAP SERPL CALC-SCNC: 14 MMOL/L (ref 10–20)
APTT PPP: 67 SECONDS (ref 27–38)
BUN SERPL-MCNC: 15 MG/DL (ref 6–23)
CALCIUM SERPL-MCNC: 9.1 MG/DL (ref 8.6–10.6)
CHLORIDE SERPL-SCNC: 103 MMOL/L (ref 98–107)
CO2 SERPL-SCNC: 24 MMOL/L (ref 21–32)
CREAT SERPL-MCNC: 0.97 MG/DL (ref 0.5–1.3)
EGFRCR SERPLBLD CKD-EPI 2021: 87 ML/MIN/1.73M*2
ERYTHROCYTE [DISTWIDTH] IN BLOOD BY AUTOMATED COUNT: 14.8 % (ref 11.5–14.5)
GLUCOSE SERPL-MCNC: 104 MG/DL (ref 74–99)
HCT VFR BLD AUTO: 33.6 % (ref 41–52)
HGB BLD-MCNC: 10.9 G/DL (ref 13.5–17.5)
INR PPP: 1.9 (ref 0.9–1.1)
MAGNESIUM SERPL-MCNC: 2.01 MG/DL (ref 1.6–2.4)
MCH RBC QN AUTO: 29.5 PG (ref 26–34)
MCHC RBC AUTO-ENTMCNC: 32.4 G/DL (ref 32–36)
MCV RBC AUTO: 91 FL (ref 80–100)
NRBC BLD-RTO: 0 /100 WBCS (ref 0–0)
PHOSPHATE SERPL-MCNC: 3.3 MG/DL (ref 2.5–4.9)
PLATELET # BLD AUTO: 347 X10*3/UL (ref 150–450)
POTASSIUM SERPL-SCNC: 4.3 MMOL/L (ref 3.5–5.3)
PROTHROMBIN TIME: 21.2 SECONDS (ref 9.8–12.8)
RBC # BLD AUTO: 3.69 X10*6/UL (ref 4.5–5.9)
SODIUM SERPL-SCNC: 137 MMOL/L (ref 136–145)
UFH PPP CHRO-ACNC: 0.3 IU/ML
WBC # BLD AUTO: 7.3 X10*3/UL (ref 4.4–11.3)

## 2024-06-16 PROCEDURE — 80069 RENAL FUNCTION PANEL: CPT | Performed by: NURSE PRACTITIONER

## 2024-06-16 PROCEDURE — 2500000004 HC RX 250 GENERAL PHARMACY W/ HCPCS (ALT 636 FOR OP/ED): Performed by: NURSE PRACTITIONER

## 2024-06-16 PROCEDURE — 85610 PROTHROMBIN TIME: CPT | Performed by: NURSE PRACTITIONER

## 2024-06-16 PROCEDURE — 2500000001 HC RX 250 WO HCPCS SELF ADMINISTERED DRUGS (ALT 637 FOR MEDICARE OP): Performed by: NURSE PRACTITIONER

## 2024-06-16 PROCEDURE — 2500000001 HC RX 250 WO HCPCS SELF ADMINISTERED DRUGS (ALT 637 FOR MEDICARE OP)

## 2024-06-16 PROCEDURE — 85730 THROMBOPLASTIN TIME PARTIAL: CPT | Performed by: NURSE PRACTITIONER

## 2024-06-16 PROCEDURE — 1200000002 HC GENERAL ROOM WITH TELEMETRY DAILY

## 2024-06-16 PROCEDURE — S4991 NICOTINE PATCH NONLEGEND: HCPCS | Performed by: NURSE PRACTITIONER

## 2024-06-16 PROCEDURE — 85027 COMPLETE CBC AUTOMATED: CPT | Performed by: NURSE PRACTITIONER

## 2024-06-16 PROCEDURE — 85520 HEPARIN ASSAY: CPT | Performed by: NURSE PRACTITIONER

## 2024-06-16 PROCEDURE — 2500000002 HC RX 250 W HCPCS SELF ADMINISTERED DRUGS (ALT 637 FOR MEDICARE OP, ALT 636 FOR OP/ED): Performed by: PHYSICIAN ASSISTANT

## 2024-06-16 PROCEDURE — 83735 ASSAY OF MAGNESIUM: CPT | Performed by: NURSE PRACTITIONER

## 2024-06-16 PROCEDURE — 2500000002 HC RX 250 W HCPCS SELF ADMINISTERED DRUGS (ALT 637 FOR MEDICARE OP, ALT 636 FOR OP/ED): Performed by: NURSE PRACTITIONER

## 2024-06-16 ASSESSMENT — PAIN SCALES - GENERAL: PAINLEVEL_OUTOF10: 2

## 2024-06-16 NOTE — CARE PLAN
The patient's goals for the shift include      The clinical goals for the shift include pt will remain hds during shift    Over the shift, the patient did not make progress toward the following goals. Barriers to progression include . Recommendations to address these barriers include .

## 2024-06-16 NOTE — CARE PLAN
Per NP patient to have cardioversion tomorrow.  Earliest discharge would be Tuesday.  [facility] updated....Kira Marrero RN, BSN, TCC

## 2024-06-16 NOTE — PROGRESS NOTES
"CARDIAC SURGERY DAILY PROGRESS NOTE    Farooq Lang is a 63 year old male with a PMHx of mitral valve stenosis secondary to rheumatic disease, afib (on coumadin), HFrEF, and thrombocytopenia, who presented to [facility] on 6/4/2024 for planned cardiac surgery with Dr. Nugent.  .     As an outpatient, he presented to the emergency department with complaints of chest pain for which he was found to have severe mitral valve stenosis.  He underwent preoperative coronary angiogram showing no significant coronary artery disease.  He is also on chronic atrial fibrillation on p.o. anticoagulation.  The echo shows a severe mitral stenosis, extreme thickening and shortening of the subvalvular apparatus, the LV and the RV functions are preserved, there is no significant TR, for which Dr. Nugent and the patient agreed to proceed with mitral valve surgery.      OPERATION/PROCEDURE: 6/4/2024 with Lukasz Nugent   - Median sternotomy  - Standard central cannulation  - Mitral valve replacement with 27mm mitris bioprosthetic valve  - Left atrial appendage ligation   - Prevena dressing    CTICU Course: uneventful, postoperative pain  Transfer to LT3: 6/6/2024  ==================      Interval History:   No acute events overnight  DCCV planned for tomorrow Monday 6/17       SUBJECTIVE:  Denies SOB/intolerable chest pain.  Reports loose stools/diarrhea since yesterday- he reports likely due to his IBS..    Objective   /58 (BP Location: Left arm, Patient Position: Sitting)   Pulse 83   Temp 36.5 °C (97.7 °F) (Temporal)   Resp 17   Ht 1.676 m (5' 6\")   Wt 53.3 kg (117 lb 8 oz)   SpO2 96%   BMI 18.96 kg/m²   Pain Score: 3   3 Day Weight Change: Unable to Calculate    Intake and Output    Intake/Output Summary (Last 24 hours) at 6/16/2024 1250  Last data filed at 6/16/2024 0852  Gross per 24 hour   Intake 560 ml   Output 0 ml   Net 560 ml         Physical Exam  Vitals and nursing note reviewed.   Constitutional: "       Appearance: Normal appearance.      Comments: Pleasant  engaging man A&Ox3 , underweight with BMI 18.96 and temporal wasting no acute distress - ambulating without difficulty perimeter of unit    HENT:      Head: Normocephalic and atraumatic.      Mouth/Throat:      Mouth: Mucous membranes are moist.   Eyes:      Conjunctiva/sclera: Conjunctivae normal.   Neck:      Comments: Trachea Midline.   Cardiovascular:      Rate and Rhythm: Rhythm irregular.      Pulses: Normal pulses.      Heart sounds: Normal heart sounds.      Comments: Tele: AFIB 60s-90s  V wires - connected to external pacemaker VVI @ 30    Pulmonary:      Effort: Pulmonary effort is normal.      Comments: On room air  Abdominal:      General: Bowel sounds are normal.      Palpations: Abdomen is soft.      Comments: No further diarrhea the past 24 hours   Genitourinary:     Comments: Voids independently   Musculoskeletal:         General: Normal range of motion.      Cervical back: Neck supple.      Comments: Ambulating without difficulty around unit   Skin:     General: Skin is warm and dry.      Capillary Refill: Capillary refill takes less than 2 seconds.      Findings: Bruising (arms) present.      Comments: Mid-Sternal Incision: well approximated, no s/s of infection or bleeding,  - RE      Neurological:      General: No focal deficit present.      Mental Status: He is alert and oriented to person, place, and time.   Psychiatric:         Mood and Affect: Mood normal.         Behavior: Behavior normal.       Medications  Scheduled medications  acetaminophen, 650 mg, oral, q6h  aspirin, 81 mg, oral, Daily  furosemide, 20 mg, oral, Daily  lidocaine, 1 patch, transdermal, Daily  magnesium oxide, 400 mg, oral, Daily  melatonin, 10 mg, oral, Daily  [Held by provider] metoprolol tartrate, 6.25 mg, oral, BID  multivitamin with minerals, 1 tablet, oral, Daily  nicotine, 1 patch, transdermal, Daily   Followed by  [START ON 7/17/2024] nicotine, 1  patch, transdermal, Daily   Followed by  [START ON 7/31/2024] nicotine, 1 patch, transdermal, Daily  warfarin, 5 mg, oral, Daily      Continuous medications  heparin, 0-4,000 Units/hr, Last Rate: 1,100 Units/hr (06/16/24 0825)      PRN medications  PRN medications: benzocaine-menthol, bisacodyl, calcium carbonate, dextrose **OR** glucagon, naloxone, [DISCONTINUED] ondansetron **OR** ondansetron, oxygen, traMADol, traMADol, traZODone     LABS:  CMP:  Results from last 7 days   Lab Units 06/16/24  0534 06/15/24  0612 06/14/24  0409 06/13/24  0543 06/12/24  0552 06/11/24  0635 06/10/24  0601   SODIUM mmol/L 137 137 136 138 138 135* 134*   POTASSIUM mmol/L 4.3 4.4 3.9 3.8 3.9 4.3 3.7   CHLORIDE mmol/L 103 105 104 104 103 103 101   CO2 mmol/L 24 23 24 24 26 24 23   ANION GAP mmol/L 14 13 12 14 13 12 14   BUN mg/dL 15 15 17 15 16 21 18   CREATININE mg/dL 0.97 0.86 0.78 0.83 0.86 0.83 0.79   EGFR mL/min/1.73m*2 87 >90 >90 >90 >90 >90 >90   MAGNESIUM mg/dL 2.01 2.22 1.83 1.91 1.98 1.84 1.97   ALBUMIN g/dL 3.4 3.4 3.3* 3.4 3.4 3.3* 3.3*     CBC:  Results from last 7 days   Lab Units 06/16/24  0534 06/15/24  0612 06/14/24  0409 06/13/24  0543 06/12/24  0552 06/11/24  0635 06/10/24  0601   WBC AUTO x10*3/uL 7.3 7.8 7.3 7.1 6.8 7.0 6.3   HEMOGLOBIN g/dL 10.9* 10.9* 10.6* 10.6* 10.9* 9.8* 9.6*   HEMATOCRIT % 33.6* 32.9* 31.8* 32.3* 32.6* 29.8* 28.9*   PLATELETS AUTO x10*3/uL 347 333 306 296 255 202 155   MCV fL 91 89 88 90 89 88 88     COAG:   Results from last 7 days   Lab Units 06/16/24  0534 06/15/24  0612 06/14/24  0409 06/13/24  0543 06/12/24  0552 06/11/24  0636 06/10/24  0916   INR  1.9* 1.9* 1.5* 1.3* 1.3* 1.3* 1.2*       IMAGING/ DIAGNOSTIC TESTING:  I have personally reviewed the following test result(s):      XR chest 2 views 06/08/2024  Impression  1.  Bibasilar atelectasis and small bilateral pleural effusions. No  evidence of pneumothorax.    6/8/24 TTE - Complete  CONCLUSIONS:  1. Left ventricular systolic  function is normal with a 55-60% estimated ejection fraction.  2. Abnormal septal motion consistent with post-operative status and abnormal septal motion consistent with RV pacemaker.  3. The left atrium is severely dilated.  4. Peak and mean mitral diastolic gradients are 14.5 and 5.4 mmHg at a heart rate of 72 bpm. The DI is ~ 2. The pressure half-time is ~ 90 ms, resulting in a mitral orifice area of 2.4.  5. The patient is in atrial fibrillation which may influence the estimate of left ventricular function and transvalvular flows.      IMPRESSION & PLAN:  POD # 12 s/p MVR Bioprosthetic Valve and KONRAD clipping with Dr. Nugent   - Increase activity/ ambulation; PT/OT  - Encourage IS, C/DB; respiratory therapy; wean O2 as zo - on Room air   - Cardiac rehab referral   - Continue cardiac meds: ASA and Coumadin  - holding BB given persistent Aflutter with slow ventricular rate  - Pain and anticonstipation meds  - 6/8/24 2V CXR   - 6/8/24 Postop ECHO with 55-60% EF, severely dilated LA, Mitral gradients 14.5 peak and 5.4 mean  - CUT epicardial wires prior to discharge; per surgeon preference.    - Tele until discharge    Atrial Fibrillation/Atrial Flutter: home meds: Coumadin 5mg daily except 7.5mg on Mon & Wed; Diltiazem 120mg daily    - Tele: AFIB/Aflutter rate controlled in the 60s-90's; VVI 30 backup, mA 8  - Continue to hold metoprolol, did need pacing overnight 6/14  - Continue holding home diltiazem  - EP planning cardioversion on 6/17; ordered CT Watchman and heparin infusion bridging following approval from Dr. Nugent per EP recommendations- unable to perform cardioversion 6/14 due to unavailability of anesthesia; rescheduled for Monday , NPO midnight tonight  - INR 1.9 again today- EP recs to continue heparin gtt until INR 2 or greater    Acute Blood Loss Anemia - Resolved   Recent Labs     06/16/24  0534 06/15/24  0612 06/14/24  0409 06/13/24  0543 06/12/24  0552 06/11/24  0635 06/10/24  0601   HGB 10.9* 10.9*  10.6* 10.6* 10.9* 9.8* 9.6*   HCT 33.6* 32.9* 31.8* 32.3* 32.6* 29.8* 28.9*   - MV, PO x1mo  - Daily labs, transfuse as indicated      Chronic diastolic heart failure with EF 45 % on echo done 3/28  Home Meds: Furosemide 20mg Daily   Volume/Electrolyte Status: Preop wt Weight: 57.4 kg (126 lb 8.7 oz)   Vitals:    06/16/24 0645   Weight: 53.3 kg (117 lb 8 oz)   - Weight: 53.3 [54.3, 54.2 , 56.2, X, 56.8 kg]   - Adjust diuresis for postop cardiac surgery hypervolemia  - Lasix 20 mg Daily scheduled   - Daily weights/strict I&Os  - Daily RFP     Diverticulosis/ Irritable Bowel Syndrome   Diarrhea resolved  Home meds: supplement IBS Clear OTC (per patient keeps him regular at home)  - Diet as tolerated   - Hold all bowel regimen at this time given recent diarrhea     Tobacco use/ Current smoker: 0.5 PPD x49 years   Oxygenating well on RA  - Encourage smoking cessation  - Continue nicotine replacement via patch   - Aggressive pulmonary hygiene    VTE Prophylaxis: SCDs/TEDs, ambulation, heparin gtt bridging to coumadin  Code Status: Full Code    Dispo  - PT/OT recs low intensity level of continued care; home   - Would benefit from homecare for cardiac surgery carepath and RN visits  - Discharge pending further EP workup/cardioversion  - Will continue to assess discharge needs    LENY Hernandez-CNP  Cardiac Surgery NIKO  [facility]  Team Phone [phone]    6/16/2024  12:50 PM

## 2024-06-17 LAB
ALBUMIN SERPL BCP-MCNC: 3.4 G/DL (ref 3.4–5)
ANION GAP SERPL CALC-SCNC: 12 MMOL/L (ref 10–20)
BUN SERPL-MCNC: 14 MG/DL (ref 6–23)
CALCIUM SERPL-MCNC: 9 MG/DL (ref 8.6–10.6)
CHLORIDE SERPL-SCNC: 104 MMOL/L (ref 98–107)
CO2 SERPL-SCNC: 23 MMOL/L (ref 21–32)
CREAT SERPL-MCNC: 0.84 MG/DL (ref 0.5–1.3)
EGFRCR SERPLBLD CKD-EPI 2021: >90 ML/MIN/1.73M*2
ERYTHROCYTE [DISTWIDTH] IN BLOOD BY AUTOMATED COUNT: 14.8 % (ref 11.5–14.5)
GLUCOSE SERPL-MCNC: 106 MG/DL (ref 74–99)
HCT VFR BLD AUTO: 33.5 % (ref 41–52)
HGB BLD-MCNC: 11.2 G/DL (ref 13.5–17.5)
INR PPP: 1.9 (ref 0.9–1.1)
MAGNESIUM SERPL-MCNC: 2.09 MG/DL (ref 1.6–2.4)
MCH RBC QN AUTO: 30 PG (ref 26–34)
MCHC RBC AUTO-ENTMCNC: 33.4 G/DL (ref 32–36)
MCV RBC AUTO: 90 FL (ref 80–100)
NRBC BLD-RTO: 0 /100 WBCS (ref 0–0)
PHOSPHATE SERPL-MCNC: 3.5 MG/DL (ref 2.5–4.9)
PLATELET # BLD AUTO: 363 X10*3/UL (ref 150–450)
POTASSIUM SERPL-SCNC: 4 MMOL/L (ref 3.5–5.3)
PROTHROMBIN TIME: 22.1 SECONDS (ref 9.8–12.8)
RBC # BLD AUTO: 3.73 X10*6/UL (ref 4.5–5.9)
SODIUM SERPL-SCNC: 135 MMOL/L (ref 136–145)
UFH PPP CHRO-ACNC: 0.2 IU/ML
UFH PPP CHRO-ACNC: 0.2 IU/ML
UFH PPP CHRO-ACNC: 0.3 IU/ML
UFH PPP CHRO-ACNC: 0.3 IU/ML
WBC # BLD AUTO: 8.2 X10*3/UL (ref 4.4–11.3)

## 2024-06-17 PROCEDURE — 2500000001 HC RX 250 WO HCPCS SELF ADMINISTERED DRUGS (ALT 637 FOR MEDICARE OP)

## 2024-06-17 PROCEDURE — 2500000005 HC RX 250 GENERAL PHARMACY W/O HCPCS: Performed by: PHYSICIAN ASSISTANT

## 2024-06-17 PROCEDURE — 2500000002 HC RX 250 W HCPCS SELF ADMINISTERED DRUGS (ALT 637 FOR MEDICARE OP, ALT 636 FOR OP/ED): Performed by: NURSE PRACTITIONER

## 2024-06-17 PROCEDURE — 3700000002 HC GENERAL ANESTHESIA TIME - EACH INCREMENTAL 1 MINUTE: Performed by: INTERNAL MEDICINE

## 2024-06-17 PROCEDURE — 92960 CARDIOVERSION ELECTRIC EXT: CPT | Performed by: INTERNAL MEDICINE

## 2024-06-17 PROCEDURE — 80069 RENAL FUNCTION PANEL: CPT | Performed by: NURSE PRACTITIONER

## 2024-06-17 PROCEDURE — 2500000002 HC RX 250 W HCPCS SELF ADMINISTERED DRUGS (ALT 637 FOR MEDICARE OP, ALT 636 FOR OP/ED): Performed by: PHYSICIAN ASSISTANT

## 2024-06-17 PROCEDURE — S4991 NICOTINE PATCH NONLEGEND: HCPCS | Performed by: NURSE PRACTITIONER

## 2024-06-17 PROCEDURE — 02RG08Z REPLACEMENT OF MITRAL VALVE WITH ZOOPLASTIC TISSUE, OPEN APPROACH: ICD-10-PCS | Performed by: THORACIC SURGERY (CARDIOTHORACIC VASCULAR SURGERY)

## 2024-06-17 PROCEDURE — 3700000001 HC GENERAL ANESTHESIA TIME - INITIAL BASE CHARGE: Performed by: INTERNAL MEDICINE

## 2024-06-17 PROCEDURE — 85027 COMPLETE CBC AUTOMATED: CPT | Performed by: NURSE PRACTITIONER

## 2024-06-17 PROCEDURE — 2500000001 HC RX 250 WO HCPCS SELF ADMINISTERED DRUGS (ALT 637 FOR MEDICARE OP): Performed by: PHYSICIAN ASSISTANT

## 2024-06-17 PROCEDURE — 2500000004 HC RX 250 GENERAL PHARMACY W/ HCPCS (ALT 636 FOR OP/ED): Performed by: NURSE PRACTITIONER

## 2024-06-17 PROCEDURE — 7100000010 HC PHASE TWO TIME - EACH INCREMENTAL 1 MINUTE: Performed by: INTERNAL MEDICINE

## 2024-06-17 PROCEDURE — 2500000004 HC RX 250 GENERAL PHARMACY W/ HCPCS (ALT 636 FOR OP/ED): Performed by: ANESTHESIOLOGIST ASSISTANT

## 2024-06-17 PROCEDURE — 2500000001 HC RX 250 WO HCPCS SELF ADMINISTERED DRUGS (ALT 637 FOR MEDICARE OP): Performed by: NURSE PRACTITIONER

## 2024-06-17 PROCEDURE — 85520 HEPARIN ASSAY: CPT | Performed by: NURSE PRACTITIONER

## 2024-06-17 PROCEDURE — 2720000007 HC OR 272 NO HCPCS: Performed by: INTERNAL MEDICINE

## 2024-06-17 PROCEDURE — 83735 ASSAY OF MAGNESIUM: CPT | Performed by: NURSE PRACTITIONER

## 2024-06-17 PROCEDURE — 7100000009 HC PHASE TWO TIME - INITIAL BASE CHARGE: Performed by: INTERNAL MEDICINE

## 2024-06-17 PROCEDURE — 02L70ZK OCCLUSION OF LEFT ATRIAL APPENDAGE, OPEN APPROACH: ICD-10-PCS | Performed by: THORACIC SURGERY (CARDIOTHORACIC VASCULAR SURGERY)

## 2024-06-17 PROCEDURE — 1200000002 HC GENERAL ROOM WITH TELEMETRY DAILY

## 2024-06-17 PROCEDURE — 85610 PROTHROMBIN TIME: CPT | Performed by: NURSE PRACTITIONER

## 2024-06-17 RX ORDER — ACETAMINOPHEN 325 MG/1
325 TABLET ORAL EVERY 6 HOURS PRN
Status: DISCONTINUED | OUTPATIENT
Start: 2024-06-17 | End: 2024-06-18 | Stop reason: HOSPADM

## 2024-06-17 RX ORDER — WARFARIN 3 MG/1
6 TABLET ORAL DAILY
Status: DISCONTINUED | OUTPATIENT
Start: 2024-06-17 | End: 2024-06-18 | Stop reason: HOSPADM

## 2024-06-17 RX ORDER — PROPOFOL 10 MG/ML
INJECTION, EMULSION INTRAVENOUS AS NEEDED
Status: DISCONTINUED | OUTPATIENT
Start: 2024-06-17 | End: 2024-06-17

## 2024-06-17 SDOH — HEALTH STABILITY: MENTAL HEALTH: CURRENT SMOKER: 1

## 2024-06-17 ASSESSMENT — PAIN SCALES - GENERAL
PAINLEVEL_OUTOF10: 2
PAINLEVEL_OUTOF10: 0 - NO PAIN

## 2024-06-17 ASSESSMENT — PAIN - FUNCTIONAL ASSESSMENT: PAIN_FUNCTIONAL_ASSESSMENT: 0-10

## 2024-06-17 ASSESSMENT — PAIN DESCRIPTION - LOCATION: LOCATION: HEAD

## 2024-06-17 NOTE — PROGRESS NOTES
Transitional Care Coordination Progress Note: Interdisciplinary Rounds     Team members present: TCC, Cardiac NP     Plan per Medical/Surgical team: cardiac care path     Discharge disposition: Home w/ [facility]     Status: IP     Payer: [facility]      Potential Barriers: unable to be cardioverted 6/14 - rescheduled for 6/17 11am, stable rhythm        ADOD: 6/18     This TCC will continue to follow for home going needs and safe DC plan.     Mayelin Parsons RN

## 2024-06-17 NOTE — PROGRESS NOTES
Subjective Data/updates:  S/p successful DCCV today.   No other issues. Patient with no complaints.       Objective Data:  Last Recorded Vitals:  Vitals:    06/16/24 2300 06/17/24 0613 06/17/24 1009 06/17/24 1506   BP: 108/58 113/72 106/67 99/61   BP Location: Right arm      Patient Position: Lying      Pulse: 71 75 70 62   Resp: 17 18 18 17   Temp: 36.6 °C (97.9 °F) 36.8 °C (98.2 °F) 36.5 °C (97.7 °F) 36 °C (96.8 °F)   TempSrc: Temporal Temporal  Temporal   SpO2: 97% 97% 98% 99%   Weight:  53.3 kg (117 lb 9.6 oz)     Height:           Last Labs:  CBC - 6/17/2024:  7:17 AM  8.2 11.2 363    33.5      CMP - 6/17/2024:  7:17 AM  9.0 7.5 10 --- 0.7   3.5 3.4 11 88      PTT - 6/16/2024:  7:20 AM  1.9   22.1 67     TROPHS   Date/Time Value Ref Range Status   05/08/2024 10:25 AM 35 0 - 20 ng/L Final   05/08/2024 09:18 AM 38 0 - 20 ng/L Final   04/10/2024 03:45 PM 14 0 - 20 ng/L Final     BNP   Date/Time Value Ref Range Status   04/10/2024 02:23  0 - 99 pg/mL Final   03/27/2024 10:21  0 - 99 pg/mL Final     HGBA1C   Date/Time Value Ref Range Status   05/06/2024 01:06 PM 5.7 see below % Final   05/06/2024 01:06 PM 5.7 see below % Final      Last I/O:  I/O last 3 completed shifts:  In: 600 (11.2 mL/kg) [P.O.:600]  Out: 0 (0 mL/kg)   Weight: 53.3 kg       Inpatient Medications:  Scheduled medications   Medication Dose Route Frequency    aspirin  81 mg oral Daily    furosemide  20 mg oral Daily    lidocaine  1 patch transdermal Daily    magnesium oxide  400 mg oral Daily    melatonin  10 mg oral Daily    [Held by provider] metoprolol tartrate  6.25 mg oral BID    multivitamin with minerals  1 tablet oral Daily    nicotine  1 patch transdermal Daily    Followed by    [START ON 7/17/2024] nicotine  1 patch transdermal Daily    Followed by    [START ON 7/31/2024] nicotine  1 patch transdermal Daily    warfarin  6 mg oral Daily     PRN medications   Medication    acetaminophen    benzocaine-menthol    bisacodyl     calcium carbonate    dextrose    Or    glucagon    naloxone    ondansetron    oxygen    traMADol    traMADol    traZODone     Continuous Medications   Medication Dose Last Rate    heparin  0-4,000 Units/hr 1,200 Units/hr (06/17/24 0810)       Physical Exam:  Gen: well appearing  Neuro: AAOx3, CN 2-12 intact, no FND  HEENT: PEERL, EOMI, sclera anicteric, no conjunctival injection, MMM, no oropharyngeal lesions  Neck: no elevated JVD  Resp: CTAB, normal breath sounds, no wheezing/crackles/ rales  CV: RRR, normal S1/S2, no murmurs/ rubs/gallops  GI: non-tender, non-distended, normal BSs in all 4 quadrants  MSK: warm and well perfused, no edema  Skin: warm and dry, no lesions, no rashes      Assessment/Plan     Farooq Lang is a 63 y.o. 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 Nugent on 6/4. EP consulted given Aflutter with slow ventricular response and possible consideration for PPM     # Afib/Flutter  -Patient is in Aflutter on EKG with variable conduction. With activity, his HR does increase from 60 to 80 suggesting some chronotropic competence. There are only 3 beats in the last 24h that were paced  -Patient has never been cardioverted. Pt has a severely dilated left atrium on TTE but we would like to make an attempt at cardioversion to assess underlying sinus node dysfunction. This will help decide if he needs a PPM prior to discharge    Discussed case with imaging attending. Patient with ligated KONRAD during his cardiac surgery. No obvious communication between the LA and left atrial appendage with no contrast seen seeping into the KONRAD and as such safe to proceed with DCCV.     Update 6/17:  S/p successful DCCV today.     Recommendations:  - May consider low dose BB   - Continue AC and transition to warfarin per primary team   - No indication for AAD  - Patient will need close cardiology/EP and CT surgery follow up    EP will sign-off.   Case discussed with   [place]v        Cas Birmingham MD

## 2024-06-17 NOTE — ANESTHESIA POSTPROCEDURE EVALUATION
Patient: Farooq Lang    Procedure Summary       Date: 06/17/24 [place] [facility] [place] [id number] EP PRE/POST BAY 1 / Virtual [facility] [place] MAT 3529 Cardiac Cath Lab    Anesthesia Start: 1321 Anesthesia Stop:     Procedure: Cardioversion Diagnosis:       New onset atrial fibrillation (Multi)      (New onset atrial fibrillation (Multi) [I48.91])    Providers: Magdy Meraz MD Responsible Provider: Eva Jurado MD    Anesthesia Type: general ASA Status: 3            Anesthesia Type: general    Vitals Value Taken Time   BP 98/61 06/17/24 1348   Temp 36.3 06/17/24 1348   Pulse 59 06/17/24 1348   Resp 16 06/17/24 1348   SpO2 100 06/17/24 1348       Anesthesia Post Evaluation    Patient location during evaluation: bedside  Patient participation: complete - patient participated  Level of consciousness: awake and alert  Pain management: satisfactory to patient  Airway patency: patent  Cardiovascular status: acceptable and hemodynamically stable  Respiratory status: acceptable, spontaneous ventilation, nonlabored ventilation and face mask  Hydration status: acceptable  Postoperative Nausea and Vomiting: none        There were no known notable events for this encounter.

## 2024-06-17 NOTE — ANESTHESIA PREPROCEDURE EVALUATION
Patient: Farooq Lang    Procedure Information       Date/Time: 06/17/24 1305    Procedure: Cardioversion    Location: [facility] [place] [id number] EP PRE/POST BAY 1 / Virtual [facility] [place]  Cardiac Cath Lab    Providers: Magdy Meraz MD            Relevant Problems   Cardiac   (+) Mitral valve stenosis   (+) Mitral valve stenosis, unspecified etiology   (+) New onset atrial fibrillation (Multi)       Clinical information reviewed:   Tobacco  Allergies  Meds   Med Hx  Surg Hx   Fam Hx  Soc Hx        NPO Detail:  No data recorded     Physical Exam    Airway  Mallampati: II  TM distance: >3 FB  Neck ROM: full  Comments: Can bite upper lip   Cardiovascular   Rhythm: irregular     Dental   (+) lower dentures     Pulmonary    Abdominal          Anesthesia Plan    History of general anesthesia?: yes  History of complications of general anesthesia?: no    ASA 3     general     The patient is a current smoker.    intravenous induction   Anesthetic plan and risks discussed with patient.    Plan discussed with attending and CAA.

## 2024-06-17 NOTE — PROGRESS NOTES
"CARDIAC SURGERY DAILY PROGRESS NOTE    Farooq Lang is a 63 year old male with a PMHx of mitral valve stenosis secondary to rheumatic disease, afib (on coumadin), HFrEF, and thrombocytopenia, who presented to [facility] on 6/4/2024 for planned cardiac surgery with Dr. Nugent.  .     As an outpatient, he presented to the emergency department with complaints of chest pain for which he was found to have severe mitral valve stenosis.  He underwent preoperative coronary angiogram showing no significant coronary artery disease.  He is also on chronic atrial fibrillation on p.o. anticoagulation.  The echo shows a severe mitral stenosis, extreme thickening and shortening of the subvalvular apparatus, the LV and the RV functions are preserved, there is no significant TR, for which Dr. Nugent and the patient agreed to proceed with mitral valve surgery.      OPERATION/PROCEDURE: 6/4/2024 with Lukasz Nugent   - Median sternotomy  - Standard central cannulation  - Mitral valve replacement with 27mm mitris bioprosthetic valve  - Left atrial appendage ligation   - Prevena dressing    CTICU Course: uneventful, postoperative pain  Transfer to LT3: 6/6/2024  ==================      Interval History:   No acute events overnight  Continues to be in fib vs flutter in the 100s      SUBJECTIVE:  Denies SOB/intolerable chest pain.  Reports loose stools/diarrhea since yesterday- he reports likely due to his IBS..    Objective   /72   Pulse 75   Temp 36.8 °C (98.2 °F) (Temporal)   Resp 18   Ht 1.676 m (5' 6\")   Wt 53.3 kg (117 lb 9.6 oz)   SpO2 97%   BMI 18.98 kg/m²   Pain Score: 0 - No pain   3 Day Weight Change: -0.318 kg (-11.2 oz) per day    Intake and Output    Intake/Output Summary (Last 24 hours) at 6/17/2024 0921  Last data filed at 6/16/2024 1811  Gross per 24 hour   Intake 420 ml   Output --   Net 420 ml         Physical Exam  Vitals and nursing note reviewed.   Constitutional:       Appearance: Normal " appearance.      Comments: Pleasant  engaging man A&Ox3 , underweight with BMI 18.96 and temporal wasting no acute distress - ambulating without difficulty perimeter of unit    HENT:      Head: Normocephalic and atraumatic.      Mouth/Throat:      Mouth: Mucous membranes are moist.   Eyes:      Conjunctiva/sclera: Conjunctivae normal.   Neck:      Comments: Trachea Midline.   Cardiovascular:      Rate and Rhythm: Rhythm irregular.      Pulses: Normal pulses.      Heart sounds: Normal heart sounds.      Comments: Tele: AFIB/flutter 60s-90s  V wires - connected to external pacemaker VVI @ 30    Pulmonary:      Effort: Pulmonary effort is normal.      Comments: On room air  Abdominal:      General: Bowel sounds are normal.      Palpations: Abdomen is soft.   Genitourinary:     Comments: Voids independently   Musculoskeletal:         General: Normal range of motion.      Cervical back: Neck supple.      Comments: Ambulating without difficulty around unit   Skin:     General: Skin is warm and dry.      Capillary Refill: Capillary refill takes less than 2 seconds.      Findings: Bruising (arms) present.      Comments: Mid-Sternal Incision: well approximated, no s/s of infection or bleeding,  - RE      Neurological:      General: No focal deficit present.      Mental Status: He is alert and oriented to person, place, and time.   Psychiatric:         Mood and Affect: Mood normal.         Behavior: Behavior normal.       Medications  Scheduled medications  acetaminophen, 650 mg, oral, q6h  aspirin, 81 mg, oral, Daily  furosemide, 20 mg, oral, Daily  lidocaine, 1 patch, transdermal, Daily  magnesium oxide, 400 mg, oral, Daily  melatonin, 10 mg, oral, Daily  [Held by provider] metoprolol tartrate, 6.25 mg, oral, BID  multivitamin with minerals, 1 tablet, oral, Daily  nicotine, 1 patch, transdermal, Daily   Followed by  [START ON 7/17/2024] nicotine, 1 patch, transdermal, Daily   Followed by  [START ON 7/31/2024] nicotine, 1  patch, transdermal, Daily  warfarin, 6 mg, oral, Daily      Continuous medications  heparin, 0-4,000 Units/hr, Last Rate: 1,200 Units/hr (06/17/24 0810)      PRN medications  PRN medications: benzocaine-menthol, bisacodyl, calcium carbonate, dextrose **OR** glucagon, naloxone, [DISCONTINUED] ondansetron **OR** ondansetron, oxygen, traMADol, traMADol, traZODone     LABS:  CMP:  Results from last 7 days   Lab Units 06/17/24  0717 06/16/24  0534 06/15/24  0612 06/14/24  0409 06/13/24  0543 06/12/24  0552 06/11/24  0635   SODIUM mmol/L 135* 137 137 136 138 138 135*   POTASSIUM mmol/L 4.0 4.3 4.4 3.9 3.8 3.9 4.3   CHLORIDE mmol/L 104 103 105 104 104 103 103   CO2 mmol/L 23 24 23 24 24 26 24   ANION GAP mmol/L 12 14 13 12 14 13 12   BUN mg/dL 14 15 15 17 15 16 21   CREATININE mg/dL 0.84 0.97 0.86 0.78 0.83 0.86 0.83   EGFR mL/min/1.73m*2 >90 87 >90 >90 >90 >90 >90   MAGNESIUM mg/dL 2.09 2.01 2.22 1.83 1.91 1.98 1.84   ALBUMIN g/dL 3.4 3.4 3.4 3.3* 3.4 3.4 3.3*     CBC:  Results from last 7 days   Lab Units 06/17/24  0717 06/16/24  0534 06/15/24  0612 06/14/24  0409 06/13/24  0543 06/12/24  0552 06/11/24  0635   WBC AUTO x10*3/uL 8.2 7.3 7.8 7.3 7.1 6.8 7.0   HEMOGLOBIN g/dL 11.2* 10.9* 10.9* 10.6* 10.6* 10.9* 9.8*   HEMATOCRIT % 33.5* 33.6* 32.9* 31.8* 32.3* 32.6* 29.8*   PLATELETS AUTO x10*3/uL 363 347 333 306 296 255 202   MCV fL 90 91 89 88 90 89 88     COAG:   Results from last 7 days   Lab Units 06/17/24  0717 06/16/24  0534 06/15/24  0612 06/14/24  0409 06/13/24  0543 06/12/24  0552 06/11/24  0636   INR  1.9* 1.9* 1.9* 1.5* 1.3* 1.3* 1.3*       IMAGING/ DIAGNOSTIC TESTING:  I have personally reviewed the following test result(s):      XR chest 2 views 06/08/2024  Impression  1.  Bibasilar atelectasis and small bilateral pleural effusions. No  evidence of pneumothorax.    6/8/24 TTE - Complete  CONCLUSIONS:  1. Left ventricular systolic function is normal with a 55-60% estimated ejection fraction.  2. Abnormal septal  motion consistent with post-operative status and abnormal septal motion consistent with RV pacemaker.  3. The left atrium is severely dilated.  4. Peak and mean mitral diastolic gradients are 14.5 and 5.4 mmHg at a heart rate of 72 bpm. The DI is ~ 2. The pressure half-time is ~ 90 ms, resulting in a mitral orifice area of 2.4.  5. The patient is in atrial fibrillation which may influence the estimate of left ventricular function and transvalvular flows.      IMPRESSION & PLAN:  POD # 12 s/p MVR Bioprosthetic Valve and KONRAD clipping with Dr. Nugent   - Ambulating independently in the halls  - Encourage IS, C/DB; respiratory therapy; wean O2 as zo - on Room air   - Cardiac rehab referral   - Continue cardiac meds: ASA  - Increase coumadin to 6mg today given INR 1.9  - holding BB given persistent Aflutter with slow ventricular rate  - 6/8/24 2V CXR   - 6/8/24 Postop ECHO with 55-60% EF, severely dilated LA, Mitral gradients 14.5 peak and 5.4 mean  - CUT epicardial wires prior to discharge; per surgeon preference.    - Tele until discharge    Atrial Fibrillation/Atrial Flutter: home meds: Coumadin 5mg daily except 7.5mg on Mon & Wed; Diltiazem 120mg daily    - Tele: AFIB/Aflutter rleatively rate controlleds; VVI 30 backup, mA 8  - Continue to hold metoprolol, did need pacing overnight 6/14  - Continue holding home diltiazem  - EP planning cardioversion today to evaluate underlying rhythm  - INR 1.9 again today- EP recs to continue heparin gtt until INR 2 or greater    Acute Blood Loss Anemia - Resolved   Recent Labs     06/17/24  0717 06/16/24  0534 06/15/24  0612 06/14/24  0409 06/13/24  0543 06/12/24  0552 06/11/24  0635   HGB 11.2* 10.9* 10.9* 10.6* 10.6* 10.9* 9.8*   HCT 33.5* 33.6* 32.9* 31.8* 32.3* 32.6* 29.8*   - MV, PO x1mo  - Daily labs, transfuse as indicated      Chronic diastolic heart failure with EF 45 % on echo done 3/28  Home Meds: Furosemide 20mg Daily   Volume/Electrolyte Status: Preop wt Weight: 57.4  kg (126 lb 8.7 oz)   Vitals:    06/17/24 0613   Weight: 53.3 kg (117 lb 9.6 oz)   - Weight: 53.3 [54.3, 54.2 , 56.2, X, 56.8 kg]   - Lasix 20 mg Daily scheduled   - Daily weights  - Daily RFP     Diverticulosis/ Irritable Bowel Syndrome   Diarrhea resolved  Home meds: supplement IBS Clear OTC (per patient keeps him regular at home)  - Diet as tolerated   - Hold all bowel regimen at this time given recent diarrhea     Tobacco use/ Current smoker: 0.5 PPD x49 years   Oxygenating well on RA  - Encourage smoking cessation  - Continue nicotine replacement via patch   - Aggressive pulmonary hygiene    VTE Prophylaxis: SCDs/TEDs, ambulation, heparin gtt bridging to coumadin  Code Status: Full Code    Dispo  - PT/OT recs low intensity level of continued care; home   - Would benefit from homecare for cardiac surgery carepath and RN visits  - Discharge pending further EP workup/cardioversion  - Will continue to assess discharge needs    Uziel Agudelo PA-C  Cardiac Surgery NIKO  [facility]  Team Phone [phone]    6/17/2024  9:21 AM

## 2024-06-18 ENCOUNTER — APPOINTMENT (OUTPATIENT)
Dept: CARDIOLOGY | Facility: HOSPITAL | Age: 64
DRG: 219 | End: 2024-06-18
Payer: COMMERCIAL

## 2024-06-18 ENCOUNTER — DOCUMENTATION (OUTPATIENT)
Dept: HOME HEALTH SERVICES | Facility: HOME HEALTH | Age: 64
End: 2024-06-18
Payer: COMMERCIAL

## 2024-06-18 VITALS
BODY MASS INDEX: 19.25 KG/M2 | DIASTOLIC BLOOD PRESSURE: 55 MMHG | OXYGEN SATURATION: 100 % | HEIGHT: 66 IN | SYSTOLIC BLOOD PRESSURE: 106 MMHG | RESPIRATION RATE: 18 BRPM | TEMPERATURE: 97.2 F | HEART RATE: 61 BPM | WEIGHT: 119.8 LBS

## 2024-06-18 PROBLEM — I05.0 MITRAL VALVE STENOSIS, UNSPECIFIED ETIOLOGY: Status: RESOLVED | Noted: 2024-06-04 | Resolved: 2024-06-18

## 2024-06-18 PROBLEM — I05.0 MITRAL VALVE STENOSIS: Status: RESOLVED | Noted: 2024-04-03 | Resolved: 2024-06-18

## 2024-06-18 LAB
ALBUMIN SERPL BCP-MCNC: 3.3 G/DL (ref 3.4–5)
ANION GAP SERPL CALC-SCNC: 13 MMOL/L (ref 10–20)
ATRIAL RATE: 44 BPM
BODY SURFACE AREA: 1.59 M2
BUN SERPL-MCNC: 20 MG/DL (ref 6–23)
CALCIUM SERPL-MCNC: 8.9 MG/DL (ref 8.6–10.6)
CHLORIDE SERPL-SCNC: 104 MMOL/L (ref 98–107)
CO2 SERPL-SCNC: 24 MMOL/L (ref 21–32)
CREAT SERPL-MCNC: 0.94 MG/DL (ref 0.5–1.3)
EGFRCR SERPLBLD CKD-EPI 2021: >90 ML/MIN/1.73M*2
ERYTHROCYTE [DISTWIDTH] IN BLOOD BY AUTOMATED COUNT: 15.2 % (ref 11.5–14.5)
GLUCOSE SERPL-MCNC: 123 MG/DL (ref 74–99)
HCT VFR BLD AUTO: 31 % (ref 41–52)
HGB BLD-MCNC: 10.1 G/DL (ref 13.5–17.5)
INR PPP: 2.1 (ref 0.9–1.1)
MAGNESIUM SERPL-MCNC: 2.04 MG/DL (ref 1.6–2.4)
MCH RBC QN AUTO: 29.4 PG (ref 26–34)
MCHC RBC AUTO-ENTMCNC: 32.6 G/DL (ref 32–36)
MCV RBC AUTO: 90 FL (ref 80–100)
NRBC BLD-RTO: 0 /100 WBCS (ref 0–0)
PHOSPHATE SERPL-MCNC: 3.4 MG/DL (ref 2.5–4.9)
PLATELET # BLD AUTO: 334 X10*3/UL (ref 150–450)
POTASSIUM SERPL-SCNC: 4 MMOL/L (ref 3.5–5.3)
PROTHROMBIN TIME: 23.6 SECONDS (ref 9.8–12.8)
Q ONSET: 223 MS
QRS COUNT: 11 BEATS
QRS DURATION: 82 MS
QT INTERVAL: 432 MS
QTC CALCULATION(BAZETT): 456 MS
QTC FREDERICIA: 448 MS
R AXIS: 7 DEGREES
RBC # BLD AUTO: 3.44 X10*6/UL (ref 4.5–5.9)
SODIUM SERPL-SCNC: 137 MMOL/L (ref 136–145)
T AXIS: -21 DEGREES
T OFFSET: 439 MS
UFH PPP CHRO-ACNC: 0.3 IU/ML
VENTRICULAR RATE: 67 BPM
WBC # BLD AUTO: 7.4 X10*3/UL (ref 4.4–11.3)

## 2024-06-18 PROCEDURE — 2500000001 HC RX 250 WO HCPCS SELF ADMINISTERED DRUGS (ALT 637 FOR MEDICARE OP)

## 2024-06-18 PROCEDURE — 2500000001 HC RX 250 WO HCPCS SELF ADMINISTERED DRUGS (ALT 637 FOR MEDICARE OP): Performed by: NURSE PRACTITIONER

## 2024-06-18 PROCEDURE — 97116 GAIT TRAINING THERAPY: CPT | Mod: GP,CQ

## 2024-06-18 PROCEDURE — 83735 ASSAY OF MAGNESIUM: CPT | Performed by: NURSE PRACTITIONER

## 2024-06-18 PROCEDURE — 85027 COMPLETE CBC AUTOMATED: CPT | Performed by: NURSE PRACTITIONER

## 2024-06-18 PROCEDURE — 2500000004 HC RX 250 GENERAL PHARMACY W/ HCPCS (ALT 636 FOR OP/ED): Performed by: NURSE PRACTITIONER

## 2024-06-18 PROCEDURE — 93242 EXT ECG>48HR<7D RECORDING: CPT

## 2024-06-18 PROCEDURE — 93244 EXT ECG>48HR<7D REV&INTERPJ: CPT | Performed by: INTERNAL MEDICINE

## 2024-06-18 PROCEDURE — 85520 HEPARIN ASSAY: CPT | Performed by: NURSE PRACTITIONER

## 2024-06-18 PROCEDURE — 2500000001 HC RX 250 WO HCPCS SELF ADMINISTERED DRUGS (ALT 637 FOR MEDICARE OP): Performed by: PHYSICIAN ASSISTANT

## 2024-06-18 PROCEDURE — 85610 PROTHROMBIN TIME: CPT | Performed by: NURSE PRACTITIONER

## 2024-06-18 PROCEDURE — 82565 ASSAY OF CREATININE: CPT | Performed by: NURSE PRACTITIONER

## 2024-06-18 RX ORDER — NAPROXEN SODIUM 220 MG/1
81 TABLET, FILM COATED ORAL DAILY
Qty: 30 TABLET | Refills: 0 | Status: SHIPPED | OUTPATIENT
Start: 2024-06-19 | End: 2024-07-19

## 2024-06-18 RX ORDER — TRAMADOL HYDROCHLORIDE 25 MG/1
25 TABLET, COATED ORAL EVERY 6 HOURS PRN
Qty: 15 TABLET | Refills: 0 | Status: SHIPPED | OUTPATIENT
Start: 2024-06-18 | End: 2024-06-21

## 2024-06-18 RX ORDER — MULTIVIT-MIN/IRON FUM/FOLIC AC 7.5 MG-4
1 TABLET ORAL DAILY
COMMUNITY
Start: 2024-06-19

## 2024-06-18 RX ORDER — ACETAMINOPHEN 325 MG/1
325 TABLET ORAL EVERY 6 HOURS PRN
COMMUNITY
Start: 2024-06-18

## 2024-06-18 RX ORDER — WARFARIN 3 MG/1
TABLET ORAL
Qty: 30 TABLET | Refills: 0 | Status: SHIPPED | OUTPATIENT
Start: 2024-06-18 | End: 2024-06-18

## 2024-06-18 RX ORDER — WARFARIN 3 MG/1
6 TABLET ORAL SEE ADMIN INSTRUCTIONS
Qty: 30 TABLET | Refills: 0 | Status: SHIPPED | OUTPATIENT
Start: 2024-06-18

## 2024-06-18 RX ORDER — METOPROLOL TARTRATE 25 MG/1
12.5 TABLET, FILM COATED ORAL 2 TIMES DAILY
Status: DISCONTINUED | OUTPATIENT
Start: 2024-06-18 | End: 2024-06-18 | Stop reason: HOSPADM

## 2024-06-18 RX ORDER — METOPROLOL TARTRATE 25 MG/1
12.5 TABLET, FILM COATED ORAL 2 TIMES DAILY
Qty: 30 TABLET | Refills: 0 | Status: SHIPPED | OUTPATIENT
Start: 2024-06-18 | End: 2024-07-18

## 2024-06-18 ASSESSMENT — COGNITIVE AND FUNCTIONAL STATUS - GENERAL
MOBILITY SCORE: 21
TURNING FROM BACK TO SIDE WHILE IN FLAT BAD: A LITTLE
MOVING FROM LYING ON BACK TO SITTING ON SIDE OF FLAT BED WITH BEDRAILS: A LITTLE
CLIMB 3 TO 5 STEPS WITH RAILING: A LITTLE

## 2024-06-18 ASSESSMENT — PAIN SCALES - GENERAL
PAINLEVEL_OUTOF10: 0 - NO PAIN

## 2024-06-18 ASSESSMENT — PAIN - FUNCTIONAL ASSESSMENT
PAIN_FUNCTIONAL_ASSESSMENT: 0-10

## 2024-06-18 NOTE — NURSING NOTE
Pt cleared for DC. IV and tele monitoring d/c'd. Halter monitor applied prior to DC and pt verbalizes understanding of procedure for returning monitor in one week. Belongings collected and remain with patient upon DC. Pt's wife to provide transport home. Prescriptions sent electronically to his preferred pharmacy. Pt ambulated off unit.

## 2024-06-18 NOTE — DISCHARGE SUMMARY
Discharge Diagnosis  Mitral valve stenosis    Issues Requiring Follow-Up  Holter monitor to be placed prior to discharge to be reviewed as outpatient    Test Results Pending At Discharge  Pending Labs       Order Current Status    Surgical Pathology Exam In process            Hospital Course  Farooq Lang is a 63 year old male with a PMHx of mitral valve stenosis secondary to rheumatic disease, afib (on coumadin), HFrEF, and thrombocytopenia, who presented to [facility] on 6/4/2024 for planned cardiac surgery with Dr. Nugent.  .     As an outpatient, he presented to the emergency department with complaints of chest pain for which he was found to have severe mitral valve stenosis.  He underwent preoperative coronary angiogram showing no significant coronary artery disease.  He is also on chronic atrial fibrillation on p.o. anticoagulation.  The echo shows a severe mitral stenosis, extreme thickening and shortening of the subvalvular apparatus, the LV and the RV functions are preserved, there is no significant TR, for which Dr. Nugent and the patient agreed to proceed with mitral valve surgery.      OPERATION/PROCEDURE: 6/4/2024 with Lukasz Nugent   - Median sternotomy  - Standard central cannulation  - Mitral valve replacement with 27mm mitris bioprosthetic valve  - Left atrial appendage ligation   - Prevena dressing    CTICU Course: uneventful, postoperative pain  Transfer to LT3: 6/6/2024  ==================    Floor Course:  - On ASA, statin, BB,  - Epicardial wires CUT on 6/18  - Telemetry at discharge Sinus rhythm/bandar with pAfib  6/7 ECHO EF 55-60%, mean gradient MV 14.5  - Cardiac rehab referral was placed  - PT recs home and low intensity therapy  - Discharge to home with homecare    On day of discharge, vital signs were stable and no acute distress was noted. All questions were answered. After VS and labs were reviewed it was determined the patient was stable for discharge.     Discharged  on 6/18  ========================  Hospital day of discharge management- spent >30 minutes coordinating the discharge and counseling/educating patient and family regarding discharge instructions.     Past Medical History:  - A-fib on coumadin    - Diverticulosis    - Heart failure with mid-range ejection fraction (HFmEF) (Multi)      (45%, 3/28/24)  - Irritable bowel syndrome    - Mitral valve stenosis    - Thrombocytopenia         Past Surgical History:  - CARDIAC CATHETERIZATION: Left And Right Heart Cath, Without LV;  Surgeon: Yobani Soto MD;  Location: [place] [address] Cardiac Cath Lab (04/22/2024)  - COLONOSCOPY      - DENTAL SURGERY      - TONSILLECTOMY                  Social History:       - Smoking status: Every Day; 0.5 PPD/49 years   - Smokeless tobacco: Never  - Vaping status: Never Used  - Alcohol use: Never  - Drug use: Never        IMPRESSION & PLAN:  s/p MVR Bioprosthetic Valve and KONRAD clipping with Dr. Nugent   - Ambulating independently in the [place]  - Cardiac rehab referral   - Continue cardiac meds: ASA  - Continue Coumadin to 6mg today, INR goal 2-3- will discuss with PCP Dr Amado managing this as the patient states he was prior to admission  - Resumed low dose beta blocker 12.5mg BID  - 6/8/24 2V CXR   - 6/8/24 Postop ECHO with 55-60% EF, severely dilated LA, Mitral gradients 14.5 peak and 5.4 mean  - CUT epicardial wires prior to discharge; per surgeon preference.       Atrial Fibrillation/Atrial Flutter: home meds: Coumadin 5mg daily except 7.5mg on Mon & Wed; Diltiazem 120mg daily    6/17 DCCV to sinus bandar/SR  SR with 2 small runs of afib over night  - Metoprolol on discharge  - Discussed with EP runs of afib overnight- plan to send home on holter monitor and continue with anticoagulation and BB titration     Acute Blood Loss Anemia - stable            Recent Labs     06/17/24  0717 06/16/24  0534 06/15/24  0612 06/14/24  0409 06/13/24  0543 06/12/24  0552 06/11/24  0635   HGB  11.2* 10.9* 10.9* 10.6* 10.6* 10.9* 9.8*   HCT 33.5* 33.6* 32.9* 31.8* 32.3* 32.6* 29.8*   - MV, PO x1mo  - Daily labs, transfuse as indicated        Chronic diastolic heart failure with EF 45 % on echo done 3/28  Home Meds: Furosemide 20mg Daily   Volume/Electrolyte Status: Preop wt Weight: 57.4 kg (126 lb 8.7 oz)   Vitals       Vitals:     06/17/24 0613   Weight: 53.3 kg (117 lb 9.6 oz)      - Weight: 53.3 [54.3, 54.2 , 56.2, X, 56.8 kg]   - Daily weights  - Daily RFP      Diverticulosis/ Irritable Bowel Syndrome   Diarrhea resolved  Home meds: supplement IBS Clear OTC (per patient keeps him regular at home)  - Regular diet on discharge    Tobacco use/ Current smoker: 0.5 PPD x49 years   Oxygenating well on RA  - Encourage smoking cessation  - Continue nicotine replacement via patch   - Aggressive pulmonary hygiene          Pertinent Physical Exam At Time of Discharge  Physical Exam  Constitutional:       General: He is not in acute distress.     Appearance: He is not toxic-appearing.   Eyes:      Pupils: Pupils are equal, round, and reactive to light.   Cardiovascular:      Rate and Rhythm: Normal rate and regular rhythm.      Pulses: Normal pulses.      Comments: Midsternal incision well approximated and stable to palpation, no erythema or drainage    Intermittent Afib  Pulmonary:      Effort: Pulmonary effort is normal.   Abdominal:      General: Abdomen is flat. There is no distension.      Palpations: Abdomen is soft.      Tenderness: There is no abdominal tenderness.   Skin:     General: Skin is warm and dry.   Neurological:      General: No focal deficit present.      Mental Status: He is alert and oriented to person, place, and time.   Psychiatric:         Mood and Affect: Mood normal.         Behavior: Behavior normal.         Home Medications     Medication List      START taking these medications     acetaminophen 325 mg tablet; Commonly known as: Tylenol; Take 1 tablet   (325 mg) by mouth every 6 hours  if needed for mild pain (1 - 3) or   moderate pain (4 - 6).   aspirin 81 mg chewable tablet; Chew 1 tablet (81 mg) once daily.; Start   taking on: June 19, 2024   metoprolol tartrate 25 mg tablet; Commonly known as: Lopressor; Take 0.5   tablets (12.5 mg) by mouth 2 times a day.   multivitamin with minerals tablet; Take 1 tablet by mouth once daily.;   Start taking on: June 19, 2024   traMADoL 25 mg tablet; Take 25 mg by mouth every 6 hours if needed for   moderate pain (4 - 6) or severe pain (7 - 10) for up to 3 days.     CHANGE how you take these medications     warfarin 3 mg tablet; Commonly known as: Coumadin; Take as directed per   After Visit Summary.; What changed: medication strength, how much to take,   how to take this, when to take this     CONTINUE taking these medications     UNABLE TO FIND     STOP taking these medications     dilTIAZem  mg 24 hr capsule; Commonly known as: Cardizem CD   furosemide 40 mg tablet; Commonly known as: Lasix       Outpatient Follow-Up  Future Appointments   Date Time Provider Department Center   6/24/2024 10:10 AM [facility] [id number] NURSE [id number] [facility]   7/10/2024 11:30 AM Yobani Soto MD [id number] [facility]   7/25/2024  3:30 PM Lukasz Nugent MD [id number] [facility]     >35 minutes spent with patient care, coordination of care and counseling  Uziel Agudelo PA-C

## 2024-06-18 NOTE — SIGNIFICANT EVENT
Discussion with EP  this morning no further intervention for afib other than BB. He is tolerating low dose metoprolol at this time sinus bandar in the 50s-60s. Tele reviewed with no pacing, Pacemaker wire cleansed thoroughly and cut at the skin with good retraction. Patient tolerated procedure well

## 2024-06-18 NOTE — HH CARE COORDINATION
Home Care received a Referral for Nursing. We have processed the referral for a Start of Care on 6/19-6/20.     If you have any questions or concerns, please feel free to contact us at [phone]. Follow the prompts, enter your five digit zip code, and you will be directed to your care team on EAST 2.

## 2024-06-18 NOTE — PROGRESS NOTES
Updated [facility] (SHAKIRA Wilder RN) pt discharging today with halter monitor. SOC confirmed 24-48 hours. Pt updated.    SHAW MUSTAFA RN

## 2024-06-18 NOTE — PROGRESS NOTES
Physical Therapy    Physical Therapy Treatment    Patient Name: Farooq Lang  MRN: [id number]  Today's Date: 6/18/2024  Time Calculation  Start Time: 0837  Stop Time: 0846  Time Calculation (min): 9 min    Assessment/Plan   PT Assessment  Rehab Prognosis: Good  Evaluation/Treatment Tolerance: Patient tolerated treatment well  Medical Staff Made Aware: Yes  Strengths: Ability to acquire knowledge, Attitude of self, Support of Caregivers  End of Session Communication: Bedside nurse  End of Session Patient Position: Up in chair, Alarm off, not on at start of session, Alarm off, caregiver present     PT Plan  Treatment/Interventions: Bed mobility, Transfer training, Gait training, Stair training, Balance training, Strengthening, Endurance training, Therapeutic exercise, Therapeutic activity  PT Plan: Skilled PT  PT Frequency: 3 times per week  PT Discharge Recommendations: Low intensity level of continued care  PT Recommended Transfer Status: Assist x1, Assistive device  PT - OK to Discharge: Yes      General Visit Information:   PT  Visit  PT Received On: 06/18/24  Response to Previous Treatment: Patient with no complaints from previous session.  General  Family/Caregiver Present: No  Patient Position Received: Up in chair, Alarm off, not on at start of session  Preferred Learning Style: verbal  General Comment: Patient stating that he feels much better post cardioversion yesterday. Pleasant and willing to participate in therapy session.    Subjective   Precautions:  Precautions  Medical Precautions: Fall precautions, Cardiac precautions  Post-Surgical Precautions: Move in the Tube  Precautions Comment: ext pacer  Vital Signs:  Vital Signs  Heart Rate:  (59bpm-79bpm during mobility)  Heart Rate Source: Monitor    Objective   Pain:  Pain Assessment  Pain Assessment: 0-10  Pain Score: 0 - No pain  Cognition:  Cognition  Orientation Level: Oriented X4  Coordination:     Postural Control:  Static Standing Balance  Static  Standing-Balance Support: No upper extremity supported  Static Standing-Level of Assistance: Independent  Dynamic Standing Balance  Dynamic Standing-Balance Support: No upper extremity supported  Dynamic Standing-Balance: Turning  Dynamic Standing-Comments: distant sup    Activity Tolerance:  Activity Tolerance  Endurance: Endurance does not limit participation in activity  Rate of Perceived Dyspnea (RPD): 0/10  Treatments:       Ambulation/Gait Training  Ambulation/Gait Training Performed: Yes  Ambulation/Gait Training 1  Surface 1: Level tile  Device 1: No device  Assistance 1: Independent  Comments/Distance (ft) 1: 250 ft x 2  Transfers  Transfer: Yes  Transfer 1  Transfer From 1: Sit to, Stand to  Transfer to 1: Sit, Stand  Technique 1: Sit to stand, Stand to sit  Transfer Level of Assistance 1: Distant supervision  Trials/Comments 1: 6 trials    Stairs  Stairs: Yes  Stairs  Rails 1: Left  Curb Step 1: No  Device 1: Railing  Assistance 1: Close supervision  Comment/Number of Steps 1: 12 steps         Outcome Measures:  [facility] Basic Mobility  Turning from your back to your side while in a flat bed without using bedrails: A little  Moving from lying on your back to sitting on the side of a flat bed without using bedrails: A little  Moving to and from bed to chair (including a wheelchair): None  Standing up from a chair using your arms (e.g. wheelchair or bedside chair): None  To walk in hospital room: None  Climbing 3-5 steps with railing: A little  Basic Mobility - Total Score: 21    Other Measures  5x Sit to Stand: 14.77sec    Education Documentation  Handouts, taught by Rachel Khan PTA at 6/18/2024 10:04 AM.  Learner: Patient  Readiness: Acceptance  Method: Explanation  Response: Verbalizes Understanding    Precautions, taught by Rachel Khan PTA at 6/18/2024 10:04 AM.  Learner: Patient  Readiness: Acceptance  Method: Explanation  Response: Verbalizes Understanding    Body Mechanics, taught by Rachel RAMOS  AJAY Khan at 6/18/2024 10:04 AM.  Learner: Patient  Readiness: Acceptance  Method: Explanation  Response: Verbalizes Understanding    Mobility Training, taught by Rachel Khan PTA at 6/18/2024 10:04 AM.  Learner: Patient  Readiness: Acceptance  Method: Explanation  Response: Verbalizes Understanding    Education Comments  No comments found.        OP EDUCATION:       Encounter Problems       Encounter Problems (Active)       Balance       Pt will demonstrated ability to score at least 24/28 on the Tinetti balance assessment tool to ensure safety upon D/C.  (Progressing)       Start:  06/05/24    Expected End:  06/19/24               PT Transfers       Pt will demonstrate ability to complete 5X STS in < 12 sec with good from and consistency between transfers for safe D/C.  (Progressing)       Start:  06/05/24    Expected End:  06/19/24               Pain - Adult             Encounter Problems (Resolved)       Mobility       Pt will demonstrated ability to ambulate >/=250ft with proper form and no balance deficits for safe home going.   (Met)       Start:  06/05/24    Expected End:  06/19/24    Resolved:  06/18/24         Pt will demonstrate ability to ascend/descend 6 stairs with unilateral rail and no balance deficits for safe home going.  (Met)       Start:  06/05/24    Expected End:  06/19/24    Resolved:  06/18/24

## 2024-06-19 LAB
LABORATORY COMMENT REPORT: NORMAL
PATH REPORT.FINAL DX SPEC: NORMAL
PATH REPORT.GROSS SPEC: NORMAL
PATH REPORT.RELEVANT HX SPEC: NORMAL
PATH REPORT.TOTAL CANCER: NORMAL

## 2024-06-20 ENCOUNTER — HOME CARE VISIT (OUTPATIENT)
Dept: HOME HEALTH SERVICES | Facility: HOME HEALTH | Age: 64
End: 2024-06-20
Payer: COMMERCIAL

## 2024-06-20 VITALS
RESPIRATION RATE: 18 BRPM | HEART RATE: 72 BPM | WEIGHT: 122 LBS | SYSTOLIC BLOOD PRESSURE: 118 MMHG | BODY MASS INDEX: 19.61 KG/M2 | HEIGHT: 66 IN | OXYGEN SATURATION: 97 % | TEMPERATURE: 98.6 F | DIASTOLIC BLOOD PRESSURE: 58 MMHG

## 2024-06-20 PROCEDURE — G0299 HHS/HOSPICE OF RN EA 15 MIN: HCPCS

## 2024-06-20 ASSESSMENT — ENCOUNTER SYMPTOMS
PAIN: 1
PAIN LOCATION: CHEST
PERSON REPORTING PAIN: PATIENT

## 2024-06-20 ASSESSMENT — ACTIVITIES OF DAILY LIVING (ADL): ENTERING_EXITING_HOME: STAND BY ASSIST

## 2024-06-21 PROBLEM — R15.0 INCOMPLETE PASSAGE OF STOOL: Status: RESOLVED | Noted: 2024-06-21 | Resolved: 2024-06-21

## 2024-06-21 PROBLEM — J43.8 OTHER EMPHYSEMA (MULTI): Status: RESOLVED | Noted: 2024-02-06 | Resolved: 2024-06-21

## 2024-06-21 PROBLEM — I95.9 HYPOTENSION: Status: RESOLVED | Noted: 2024-06-21 | Resolved: 2024-06-21

## 2024-06-21 PROBLEM — R06.00 DYSPNEA: Status: RESOLVED | Noted: 2024-06-21 | Resolved: 2024-06-21

## 2024-06-21 PROBLEM — R15.1 FECAL SMEARING: Status: RESOLVED | Noted: 2024-06-21 | Resolved: 2024-06-21

## 2024-06-21 PROBLEM — J18.9 PNEUMONIA: Status: RESOLVED | Noted: 2024-06-21 | Resolved: 2024-06-21

## 2024-06-21 PROBLEM — D12.6 TUBULAR ADENOMA OF COLON: Status: RESOLVED | Noted: 2024-06-21 | Resolved: 2024-06-21

## 2024-06-21 PROBLEM — K58.0 IRRITABLE BOWEL SYNDROME WITH DIARRHEA: Status: RESOLVED | Noted: 2024-06-21 | Resolved: 2024-06-21

## 2024-06-21 PROBLEM — G47.00 INSOMNIA: Status: RESOLVED | Noted: 2024-06-21 | Resolved: 2024-06-21

## 2024-06-21 PROBLEM — K57.90 DIVERTICULOSIS: Status: RESOLVED | Noted: 2023-08-15 | Resolved: 2024-06-21

## 2024-06-21 PROBLEM — R07.89 CHEST DISCOMFORT: Status: ACTIVE | Noted: 2023-08-15

## 2024-06-21 PROBLEM — R15.1 FECAL SOILING: Status: RESOLVED | Noted: 2024-06-21 | Resolved: 2024-06-21

## 2024-06-21 PROBLEM — M54.50 LOW BACK PAIN: Status: RESOLVED | Noted: 2024-06-21 | Resolved: 2024-06-21

## 2024-06-21 PROBLEM — H81.10 BENIGN PAROXYSMAL POSITIONAL VERTIGO: Status: RESOLVED | Noted: 2024-06-21 | Resolved: 2024-06-21

## 2024-06-21 PROBLEM — G44.209 TENSION TYPE HEADACHE: Status: RESOLVED | Noted: 2024-06-21 | Resolved: 2024-06-21

## 2024-06-21 PROBLEM — K57.90 DIVERTICULAR DISEASE: Status: RESOLVED | Noted: 2024-06-21 | Resolved: 2024-06-21

## 2024-06-21 PROBLEM — K52.9 CHRONIC DIARRHEA: Status: RESOLVED | Noted: 2024-06-21 | Resolved: 2024-06-21

## 2024-06-21 PROBLEM — R55 SYNCOPE: Status: RESOLVED | Noted: 2024-06-21 | Resolved: 2024-06-21

## 2024-06-21 PROBLEM — R10.31 ACUTE RIGHT LOWER QUADRANT PAIN: Status: RESOLVED | Noted: 2024-06-21 | Resolved: 2024-06-21

## 2024-06-21 PROBLEM — B34.9 NONSPECIFIC SYNDROME SUGGESTIVE OF VIRAL ILLNESS: Status: RESOLVED | Noted: 2024-06-21 | Resolved: 2024-06-21

## 2024-06-21 PROBLEM — R05.9 COUGH: Status: RESOLVED | Noted: 2024-06-21 | Resolved: 2024-06-21

## 2024-06-21 PROBLEM — R51.9 HEADACHE: Status: RESOLVED | Noted: 2023-06-16 | Resolved: 2024-06-21

## 2024-06-21 PROBLEM — M94.0 COSTOCHONDRITIS: Status: RESOLVED | Noted: 2024-06-21 | Resolved: 2024-06-21

## 2024-06-21 PROBLEM — U07.1 DISEASE DUE TO SEVERE ACUTE RESPIRATORY SYNDROME CORONAVIRUS 2 (SARS-COV-2): Status: RESOLVED | Noted: 2022-09-06 | Resolved: 2024-06-21

## 2024-06-21 PROBLEM — J20.9 ACUTE BRONCHITIS: Status: RESOLVED | Noted: 2024-06-21 | Resolved: 2024-06-21

## 2024-06-21 PROBLEM — F17.200 TOBACCO DEPENDENCE: Status: RESOLVED | Noted: 2023-08-15 | Resolved: 2024-06-21

## 2024-06-21 PROBLEM — R11.2 NAUSEA & VOMITING: Status: RESOLVED | Noted: 2024-06-21 | Resolved: 2024-06-21

## 2024-06-21 ASSESSMENT — ENCOUNTER SYMPTOMS
HIGHEST PAIN SEVERITY IN PAST 24 HOURS: 5/10
PAIN SEVERITY GOAL: 0/10
LOWEST PAIN SEVERITY IN PAST 24 HOURS: 0/10
SUBJECTIVE PAIN PROGRESSION: GRADUALLY IMPROVING

## 2024-06-21 NOTE — PROGRESS NOTES
Contacting Farooq status post 6/4/24 mitral valve replacement. Doing very well at home. Weight today 125 lbs. Seeing cardiologist Dr. Johnson this afternoon will review any refill medications needed. Having difficulty with constipation. Some incisional soreness. Currently has heart monitor in place. Some difficulty getting comfortable sleeping with monitor. Confirmed follow up appointment 7/25/24 with chest  xray prior. Adriana Zee RN

## 2024-06-22 ENCOUNTER — HOME CARE VISIT (OUTPATIENT)
Dept: HOME HEALTH SERVICES | Facility: HOME HEALTH | Age: 64
End: 2024-06-22
Payer: COMMERCIAL

## 2024-06-24 ENCOUNTER — TELEMEDICINE CLINICAL SUPPORT (OUTPATIENT)
Dept: CARDIAC SURGERY | Facility: HOSPITAL | Age: 64
End: 2024-06-24
Payer: COMMERCIAL

## 2024-06-24 DIAGNOSIS — Z95.2 STATUS POST MITRAL VALVE REPLACEMENT: ICD-10-CM

## 2024-06-27 ENCOUNTER — LAB REQUISITION (OUTPATIENT)
Dept: LAB | Facility: HOSPITAL | Age: 64
End: 2024-06-27
Payer: COMMERCIAL

## 2024-06-27 ENCOUNTER — HOME CARE VISIT (OUTPATIENT)
Dept: HOME HEALTH SERVICES | Facility: HOME HEALTH | Age: 64
End: 2024-06-27
Payer: COMMERCIAL

## 2024-06-27 VITALS
RESPIRATION RATE: 18 BRPM | DIASTOLIC BLOOD PRESSURE: 64 MMHG | SYSTOLIC BLOOD PRESSURE: 122 MMHG | BODY MASS INDEX: 20.18 KG/M2 | OXYGEN SATURATION: 99 % | TEMPERATURE: 97.9 F | HEART RATE: 72 BPM | WEIGHT: 125 LBS

## 2024-06-27 DIAGNOSIS — I48.91 UNSPECIFIED ATRIAL FIBRILLATION (MULTI): ICD-10-CM

## 2024-06-27 LAB
ANION GAP SERPL CALC-SCNC: 11 MMOL/L (ref 10–20)
BUN SERPL-MCNC: 15 MG/DL (ref 6–23)
CALCIUM SERPL-MCNC: 9 MG/DL (ref 8.6–10.3)
CHLORIDE SERPL-SCNC: 102 MMOL/L (ref 98–107)
CO2 SERPL-SCNC: 28 MMOL/L (ref 21–32)
CREAT SERPL-MCNC: 0.93 MG/DL (ref 0.5–1.3)
EGFRCR SERPLBLD CKD-EPI 2021: >90 ML/MIN/1.73M*2
ERYTHROCYTE [DISTWIDTH] IN BLOOD BY AUTOMATED COUNT: 14.6 % (ref 11.5–14.5)
GLUCOSE SERPL-MCNC: 73 MG/DL (ref 74–99)
HCT VFR BLD AUTO: 37.2 % (ref 41–52)
HGB BLD-MCNC: 11.3 G/DL (ref 13.5–17.5)
MAGNESIUM SERPL-MCNC: 1.94 MG/DL (ref 1.6–2.4)
MCH RBC QN AUTO: 28.3 PG (ref 26–34)
MCHC RBC AUTO-ENTMCNC: 30.4 G/DL (ref 32–36)
MCV RBC AUTO: 93 FL (ref 80–100)
NRBC BLD-RTO: 0 /100 WBCS (ref 0–0)
PLATELET # BLD AUTO: 251 X10*3/UL (ref 150–450)
POTASSIUM SERPL-SCNC: 3.9 MMOL/L (ref 3.5–5.3)
RBC # BLD AUTO: 3.99 X10*6/UL (ref 4.5–5.9)
SODIUM SERPL-SCNC: 137 MMOL/L (ref 136–145)
WBC # BLD AUTO: 9.3 X10*3/UL (ref 4.4–11.3)

## 2024-06-27 PROCEDURE — 85027 COMPLETE CBC AUTOMATED: CPT

## 2024-06-27 PROCEDURE — 83735 ASSAY OF MAGNESIUM: CPT

## 2024-06-27 PROCEDURE — 80048 BASIC METABOLIC PNL TOTAL CA: CPT

## 2024-06-27 PROCEDURE — G0299 HHS/HOSPICE OF RN EA 15 MIN: HCPCS

## 2024-06-27 ASSESSMENT — ENCOUNTER SYMPTOMS
CHANGE IN APPETITE: UNCHANGED
SHORTNESS OF BREATH: 1
OCCASIONAL FEELINGS OF UNSTEADINESS: 0
FATIGUES EASILY: 1
LOWEST PAIN SEVERITY IN PAST 24 HOURS: 0/10
MUSCLE WEAKNESS: 1
LAST BOWEL MOVEMENT: 67011
PAIN SEVERITY GOAL: 0/10
CHEST PAIN QUALITY: ACHING
APPETITE LEVEL: GOOD
PERSON REPORTING PAIN: PATIENT
FATIGUE: 1
HIGHEST PAIN SEVERITY IN PAST 24 HOURS: 5/10
PAIN: 1
DYSPNEA ACTIVITY LEVEL: AFTER AMBULATING MORE THAN 20 FT
DYSPNEA ON EXERTION: 1
FATIGUES EASILY: 1
LOSS OF SENSATION IN FEET: 0
DIARRHEA: 1
SUBJECTIVE PAIN PROGRESSION: UNCHANGED
MUSCLE WEAKNESS: 1
APPETITE LEVEL: GOOD
CHANGE IN APPETITE: UNCHANGED
DEPRESSION: 0
DYSPNEA ON EXERTION: 1
CHEST TIGHTNESS: 1

## 2024-06-27 ASSESSMENT — ACTIVITIES OF DAILY LIVING (ADL)
AMBULATION ASSISTANCE: STAND BY ASSIST
TOILETING: 1
DRESSING_UB_CURRENT_FUNCTION: SUPERVISION
TRANSPORTATION: DEPENDENT
DRESSING_LB_CURRENT_FUNCTION: SUPERVISION
PHYSICAL TRANSFERS ASSESSED: 1
TRANSPORTATION ASSESSED: 1
TOILETING: SUPERVISION
CURRENT_FUNCTION: SUPERVISION

## 2024-06-29 LAB — BODY SURFACE AREA: 1.59 M2

## 2024-06-29 ASSESSMENT — ACTIVITIES OF DAILY LIVING (ADL)
AMBULATION ASSISTANCE: 1
TOILETING: STAND BY ASSIST
TRANSPORTATION: DEPENDENT
PHYSICAL TRANSFERS ASSESSED: 1
CURRENT_FUNCTION: STAND BY ASSIST
DRESSING_UB_CURRENT_FUNCTION: STAND BY ASSIST
TOILETING: 1
TRANSPORTATION ASSESSED: 1
DRESSING_LB_CURRENT_FUNCTION: STAND BY ASSIST
OASIS_M1830: 02

## 2024-07-03 ENCOUNTER — APPOINTMENT (OUTPATIENT)
Dept: HOME HEALTH SERVICES | Facility: HOME HEALTH | Age: 64
End: 2024-07-03
Payer: COMMERCIAL

## 2024-07-03 ENCOUNTER — HOME CARE VISIT (OUTPATIENT)
Dept: HOME HEALTH SERVICES | Facility: HOME HEALTH | Age: 64
End: 2024-07-03
Payer: COMMERCIAL

## 2024-07-08 ENCOUNTER — APPOINTMENT (OUTPATIENT)
Dept: RADIOLOGY | Facility: HOSPITAL | Age: 64
End: 2024-07-08
Payer: COMMERCIAL

## 2024-07-08 ENCOUNTER — HOSPITAL ENCOUNTER (OUTPATIENT)
Facility: HOSPITAL | Age: 64
Setting detail: OBSERVATION
Discharge: HOME | End: 2024-07-09
Attending: EMERGENCY MEDICINE | Admitting: FAMILY MEDICINE
Payer: COMMERCIAL

## 2024-07-08 ENCOUNTER — APPOINTMENT (OUTPATIENT)
Dept: CARDIOLOGY | Facility: HOSPITAL | Age: 64
End: 2024-07-08
Payer: COMMERCIAL

## 2024-07-08 DIAGNOSIS — I48.91 ATRIAL FIBRILLATION, UNSPECIFIED TYPE (MULTI): ICD-10-CM

## 2024-07-08 DIAGNOSIS — R00.2 PALPITATIONS: Primary | ICD-10-CM

## 2024-07-08 LAB
ALBUMIN SERPL BCP-MCNC: 3.8 G/DL (ref 3.4–5)
ALP SERPL-CCNC: 108 U/L (ref 33–136)
ALT SERPL W P-5'-P-CCNC: 13 U/L (ref 10–52)
ANION GAP BLDV CALCULATED.4IONS-SCNC: 11 MMOL/L (ref 10–25)
ANION GAP SERPL CALC-SCNC: 13 MMOL/L (ref 10–20)
AST SERPL W P-5'-P-CCNC: 10 U/L (ref 9–39)
BASE EXCESS BLDV CALC-SCNC: 0.7 MMOL/L (ref -2–3)
BASOPHILS # BLD AUTO: 0.08 X10*3/UL (ref 0–0.1)
BASOPHILS NFR BLD AUTO: 1 %
BILIRUB SERPL-MCNC: 0.2 MG/DL (ref 0–1.2)
BNP SERPL-MCNC: 535 PG/ML (ref 0–99)
BODY SURFACE AREA: 1.59 M2
BODY TEMPERATURE: ABNORMAL
BUN SERPL-MCNC: 17 MG/DL (ref 6–23)
CA-I BLDV-SCNC: 1.2 MMOL/L (ref 1.1–1.33)
CALCIUM SERPL-MCNC: 9.2 MG/DL (ref 8.6–10.3)
CARDIAC TROPONIN I PNL SERPL HS: 38 NG/L (ref 0–20)
CARDIAC TROPONIN I PNL SERPL HS: 43 NG/L (ref 0–20)
CHLORIDE BLDV-SCNC: 103 MMOL/L (ref 98–107)
CHLORIDE SERPL-SCNC: 103 MMOL/L (ref 98–107)
CO2 SERPL-SCNC: 24 MMOL/L (ref 21–32)
CREAT SERPL-MCNC: 0.96 MG/DL (ref 0.5–1.3)
EGFRCR SERPLBLD CKD-EPI 2021: 88 ML/MIN/1.73M*2
EOSINOPHIL # BLD AUTO: 0.56 X10*3/UL (ref 0–0.7)
EOSINOPHIL NFR BLD AUTO: 6.9 %
ERYTHROCYTE [DISTWIDTH] IN BLOOD BY AUTOMATED COUNT: 14.4 % (ref 11.5–14.5)
GLUCOSE BLDV-MCNC: 98 MG/DL (ref 74–99)
GLUCOSE SERPL-MCNC: 94 MG/DL (ref 74–99)
HCO3 BLDV-SCNC: 24.3 MMOL/L (ref 22–26)
HCT VFR BLD AUTO: 42.6 % (ref 41–52)
HCT VFR BLD EST: 44 % (ref 41–52)
HGB BLD-MCNC: 13.8 G/DL (ref 13.5–17.5)
HGB BLDV-MCNC: 14.6 G/DL (ref 13.5–17.5)
IMM GRANULOCYTES # BLD AUTO: 0.02 X10*3/UL (ref 0–0.7)
IMM GRANULOCYTES NFR BLD AUTO: 0.2 % (ref 0–0.9)
INHALED O2 CONCENTRATION: 21 %
INR PPP: 2.1 (ref 0.9–1.1)
LACTATE BLDV-SCNC: 1 MMOL/L (ref 0.4–2)
LYMPHOCYTES # BLD AUTO: 2.29 X10*3/UL (ref 1.2–4.8)
LYMPHOCYTES NFR BLD AUTO: 28.1 %
MAGNESIUM SERPL-MCNC: 1.81 MG/DL (ref 1.6–2.4)
MCH RBC QN AUTO: 28.5 PG (ref 26–34)
MCHC RBC AUTO-ENTMCNC: 32.4 G/DL (ref 32–36)
MCV RBC AUTO: 88 FL (ref 80–100)
MONOCYTES # BLD AUTO: 0.79 X10*3/UL (ref 0.1–1)
MONOCYTES NFR BLD AUTO: 9.7 %
NEUTROPHILS # BLD AUTO: 4.42 X10*3/UL (ref 1.2–7.7)
NEUTROPHILS NFR BLD AUTO: 54.1 %
NRBC BLD-RTO: 0 /100 WBCS (ref 0–0)
OXYHGB MFR BLDV: 80.3 % (ref 45–75)
PCO2 BLDV: 35 MM HG (ref 41–51)
PH BLDV: 7.45 PH (ref 7.33–7.43)
PLATELET # BLD AUTO: 289 X10*3/UL (ref 150–450)
PO2 BLDV: 51 MM HG (ref 35–45)
POTASSIUM BLDV-SCNC: 4.2 MMOL/L (ref 3.5–5.3)
POTASSIUM SERPL-SCNC: 4.1 MMOL/L (ref 3.5–5.3)
PROT SERPL-MCNC: 7 G/DL (ref 6.4–8.2)
PROTHROMBIN TIME: 23.4 SECONDS (ref 9.8–12.8)
RBC # BLD AUTO: 4.85 X10*6/UL (ref 4.5–5.9)
SAO2 % BLDV: 86 % (ref 45–75)
SODIUM BLDV-SCNC: 134 MMOL/L (ref 136–145)
SODIUM SERPL-SCNC: 136 MMOL/L (ref 136–145)
TSH SERPL-ACNC: 1.53 MIU/L (ref 0.44–3.98)
WBC # BLD AUTO: 8.2 X10*3/UL (ref 4.4–11.3)

## 2024-07-08 PROCEDURE — 84443 ASSAY THYROID STIM HORMONE: CPT

## 2024-07-08 PROCEDURE — 84132 ASSAY OF SERUM POTASSIUM: CPT | Performed by: PHYSICIAN ASSISTANT

## 2024-07-08 PROCEDURE — 84484 ASSAY OF TROPONIN QUANT: CPT | Performed by: PHYSICIAN ASSISTANT

## 2024-07-08 PROCEDURE — 71046 X-RAY EXAM CHEST 2 VIEWS: CPT | Mod: FOREIGN READ | Performed by: RADIOLOGY

## 2024-07-08 PROCEDURE — 96366 THER/PROPH/DIAG IV INF ADDON: CPT

## 2024-07-08 PROCEDURE — 36415 COLL VENOUS BLD VENIPUNCTURE: CPT | Performed by: PHYSICIAN ASSISTANT

## 2024-07-08 PROCEDURE — S4991 NICOTINE PATCH NONLEGEND: HCPCS

## 2024-07-08 PROCEDURE — G0378 HOSPITAL OBSERVATION PER HR: HCPCS

## 2024-07-08 PROCEDURE — 2500000002 HC RX 250 W HCPCS SELF ADMINISTERED DRUGS (ALT 637 FOR MEDICARE OP, ALT 636 FOR OP/ED)

## 2024-07-08 PROCEDURE — 2500000002 HC RX 250 W HCPCS SELF ADMINISTERED DRUGS (ALT 637 FOR MEDICARE OP, ALT 636 FOR OP/ED): Performed by: EMERGENCY MEDICINE

## 2024-07-08 PROCEDURE — 2500000005 HC RX 250 GENERAL PHARMACY W/O HCPCS: Performed by: PHYSICIAN ASSISTANT

## 2024-07-08 PROCEDURE — 2500000001 HC RX 250 WO HCPCS SELF ADMINISTERED DRUGS (ALT 637 FOR MEDICARE OP)

## 2024-07-08 PROCEDURE — 85025 COMPLETE CBC W/AUTO DIFF WBC: CPT | Performed by: PHYSICIAN ASSISTANT

## 2024-07-08 PROCEDURE — 71046 X-RAY EXAM CHEST 2 VIEWS: CPT

## 2024-07-08 PROCEDURE — 96375 TX/PRO/DX INJ NEW DRUG ADDON: CPT

## 2024-07-08 PROCEDURE — 96365 THER/PROPH/DIAG IV INF INIT: CPT

## 2024-07-08 PROCEDURE — 93005 ELECTROCARDIOGRAM TRACING: CPT

## 2024-07-08 PROCEDURE — 2500000004 HC RX 250 GENERAL PHARMACY W/ HCPCS (ALT 636 FOR OP/ED): Performed by: PHYSICIAN ASSISTANT

## 2024-07-08 PROCEDURE — 99223 1ST HOSP IP/OBS HIGH 75: CPT

## 2024-07-08 PROCEDURE — 83735 ASSAY OF MAGNESIUM: CPT | Performed by: PHYSICIAN ASSISTANT

## 2024-07-08 PROCEDURE — 85610 PROTHROMBIN TIME: CPT | Performed by: PHYSICIAN ASSISTANT

## 2024-07-08 PROCEDURE — 99285 EMERGENCY DEPT VISIT HI MDM: CPT | Mod: 25

## 2024-07-08 PROCEDURE — 83880 ASSAY OF NATRIURETIC PEPTIDE: CPT | Performed by: PHYSICIAN ASSISTANT

## 2024-07-08 RX ORDER — HEPARIN SODIUM 10000 [USP'U]/100ML
0-4000 INJECTION, SOLUTION INTRAVENOUS CONTINUOUS
Status: DISCONTINUED | OUTPATIENT
Start: 2024-07-08 | End: 2024-07-08

## 2024-07-08 RX ORDER — WARFARIN 3 MG/1
9 TABLET ORAL
Status: DISCONTINUED | OUTPATIENT
Start: 2024-07-12 | End: 2024-07-09 | Stop reason: HOSPADM

## 2024-07-08 RX ORDER — METOPROLOL TARTRATE 25 MG/1
12.5 TABLET, FILM COATED ORAL 2 TIMES DAILY
Status: DISCONTINUED | OUTPATIENT
Start: 2024-07-08 | End: 2024-07-09

## 2024-07-08 RX ORDER — ACETAMINOPHEN 160 MG/5ML
650 SOLUTION ORAL EVERY 4 HOURS PRN
Status: DISCONTINUED | OUTPATIENT
Start: 2024-07-08 | End: 2024-07-09 | Stop reason: HOSPADM

## 2024-07-08 RX ORDER — METOPROLOL TARTRATE 1 MG/ML
5 INJECTION, SOLUTION INTRAVENOUS EVERY 6 HOURS PRN
Status: DISCONTINUED | OUTPATIENT
Start: 2024-07-08 | End: 2024-07-09 | Stop reason: HOSPADM

## 2024-07-08 RX ORDER — ACETAMINOPHEN 325 MG/1
650 TABLET ORAL EVERY 4 HOURS PRN
Status: DISCONTINUED | OUTPATIENT
Start: 2024-07-08 | End: 2024-07-09 | Stop reason: HOSPADM

## 2024-07-08 RX ORDER — WARFARIN 3 MG/1
9 TABLET ORAL ONCE
Status: COMPLETED | OUTPATIENT
Start: 2024-07-08 | End: 2024-07-08

## 2024-07-08 RX ORDER — METOPROLOL TARTRATE 1 MG/ML
5 INJECTION, SOLUTION INTRAVENOUS ONCE
Status: COMPLETED | OUTPATIENT
Start: 2024-07-08 | End: 2024-07-08

## 2024-07-08 RX ORDER — MAGNESIUM SULFATE HEPTAHYDRATE 40 MG/ML
2 INJECTION, SOLUTION INTRAVENOUS ONCE
Status: COMPLETED | OUTPATIENT
Start: 2024-07-08 | End: 2024-07-08

## 2024-07-08 RX ORDER — IBUPROFEN 200 MG
1 TABLET ORAL DAILY
Status: DISCONTINUED | OUTPATIENT
Start: 2024-07-08 | End: 2024-07-09 | Stop reason: HOSPADM

## 2024-07-08 RX ORDER — WARFARIN 3 MG/1
6 TABLET ORAL
Status: DISCONTINUED | OUTPATIENT
Start: 2024-07-09 | End: 2024-07-09 | Stop reason: HOSPADM

## 2024-07-08 RX ORDER — NAPROXEN SODIUM 220 MG/1
81 TABLET, FILM COATED ORAL DAILY
Status: DISCONTINUED | OUTPATIENT
Start: 2024-07-09 | End: 2024-07-09 | Stop reason: HOSPADM

## 2024-07-08 SDOH — SOCIAL STABILITY: SOCIAL INSECURITY: ARE THERE ANY APPARENT SIGNS OF INJURIES/BEHAVIORS THAT COULD BE RELATED TO ABUSE/NEGLECT?: NO

## 2024-07-08 SDOH — SOCIAL STABILITY: SOCIAL INSECURITY: HAVE YOU HAD ANY THOUGHTS OF HARMING ANYONE ELSE?: NO

## 2024-07-08 SDOH — SOCIAL STABILITY: SOCIAL INSECURITY: DO YOU FEEL UNSAFE GOING BACK TO THE PLACE WHERE YOU ARE LIVING?: NO

## 2024-07-08 SDOH — SOCIAL STABILITY: SOCIAL INSECURITY: ARE YOU OR HAVE YOU BEEN THREATENED OR ABUSED PHYSICALLY, EMOTIONALLY, OR SEXUALLY BY ANYONE?: NO

## 2024-07-08 SDOH — SOCIAL STABILITY: SOCIAL INSECURITY: HAS ANYONE EVER THREATENED TO HURT YOUR FAMILY OR YOUR PETS?: NO

## 2024-07-08 SDOH — SOCIAL STABILITY: SOCIAL INSECURITY: WERE YOU ABLE TO COMPLETE ALL THE BEHAVIORAL HEALTH SCREENINGS?: YES

## 2024-07-08 SDOH — SOCIAL STABILITY: SOCIAL INSECURITY: DO YOU FEEL ANYONE HAS EXPLOITED OR TAKEN ADVANTAGE OF YOU FINANCIALLY OR OF YOUR PERSONAL PROPERTY?: NO

## 2024-07-08 SDOH — SOCIAL STABILITY: SOCIAL INSECURITY: ABUSE: ADULT

## 2024-07-08 SDOH — SOCIAL STABILITY: SOCIAL INSECURITY: HAVE YOU HAD THOUGHTS OF HARMING ANYONE ELSE?: NO

## 2024-07-08 SDOH — SOCIAL STABILITY: SOCIAL INSECURITY: DOES ANYONE TRY TO KEEP YOU FROM HAVING/CONTACTING OTHER FRIENDS OR DOING THINGS OUTSIDE YOUR HOME?: NO

## 2024-07-08 ASSESSMENT — ACTIVITIES OF DAILY LIVING (ADL)
ASSISTIVE_DEVICE: EYEGLASSES;DENTURES LOWER
FEEDING YOURSELF: INDEPENDENT
HEARING - RIGHT EAR: FUNCTIONAL
DRESSING YOURSELF: INDEPENDENT
TOILETING: INDEPENDENT
JUDGMENT_ADEQUATE_SAFELY_COMPLETE_DAILY_ACTIVITIES: YES
WALKS IN HOME: INDEPENDENT
GROOMING: INDEPENDENT
PATIENT'S MEMORY ADEQUATE TO SAFELY COMPLETE DAILY ACTIVITIES?: YES
BATHING: INDEPENDENT
LACK_OF_TRANSPORTATION: NO
ADEQUATE_TO_COMPLETE_ADL: YES
HEARING - LEFT EAR: FUNCTIONAL

## 2024-07-08 ASSESSMENT — COGNITIVE AND FUNCTIONAL STATUS - GENERAL
WALKING IN HOSPITAL ROOM: A LITTLE
CLIMB 3 TO 5 STEPS WITH RAILING: A LITTLE
DAILY ACTIVITIY SCORE: 24
PATIENT BASELINE BEDBOUND: NO
MOBILITY SCORE: 22

## 2024-07-08 ASSESSMENT — PAIN SCALES - GENERAL
PAINLEVEL_OUTOF10: 0 - NO PAIN
PAINLEVEL_OUTOF10: 1

## 2024-07-08 ASSESSMENT — LIFESTYLE VARIABLES
AUDIT-C TOTAL SCORE: 0
HOW OFTEN DO YOU HAVE 6 OR MORE DRINKS ON ONE OCCASION: NEVER
AUDIT-C TOTAL SCORE: 0
TOTAL SCORE: 0
HOW MANY STANDARD DRINKS CONTAINING ALCOHOL DO YOU HAVE ON A TYPICAL DAY: PATIENT DOES NOT DRINK
HAVE PEOPLE ANNOYED YOU BY CRITICIZING YOUR DRINKING: NO
EVER FELT BAD OR GUILTY ABOUT YOUR DRINKING: NO
SKIP TO QUESTIONS 9-10: 1
EVER HAD A DRINK FIRST THING IN THE MORNING TO STEADY YOUR NERVES TO GET RID OF A HANGOVER: NO
HOW OFTEN DO YOU HAVE A DRINK CONTAINING ALCOHOL: NEVER
HAVE YOU EVER FELT YOU SHOULD CUT DOWN ON YOUR DRINKING: NO

## 2024-07-08 ASSESSMENT — HEART SCORE
RISK FACTORS: 1-2 RISK FACTORS
TROPONIN: 1-3 TIMES NORMAL LIMIT
ECG: NORMAL
HEART SCORE: 4
AGE: 45-64
HISTORY: MODERATELY SUSPICIOUS

## 2024-07-08 ASSESSMENT — PATIENT HEALTH QUESTIONNAIRE - PHQ9
SUM OF ALL RESPONSES TO PHQ9 QUESTIONS 1 & 2: 0
1. LITTLE INTEREST OR PLEASURE IN DOING THINGS: NOT AT ALL
2. FEELING DOWN, DEPRESSED OR HOPELESS: NOT AT ALL

## 2024-07-08 ASSESSMENT — PAIN - FUNCTIONAL ASSESSMENT
PAIN_FUNCTIONAL_ASSESSMENT: 0-10
PAIN_FUNCTIONAL_ASSESSMENT: 0-10

## 2024-07-08 NOTE — PROGRESS NOTES
Pharmacy Medication History Review    Farooq Lang is a 63 y.o. male admitted for Atrial fibrillation with rapid ventricular response (Multi). Pharmacy reviewed the patient's [id number] medications and allergies for accuracy.    The list below reflectives the updated PTA list. Please review each medication in order reconciliation for additional clarification and justification.  Prior to Admission Medications   Prescriptions Last Dose Informant Patient Reported? Taking?   UNABLE TO FIND 7/8/2024 at AM Self, Spouse/Significant Other Yes Yes   Sig: Take 1 capsule by mouth once daily. Med Name: IBS Clear supplement   acetaminophen (Tylenol) 325 mg tablet Past Week  No Yes   Sig: Take 1 tablet (325 mg) by mouth every 6 hours if needed for mild pain (1 - 3) or moderate pain (4 - 6).   aspirin 81 mg chewable tablet 7/8/2024 at 07:00  No Yes   Sig: Chew 1 tablet (81 mg) once daily.   metoprolol tartrate (Lopressor) 25 mg tablet 7/8/2024 at 09:00  No Yes   Sig: Take 0.5 tablets (12.5 mg) by mouth 2 times a day.   multivitamin with minerals tablet Unknown  No Yes   Sig: Take 1 tablet by mouth once daily.   warfarin (Coumadin) 3 mg tablet 7/7/2024 at PM  No Yes   Sig: Take 2 tablets (6 mg) by mouth see administration instructions. Take daily for now. Your Cardiologist will continue to follow and manage your INR   Patient taking differently: Take 2 tablets (6 mg) by mouth see administration instructions. Take daily for now. Your Cardiologist will continue to follow and manage your INR.  SUNDAYS, TUESDAYS, WEDNESDAYS, THURSDAY, SATURDAY   warfarin sodium (WARFARIN ORAL) 7/5/2024  Yes Yes   Sig: Take 9 mg by mouth 2 times a week. ON MONDAYS AND FRIDAYS      Facility-Administered Medications: None         The list below reflectives the updated allergy list. Please review each documented allergy for additional clarification and justification.  Allergies  Reviewed by Molly Cogan, EMT on 7/8/2024   No Known Allergies          Below are additional concerns with the patient's PTA list.      Emily Bettencourt

## 2024-07-08 NOTE — ED TRIAGE NOTES
"Pt reports new onset CP for the past day. Pt reported new onset of palpitations last night. Pt reports recent mitral valve replacement 4 weeks ago. Pt reports they cardioverted Pt out of A-Fib right after the mitral valve replacement, but they did not set him up with meds to treat A-Fib long term. Pt reports 1/10 CP and SOB today. Pt reports he has not felt \"right\" since onset of palpitations last night.   "

## 2024-07-08 NOTE — ED PROVIDER NOTES
"HPI   Chief Complaint   Patient presents with   • Chest Pain     Pt reports new onset CP for the past day. Pt reported new onset of palpitations last night. Pt reports recent mitral valve replacement 4 weeks ago. Pt reports they cardioverted Pt out of A-Fib right after the mitral valve replacement, but they did not set him up with meds to treat A-Fib long term. Pt reports 1/10 CP and SOB today. Pt reports he has not felt \"right\" since onset of palpitations last night.        63-year-old male with a history of mitral valve replacement about 4 weeks ago presenting with concern for palpitations.  Patient states he was laughing with his wife when he suddenly felt \"funny\" in his chest.  He tried to sleep it off however all throughout the day today he has had palpitations.  He denies any shortness of breath but states his breathing feels \"weird\".  Patient has not missed any home meds and has otherwise been doing well at home.  He also states he \"checked his heart\" and it has been \"all over the place\" at home as far as his rate goes.                           [place] Coma Scale Score: 15                     Patient History   Past Medical History:   Diagnosis Date   • A-fib (Multi)    • Acute bronchitis 06/21/2024   • Acute right lower quadrant pain 06/21/2024   • Atherosclerosis of abdominal aorta (CMS-HCC) 03/28/2016    Last Assessment & Plan:    Formatting of this note might be different from the original.   Stable asymptomatic  Formatting of this note might be different from the original.   Repeat CT reveals moderate partially calcified wall changes. Stable 6 mm thickness/plaque in the inferior aspect of the isthmus and 6 mm wall thickening/plaque along the minor curvature of the proximal descending aorta.   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   Previous history of asthma   Current everyday smoker   Also CT chest 3/28 with findings for COPD   Duonebs as needed   • Costochondritis 06/21/2024   • Cough 06/21/2024   • Disease due to severe acute respiratory syndrome coronavirus 2 (SARS-CoV-2) 09/06/2022    Comment on above: COVID SYMPTOMS   • Diverticular disease 06/21/2024   • Diverticulosis    • Diverticulosis 08/15/2023    Formatting of this note might be different from the original.   Formatting of this note might be different from the original. hx diverticulitis at same time as pneumonia  Formatting of this note might be different from the original.   hx diverticulitis at same time as pneumonia   • Dizziness    • Dyspnea 06/21/2024   • Fecal smearing 06/21/2024   • Fecal soiling 06/21/2024   • Headache 06/16/2023   • Heart failure with mid-range ejection fraction (HFmEF) (Multi)     (45%, 3/28/24)   • Hypotension 06/21/2024   • Incomplete passage of stool 06/21/2024   • Insomnia 06/21/2024   • Irregular heart beat     afib on coumadin   • Irritable bowel syndrome    • Irritable bowel syndrome with diarrhea 06/21/2024   • Low back pain 06/21/2024   • Mitral valve stenosis    • Nausea & vomiting 06/21/2024   • Nonspecific syndrome suggestive of viral illness 06/21/2024   • Other chest pain 03/10/2016    Chest discomfort   • Other emphysema (Multi) 02/06/2024    Last Assessment & Plan:    Formatting of this note might be different from the original.   As seen on cxr.  Cough is likely related to viral infection.  COPD contributes highly recommended smoking cessation to reduce progression of COPD.  With symptoms improving pt declined inhaler at this time   • Pleurodynia 11/29/2017    Chest pain, pleuritic   • Pneumonia 06/21/2024   • Prediabetes 03/29/2016    Formatting of this note might be different from the original.   Hemoglobin a1c 6.0.   Need education on lifestyle modification.   • Right lower quadrant pain 11/14/2018    Abdominal pain,  acute, right lower quadrant   • Shortness of breath     VANCE   • Syncope 06/21/2024   • Tension type headache 06/21/2024   • Thrombocytopenia (CMS-HCC)    • Tobacco dependence 08/15/2023    Last Assessment & Plan:    Formatting of this note might be different from the original.   Once again highly recommended cessation   • Tubular adenoma of colon 06/21/2024     Past Surgical History:   Procedure Laterality Date   • CARDIAC CATHETERIZATION N/A 04/22/2024    Procedure: Left And Right Heart Cath, Without LV;  Surgeon: Yobani Soto MD;  Location: [place] [address] Cardiac Cath Lab;  Service: Cardiovascular;  Laterality: N/A;  Schedule at 3:30pm Dr. Soto   • CARDIAC ELECTROPHYSIOLOGY PROCEDURE N/A 6/17/2024    Procedure: Cardioversion;  Surgeon: Dick Edouard MD;  Location: [place] [address] Cardiac Cath Lab;  Service: Electrophysiology;  Laterality: N/A;   • COLONOSCOPY     • DENTAL SURGERY     • TONSILLECTOMY       Family History   Problem Relation Name Age of Onset   • Cancer Father  74   • Brain Aneurysm Father       Social History     Tobacco Use   • Smoking status: Every Day     Current packs/day: 0.50     Average packs/day: 0.5 packs/day for 49.5 years (24.8 ttl pk-yrs)     Types: Cigarettes     Start date: 1/1/1975     Passive exposure: Current   • Smokeless tobacco: Never   Vaping Use   • Vaping status: Never Used   Substance Use Topics   • Alcohol use: Never   • Drug use: Never       Physical Exam   ED Triage Vitals [07/08/24 1617]   Temperature Heart Rate Respirations BP   36.1 °C (97 °F) 99 18 121/76      Pulse Ox Temp Source Heart Rate Source Patient Position   99 % Temporal -- Sitting      BP Location FiO2 (%)     Left arm --       Physical Exam  Vitals reviewed.   Constitutional:       General: He is not in acute distress.  HENT:      Head: Normocephalic and atraumatic.      Right Ear: Tympanic membrane normal.      Left Ear: Tympanic membrane normal.      Nose: Nose normal.      Mouth/Throat:       Mouth: Mucous membranes are moist.      Pharynx: Oropharynx is clear.   Eyes:      Extraocular Movements: Extraocular movements intact.      Pupils: Pupils are equal, round, and reactive to light.   Cardiovascular:      Rate and Rhythm: Tachycardia present. Rhythm irregular.      Pulses: Normal pulses.           Carotid pulses are 2+ on the right side and 2+ on the left side.       Radial pulses are 2+ on the right side and 2+ on the left side.        Dorsalis pedis pulses are 2+ on the right side and 2+ on the left side.        Posterior tibial pulses are 2+ on the right side and 2+ on the left side.      Heart sounds: Normal heart sounds. No murmur heard.     No friction rub. No gallop.   Pulmonary:      Effort: Pulmonary effort is normal. No respiratory distress.      Breath sounds: Normal breath sounds. No wheezing, rhonchi or rales.   Chest:      Chest wall: No tenderness.   Abdominal:      General: There is no distension.      Palpations: Abdomen is soft.      Tenderness: There is no abdominal tenderness.   Genitourinary:     Penis: Normal.       Testes: Normal.   Musculoskeletal:         General: Normal range of motion.      Cervical back: Normal range of motion. No tenderness.      Right lower leg: No tenderness. No edema.      Left lower leg: No tenderness. No edema.   Skin:     General: Skin is warm and dry.      Capillary Refill: Capillary refill takes less than 2 seconds.   Neurological:      General: No focal deficit present.      Mental Status: He is alert and oriented to person, place, and time.      Sensory: No sensory deficit.      Motor: No weakness.   Psychiatric:         Mood and Affect: Mood normal.         Behavior: Behavior normal.         ED Course & MDM   Diagnoses as of 07/08/24 1911   Palpitations   Atrial fibrillation, unspecified type (Multi)       Medical Decision Making  63-year-old male presenting with palpitations and chest discomfort.  Heart score is 4.  EKG was obtained and reveals  A-fib with RVR to a rate of 120.  QTc is 449.  Otherwise normal.  No STEMI.  Labs as reviewed in ED course patient was provided with 5 mg of IV metoprolol which brought his rate from the 120s to 140s down to the 90s to 1 teens.  Blood pressure always remained acceptable.  Troponin mildly elevated in the 30s however it is there at baseline.  Delta troponin was drawn.    I spoke with Dr. Smith who is covering for Dr. Johnson.  He requests admission for the patient to the hospitalist service.  I spoke with the hospitalist team who agreed to admit the patient to telemetry.        Procedure  Procedures     Jerardo Cueto PA-C  07/08/24 1856       Jerardo Cueto PA-C  07/08/24 1915

## 2024-07-09 ENCOUNTER — PHARMACY VISIT (OUTPATIENT)
Dept: PHARMACY | Facility: CLINIC | Age: 64
End: 2024-07-09
Payer: COMMERCIAL

## 2024-07-09 ENCOUNTER — APPOINTMENT (OUTPATIENT)
Dept: HOME HEALTH SERVICES | Facility: HOME HEALTH | Age: 64
End: 2024-07-09
Payer: COMMERCIAL

## 2024-07-09 ENCOUNTER — APPOINTMENT (OUTPATIENT)
Dept: CARDIOLOGY | Facility: HOSPITAL | Age: 64
End: 2024-07-09
Payer: COMMERCIAL

## 2024-07-09 VITALS
DIASTOLIC BLOOD PRESSURE: 72 MMHG | HEIGHT: 66 IN | WEIGHT: 131.6 LBS | BODY MASS INDEX: 21.15 KG/M2 | OXYGEN SATURATION: 97 % | TEMPERATURE: 97.3 F | SYSTOLIC BLOOD PRESSURE: 106 MMHG | HEART RATE: 82 BPM | RESPIRATION RATE: 18 BRPM

## 2024-07-09 LAB
ALBUMIN SERPL BCP-MCNC: 3.6 G/DL (ref 3.4–5)
ANION GAP SERPL CALC-SCNC: 12 MMOL/L (ref 10–20)
BUN SERPL-MCNC: 16 MG/DL (ref 6–23)
CALCIUM SERPL-MCNC: 9.1 MG/DL (ref 8.6–10.3)
CARDIAC TROPONIN I PNL SERPL HS: 41 NG/L (ref 0–20)
CHLORIDE SERPL-SCNC: 104 MMOL/L (ref 98–107)
CO2 SERPL-SCNC: 27 MMOL/L (ref 21–32)
CREAT SERPL-MCNC: 1 MG/DL (ref 0.5–1.3)
EGFRCR SERPLBLD CKD-EPI 2021: 84 ML/MIN/1.73M*2
ERYTHROCYTE [DISTWIDTH] IN BLOOD BY AUTOMATED COUNT: 14.5 % (ref 11.5–14.5)
GLUCOSE SERPL-MCNC: 92 MG/DL (ref 74–99)
HCT VFR BLD AUTO: 42.6 % (ref 41–52)
HGB BLD-MCNC: 13.6 G/DL (ref 13.5–17.5)
INR PPP: 1.9 (ref 0.9–1.1)
MAGNESIUM SERPL-MCNC: 2.03 MG/DL (ref 1.6–2.4)
MCH RBC QN AUTO: 28.5 PG (ref 26–34)
MCHC RBC AUTO-ENTMCNC: 31.9 G/DL (ref 32–36)
MCV RBC AUTO: 89 FL (ref 80–100)
NRBC BLD-RTO: 0 /100 WBCS (ref 0–0)
PHOSPHATE SERPL-MCNC: 3.6 MG/DL (ref 2.5–4.9)
PLATELET # BLD AUTO: 277 X10*3/UL (ref 150–450)
POTASSIUM SERPL-SCNC: 4.5 MMOL/L (ref 3.5–5.3)
PROTHROMBIN TIME: 21.8 SECONDS (ref 9.8–12.8)
RBC # BLD AUTO: 4.77 X10*6/UL (ref 4.5–5.9)
SODIUM SERPL-SCNC: 138 MMOL/L (ref 136–145)
WBC # BLD AUTO: 9.3 X10*3/UL (ref 4.4–11.3)

## 2024-07-09 PROCEDURE — 36415 COLL VENOUS BLD VENIPUNCTURE: CPT

## 2024-07-09 PROCEDURE — 85610 PROTHROMBIN TIME: CPT

## 2024-07-09 PROCEDURE — 84484 ASSAY OF TROPONIN QUANT: CPT

## 2024-07-09 PROCEDURE — 83735 ASSAY OF MAGNESIUM: CPT

## 2024-07-09 PROCEDURE — 85027 COMPLETE CBC AUTOMATED: CPT

## 2024-07-09 PROCEDURE — G0378 HOSPITAL OBSERVATION PER HR: HCPCS

## 2024-07-09 PROCEDURE — 2500000001 HC RX 250 WO HCPCS SELF ADMINISTERED DRUGS (ALT 637 FOR MEDICARE OP)

## 2024-07-09 PROCEDURE — 99238 HOSP IP/OBS DSCHRG MGMT 30/<: CPT

## 2024-07-09 PROCEDURE — RXMED WILLOW AMBULATORY MEDICATION CHARGE

## 2024-07-09 PROCEDURE — 93005 ELECTROCARDIOGRAM TRACING: CPT

## 2024-07-09 PROCEDURE — 80069 RENAL FUNCTION PANEL: CPT

## 2024-07-09 RX ORDER — SOTALOL HYDROCHLORIDE 80 MG/1
40 TABLET ORAL EVERY 12 HOURS
Status: DISCONTINUED | OUTPATIENT
Start: 2024-07-09 | End: 2024-07-09 | Stop reason: HOSPADM

## 2024-07-09 RX ORDER — SOTALOL HYDROCHLORIDE 80 MG/1
40 TABLET ORAL EVERY 12 HOURS
Qty: 60 TABLET | Refills: 0 | Status: SHIPPED | OUTPATIENT
Start: 2024-07-09 | End: 2024-09-07

## 2024-07-09 ASSESSMENT — COGNITIVE AND FUNCTIONAL STATUS - GENERAL
MOBILITY SCORE: 24
DAILY ACTIVITIY SCORE: 24

## 2024-07-09 ASSESSMENT — ENCOUNTER SYMPTOMS
ABDOMINAL PAIN: 0
WEAKNESS: 0
CHILLS: 0
DIZZINESS: 0
COUGH: 0
SHORTNESS OF BREATH: 1
PALPITATIONS: 1
FEVER: 0
ABDOMINAL DISTENTION: 0

## 2024-07-09 ASSESSMENT — PAIN SCALES - GENERAL
PAINLEVEL_OUTOF10: 3
PAINLEVEL_OUTOF10: 0 - NO PAIN

## 2024-07-09 ASSESSMENT — PAIN - FUNCTIONAL ASSESSMENT: PAIN_FUNCTIONAL_ASSESSMENT: 0-10

## 2024-07-09 ASSESSMENT — PAIN DESCRIPTION - LOCATION: LOCATION: CHEST

## 2024-07-09 NOTE — DISCHARGE SUMMARY
Discharge Diagnosis  Atrial fibrillation with rapid ventricular response (Multi)    Issues Requiring Follow-Up  Please take your medications as instructed at time of hospital discharge.     Instructions at Discharge:  -please take sotalol 40 mg twice daily starting tomorrow morning  -please follow-up with your cardiologist.  You have an appointment set up for this Friday.    Please follow-up with your primary care provider within 5-7 days from time of discharge.       If you experience any worsening symptoms or any new acute concerns arise, please contact your primary care provider to discuss and possibly arrange an appointment. If you cannot get in touch with provider or severe symptoms are present, please return to nearest emergency room/urgent care for evaluation and treatment.     Discharge Meds     Your medication list        START taking these medications        Instructions Last Dose Given Next Dose Due   sotalol 80 mg tablet  Commonly known as: Betapace      Take 0.5 tablets (40 mg) by mouth every 12 hours.              CHANGE how you take these medications        Instructions Last Dose Given Next Dose Due   WARFARIN ORAL  What changed: Another medication with the same name was changed. Make sure you understand how and when to take each.           warfarin 3 mg tablet  Commonly known as: Coumadin  What changed: additional instructions      Take 2 tablets (6 mg) by mouth see administration instructions. Take daily for now. Your Cardiologist will continue to follow and manage your INR              CONTINUE taking these medications        Instructions Last Dose Given Next Dose Due   aspirin 81 mg chewable tablet      Chew 1 tablet (81 mg) once daily.       multivitamin with minerals tablet      Take 1 tablet by mouth once daily.              STOP taking these medications      acetaminophen 325 mg tablet  Commonly known as: Tylenol        metoprolol tartrate 25 mg tablet  Commonly known as: Lopressor         UNABLE TO FIND                  Where to Get Your Medications        These medications were sent to [facility]  [address], [place] [address]      Hours: 9 AM to 5 PM Mon-Fri Phone: [phone]   sotalol 80 mg tablet         Test Results Pending At Discharge  Pending Labs       No current pending labs.            Hospital Course  MARLY Lang is a 62y/o M with PMHx of A-fib on warfarin, mitral valve stenosis secondary to rheumatic disease s/p MVR Bioprosthetic Valve and KONRAD clipping with Dr. Nugent at [facility] in 6/2024, and tobacco use presented to the emergency room for palpitations, dyspnea on exertion, and generalized weakness.  Of note, status post MVR bioprosthetic valve and KONRAD clipping and had a successful DCCV on 6/17/2024.    ED course  Upon arrival to the ED an EKG was notable for A-fib with RVR with a heart rate of 120.  There were no signs of acute ischemia/STEMI.  He was given magnesium replacement and 1 dose of metoprolol tartrate 5 mg IV and transferred onto the floor for further management.    Hospital course  During this encounter we continued home metoprolol tartrate 12.5 mg twice daily and added as needed IV Lopressor as needed.  TSH with reflex T4 unremarkable.  Cardiology was consulted who recommended switching the patient from metoprolol to sotalol 40 mg twice daily.  The patient has a follow-up appointment with his cardiologist this upcoming Friday.    Discharge   Please take your medications as instructed at time of hospital discharge.     Instructions at Discharge:  -please take sotalol 40 mg twice daily starting tomorrow morning  -please follow-up with your cardiologist.  You have an appointment set up for this Friday.    Please follow-up with your primary care provider within 5-7 days from time of discharge.     If you experience any worsening symptoms or any new acute concerns arise, please contact your primary care provider to discuss and possibly arrange an  appointment. If you cannot get in touch with provider or severe symptoms are present, please return to nearest emergency room/urgent care for evaluation and treatment.     Pertinent Physical Exam At Time of Discharge  Physical Exam  Constitutional:       General: He is not in acute distress.  HENT:      Head: Normocephalic and atraumatic.      Nose: Nose normal.   Cardiovascular:      Rate and Rhythm: Tachycardia present. Rhythm irregular.      Heart sounds: Normal heart sounds.   Pulmonary:      Effort: Pulmonary effort is normal.      Breath sounds: Normal breath sounds.   Abdominal:      General: Bowel sounds are normal.      Palpations: Abdomen is soft.   Musculoskeletal:         General: Normal range of motion.   Skin:     General: Skin is warm.   Neurological:      General: No focal deficit present.      Mental Status: He is alert and oriented to person, place, and time. Mental status is at baseline.   Psychiatric:         Mood and Affect: Mood normal.         Behavior: Behavior normal.         Thought Content: Thought content normal.         Judgment: Judgment normal.         Outpatient Follow-Up  Future Appointments   Date Time Provider Department Center   7/25/2024  3:30 PM Lukasz Nugent MD [id number]         David Corona MD

## 2024-07-09 NOTE — H&P
Patient: Farooq Lang   Age: 63 y.o.   Gender: male   Room/Bed: 119/119-A     Attending: Star Marsh MD  Code Status:  Full Code    Chief Complaint:  Patient: Farooq Lang is a 63 y.o. male who presented to the hospital for palpitations and VANCE.     History of Presenting Illness  Farooq Lang is a 64y/o M with PMHx of A-fib on warfarin, mitral valve stenosis secondary to rheumatic disease s/p MVR Bioprosthetic Valve and KONRAD clipping with Dr. Nugent at [facility] in 6/2024, and tobacco use presented to the emergency room for palpitations, dyspnea on exertion, and generalized weakness.  Last night, patient was laughing with his wife when he suddenly felt funny feeling in his chest.  He did report some palpitations but denies any syncope or fall.  Chest pain was minimal but did mention his breathing feeling weird and short of breath.  His symptoms lasted less than a minute and resolved. He is an active person and does take walks daily multiple times which he did today.  This morning he felt weak and reported palpitation again along with shortness of breath on exertion.  He denies any fever, chills, sweats, abdominal pain, lower extremity swelling, or bruising/bleeding.      In the ED:  Vitals showed temperature 36.1, /76, pulse 99/119/124, respiratory rate 18, 99% O2 on room air  EKG showed A-fib with RVR with a rate of 120 and QTc 449.  No STEMI  CBC and CMP unremarkable    Troponin 38  Mg 1.81  INR 2.1 PT 23.4  VBG pH 7.45 pCO2 35 pO2 51  Chest x-ray showed chronic interstitial changes with no consolidation.  Sternotomy and changes of valve replacement    ED interventions include magnesium replacement and metoprolol tartrate 5 mg IV once.    Past Medical History   Past Medical History:   Diagnosis Date    A-fib (Multi)     Acute bronchitis 06/21/2024    Acute right lower quadrant pain 06/21/2024    Atherosclerosis of abdominal aorta (CMS-HCC) 03/28/2016    Last Assessment & Plan:    Formatting of  this note might be different from the original.   Stable asymptomatic  Formatting of this note might be different from the original.   Repeat CT reveals moderate partially calcified wall changes. Stable 6 mm thickness/plaque in the inferior aspect of the isthmus and 6 mm wall thickening/plaque along the minor curvature of the proximal descending aorta.   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   Previous history of asthma   Current everyday smoker   Also CT chest 3/28 with findings for COPD   Duonebs as needed    Costochondritis 06/21/2024    Cough 06/21/2024    Disease due to severe acute respiratory syndrome coronavirus 2 (SARS-CoV-2) 09/06/2022    Comment on above: COVID SYMPTOMS    Diverticular disease 06/21/2024    Diverticulosis     Diverticulosis 08/15/2023    Formatting of this note might be different from the original.   Formatting of this note might be different from the original. hx diverticulitis at same time as pneumonia  Formatting of this note might be different from the original.   hx diverticulitis at same time as pneumonia    Dizziness     Dyspnea 06/21/2024    Fecal smearing 06/21/2024    Fecal soiling 06/21/2024    Headache 06/16/2023    Heart failure with mid-range ejection fraction (HFmEF) (Multi)     (45%, 3/28/24)    Hypotension 06/21/2024    Incomplete passage of stool 06/21/2024    Insomnia 06/21/2024    Irregular heart beat     afib on coumadin    Irritable bowel syndrome     Irritable bowel syndrome with diarrhea 06/21/2024    Low back pain 06/21/2024    Mitral valve stenosis     Nausea & vomiting 06/21/2024    Nonspecific syndrome suggestive of viral illness 06/21/2024    Other chest pain 03/10/2016    Chest discomfort    Other emphysema (Multi) 02/06/2024    Last Assessment & Plan:    Formatting of this note  might be different from the original.   As seen on cxr.  Cough is likely related to viral infection.  COPD contributes highly recommended smoking cessation to reduce progression of COPD.  With symptoms improving pt declined inhaler at this time    Pleurodynia 11/29/2017    Chest pain, pleuritic    Pneumonia 06/21/2024    Prediabetes 03/29/2016    Formatting of this note might be different from the original.   Hemoglobin a1c 6.0.   Need education on lifestyle modification.    Right lower quadrant pain 11/14/2018    Abdominal pain, acute, right lower quadrant    Shortness of breath     VANCE    Syncope 06/21/2024    Tension type headache 06/21/2024    Thrombocytopenia (CMS-HCC)     Tobacco dependence 08/15/2023    Last Assessment & Plan:    Formatting of this note might be different from the original.   Once again highly recommended cessation    Tubular adenoma of colon 06/21/2024      Past Surgical History:   Past Surgical History:   Procedure Laterality Date    CARDIAC CATHETERIZATION N/A 04/22/2024    Procedure: Left And Right Heart Cath, Without LV;  Surgeon: Yobani Soto MD;  Location: [place] [address] Cardiac Cath Lab;  Service: Cardiovascular;  Laterality: N/A;  Schedule at 3:30pm Dr. Soto    CARDIAC ELECTROPHYSIOLOGY PROCEDURE N/A 6/17/2024    Procedure: Cardioversion;  Surgeon: Dick Edouard MD;  Location: [place] [address] Cardiac Cath Lab;  Service: Electrophysiology;  Laterality: N/A;    COLONOSCOPY      DENTAL SURGERY      TONSILLECTOMY       Family History:   Family History   Problem Relation Name Age of Onset    Cancer Father  74    Brain Aneurysm Father       Social History:   Tobacco Use: High Risk (6/4/2024)    Patient History     Smoking Tobacco Use: Every Day     Smokeless Tobacco Use: Never     Passive Exposure: Current      Social History     Substance and Sexual Activity   Alcohol Use Never       Outpatient Medications:  Current Outpatient Medications   Medication Instructions     acetaminophen (TYLENOL) 325 mg, oral, Every 6 hours PRN    aspirin 81 mg, oral, Daily    metoprolol tartrate (LOPRESSOR) 12.5 mg, oral, 2 times daily    multivitamin with minerals tablet 1 tablet, oral, Daily    UNABLE TO FIND 1 capsule, oral, Daily, Med Name: IBS Clear supplement    warfarin (COUMADIN) 6 mg, oral, See admin instructions, Take daily for now. Your Cardiologist will continue to follow and manage your INR    warfarin sodium (WARFARIN ORAL) 9 mg, oral, 2 times weekly, ON MONDAYS AND FRIDAYS        ED Course   Vitals - /81   Pulse 96   Temp 36.1 °C (97 °F) (Temporal)   Resp 18   Wt 56.7 kg (125 lb)   SpO2 99%     Labs:   CBC:  Recent Labs     07/08/24 1658 06/27/24 1600 06/18/24  0535   WBC 8.2 9.3 7.4   HGB 13.8 11.3* 10.1*   HCT 42.6 37.2* 31.0*    251 334   MCV 88 93 90     CMP:  Recent Labs     07/08/24 1657 06/27/24 1600 06/18/24  0535    137 137   K 4.1 3.9 4.0    102 104   CO2 24 28 24   ANIONGAP 13 11 13   BUN 17 15 20   CREATININE 0.96 0.93 0.94   EGFR 88 >90 >90   GLUCOSE 94 73* 123*     Recent Labs     07/08/24 1657 06/18/24  0535 06/17/24  0717 06/04/24  2142 05/08/24  0918 05/06/24  1306 11/02/21  1042 07/26/21  1223 01/03/19  1425 11/14/18  1132   ALBUMIN 3.8 3.3* 3.4   < > 4.2 4.1   < > 4.3   < > 4.2   ALKPHOS 108  --   --   --  88 82   < > 86   < > 87   ALT 13  --   --   --  11 10   < > 10   < > 12   AST 10  --   --   --  10 9   < > 11   < > 12   BILITOT 0.2  --   --   --  0.7 0.4   < > 0.4   < > 0.3   LIPASE  --   --   --   --   --   --   --  74  --  74    < > = values in this interval not displayed.     Calcium/Phos:   Lab Results   Component Value Date    CALCIUM 9.2 07/08/2024    PHOS 3.4 06/18/2024      COAG:   Recent Labs     07/08/24  1657 06/18/24  0535 06/17/24  0717 03/27/24  1021 08/18/22  1837   INR 2.1* 2.1* 1.9*   < >  --    DDIMERVTE  --   --   --   --  443    < > = values in this interval not displayed.     CRP:   Lab Results   Component  "Value Date    CRP 0.75 05/06/2024      [unfilled]   ENDO:  Recent Labs     05/06/24  1306 03/27/24  1145 11/14/18  1132   TSH  --  1.58 1.05   HGBA1C 5.7*  5.7*  --   --       CARDIAC:   Recent Labs     07/08/24  1657 05/08/24  1025 05/08/24  0918 04/10/24  1545 04/10/24  1423 03/27/24  1145 03/27/24  1021   TROPHS 38* 35* 38*   < > 14   < > 26*   *  --   --   --  511*  --  568*    < > = values in this interval not displayed.   No results for input(s): \"CHOL\", \"LDLF\", \"LDL\", \"LDLCALC\", \"HDL\", \"TRIG\" in the last 11639 hours.  No data recorded    Micro/ID:   Susceptibility data from last 90 days.  Collected Specimen Info Organism   06/04/24 Swab from Anterior Nares Methicillin Susceptible Staphylococcus aureus (MSSA)   05/06/24 Swab from Nares/Axilla/Groin Methicillin Susceptible Staphylococcus aureus (MSSA)                    No lab exists for component: \"AGALPCRNB\"   .ID  No results found for: \"URINECULTURE\", \"BLOODCULT\", \"CSFCULTSMEAR\"    Imaging:   No orders to display     Encounter Date: 06/04/24   ECG 12 lead   Result Value    Ventricular Rate 67    Atrial Rate 44    QRS Duration 82    QT Interval 432    QTC Calculation(Bazett) 456    R Axis 7    T Axis -21    QRS Count 11    Q Onset 223    T Offset 439    QTC Fredericia 448    Narrative    Atrial flutter with variable AV block  Nonspecific ST abnormality  Abnormal ECG    Confirmed by Richard Veronica ([id number]) on 6/18/2024 10:26:35 AM     Transthoracic Echo (TTE) Complete    Result Date: 6/8/2024   [facility], [address] [place] [address]                Tel [phone] and Fax [phone] TRANSTHORACIC ECHOCARDIOGRAM REPORT  Patient Name:      MADYSON WRIGHT      Reading Physician:    [id number] Tyler Tyson MD Study Date:        6/8/2024             Ordering Provider:    [id number] FAROOQ LOMAS MRN/PID:           [id number]             Fellow: Accession#:        " [id number]         Nurse: Date of Birth/Age: 1960 / 63 years Sonographer:          Adiel Rai RDCS Gender:            M                    Additional Staff: Height:            167.64 cm            Admit Date: Weight:            56.70 kg             Admission Status:     Inpatient -                                                               Routine BSA / BMI:         1.64 m2 / 20.18      Encounter#:           [id number]                    kg/m2                                         Department Location:  [facility]                                                               Invasive Blood Pressure: 111 /60 mmHg Study Type:    TRANSTHORACIC ECHO (TTE) COMPLETE Diagnosis/ICD: Presence of prosthetic heart valve-Z95.2 Indication:    S/p MVR, KONRAD closure (06/04/24) CPT Code:      Echo Complete w Full [id number] Patient History: Pertinent History: Chest Pain and Dyspnea. S/p MVR #27mm mitris bioprosthetic                    valve, KONRAD closure (06/04/24) Afib, HFrEF, SOB and                    thrombocytopenia. Study Detail: The following Echo studies were performed: 2D, M-Mode, Doppler and               color flow.  PHYSICIAN INTERPRETATION: Left Ventricle: The left ventricular systolic function is normal, with an estimated ejection fraction of 55-60%. The patient is in atrial fibrillation which may influence the estimate of left ventricular function and transvalvular flows. There are no regional wall motion abnormalities. The left ventricular cavity size is normal. Abnormal (paradoxical) septal motion consistent with post-operative status and abnormal (paradoxical) septal motion, consistent with RV pacemaker. Left ventricular diastolic filling was indeterminate. Left Atrium: The left atrium is severely dilated. Right Ventricle: The right ventricle is normal in size. There is normal right ventricular global systolic function. Right  Atrium: The right atrium is normal in size. Aortic Valve: The aortic valve is trileaflet. There is mild aortic valve cusp calcification. There is no evidence of reduced aortic valve leaflet excursion excursion. There is no evidence of aortic valve regurgitation. The peak instantaneous gradient of the aortic valve is 6.4 mmHg. Mitral Valve: There is a prosthetic mitral valve present. There is a Mitris mitral valve bioprosthesis with a 27 mm reported size. There is no evidence of mitral valve regurgitation. Peak and mean mitral diastolic gradients are 14.5 and 5.4 mmHg at a heart rate of 72 bpm. The DI is ~ 2. The pressure half-time is ~ 90 ms, resulting in a mitral orifice area of 2.4. Tricuspid Valve: The tricuspid valve is structurally normal. There is mild tricuspid regurgitation. Pulmonic Valve: The pulmonic valve is structurally normal. There is trace pulmonic valve regurgitation. Pericardium: There is no pericardial effusion noted. Aorta: The aortic root is normal. In comparison to the previous echocardiogram(s): Although previous studies are available for review, their comparison with the current echocardiogram is clinically irrelevant.  CONCLUSIONS:  1. Left ventricular systolic function is normal with a 55-60% estimated ejection fraction.  2. Abnormal septal motion consistent with post-operative status and abnormal septal motion consistent with RV pacemaker.  3. The left atrium is severely dilated.  4. Peak and mean mitral diastolic gradients are 14.5 and 5.4 mmHg at a heart rate of 72 bpm. The DI is ~ 2. The pressure half-time is ~ 90 ms, resulting in a mitral orifice area of 2.4.  5. The patient is in atrial fibrillation which may influence the estimate of left ventricular function and transvalvular flows. QUANTITATIVE DATA SUMMARY: 2D MEASUREMENTS:                           Normal Ranges: Ao Root d:     3.30 cm    (2.0-3.7cm) LAs:           5.80 cm    (2.7-4.0cm) IVSd:          1.10 cm    (0.6-1.1cm)  LVPWd:         1.10 cm    (0.6-1.1cm) LVIDd:         4.60 cm    (3.9-5.9cm) LVIDs:         3.60 cm LV Mass Index: 110.6 g/m2 LV % FS        21.7 % LA VOLUME:                               Normal Ranges: LA Vol A4C:        106.5 ml   (22+/-6mL/m2) LA Vol A2C:        118.7 ml LA Vol BP:         113.9 ml LA Vol Index A4C:  65.0ml/m2 LA Vol Index A2C:  72.5 ml/m2 LA Vol Index BP:   69.5 ml/m2 LA Area A4C:       28.6 cm2 LA Area A2C:       29.8 cm2 LA Major Axis A4C: 6.5 cm LA Major Axis A2C: 6.4 cm LA Volume Index:   69.5 ml/m2 AORTA MEASUREMENTS:                      Normal Ranges: Ao Sinus, d: 3.30 cm (2.1-3.5cm) LV SYSTOLIC FUNCTION BY 2D PLANIMETRY (MOD):                     Normal Ranges: EF-A4C View: 50.1 % (>=55%) EF-A2C View: 69.2 % EF-Biplane:  61.4 % LV DIASTOLIC FUNCTION:                     Normal Ranges: MV Peak E: 1.50 m/s (0.7-1.2 m/s) MITRAL VALVE:                       Normal Ranges: MV Vmax:    1.91 m/s  (<=1.3m/s) MV peak P.5 mmHg (<5mmHg) MV mean P.4 mmHg  (<2mmHg) MV DT:      226 msec  (150-240msec) AORTIC VALVE:                         Normal Ranges: AoV Vmax:      1.26 m/s (<=1.7m/s) AoV Peak P.4 mmHg (<20mmHg) LVOT Max Christian:  1.00 m/s (<=1.1m/s) LVOT VTI:      20.40 cm LVOT Diameter: 1.80 cm  (1.8-2.4cm) AoV Area,Vmax: 2.02 cm2 (2.5-4.5cm2) TRICUSPID VALVE/RVSP:                             Normal Ranges: Peak TR Velocity: 2.44 m/s RV Syst Pressure: 26.8 mmHg (< 30mmHg) IVC Diam:         2.48 cm PULMONIC VALVE:                      Normal Ranges: PV Max Christian: 0.9 m/s  (0.6-0.9m/s) PV Max PG:  3.5 mmHg  [id number] Tyler Tyson MD Electronically signed on 2024 at 2:33:10 PM  ** Final **     Transthoracic Echo (TTE) Complete    Result Date: 3/28/2024   [facility], [address] [place] [address]               Tel [phone] and Fax [phone] TRANSTHORACIC ECHOCARDIOGRAM REPORT  Patient Name:      MADYSON Barclay Physician:    [id number] Morris Johnson                                                                MD Study Date:        3/28/2024            Ordering Provider:    [id number] CHARISSA HILLIARD MRN/PID:           [id number]             Fellow: Accession#:        [id number]         Nurse:                Natalia Aleman RN Date of Birth/Age: 1960 / 63 years Sonographer:          Helen Linda RDCS Gender:            M                    Additional Staff: Height:            167.64 cm            Admit Date:           3/27/2024 Weight:            57.15 kg             Admission Status:     Inpatient -                                                               Routine BSA / BMI:         1.64 m2 / 20.34      Encounter#:           [id number]                    kg/m2                                         Department Location:  [facility]                                                               Invasive Blood Pressure: 124 /85 mmHg Study Type:    TRANSTHORACIC ECHO (TTE) COMPLETE Diagnosis/ICD: Unspecified atrial fibrillation-I48.91 Indication:    Atrial Fibrillation CPT Code:      Echo Complete w Full Doppler-[id number] Patient History: Smoker:            Current. Pertinent History: Chest Pain and Dyspnea. Dizziness. Study Detail: The following Echo studies were performed: 2D, M-Mode, Doppler and               color flow. Definity used as a contrast agent for endocardial               border definition. Total contrast used for this procedure was 1.0               mL via IV push. The patient was awake.  Critical Event Critical Event: Test was completed as per department protocol. Critical Finding: Mitral stenosis. Time Test was Completed: 10:45:33 AM Notified: Patricia Morrison NP. Attending notification time: 11:01:31 AM  PHYSICIAN INTERPRETATION: Left Ventricle: The left ventricular systolic function is mildly decreased, with an estimated ejection fraction of 45%. There is global hypokinesis of the left ventricle with minor regional variations. The left  ventricular cavity size is mildly dilated. Left ventricular diastolic filling was indeterminate. Left Atrium: The left atrium is severely dilated. Right Ventricle: The right ventricle is normal in size. There is normal right ventricular global systolic function. Right Atrium: The right atrium is mildly dilated. Aortic Valve: The aortic valve is trileaflet. There is mild aortic valve cusp calcification. There is trace to mild aortic valve regurgitation. The peak instantaneous gradient of the aortic valve is 3.1 mmHg. The mean gradient of the aortic valve is 2.0 mmHg. Mitral Valve: The mitral valve is moderately thickened. 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 There is evidence of severe mitral valve stenosis. The doppler estimated mean and peak diastolic pressure gradients are 12.7 mmHg and 21.8 mmHg respectively. There is moderate mitral annular calcification. There is moderate mitral valve regurgitation. Tricuspid Valve: The tricuspid valve is structurally normal. There is mild tricuspid regurgitation. Pulmonic Valve: The pulmonic valve is not well visualized. The pulmonic valve regurgitation was not well visualized. Pericardium: There is no pericardial effusion noted. Aorta: The aortic root is normal. Pulmonary Artery: The tricuspid regurgitant velocity is 3.32 m/s, and with an estimated right atrial pressure of 3 mmHg, the estimated pulmonary artery pressure is mild to moderately elevated with the RVSP at 47.1 mmHg.  CONCLUSIONS:  1. Left ventricular systolic function is mildly decreased with a 45% estimated ejection fraction.  2. The left atrium is severely dilated.  3. The mitral valve is moderately thickened. 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  4. There is moderate mitral annular calcification.  5. There is severe mitral valve stenosis.  6. Moderate mitral valve regurgitation.  7. Mild to moderately elevated pulmonary artery pressure.  8. There is global hypokinesis of the left ventricle with minor  regional variations. QUANTITATIVE DATA SUMMARY: 2D MEASUREMENTS:                          Normal Ranges: IVSd:          0.60 cm   (0.6-1.1cm) LVPWd:         0.90 cm   (0.6-1.1cm) LVIDd:         4.47 cm   (3.9-5.9cm) LVIDs:         3.60 cm LV Mass Index: 63.0 g/m2 LV % FS        19.4 % LA VOLUME:                               Normal Ranges: LA Vol A4C:        71.6 ml    (22+/-6mL/m2) LA Vol A2C:        67.4 ml LA Vol BP:         70.7 ml LA Vol Index A4C:  43.6 ml/m2 LA Vol Index A2C:  41.0 ml/m2 LA Vol Index BP:   43.0 ml/m2 LA Area A4C:       22.3 cm2 LA Area A2C:       22.0 cm2 LA Major Axis A4C: 5.9 cm LA Major Axis A2C: 6.1 cm LA Volume Index:   40.8 ml/m2 LA Vol A4C:        66.6 ml LA Vol A2C:        64.7 ml RA VOLUME BY A/L METHOD:                       Normal Ranges: RA Area A4C: 17.6 cm2 M-MODE MEASUREMENTS:                  Normal Ranges: Ao Root: 3.20 cm (2.0-3.7cm) LAs:     5.42 cm (2.7-4.0cm) AORTA MEASUREMENTS:                      Normal Ranges: Ao Sinus, d: 3.30 cm (2.1-3.5cm) Asc Ao, d:   3.00 cm (2.1-3.4cm) LV SYSTOLIC FUNCTION BY 2D PLANIMETRY (MOD):                     Normal Ranges: EF-A4C View: 41.3 % (>=55%) EF-A2C View: 40.4 % EF-Biplane:  41.5 % LV DIASTOLIC FUNCTION:                            Normal Ranges: MV Peak E:      1.85 m/s   (0.7-1.2 m/s) MV e'           0.05 m/s   (>8.0) MV lateral e'   0.05 m/s MV medial e'    0.04 m/s E/e' Ratio:     36.95      (<8.0) PulmV Sys Christian:  17.52 cm/s PulmV Thompson Christian: 32.92 cm/s PulmV S/D Christian:  0.53 MITRAL VALVE:                       Normal Ranges: MV Vmax:    2.33 m/s  (<=1.3m/s) MV peak P.8 mmHg (<5mmHg) MV mean P.7 mmHg (<2mmHg) MV VTI:     60.69 cm  (10-13cm) MITRAL INSUFFICIENCY:                      Normal Ranges: MR VTI:  150.95 cm MR Vmax: 491.71 cm/s AORTIC VALVE:                                   Normal Ranges: AoV Vmax:                0.88 m/s (<=1.7m/s) AoV Peak PG:             3.1 mmHg (<20mmHg) AoV Mean P.0 mmHg  (1.7-11.5mmHg) LVOT Max Christian:            0.68 m/s (<=1.1m/s) AoV VTI:                 14.53 cm (18-25cm) LVOT VTI:                12.65 cm LVOT Diameter:           1.98 cm  (1.8-2.4cm) AoV Area, VTI:           2.69 cm2 (2.5-5.5cm2) AoV Area,Vmax:           2.38 cm2 (2.5-4.5cm2) AoV Dimensionless Index: 0.87  RIGHT VENTRICLE: RV Basal 3.10 cm RV Mid   2.20 cm RV Major 6.7 cm TAPSE:   18.0 mm RV s'    0.10 m/s TRICUSPID VALVE/RVSP:                             Normal Ranges: Peak TR Velocity: 3.32 m/s RV Syst Pressure: 47.1 mmHg (< 30mmHg) IVC Diam:         2.21 cm PULMONIC VALVE:                      Normal Ranges: PV Max Christian: 0.7 m/s  (0.6-0.9m/s) PV Max P.7 mmHg Pulmonary Veins: PulmV Thompson Christian: 32.92 cm/s PulmV S/D Christian:  0.53 PulmV Sys Christian:  17.52 cm/s AORTA: Asc Ao Diam 3.01 cm  [id number] Morris Johnson MD Electronically signed on 3/28/2024 at 12:19:54 PM  ** Final **    XR chest 2 views    Result Date: 2024  STUDY: Chest Radiographs;  2024 17:32 INDICATION: Chest pain. COMPARISON: 3/30/2024 XR Chest, 3/27/2024 XR Chest. ACCESSION NUMBER(S): [id number] ORDERING CLINICIAN: ANJUM ACKERMAN TECHNIQUE:  Frontal and lateral chest. FINDINGS: CARDIOMEDIASTINAL SILHOUETTE: Cardiomediastinal silhouette is normal in size and configuration.  LUNGS: Lungs are clear.  ABDOMEN: No remarkable upper abdominal findings.  BONES: No acute osseous changes.    Chronic interstitial changes.  No consolidation. Sternotomy and changes of valve replacement. Signed by Lambert Webber MD     Interventions:  Medications   warfarin (Coumadin) tablet 9 mg (has no administration in time range)   aspirin chewable tablet 81 mg (has no administration in time range)   metoprolol tartrate (Lopressor) tablet 12.5 mg (has no administration in time range)   warfarin (Coumadin) tablet 6 mg (has no administration in time range)   warfarin (Coumadin) tablet 9 mg (has no administration in time range)   metoprolol tartrate (Lopressor) injection  5 mg (has no administration in time range)   magnesium sulfate IV 2 g (0 g intravenous Stopped 7/8/24 1468)   metoprolol tartrate (Lopressor) injection 5 mg (5 mg intravenous Given 7/8/24 1205)       Objective Data  Physical Exam  Constitutional:       General: He is not in acute distress.     Appearance: He is not ill-appearing.   HENT:      Head: Normocephalic and atraumatic.      Mouth/Throat:      Mouth: Mucous membranes are moist.   Cardiovascular:      Rate and Rhythm: Normal rate. Rhythm irregular.   Pulmonary:      Effort: Pulmonary effort is normal. No respiratory distress.      Breath sounds: No wheezing.   Abdominal:      Palpations: Abdomen is soft.      Tenderness: There is no abdominal tenderness.   Musculoskeletal:         General: No swelling.   Skin:     General: Skin is warm.   Neurological:      Mental Status: He is alert and oriented to person, place, and time.      Motor: No weakness.   Psychiatric:         Mood and Affect: Mood normal.         Behavior: Behavior normal.         Thought Content: Thought content normal.         Judgment: Judgment normal.                Assessment and Plan   Farooq Lang is a 64y/o M with PMHx of A-fib on warfarin, mitral valve stenosis secondary to rheumatic disease s/p MVR Bioprosthetic Valve and KONRAD clipping with Dr. Nugent at [facility] in 6/2024, and tobacco use presented to the emergency room for palpitations, dyspnea on exertion, and generalized weakness.  He was found to be in A-fib RVR.  ED contacted cardiology recommended admission.    Acute Medical Issues:  #Afib RVR   -Patient presented with palpitations and SOB  -ECG showed Afib RVR in the 120s; s/p IV lopressor 5mg once and mag repletion in the ED   -CXR showed chronic interstitial changes   -Continue home metoprolol tartrate 12.5mg BID. Consider increasing home dose in the am pending cardiology evaluation   -Added PRN IV lopressor for HR>120  -INR therapeutic=2.1 (goal 2-3)  -Continue home warfarin  regimen (9 mg on Monday/Friday and 6 mg on Tuesday/Wednesday/Thursday/Saturday/Sunday)  -Placed on Tele; TSH with reflex T4 pending   -NPO at midnight pending cardiology evaluation in the am   -Cardiology consulted; Patient follows with Dr. Johnson     #Troponemia   -Troponin 38->Trend   -likely demand ischemia ISO Afib RVR      Chronic Medical Issues:   #Tobacco use: discussed risks and  cessation benefits.   #mitral valve stenosis secondary to rheumatic disease s/p MVR Bioprosthetic Valve and KONRAD clipping with Dr. Nugent at [facility] in 6/2024      Fluids:PO  Electrolytes: replete as needed  Nutrition: Cardiac, NPO at midnight  GI Prophylaxis: none  DVT Prophylaxis: Warfarin      Dispo: Patient admitted for observation after he presented with palpitations and SOB, found to be in Afib RVR. Rate now controlled. Cardio consulted. ELOS<48hours

## 2024-07-09 NOTE — PROGRESS NOTES
07/09/24 1101   Discharge Planning   Living Arrangements Spouse/significant other   Support Systems Spouse/significant other   Assistance Needed A&Ox3, independent with ADLs, no DME, room air at basline, no BIPAP/CPAP, active with [facility]   Type of Residence Private residence   Number of Stairs to Enter Residence 10   Number of Stairs Within Residence 0   Do you have animals or pets at home? No   Home or Post Acute Services In home services   Type of Home Care Services Home nursing visits;Home PT   Patient expects to be discharged to: Home with resumed [facility], no new orders needed due to observation status   Does the patient need discharge transport arranged? No   Patient Choice   Provider Choice list and CMS website (https://medicare.gov/care-compare#search) for post-acute Quality and Resource Measure Data were provided and reviewed with: Patient   Patient / Family choosing to utilize agency / facility established prior to hospitalization Yes

## 2024-07-09 NOTE — HOSPITAL COURSE
HPI   Farooq Lang is a 64y/o M with PMHx of A-fib on warfarin, mitral valve stenosis secondary to rheumatic disease s/p MVR Bioprosthetic Valve and KONRAD clipping with Dr. Nugent at [facility] in 6/2024, and tobacco use presented to the emergency room for palpitations, dyspnea on exertion, and generalized weakness.  Of note, status post MVR bioprosthetic valve and KONRAD clipping and had a successful DCCV on 6/17/2024.    ED course  Upon arrival to the ED an EKG was notable for A-fib with RVR with a heart rate of 120.  There were no signs of acute ischemia/STEMI.  He was given magnesium replacement and 1 dose of metoprolol tartrate 5 mg IV and transferred onto the floor for further management.    Hospital course  During this encounter we continued home metoprolol tartrate 12.5 mg twice daily and added as needed IV Lopressor as needed.  TSH with reflex T4 unremarkable.  Cardiology was consulted who recommended switching the patient from metoprolol to sotalol 40 mg twice daily.  The patient has a follow-up appointment with his cardiologist this upcoming Friday.    Discharge   Please take your medications as instructed at time of hospital discharge.     Instructions at Discharge:  -please take sotalol 40 mg twice daily starting tomorrow morning  -please follow-up with your cardiologist.  You have an appointment set up for this Friday.    Please follow-up with your primary care provider within 5-7 days from time of discharge.     If you experience any worsening symptoms or any new acute concerns arise, please contact your primary care provider to discuss and possibly arrange an appointment. If you cannot get in touch with provider or severe symptoms are present, please return to nearest emergency room/urgent care for evaluation and treatment.

## 2024-07-09 NOTE — NURSING NOTE
6043 Discharge instructions reviewed with pt. All questions and concerns addressed. IV removed. Telemetry removed and returned to nurses station.

## 2024-07-09 NOTE — CONSULTS
Inpatient consult to Cardiology  Consult performed by: LENY Alexander-CNP  Consult ordered by: Milad Irwin MD MPH  Reason for consult: afib rvr          History Of Present Illness  Farooq Lang is a 63 y.o. male presenting with complaints of palpitations and slight shortness of breath. He checked his heart rate and noted it jumping up above 100. He had his mitral valve replaced June 4, 2024 and post operatively underwent a cardioversion to restore SR. He states he was feeling good up until yesterday.     Lab work in the ER showed glucose 94, sodium 136, potassium 4.1, BUN/Cr 17/0.96, magnesium 1.8, , troponin 38, INR 2.1, WBC 8.2, H&H 13.8/42.6. CXR showed chronic interstitial changes.     EKG showed atrial fibrillation with RVR, rate 120bpm, Qtc 449ms.    He was given magnesium IV and metoprolol iV x1 and admitted for further care.       Past Medical History  Past Medical History:   Diagnosis Date    A-fib (Multi)     Acute bronchitis 06/21/2024    Acute right lower quadrant pain 06/21/2024    Atherosclerosis of abdominal aorta (CMS-HCC) 03/28/2016    Last Assessment & Plan:    Formatting of this note might be different from the original.   Stable asymptomatic  Formatting of this note might be different from the original.   Repeat CT reveals moderate partially calcified wall changes. Stable 6 mm thickness/plaque in the inferior aspect of the isthmus and 6 mm wall thickening/plaque along the minor curvature of the proximal descending aorta.   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   Previous history of asthma   Current everyday smoker   Also CT chest 3/28 with findings for COPD   Duonebs as needed    Costochondritis 06/21/2024    Cough 06/21/2024    Disease due to severe acute respiratory syndrome coronavirus 2  (SARS-CoV-2) 09/06/2022    Comment on above: COVID SYMPTOMS    Diverticular disease 06/21/2024    Diverticulosis     Diverticulosis 08/15/2023    Formatting of this note might be different from the original.   Formatting of this note might be different from the original. hx diverticulitis at same time as pneumonia  Formatting of this note might be different from the original.   hx diverticulitis at same time as pneumonia    Dizziness     Dyspnea 06/21/2024    Fecal smearing 06/21/2024    Fecal soiling 06/21/2024    Headache 06/16/2023    Heart failure with mid-range ejection fraction (HFmEF) (Multi)     (45%, 3/28/24)    Hypotension 06/21/2024    Incomplete passage of stool 06/21/2024    Insomnia 06/21/2024    Irregular heart beat     afib on coumadin    Irritable bowel syndrome     Irritable bowel syndrome with diarrhea 06/21/2024    Low back pain 06/21/2024    Mitral valve stenosis     Nausea & vomiting 06/21/2024    Nonspecific syndrome suggestive of viral illness 06/21/2024    Other chest pain 03/10/2016    Chest discomfort    Other emphysema (Multi) 02/06/2024    Last Assessment & Plan:    Formatting of this note might be different from the original.   As seen on cxr.  Cough is likely related to viral infection.  COPD contributes highly recommended smoking cessation to reduce progression of COPD.  With symptoms improving pt declined inhaler at this time    Pleurodynia 11/29/2017    Chest pain, pleuritic    Pneumonia 06/21/2024    Prediabetes 03/29/2016    Formatting of this note might be different from the original.   Hemoglobin a1c 6.0.   Need education on lifestyle modification.    Right lower quadrant pain 11/14/2018    Abdominal pain, acute, right lower quadrant    Shortness of breath     VANCE    Syncope 06/21/2024    Tension type headache 06/21/2024    Thrombocytopenia (CMS-HCC)     Tobacco dependence 08/15/2023    Last Assessment & Plan:    Formatting of this note might be different from the original.    Once again highly recommended cessation    Tubular adenoma of colon 06/21/2024       Surgical History  Past Surgical History:   Procedure Laterality Date    CARDIAC CATHETERIZATION N/A 04/22/2024    Procedure: Left And Right Heart Cath, Without LV;  Surgeon: Yobani Soto MD;  Location: [place] [address] [facility];  Service: Cardiovascular;  Laterality: N/A;  Schedule at 3:30pm Dr. Soto    CARDIAC ELECTROPHYSIOLOGY PROCEDURE N/A 6/17/2024    Procedure: Cardioversion;  Surgeon: Dick Edouard MD;  Location: [place] [address] [facility]iac [facility];  Service: Electrophysiology;  Laterality: N/A;    COLONOSCOPY      DENTAL SURGERY      TONSILLECTOMY     S/p mitral valve replacement with 27mm mitris bioprosthetic valve and left atrial appendage ligation     Social History  He reports that he has been smoking cigarettes. He started smoking about 49 years ago. He has a 24.8 pack-year smoking history. He has been exposed to tobacco smoke. He has never used smokeless tobacco. He reports that he does not drink alcohol and does not use drugs.    Family History  Family History   Problem Relation Name Age of Onset    Cancer Father  74    Brain Aneurysm Father          Allergies  Patient has no known allergies.    Review of Systems  Review of Systems   Constitutional:  Negative for chills and fever.   Respiratory:  Positive for shortness of breath. Negative for cough.    Cardiovascular:  Positive for palpitations. Negative for chest pain and leg swelling.   Gastrointestinal:  Negative for abdominal distention and abdominal pain.   Neurological:  Negative for dizziness and weakness.   All other systems reviewed and are negative.         Physical Exam  Constitutional: Well developed, awake/alert/oriented x3, no distress, alert and cooperative  Eyes: PERRL, EOMI, clear sclera  ENMT: mucous membranes moist, no apparent injury, no lesions seen  Head/Neck: Neck supple, no apparent injury, thyroid without mass or tenderness, No  "JVD, trachea midline, no bruits  Respiratory/Thorax: Patent airways, CTAB, normal breath sounds with good chest expansion, thorax symmetric  Cardiovascular: irregular rhythm, no murmurs, 2+ equal pulses of the extremities, normal S 1and S 2  Gastrointestinal: Nondistended, soft, non-tender, no rebound tenderness or guarding, no masses palpable, no organomegaly, +BS, no bruits  Musculoskeletal: ROM intact, no joint swelling, normal strength  Extremities: normal extremities, no cyanosis edema, contusions or wounds, no clubbing  Neurological: alert and oriented x3, intact senses, motor, response and reflexes, normal strength  Lymphatic: No significant lymphadenopathy  Psychological: Appropriate mood and behavior  Skin: Warm and dry, no lesions, no rashes       Last Recorded Vitals  Blood pressure 103/71, pulse 100, temperature 36.2 °C (97.2 °F), temperature source Temporal, resp. rate 18, height 1.676 m (5' 6\"), weight 59.7 kg (131 lb 9.6 oz), SpO2 96%.    Relevant Results    Scheduled medications  aspirin, 81 mg, oral, Daily  nicotine, 1 patch, transdermal, Daily  sotalol, 40 mg, oral, q12h  warfarin, 6 mg, oral, Once per day on Sunday Tuesday Wednesday Thursday Saturday  [START ON 7/12/2024] warfarin, 9 mg, oral, Once per day on Monday Friday      Continuous medications     PRN medications  PRN medications: acetaminophen **OR** acetaminophen, metoprolol    Results for orders placed or performed during the hospital encounter of 07/08/24 (from the past 24 hour(s))   ECG 12 lead   Result Value Ref Range    Ventricular Rate 120 BPM    Atrial Rate 111 BPM    QRS Duration 76 ms    QT Interval 318 ms    QTC Calculation(Bazett) 449 ms    R Axis 73 degrees    T Axis 65 degrees    QRS Count 20 beats    Q Onset 225 ms    T Offset 384 ms    QTC Fredericia 400 ms   Comprehensive metabolic panel   Result Value Ref Range    Glucose 94 74 - 99 mg/dL    Sodium 136 136 - 145 mmol/L    Potassium 4.1 3.5 - 5.3 mmol/L    Chloride 103 98 " - 107 mmol/L    Bicarbonate 24 21 - 32 mmol/L    Anion Gap 13 10 - 20 mmol/L    Urea Nitrogen 17 6 - 23 mg/dL    Creatinine 0.96 0.50 - 1.30 mg/dL    eGFR 88 >60 mL/min/1.73m*2    Calcium 9.2 8.6 - 10.3 mg/dL    Albumin 3.8 3.4 - 5.0 g/dL    Alkaline Phosphatase 108 33 - 136 U/L    Total Protein 7.0 6.4 - 8.2 g/dL    AST 10 9 - 39 U/L    Bilirubin, Total 0.2 0.0 - 1.2 mg/dL    ALT 13 10 - 52 U/L   Magnesium   Result Value Ref Range    Magnesium 1.81 1.60 - 2.40 mg/dL   Protime-INR   Result Value Ref Range    Protime 23.4 (H) 9.8 - 12.8 seconds    INR 2.1 (H) 0.9 - 1.1   B-Type Natriuretic Peptide   Result Value Ref Range     (H) 0 - 99 pg/mL   Blood Gas Venous Full Panel   Result Value Ref Range    POCT pH, Venous 7.45 (H) 7.33 - 7.43 pH    POCT pCO2, Venous 35 (L) 41 - 51 mm Hg    POCT pO2, Venous 51 (H) 35 - 45 mm Hg    POCT SO2, Venous 86 (H) 45 - 75 %    POCT Oxy Hemoglobin, Venous 80.3 (H) 45.0 - 75.0 %    POCT Hematocrit Calculated, Venous 44.0 41.0 - 52.0 %    POCT Sodium, Venous 134 (L) 136 - 145 mmol/L    POCT Potassium, Venous 4.2 3.5 - 5.3 mmol/L    POCT Chloride, Venous 103 98 - 107 mmol/L    POCT Ionized Calicum, Venous 1.20 1.10 - 1.33 mmol/L    POCT Glucose, Venous 98 74 - 99 mg/dL    POCT Lactate, Venous 1.0 0.4 - 2.0 mmol/L    POCT Base Excess, Venous 0.7 -2.0 - 3.0 mmol/L    POCT HCO3 Calculated, Venous 24.3 22.0 - 26.0 mmol/L    POCT Hemoglobin, Venous 14.6 13.5 - 17.5 g/dL    POCT Anion Gap, Venous 11.0 10.0 - 25.0 mmol/L    Patient Temperature      FiO2 21 %   Troponin I, High Sensitivity, Initial   Result Value Ref Range    Troponin I, High Sensitivity 38 (H) 0 - 20 ng/L   TSH with reflex to Free T4 if abnormal   Result Value Ref Range    Thyroid Stimulating Hormone 1.53 0.44 - 3.98 mIU/L   CBC and Auto Differential   Result Value Ref Range    WBC 8.2 4.4 - 11.3 x10*3/uL    nRBC 0.0 0.0 - 0.0 /100 WBCs    RBC 4.85 4.50 - 5.90 x10*6/uL    Hemoglobin 13.8 13.5 - 17.5 g/dL    Hematocrit  42.6 41.0 - 52.0 %    MCV 88 80 - 100 fL    MCH 28.5 26.0 - 34.0 pg    MCHC 32.4 32.0 - 36.0 g/dL    RDW 14.4 11.5 - 14.5 %    Platelets 289 150 - 450 x10*3/uL    Neutrophils % 54.1 40.0 - 80.0 %    Immature Granulocytes %, Automated 0.2 0.0 - 0.9 %    Lymphocytes % 28.1 13.0 - 44.0 %    Monocytes % 9.7 2.0 - 10.0 %    Eosinophils % 6.9 0.0 - 6.0 %    Basophils % 1.0 0.0 - 2.0 %    Neutrophils Absolute 4.42 1.20 - 7.70 x10*3/uL    Immature Granulocytes Absolute, Automated 0.02 0.00 - 0.70 x10*3/uL    Lymphocytes Absolute 2.29 1.20 - 4.80 x10*3/uL    Monocytes Absolute 0.79 0.10 - 1.00 x10*3/uL    Eosinophils Absolute 0.56 0.00 - 0.70 x10*3/uL    Basophils Absolute 0.08 0.00 - 0.10 x10*3/uL   Troponin, High Sensitivity, 1 Hour   Result Value Ref Range    Troponin I, High Sensitivity 43 (H) 0 - 20 ng/L   Troponin I, High Sensitivity   Result Value Ref Range    Troponin I, High Sensitivity 41 (H) 0 - 20 ng/L   CBC   Result Value Ref Range    WBC 9.3 4.4 - 11.3 x10*3/uL    nRBC 0.0 0.0 - 0.0 /100 WBCs    RBC 4.77 4.50 - 5.90 x10*6/uL    Hemoglobin 13.6 13.5 - 17.5 g/dL    Hematocrit 42.6 41.0 - 52.0 %    MCV 89 80 - 100 fL    MCH 28.5 26.0 - 34.0 pg    MCHC 31.9 (L) 32.0 - 36.0 g/dL    RDW 14.5 11.5 - 14.5 %    Platelets 277 150 - 450 x10*3/uL   Renal Function Panel   Result Value Ref Range    Glucose 92 74 - 99 mg/dL    Sodium 138 136 - 145 mmol/L    Potassium 4.5 3.5 - 5.3 mmol/L    Chloride 104 98 - 107 mmol/L    Bicarbonate 27 21 - 32 mmol/L    Anion Gap 12 10 - 20 mmol/L    Urea Nitrogen 16 6 - 23 mg/dL    Creatinine 1.00 0.50 - 1.30 mg/dL    eGFR 84 >60 mL/min/1.73m*2    Calcium 9.1 8.6 - 10.3 mg/dL    Phosphorus 3.6 2.5 - 4.9 mg/dL    Albumin 3.6 3.4 - 5.0 g/dL   Magnesium   Result Value Ref Range    Magnesium 2.03 1.60 - 2.40 mg/dL   Protime-INR   Result Value Ref Range    Protime 21.8 (H) 9.8 - 12.8 seconds    INR 1.9 (H) 0.9 - 1.1       XR chest 2 views   Final Result   Chronic interstitial changes.  No  consolidation.   Sternotomy and changes of valve replacement.   Signed by Lambert Webber MD          Transthoracic Echo (TTE) Complete    Result Date: 6/8/2024   [facility], [address] [place] [address]                Tel [phone] and Fax [phone] TRANSTHORACIC ECHOCARDIOGRAM REPORT  Patient Name:      MADYSON WRIGHT      Reading Physician:    [id number] Tyler Tyson MD Study Date:        6/8/2024             Ordering Provider:    [id number] FAROOQ LOMAS MRN/PID:           [id number]             Fellow: Accession#:        [id number]         Nurse: Date of Birth/Age: 1960 / 63 years Sonographer:          Adiel Rai RDCS Gender:            M                    Additional Staff: Height:            167.64 cm            Admit Date: Weight:            56.70 kg             Admission Status:     Inpatient -                                                               Routine BSA / BMI:         1.64 m2 / 20.18      Encounter#:           [id number]                    kg/m2                                         Department Location:  [facility] Non                                                               Invasive Blood Pressure: 111 /60 mmHg Study Type:    TRANSTHORACIC ECHO (TTE) COMPLETE Diagnosis/ICD: Presence of prosthetic heart valve-Z95.2 Indication:    S/p MVR, KONRAD closure (06/04/24) CPT Code:      Echo Complete w Full Doppler-[id number] Patient History: Pertinent History: Chest Pain and Dyspnea. S/p MVR #27mm mitris bioprosthetic                    valve, KONRAD closure (06/04/24) Afib, HFrEF, SOB and                    thrombocytopenia. Study Detail: The following Echo studies were performed: 2D, M-Mode, Doppler and               color flow.  PHYSICIAN INTERPRETATION: Left Ventricle: The left ventricular systolic function is normal, with an estimated  ejection fraction of 55-60%. The patient is in atrial fibrillation which may influence the estimate of left ventricular function and transvalvular flows. There are no regional wall motion abnormalities. The left ventricular cavity size is normal. Abnormal (paradoxical) septal motion consistent with post-operative status and abnormal (paradoxical) septal motion, consistent with RV pacemaker. Left ventricular diastolic filling was indeterminate. Left Atrium: The left atrium is severely dilated. Right Ventricle: The right ventricle is normal in size. There is normal right ventricular global systolic function. Right Atrium: The right atrium is normal in size. Aortic Valve: The aortic valve is trileaflet. There is mild aortic valve cusp calcification. There is no evidence of reduced aortic valve leaflet excursion excursion. There is no evidence of aortic valve regurgitation. The peak instantaneous gradient of the aortic valve is 6.4 mmHg. Mitral Valve: There is a prosthetic mitral valve present. There is a Mitris mitral valve bioprosthesis with a 27 mm reported size. There is no evidence of mitral valve regurgitation. Peak and mean mitral diastolic gradients are 14.5 and 5.4 mmHg at a heart rate of 72 bpm. The DI is ~ 2. The pressure half-time is ~ 90 ms, resulting in a mitral orifice area of 2.4. Tricuspid Valve: The tricuspid valve is structurally normal. There is mild tricuspid regurgitation. Pulmonic Valve: The pulmonic valve is structurally normal. There is trace pulmonic valve regurgitation. Pericardium: There is no pericardial effusion noted. Aorta: The aortic root is normal. In comparison to the previous echocardiogram(s): Although previous studies are available for review, their comparison with the current echocardiogram is clinically irrelevant.  CONCLUSIONS:  1. Left ventricular systolic function is normal with a 55-60% estimated ejection fraction.  2. Abnormal septal motion consistent with post-operative  status and abnormal septal motion consistent with RV pacemaker.  3. The left atrium is severely dilated.  4. Peak and mean mitral diastolic gradients are 14.5 and 5.4 mmHg at a heart rate of 72 bpm. The DI is ~ 2. The pressure half-time is ~ 90 ms, resulting in a mitral orifice area of 2.4.  5. The patient is in atrial fibrillation which may influence the estimate of left ventricular function and transvalvular flows. QUANTITATIVE DATA SUMMARY: 2D MEASUREMENTS:                           Normal Ranges: Ao Root d:     3.30 cm    (2.0-3.7cm) LAs:           5.80 cm    (2.7-4.0cm) IVSd:          1.10 cm    (0.6-1.1cm) LVPWd:         1.10 cm    (0.6-1.1cm) LVIDd:         4.60 cm    (3.9-5.9cm) LVIDs:         3.60 cm LV Mass Index: 110.6 g/m2 LV % FS        21.7 % LA VOLUME:                               Normal Ranges: LA Vol A4C:        106.5 ml   (22+/-6mL/m2) LA Vol A2C:        118.7 ml LA Vol BP:         113.9 ml LA Vol Index A4C:  65.0ml/m2 LA Vol Index A2C:  72.5 ml/m2 LA Vol Index BP:   69.5 ml/m2 LA Area A4C:       28.6 cm2 LA Area A2C:       29.8 cm2 LA Major Axis A4C: 6.5 cm LA Major Axis A2C: 6.4 cm LA Volume Index:   69.5 ml/m2 AORTA MEASUREMENTS:                      Normal Ranges: Ao Sinus, d: 3.30 cm (2.1-3.5cm) LV SYSTOLIC FUNCTION BY 2D PLANIMETRY (MOD):                     Normal Ranges: EF-A4C View: 50.1 % (>=55%) EF-A2C View: 69.2 % EF-Biplane:  61.4 % LV DIASTOLIC FUNCTION:                     Normal Ranges: MV Peak E: 1.50 m/s (0.7-1.2 m/s) MITRAL VALVE:                       Normal Ranges: MV Vmax:    1.91 m/s  (<=1.3m/s) MV peak P.5 mmHg (<5mmHg) MV mean P.4 mmHg  (<2mmHg) MV DT:      226 msec  (150-240msec) AORTIC VALVE:                         Normal Ranges: AoV Vmax:      1.26 m/s (<=1.7m/s) AoV Peak P.4 mmHg (<20mmHg) LVOT Max Christian:  1.00 m/s (<=1.1m/s) LVOT VTI:      20.40 cm LVOT Diameter: 1.80 cm  (1.8-2.4cm) AoV Area,Vmax: 2.02 cm2 (2.5-4.5cm2) TRICUSPID VALVE/RVSP:                              Normal Ranges: Peak TR Velocity: 2.44 m/s RV Syst Pressure: 26.8 mmHg (< 30mmHg) IVC Diam:         2.48 cm PULMONIC VALVE:                      Normal Ranges: PV Max Christian: 0.9 m/s  (0.6-0.9m/s) PV Max PG:  3.5 mmHg  [id number] Tyler Tyson MD Electronically signed on 6/8/2024 at 2:33:10 PM  ** Final **     Transthoracic Echo (TTE) Complete    Result Date: 3/28/2024   [facility], [address] [place] [address]               Tel [phone] and Fax [phone] TRANSTHORACIC ECHOCARDIOGRAM REPORT  Patient Name:      MADYSON Barclay Physician:    [id number] Morris Johnson MD Study Date:        3/28/2024            Ordering Provider:    [id number] CHARISSA HILLIARD MRN/PID:           [id number]             Fellow: Accession#:        [id number]         Nurse:                Natalia Aleman RN Date of Birth/Age: 1960 / 63 years Sonographer:          Helen Linda RDCS Gender:            M                    Additional Staff: Height:            167.64 cm            Admit Date:           3/27/2024 Weight:            57.15 kg             Admission Status:     Inpatient -                                                               Routine BSA / BMI:         1.64 m2 / 20.34      Encounter#:           [id number]                    kg/m2                                         Department Location:  [facility] Non                                                               Invasive Blood Pressure: 124 /85 mmHg Study Type:    TRANSTHORACIC ECHO (TTE) COMPLETE Diagnosis/ICD: Unspecified atrial fibrillation-I48.91 Indication:    Atrial Fibrillation CPT Code:      Echo Complete w Full Doppler-[id number] Patient History: Smoker:            Current. Pertinent History: Chest Pain and Dyspnea. Dizziness. Study Detail: The following Echo studies were performed: 2D, M-Mode, Doppler and               color flow. Definity used as a contrast agent for  endocardial               border definition. Total contrast used for this procedure was 1.0               mL via IV push. The patient was awake.  Critical Event Critical Event: Test was completed as per department protocol. Critical Finding: Mitral stenosis. Time Test was Completed: 10:45:33 AM Notified: Patricia Morrison, ANA. Attending notification time: 11:01:31 AM  PHYSICIAN INTERPRETATION: Left Ventricle: The left ventricular systolic function is mildly decreased, with an estimated ejection fraction of 45%. There is global hypokinesis of the left ventricle with minor regional variations. The left ventricular cavity size is mildly dilated. Left ventricular diastolic filling was indeterminate. Left Atrium: The left atrium is severely dilated. Right Ventricle: The right ventricle is normal in size. There is normal right ventricular global systolic function. Right Atrium: The right atrium is mildly dilated. Aortic Valve: The aortic valve is trileaflet. There is mild aortic valve cusp calcification. There is trace to mild aortic valve regurgitation. The peak instantaneous gradient of the aortic valve is 3.1 mmHg. The mean gradient of the aortic valve is 2.0 mmHg. Mitral Valve: The mitral valve is moderately thickened. 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 There is evidence of severe mitral valve stenosis. The doppler estimated mean and peak diastolic pressure gradients are 12.7 mmHg and 21.8 mmHg respectively. There is moderate mitral annular calcification. There is moderate mitral valve regurgitation. Tricuspid Valve: The tricuspid valve is structurally normal. There is mild tricuspid regurgitation. Pulmonic Valve: The pulmonic valve is not well visualized. The pulmonic valve regurgitation was not well visualized. Pericardium: There is no pericardial effusion noted. Aorta: The aortic root is normal. Pulmonary Artery: The tricuspid regurgitant velocity is 3.32 m/s, and with an estimated right atrial  pressure of 3 mmHg, the estimated pulmonary artery pressure is mild to moderately elevated with the RVSP at 47.1 mmHg.  CONCLUSIONS:  1. Left ventricular systolic function is mildly decreased with a 45% estimated ejection fraction.  2. The left atrium is severely dilated.  3. The mitral valve is moderately thickened. 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  4. There is moderate mitral annular calcification.  5. There is severe mitral valve stenosis.  6. Moderate mitral valve regurgitation.  7. Mild to moderately elevated pulmonary artery pressure.  8. There is global hypokinesis of the left ventricle with minor regional variations. QUANTITATIVE DATA SUMMARY: 2D MEASUREMENTS:                          Normal Ranges: IVSd:          0.60 cm   (0.6-1.1cm) LVPWd:         0.90 cm   (0.6-1.1cm) LVIDd:         4.47 cm   (3.9-5.9cm) LVIDs:         3.60 cm LV Mass Index: 63.0 g/m2 LV % FS        19.4 % LA VOLUME:                               Normal Ranges: LA Vol A4C:        71.6 ml    (22+/-6mL/m2) LA Vol A2C:        67.4 ml LA Vol BP:         70.7 ml LA Vol Index A4C:  43.6 ml/m2 LA Vol Index A2C:  41.0 ml/m2 LA Vol Index BP:   43.0 ml/m2 LA Area A4C:       22.3 cm2 LA Area A2C:       22.0 cm2 LA Major Axis A4C: 5.9 cm LA Major Axis A2C: 6.1 cm LA Volume Index:   40.8 ml/m2 LA Vol A4C:        66.6 ml LA Vol A2C:        64.7 ml RA VOLUME BY A/L METHOD:                       Normal Ranges: RA Area A4C: 17.6 cm2 M-MODE MEASUREMENTS:                  Normal Ranges: Ao Root: 3.20 cm (2.0-3.7cm) LAs:     5.42 cm (2.7-4.0cm) AORTA MEASUREMENTS:                      Normal Ranges: Ao Sinus, d: 3.30 cm (2.1-3.5cm) Asc Ao, d:   3.00 cm (2.1-3.4cm) LV SYSTOLIC FUNCTION BY 2D PLANIMETRY (MOD):                     Normal Ranges: EF-A4C View: 41.3 % (>=55%) EF-A2C View: 40.4 % EF-Biplane:  41.5 % LV DIASTOLIC FUNCTION:                            Normal Ranges: MV Peak E:      1.85 m/s   (0.7-1.2 m/s) MV e'           0.05 m/s   (>8.0)  MV lateral e'   0.05 m/s MV medial e'    0.04 m/s E/e' Ratio:     36.95      (<8.0) PulmV Sys Christian:  17.52 cm/s PulmV Thompson Christian: 32.92 cm/s PulmV S/D Christian:  0.53 MITRAL VALVE:                       Normal Ranges: MV Vmax:    2.33 m/s  (<=1.3m/s) MV peak P.8 mmHg (<5mmHg) MV mean P.7 mmHg (<2mmHg) MV VTI:     60.69 cm  (10-13cm) MITRAL INSUFFICIENCY:                      Normal Ranges: MR VTI:  150.95 cm MR Vmax: 491.71 cm/s AORTIC VALVE:                                   Normal Ranges: AoV Vmax:                0.88 m/s (<=1.7m/s) AoV Peak PG:             3.1 mmHg (<20mmHg) AoV Mean P.0 mmHg (1.7-11.5mmHg) LVOT Max Christian:            0.68 m/s (<=1.1m/s) AoV VTI:                 14.53 cm (18-25cm) LVOT VTI:                12.65 cm LVOT Diameter:           1.98 cm  (1.8-2.4cm) AoV Area, VTI:           2.69 cm2 (2.5-5.5cm2) AoV Area,Vmax:           2.38 cm2 (2.5-4.5cm2) AoV Dimensionless Index: 0.87  RIGHT VENTRICLE: RV Basal 3.10 cm RV Mid   2.20 cm RV Major 6.7 cm TAPSE:   18.0 mm RV s'    0.10 m/s TRICUSPID VALVE/RVSP:                             Normal Ranges: Peak TR Velocity: 3.32 m/s RV Syst Pressure: 47.1 mmHg (< 30mmHg) IVC Diam:         2.21 cm PULMONIC VALVE:                      Normal Ranges: PV Max Christian: 0.7 m/s  (0.6-0.9m/s) PV Max P.7 mmHg Pulmonary Veins: PulmV Thompson Christian: 32.92 cm/s PulmV S/D Christian:  0.53 PulmV Sys Christian:  17.52 cm/s AORTA: Asc Ao Diam 3.01 cm  [id number] Morris Johnson MD Electronically signed on 3/28/2024 at 12:19:54 PM  ** Final **         Assessment/Plan   Paroxysmal atrial fibrillation/flutter with RVR  -s/p DCCV 2024 post MVR  -Also had LAAL  -EKG reviewed, AF RVR, Qtc 449ms  -Stop metoprolol  -Start Sotalol 40mg BID  -Follow up on Friday to check EKG  -Cont coumadin, goal INR 2-3  -INR check on Friday    2. S/p Mitral valve replacement  -Post op echo showed normal functioning valve    3. Elevated troponin-Non MI elevation 2/2 AF RVR  -[facility] 2024 showed  normal coronary arteries  -Echo as above    4. Chronic diastolic CHF  -Euvolemic  -2gm na diet  -Daily weights  -Monitor    5. Tobacco use  -advised to quit    ELNY Alexander-CNP

## 2024-07-10 ENCOUNTER — APPOINTMENT (OUTPATIENT)
Dept: CARDIOLOGY | Facility: HOSPITAL | Age: 64
End: 2024-07-10
Payer: COMMERCIAL

## 2024-07-10 LAB
ATRIAL RATE: 122 BPM
Q ONSET: 225 MS
QRS COUNT: 21 BEATS
QRS DURATION: 80 MS
QT INTERVAL: 304 MS
QTC CALCULATION(BAZETT): 440 MS
QTC FREDERICIA: 389 MS
R AXIS: 63 DEGREES
T AXIS: 75 DEGREES
T OFFSET: 377 MS
VENTRICULAR RATE: 126 BPM

## 2024-07-10 NOTE — SIGNIFICANT EVENT
Observation patient no call required.     TRANSFER - OUT REPORT:    Verbal report given to Tucker Chirinos RN(name) on [facility]  being transferred to 5th floor(unit) for routine progression of care       Report consisted of patients Situation, Background, Assessment and   Recommendations(SBAR). Information from the following report(s) ED Summary was reviewed with the receiving nurse. Lines:   Venous Access Device 09/01/17 Upper chest (subclavicular area), left (Active)        Opportunity for questions and clarification was provided.       Patient transported with:   O2 @ 2 liters

## 2024-07-14 LAB
ATRIAL RATE: 111 BPM
Q ONSET: 225 MS
QRS COUNT: 20 BEATS
QRS DURATION: 76 MS
QT INTERVAL: 318 MS
QTC CALCULATION(BAZETT): 449 MS
QTC FREDERICIA: 400 MS
R AXIS: 73 DEGREES
T AXIS: 65 DEGREES
T OFFSET: 384 MS
VENTRICULAR RATE: 120 BPM

## 2024-07-15 ENCOUNTER — HOSPITAL ENCOUNTER (EMERGENCY)
Facility: HOSPITAL | Age: 64
Discharge: HOME | End: 2024-07-15
Attending: STUDENT IN AN ORGANIZED HEALTH CARE EDUCATION/TRAINING PROGRAM
Payer: COMMERCIAL

## 2024-07-15 ENCOUNTER — APPOINTMENT (OUTPATIENT)
Dept: RADIOLOGY | Facility: HOSPITAL | Age: 64
End: 2024-07-15
Payer: COMMERCIAL

## 2024-07-15 VITALS
HEIGHT: 67 IN | SYSTOLIC BLOOD PRESSURE: 113 MMHG | RESPIRATION RATE: 18 BRPM | TEMPERATURE: 97.9 F | BODY MASS INDEX: 21.07 KG/M2 | OXYGEN SATURATION: 98 % | WEIGHT: 134.26 LBS | DIASTOLIC BLOOD PRESSURE: 88 MMHG | HEART RATE: 101 BPM

## 2024-07-15 DIAGNOSIS — I48.91 ATRIAL FIBRILLATION WITH RVR (MULTI): Primary | ICD-10-CM

## 2024-07-15 LAB
ALBUMIN SERPL BCP-MCNC: 3.9 G/DL (ref 3.4–5)
ANION GAP SERPL CALC-SCNC: 13 MMOL/L (ref 10–20)
BASOPHILS # BLD AUTO: 0.06 X10*3/UL (ref 0–0.1)
BASOPHILS NFR BLD AUTO: 0.6 %
BNP SERPL-MCNC: 290 PG/ML (ref 0–99)
BUN SERPL-MCNC: 15 MG/DL (ref 6–23)
CALCIUM SERPL-MCNC: 9.3 MG/DL (ref 8.6–10.3)
CARDIAC TROPONIN I PNL SERPL HS: 18 NG/L (ref 0–20)
CHLORIDE SERPL-SCNC: 101 MMOL/L (ref 98–107)
CO2 SERPL-SCNC: 28 MMOL/L (ref 21–32)
CREAT SERPL-MCNC: 0.99 MG/DL (ref 0.5–1.3)
EGFRCR SERPLBLD CKD-EPI 2021: 85 ML/MIN/1.73M*2
EOSINOPHIL # BLD AUTO: 0.38 X10*3/UL (ref 0–0.7)
EOSINOPHIL NFR BLD AUTO: 4 %
ERYTHROCYTE [DISTWIDTH] IN BLOOD BY AUTOMATED COUNT: 14.6 % (ref 11.5–14.5)
GLUCOSE SERPL-MCNC: 90 MG/DL (ref 74–99)
HCT VFR BLD AUTO: 44.3 % (ref 41–52)
HGB BLD-MCNC: 14.2 G/DL (ref 13.5–17.5)
IMM GRANULOCYTES # BLD AUTO: 0.03 X10*3/UL (ref 0–0.7)
IMM GRANULOCYTES NFR BLD AUTO: 0.3 % (ref 0–0.9)
LYMPHOCYTES # BLD AUTO: 2.72 X10*3/UL (ref 1.2–4.8)
LYMPHOCYTES NFR BLD AUTO: 28.9 %
MAGNESIUM SERPL-MCNC: 1.89 MG/DL (ref 1.6–2.4)
MCH RBC QN AUTO: 28.4 PG (ref 26–34)
MCHC RBC AUTO-ENTMCNC: 32.1 G/DL (ref 32–36)
MCV RBC AUTO: 89 FL (ref 80–100)
MONOCYTES # BLD AUTO: 1.02 X10*3/UL (ref 0.1–1)
MONOCYTES NFR BLD AUTO: 10.8 %
NEUTROPHILS # BLD AUTO: 5.2 X10*3/UL (ref 1.2–7.7)
NEUTROPHILS NFR BLD AUTO: 55.4 %
NRBC BLD-RTO: 0 /100 WBCS (ref 0–0)
PHOSPHATE SERPL-MCNC: 3.1 MG/DL (ref 2.5–4.9)
PLATELET # BLD AUTO: 257 X10*3/UL (ref 150–450)
POTASSIUM SERPL-SCNC: 3.9 MMOL/L (ref 3.5–5.3)
RBC # BLD AUTO: 5 X10*6/UL (ref 4.5–5.9)
SODIUM SERPL-SCNC: 138 MMOL/L (ref 136–145)
WBC # BLD AUTO: 9.4 X10*3/UL (ref 4.4–11.3)

## 2024-07-15 PROCEDURE — 96360 HYDRATION IV INFUSION INIT: CPT | Performed by: STUDENT IN AN ORGANIZED HEALTH CARE EDUCATION/TRAINING PROGRAM

## 2024-07-15 PROCEDURE — 85025 COMPLETE CBC W/AUTO DIFF WBC: CPT

## 2024-07-15 PROCEDURE — 83880 ASSAY OF NATRIURETIC PEPTIDE: CPT

## 2024-07-15 PROCEDURE — 71045 X-RAY EXAM CHEST 1 VIEW: CPT

## 2024-07-15 PROCEDURE — 36415 COLL VENOUS BLD VENIPUNCTURE: CPT

## 2024-07-15 PROCEDURE — 2500000004 HC RX 250 GENERAL PHARMACY W/ HCPCS (ALT 636 FOR OP/ED)

## 2024-07-15 PROCEDURE — 71045 X-RAY EXAM CHEST 1 VIEW: CPT | Performed by: RADIOLOGY

## 2024-07-15 PROCEDURE — 83735 ASSAY OF MAGNESIUM: CPT

## 2024-07-15 PROCEDURE — 80069 RENAL FUNCTION PANEL: CPT

## 2024-07-15 PROCEDURE — 99284 EMERGENCY DEPT VISIT MOD MDM: CPT | Mod: 25 | Performed by: STUDENT IN AN ORGANIZED HEALTH CARE EDUCATION/TRAINING PROGRAM

## 2024-07-15 PROCEDURE — 84484 ASSAY OF TROPONIN QUANT: CPT

## 2024-07-15 ASSESSMENT — COLUMBIA-SUICIDE SEVERITY RATING SCALE - C-SSRS
1. IN THE PAST MONTH, HAVE YOU WISHED YOU WERE DEAD OR WISHED YOU COULD GO TO SLEEP AND NOT WAKE UP?: NO
2. HAVE YOU ACTUALLY HAD ANY THOUGHTS OF KILLING YOURSELF?: NO
6. HAVE YOU EVER DONE ANYTHING, STARTED TO DO ANYTHING, OR PREPARED TO DO ANYTHING TO END YOUR LIFE?: NO

## 2024-07-15 ASSESSMENT — PAIN - FUNCTIONAL ASSESSMENT: PAIN_FUNCTIONAL_ASSESSMENT: 0-10

## 2024-07-15 ASSESSMENT — LIFESTYLE VARIABLES
HAVE PEOPLE ANNOYED YOU BY CRITICIZING YOUR DRINKING: NO
EVER HAD A DRINK FIRST THING IN THE MORNING TO STEADY YOUR NERVES TO GET RID OF A HANGOVER: NO
EVER FELT BAD OR GUILTY ABOUT YOUR DRINKING: NO
HAVE YOU EVER FELT YOU SHOULD CUT DOWN ON YOUR DRINKING: NO
TOTAL SCORE: 0

## 2024-07-15 ASSESSMENT — PAIN SCALES - GENERAL: PAINLEVEL_OUTOF10: 0 - NO PAIN

## 2024-07-15 NOTE — ED TRIAGE NOTES
Patient c/o tachycardia that started this afternoon, he used his home monitor and was in the 130s. Patient has a hx of afib and recently had open heart surgery 5 weeks ago.

## 2024-07-16 ENCOUNTER — APPOINTMENT (OUTPATIENT)
Dept: CARDIOLOGY | Facility: HOSPITAL | Age: 64
End: 2024-07-16
Payer: COMMERCIAL

## 2024-07-16 ENCOUNTER — APPOINTMENT (OUTPATIENT)
Dept: RADIOLOGY | Facility: HOSPITAL | Age: 64
End: 2024-07-16
Payer: COMMERCIAL

## 2024-07-16 ENCOUNTER — HOSPITAL ENCOUNTER (INPATIENT)
Facility: HOSPITAL | Age: 64
LOS: 2 days | Discharge: HOME | End: 2024-07-18
Attending: STUDENT IN AN ORGANIZED HEALTH CARE EDUCATION/TRAINING PROGRAM | Admitting: INTERNAL MEDICINE
Payer: COMMERCIAL

## 2024-07-16 DIAGNOSIS — I48.91 ATRIAL FIBRILLATION WITH RVR (MULTI): ICD-10-CM

## 2024-07-16 DIAGNOSIS — I48.91 ATRIAL FIBRILLATION, UNSPECIFIED TYPE (MULTI): Primary | ICD-10-CM

## 2024-07-16 LAB
ALBUMIN SERPL BCP-MCNC: 3.7 G/DL (ref 3.4–5)
ALP SERPL-CCNC: 110 U/L (ref 33–136)
ALT SERPL W P-5'-P-CCNC: 11 U/L (ref 10–52)
ANION GAP SERPL CALC-SCNC: 11 MMOL/L (ref 10–20)
AST SERPL W P-5'-P-CCNC: 10 U/L (ref 9–39)
BASOPHILS # BLD AUTO: 0.06 X10*3/UL (ref 0–0.1)
BASOPHILS NFR BLD AUTO: 0.7 %
BILIRUB SERPL-MCNC: 0.3 MG/DL (ref 0–1.2)
BNP SERPL-MCNC: 403 PG/ML (ref 0–99)
BUN SERPL-MCNC: 14 MG/DL (ref 6–23)
CALCIUM SERPL-MCNC: 9 MG/DL (ref 8.6–10.3)
CARDIAC TROPONIN I PNL SERPL HS: 18 NG/L (ref 0–20)
CARDIAC TROPONIN I PNL SERPL HS: 20 NG/L (ref 0–20)
CHLORIDE SERPL-SCNC: 103 MMOL/L (ref 98–107)
CO2 SERPL-SCNC: 25 MMOL/L (ref 21–32)
CREAT SERPL-MCNC: 0.87 MG/DL (ref 0.5–1.3)
D DIMER PPP FEU-MCNC: 791 NG/ML FEU
EGFRCR SERPLBLD CKD-EPI 2021: >90 ML/MIN/1.73M*2
EOSINOPHIL # BLD AUTO: 0.28 X10*3/UL (ref 0–0.7)
EOSINOPHIL NFR BLD AUTO: 3.3 %
ERYTHROCYTE [DISTWIDTH] IN BLOOD BY AUTOMATED COUNT: 14.4 % (ref 11.5–14.5)
GLUCOSE SERPL-MCNC: 97 MG/DL (ref 74–99)
HCT VFR BLD AUTO: 43.8 % (ref 41–52)
HGB BLD-MCNC: 14.2 G/DL (ref 13.5–17.5)
IMM GRANULOCYTES # BLD AUTO: 0.02 X10*3/UL (ref 0–0.7)
IMM GRANULOCYTES NFR BLD AUTO: 0.2 % (ref 0–0.9)
INR PPP: 2 (ref 0.9–1.1)
LYMPHOCYTES # BLD AUTO: 2.32 X10*3/UL (ref 1.2–4.8)
LYMPHOCYTES NFR BLD AUTO: 27.4 %
MAGNESIUM SERPL-MCNC: 1.95 MG/DL (ref 1.6–2.4)
MCH RBC QN AUTO: 28.1 PG (ref 26–34)
MCHC RBC AUTO-ENTMCNC: 32.4 G/DL (ref 32–36)
MCV RBC AUTO: 87 FL (ref 80–100)
MONOCYTES # BLD AUTO: 0.92 X10*3/UL (ref 0.1–1)
MONOCYTES NFR BLD AUTO: 10.9 %
NEUTROPHILS # BLD AUTO: 4.87 X10*3/UL (ref 1.2–7.7)
NEUTROPHILS NFR BLD AUTO: 57.5 %
NRBC BLD-RTO: 0 /100 WBCS (ref 0–0)
PLATELET # BLD AUTO: 234 X10*3/UL (ref 150–450)
POTASSIUM SERPL-SCNC: 4 MMOL/L (ref 3.5–5.3)
PROT SERPL-MCNC: 7 G/DL (ref 6.4–8.2)
PROTHROMBIN TIME: 22.3 SECONDS (ref 9.8–12.8)
RBC # BLD AUTO: 5.06 X10*6/UL (ref 4.5–5.9)
SODIUM SERPL-SCNC: 135 MMOL/L (ref 136–145)
TSH SERPL-ACNC: 1.32 MIU/L (ref 0.44–3.98)
WBC # BLD AUTO: 8.5 X10*3/UL (ref 4.4–11.3)

## 2024-07-16 PROCEDURE — 2550000001 HC RX 255 CONTRASTS: Performed by: STUDENT IN AN ORGANIZED HEALTH CARE EDUCATION/TRAINING PROGRAM

## 2024-07-16 PROCEDURE — 84075 ASSAY ALKALINE PHOSPHATASE: CPT

## 2024-07-16 PROCEDURE — 99285 EMERGENCY DEPT VISIT HI MDM: CPT | Mod: 25

## 2024-07-16 PROCEDURE — 36415 COLL VENOUS BLD VENIPUNCTURE: CPT

## 2024-07-16 PROCEDURE — S4991 NICOTINE PATCH NONLEGEND: HCPCS

## 2024-07-16 PROCEDURE — 85379 FIBRIN DEGRADATION QUANT: CPT

## 2024-07-16 PROCEDURE — 94667 MNPJ CHEST WALL 1ST: CPT

## 2024-07-16 PROCEDURE — 2500000004 HC RX 250 GENERAL PHARMACY W/ HCPCS (ALT 636 FOR OP/ED)

## 2024-07-16 PROCEDURE — 96361 HYDRATE IV INFUSION ADD-ON: CPT

## 2024-07-16 PROCEDURE — 84484 ASSAY OF TROPONIN QUANT: CPT

## 2024-07-16 PROCEDURE — 83735 ASSAY OF MAGNESIUM: CPT

## 2024-07-16 PROCEDURE — 2500000002 HC RX 250 W HCPCS SELF ADMINISTERED DRUGS (ALT 637 FOR MEDICARE OP, ALT 636 FOR OP/ED)

## 2024-07-16 PROCEDURE — 2060000001 HC INTERMEDIATE ICU ROOM DAILY

## 2024-07-16 PROCEDURE — 83880 ASSAY OF NATRIURETIC PEPTIDE: CPT

## 2024-07-16 PROCEDURE — 85610 PROTHROMBIN TIME: CPT

## 2024-07-16 PROCEDURE — 84443 ASSAY THYROID STIM HORMONE: CPT

## 2024-07-16 PROCEDURE — 71275 CT ANGIOGRAPHY CHEST: CPT

## 2024-07-16 PROCEDURE — 96374 THER/PROPH/DIAG INJ IV PUSH: CPT

## 2024-07-16 PROCEDURE — 93005 ELECTROCARDIOGRAM TRACING: CPT

## 2024-07-16 PROCEDURE — 71275 CT ANGIOGRAPHY CHEST: CPT | Performed by: RADIOLOGY

## 2024-07-16 PROCEDURE — 85025 COMPLETE CBC W/AUTO DIFF WBC: CPT

## 2024-07-16 RX ORDER — NAPROXEN SODIUM 220 MG/1
81 TABLET, FILM COATED ORAL DAILY
Status: DISCONTINUED | OUTPATIENT
Start: 2024-07-17 | End: 2024-07-18 | Stop reason: HOSPADM

## 2024-07-16 RX ORDER — ACETAMINOPHEN 325 MG/1
650 TABLET ORAL EVERY 4 HOURS PRN
Status: DISCONTINUED | OUTPATIENT
Start: 2024-07-16 | End: 2024-07-18 | Stop reason: HOSPADM

## 2024-07-16 RX ORDER — IBUPROFEN 200 MG
1 TABLET ORAL DAILY
Status: DISCONTINUED | OUTPATIENT
Start: 2024-07-16 | End: 2024-07-18 | Stop reason: HOSPADM

## 2024-07-16 RX ORDER — ACETAMINOPHEN 650 MG/1
650 SUPPOSITORY RECTAL EVERY 4 HOURS PRN
Status: DISCONTINUED | OUTPATIENT
Start: 2024-07-16 | End: 2024-07-18 | Stop reason: HOSPADM

## 2024-07-16 RX ORDER — DIGOXIN 0.25 MG/ML
500 INJECTION INTRAMUSCULAR; INTRAVENOUS ONCE
Status: COMPLETED | OUTPATIENT
Start: 2024-07-16 | End: 2024-07-16

## 2024-07-16 RX ORDER — DIGOXIN 0.25 MG/ML
125 INJECTION INTRAMUSCULAR; INTRAVENOUS ONCE
Status: DISCONTINUED | OUTPATIENT
Start: 2024-07-16 | End: 2024-07-16

## 2024-07-16 RX ORDER — ALBUTEROL SULFATE 0.83 MG/ML
2.5 SOLUTION RESPIRATORY (INHALATION) EVERY 6 HOURS PRN
Status: DISCONTINUED | OUTPATIENT
Start: 2024-07-16 | End: 2024-07-17

## 2024-07-16 RX ORDER — ACETAMINOPHEN 160 MG/5ML
650 SOLUTION ORAL EVERY 4 HOURS PRN
Status: DISCONTINUED | OUTPATIENT
Start: 2024-07-16 | End: 2024-07-18 | Stop reason: HOSPADM

## 2024-07-16 RX ORDER — SENNOSIDES 8.8 MG/5ML
10 LIQUID ORAL 2 TIMES DAILY
Status: DISCONTINUED | OUTPATIENT
Start: 2024-07-16 | End: 2024-07-18 | Stop reason: HOSPADM

## 2024-07-16 SDOH — SOCIAL STABILITY: SOCIAL INSECURITY: HAVE YOU HAD ANY THOUGHTS OF HARMING ANYONE ELSE?: NO

## 2024-07-16 SDOH — ECONOMIC STABILITY: FOOD INSECURITY: WITHIN THE PAST 12 MONTHS, YOU WORRIED THAT YOUR FOOD WOULD RUN OUT BEFORE YOU GOT MONEY TO BUY MORE.: NEVER TRUE

## 2024-07-16 SDOH — ECONOMIC STABILITY: INCOME INSECURITY: IN THE PAST 12 MONTHS, HAS THE ELECTRIC, GAS, OIL, OR WATER COMPANY THREATENED TO SHUT OFF SERVICE IN YOUR HOME?: NO

## 2024-07-16 SDOH — SOCIAL STABILITY: SOCIAL INSECURITY: DOES ANYONE TRY TO KEEP YOU FROM HAVING/CONTACTING OTHER FRIENDS OR DOING THINGS OUTSIDE YOUR HOME?: NO

## 2024-07-16 SDOH — SOCIAL STABILITY: SOCIAL INSECURITY: ABUSE: ADULT

## 2024-07-16 SDOH — SOCIAL STABILITY: SOCIAL INSECURITY: ARE YOU OR HAVE YOU BEEN THREATENED OR ABUSED PHYSICALLY, EMOTIONALLY, OR SEXUALLY BY ANYONE?: NO

## 2024-07-16 SDOH — ECONOMIC STABILITY: INCOME INSECURITY: IN THE LAST 12 MONTHS, WAS THERE A TIME WHEN YOU WERE NOT ABLE TO PAY THE MORTGAGE OR RENT ON TIME?: NO

## 2024-07-16 SDOH — ECONOMIC STABILITY: FOOD INSECURITY: WITHIN THE PAST 12 MONTHS, THE FOOD YOU BOUGHT JUST DIDN'T LAST AND YOU DIDN'T HAVE MONEY TO GET MORE.: NEVER TRUE

## 2024-07-16 SDOH — HEALTH STABILITY: MENTAL HEALTH: HOW OFTEN DO YOU HAVE A DRINK CONTAINING ALCOHOL?: NEVER

## 2024-07-16 SDOH — SOCIAL STABILITY: SOCIAL INSECURITY: DO YOU FEEL UNSAFE GOING BACK TO THE PLACE WHERE YOU ARE LIVING?: NO

## 2024-07-16 SDOH — SOCIAL STABILITY: SOCIAL INSECURITY: WERE YOU ABLE TO COMPLETE ALL THE BEHAVIORAL HEALTH SCREENINGS?: YES

## 2024-07-16 SDOH — SOCIAL STABILITY: SOCIAL INSECURITY: ARE THERE ANY APPARENT SIGNS OF INJURIES/BEHAVIORS THAT COULD BE RELATED TO ABUSE/NEGLECT?: NO

## 2024-07-16 SDOH — ECONOMIC STABILITY: HOUSING INSECURITY: AT ANY TIME IN THE PAST 12 MONTHS, WERE YOU HOMELESS OR LIVING IN A SHELTER (INCLUDING NOW)?: NO

## 2024-07-16 SDOH — HEALTH STABILITY: MENTAL HEALTH: HOW OFTEN DO YOU HAVE 6 OR MORE DRINKS ON ONE OCCASION?: NEVER

## 2024-07-16 SDOH — SOCIAL STABILITY: SOCIAL INSECURITY: HAVE YOU HAD THOUGHTS OF HARMING ANYONE ELSE?: NO

## 2024-07-16 SDOH — HEALTH STABILITY: PHYSICAL HEALTH: ON AVERAGE, HOW MANY MINUTES DO YOU ENGAGE IN EXERCISE AT THIS LEVEL?: 0 MIN

## 2024-07-16 SDOH — SOCIAL STABILITY: SOCIAL INSECURITY: DO YOU FEEL ANYONE HAS EXPLOITED OR TAKEN ADVANTAGE OF YOU FINANCIALLY OR OF YOUR PERSONAL PROPERTY?: NO

## 2024-07-16 SDOH — HEALTH STABILITY: MENTAL HEALTH: HOW MANY STANDARD DRINKS CONTAINING ALCOHOL DO YOU HAVE ON A TYPICAL DAY?: PATIENT DOES NOT DRINK

## 2024-07-16 SDOH — ECONOMIC STABILITY: INCOME INSECURITY: HOW HARD IS IT FOR YOU TO PAY FOR THE VERY BASICS LIKE FOOD, HOUSING, MEDICAL CARE, AND HEATING?: NOT HARD AT ALL

## 2024-07-16 SDOH — ECONOMIC STABILITY: HOUSING INSECURITY: IN THE PAST 12 MONTHS, HOW MANY TIMES HAVE YOU MOVED WHERE YOU WERE LIVING?: 0

## 2024-07-16 SDOH — HEALTH STABILITY: PHYSICAL HEALTH: ON AVERAGE, HOW MANY DAYS PER WEEK DO YOU ENGAGE IN MODERATE TO STRENUOUS EXERCISE (LIKE A BRISK WALK)?: 0 DAYS

## 2024-07-16 SDOH — SOCIAL STABILITY: SOCIAL INSECURITY: HAS ANYONE EVER THREATENED TO HURT YOUR FAMILY OR YOUR PETS?: NO

## 2024-07-16 ASSESSMENT — LIFESTYLE VARIABLES
AUDIT-C TOTAL SCORE: 0
HOW OFTEN DO YOU HAVE A DRINK CONTAINING ALCOHOL: NEVER
EVER FELT BAD OR GUILTY ABOUT YOUR DRINKING: NO
TOTAL SCORE: 0
EVER HAD A DRINK FIRST THING IN THE MORNING TO STEADY YOUR NERVES TO GET RID OF A HANGOVER: NO
HOW MANY STANDARD DRINKS CONTAINING ALCOHOL DO YOU HAVE ON A TYPICAL DAY: PATIENT DOES NOT DRINK
HAVE YOU EVER FELT YOU SHOULD CUT DOWN ON YOUR DRINKING: NO
SKIP TO QUESTIONS 9-10: 1
SKIP TO QUESTIONS 9-10: 1
AUDIT-C TOTAL SCORE: 0
HOW OFTEN DO YOU HAVE 6 OR MORE DRINKS ON ONE OCCASION: NEVER
HAVE PEOPLE ANNOYED YOU BY CRITICIZING YOUR DRINKING: NO
AUDIT-C TOTAL SCORE: 0

## 2024-07-16 ASSESSMENT — COGNITIVE AND FUNCTIONAL STATUS - GENERAL
DAILY ACTIVITIY SCORE: 24
PATIENT BASELINE BEDBOUND: NO
MOBILITY SCORE: 24

## 2024-07-16 ASSESSMENT — ACTIVITIES OF DAILY LIVING (ADL)
ADEQUATE_TO_COMPLETE_ADL: YES
GROOMING: INDEPENDENT
ASSISTIVE_DEVICE: EYEGLASSES;DENTURES LOWER
DRESSING YOURSELF: INDEPENDENT
HEARING - LEFT EAR: FUNCTIONAL
PATIENT'S MEMORY ADEQUATE TO SAFELY COMPLETE DAILY ACTIVITIES?: YES
HEARING - RIGHT EAR: FUNCTIONAL
TOILETING: INDEPENDENT
FEEDING YOURSELF: INDEPENDENT
BATHING: INDEPENDENT
WALKS IN HOME: INDEPENDENT
JUDGMENT_ADEQUATE_SAFELY_COMPLETE_DAILY_ACTIVITIES: YES

## 2024-07-16 ASSESSMENT — PAIN SCALES - GENERAL
PAINLEVEL_OUTOF10: 0 - NO PAIN

## 2024-07-16 ASSESSMENT — COLUMBIA-SUICIDE SEVERITY RATING SCALE - C-SSRS
6. HAVE YOU EVER DONE ANYTHING, STARTED TO DO ANYTHING, OR PREPARED TO DO ANYTHING TO END YOUR LIFE?: NO
2. HAVE YOU ACTUALLY HAD ANY THOUGHTS OF KILLING YOURSELF?: NO
1. IN THE PAST MONTH, HAVE YOU WISHED YOU WERE DEAD OR WISHED YOU COULD GO TO SLEEP AND NOT WAKE UP?: NO

## 2024-07-16 ASSESSMENT — PAIN - FUNCTIONAL ASSESSMENT: PAIN_FUNCTIONAL_ASSESSMENT: 0-10

## 2024-07-16 ASSESSMENT — PATIENT HEALTH QUESTIONNAIRE - PHQ9
1. LITTLE INTEREST OR PLEASURE IN DOING THINGS: NOT AT ALL
SUM OF ALL RESPONSES TO PHQ9 QUESTIONS 1 & 2: 0
2. FEELING DOWN, DEPRESSED OR HOPELESS: NOT AT ALL

## 2024-07-16 ASSESSMENT — ENCOUNTER SYMPTOMS: PALPITATIONS: 1

## 2024-07-16 NOTE — ED TRIAGE NOTES
Patient here for tachycardia Was here yesterday for same issue. Was told to FU with Dr Johnson, States Dr Jhonson told him to stop taking his Sotalol. States he read on google if you stop taking the Sotalol he could have a heart attack. Currently denies CP. Endorses SOB

## 2024-07-16 NOTE — ED PROVIDER NOTES
HPI   Chief Complaint   Patient presents with    Rapid Heart Rate     Was here yesterday for same issue. Was told to FU with Dr Johnson, States Dr Johnson told him to stop taking his Sotalol. States he read on google if you stop taking the Sotalol he could have a heart attack. Currently denies CP. Endorses SOB       63-year-old male past medical history of hypertension mitral valve replacement 6/17/2024 with A-fib with RVR status post cardioversion who presents today for rapid heart rate last couple days.  Patient was here yesterday for similar complaints.  Patient follows with Dr. Johnson for cardiology.  Patient states he is on sotalol and symptoms improved while he is on it for a couple hours and then afterwards his heart rate goes back up really high.  Patient's last dose of the sotalol was at 6 AM today.  Patient was told by  Back today to stop the sotalol but after reading online patient was concerned that he would have a heart attack.  Patient also states symptoms did not improve he continues to be tachycardic.  Patient states he was feeling palpitations but is not currently.  Patient does endorse shortness of breath worse with exertion and some left-sided chest pain.  Patient states the chest pain is relieved with burping.  Patient denies any history of MI blood clots recent travel.  Patient heart rate decreased yesterday after a liter of fluids.  Patient has no history of CHF.  Patient denies any fever chills nausea vomiting abdominal pain diarrhea constipation numbness or tingling.  Patient smokes three quarters a pack per day denies any alcohol or street drug abuse.              Patient History   Past Medical History:   Diagnosis Date    A-fib (Multi)     Acute bronchitis 06/21/2024    Acute right lower quadrant pain 06/21/2024    Atherosclerosis of abdominal aorta (CMS-HCC) 03/28/2016    Last Assessment & Plan:    Formatting of this note might be different from the original.   Stable asymptomatic  Formatting  of this note might be different from the original.   Repeat CT reveals moderate partially calcified wall changes. Stable 6 mm thickness/plaque in the inferior aspect of the isthmus and 6 mm wall thickening/plaque along the minor curvature of the proximal descending aorta.   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   Previous history of asthma   Current everyday smoker   Also CT chest 3/28 with findings for COPD   Duonebs as needed    Costochondritis 06/21/2024    Cough 06/21/2024    Disease due to severe acute respiratory syndrome coronavirus 2 (SARS-CoV-2) 09/06/2022    Comment on above: COVID SYMPTOMS    Diverticular disease 06/21/2024    Diverticulosis     Diverticulosis 08/15/2023    Formatting of this note might be different from the original.   Formatting of this note might be different from the original. hx diverticulitis at same time as pneumonia  Formatting of this note might be different from the original.   hx diverticulitis at same time as pneumonia    Dizziness     Dyspnea 06/21/2024    Fecal smearing 06/21/2024    Fecal soiling 06/21/2024    Headache 06/16/2023    Heart failure with mid-range ejection fraction (HFmEF) (Multi)     (45%, 3/28/24)    Hypotension 06/21/2024    Incomplete passage of stool 06/21/2024    Insomnia 06/21/2024    Irregular heart beat     afib on coumadin    Irritable bowel syndrome     Irritable bowel syndrome with diarrhea 06/21/2024    Low back pain 06/21/2024    Mitral valve stenosis     Nausea & vomiting 06/21/2024    Nonspecific syndrome suggestive of viral illness 06/21/2024    Other chest pain 03/10/2016    Chest discomfort    Other emphysema (Multi) 02/06/2024    Last Assessment & Plan:    Formatting of this note might be different from the original.   As seen on cxr.  Cough is likely related to  viral infection.  COPD contributes highly recommended smoking cessation to reduce progression of COPD.  With symptoms improving pt declined inhaler at this time    Pleurodynia 11/29/2017    Chest pain, pleuritic    Pneumonia 06/21/2024    Prediabetes 03/29/2016    Formatting of this note might be different from the original.   Hemoglobin a1c 6.0.   Need education on lifestyle modification.    Right lower quadrant pain 11/14/2018    Abdominal pain, acute, right lower quadrant    Shortness of breath     VANCE    Syncope 06/21/2024    Tension type headache 06/21/2024    Thrombocytopenia (CMS-HCC)     Tobacco dependence 08/15/2023    Last Assessment & Plan:    Formatting of this note might be different from the original.   Once again highly recommended cessation    Tubular adenoma of colon 06/21/2024     Past Surgical History:   Procedure Laterality Date    CARDIAC CATHETERIZATION N/A 04/22/2024    Procedure: Left And Right Heart Cath, Without LV;  Surgeon: Yobani Soto MD;  Location: [place] [address] Cardiac Cath Lab;  Service: Cardiovascular;  Laterality: N/A;  Schedule at 3:30pm Dr. Soto    CARDIAC ELECTROPHYSIOLOGY PROCEDURE N/A 6/17/2024    Procedure: Cardioversion;  Surgeon: Dick Edouard MD;  Location: [place] [address] Cardiac Cath Lab;  Service: Electrophysiology;  Laterality: N/A;    COLONOSCOPY      DENTAL SURGERY      TONSILLECTOMY       Family History   Problem Relation Name Age of Onset    Cancer Father  74    Brain Aneurysm Father       Social History     Tobacco Use    Smoking status: Every Day     Current packs/day: 0.50     Average packs/day: 0.5 packs/day for 49.5 years (24.8 ttl pk-yrs)     Types: Cigarettes     Start date: 1/1/1975     Passive exposure: Current    Smokeless tobacco: Never   Vaping Use    Vaping status: Never Used   Substance Use Topics    Alcohol use: Never    Drug use: Never       Physical Exam   ED Triage Vitals [07/16/24 1532]   Temperature Heart Rate Respirations BP   36.3  °C (97.3 °F) (!) 122 18 (!) 142/110      Pulse Ox Temp Source Heart Rate Source Patient Position   100 % Skin Monitor Lying      BP Location FiO2 (%)     Left arm --       Physical Exam  HENT:      Head: Normocephalic.   Cardiovascular:      Rate and Rhythm: Regular rhythm. Tachycardia present.      Pulses: Normal pulses.   Pulmonary:      Effort: Pulmonary effort is normal.      Breath sounds: No rhonchi or rales.   Abdominal:      Palpations: Abdomen is soft.      Tenderness: There is no abdominal tenderness. There is no guarding or rebound.   Musculoskeletal:         General: Normal range of motion.      Cervical back: Normal range of motion.   Neurological:      General: No focal deficit present.      Mental Status: He is alert and oriented to person, place, and time. Mental status is at baseline.           ED Course & MDM   ED Course as of 07/16/24 1857 Tue Jul 16, 2024   1541 EKG interpretation performed at 329 sinus tachycardia, normal axis no acute signs of ischemia ventricular rate 133 bpm []      ED Course User Index  [] Christina Garcia PA-C         Diagnoses as of 07/16/24 1857   Atrial fibrillation, unspecified type (Multi)                       Kaylyn Coma Scale Score: 15                        Medical Decision Making  Medical Decision Making:  Patient presented as described in HPI. Patient case including ROS, PE, and treatment and plan discussed with ED attending if attached as cosigner. Due to patients presentation orders completed include as documented.  Patient presents to the ED with cc of rapid heart rate.  Patient was here yesterday for the same concern.  Patient follows with Dr. Johnson for cardiology.  Patient is nontoxic-appearing abdomen is soft and nontender lung sounds are clear, no peripheral edema.  Patient given fluids.  Will not give patient any other medication at this time due to lower blood pressure will repeat vitals after fluids.  Patient's D-dimer is elevated we will get  a CT PE.  Tropes are negative BNP is 403.  Pending imaging results reached out to Dr. Johnson.  Pending response.  Dr. Johnson did not replace when he reached out to Dr. Smith on his partner who recommends 500 mcg of digoxin and start amnio drip and bolus.  Bolus 150 mg and infusion 1 mg/min.  He recommends that Dr. Johnson be placed on consult for admission.  CT PE is negative.  Patient educated these findings.  Patient will need admission pending hospitalist callback.  Hospitalist accepted patient will be admitted.        Patient care discussed with: N/A  Social Determinants affecting care: N/A    Final diagnosis and disposition as below.  See CI    Hospitalize. I discussed the differential; results and admit plan with the patient and/or family/friend/caregiver if present.  I emphasized the importance of hospitalization need for re-evaluation/continued monitoring/interventions.. They agreed that if they feel their condition is worsening or if they have any other concern they should alert staff immediately for further assistance. I gave the patient an opportunity to ask all questions they had and answered all of them accordingly. The patient and/or family/friend/caregiver expressed understanding verbally and that they would comply.       Disposition:  admit    Hospitalize. Discussed findings and treatment done here in ED with admitting physician. It would be a risk to discharge the patient in their condition due to possibility of deterioration in their condition and the need for urgent interventions.    This note has been transcribed using voice recognition and may contain grammatical errors, misplaced words, incorrect words, incorrect phrases or other errors.        CT angio chest for pulmonary embolism   Final Result   No pulmonary embolism.        Mild bibasilar atelectasis. Otherwise, acute cardiopulmonary process.        Moderate emphysema.             MACRO:   None        Signed by: Nona Baltazar 7/16/2024 6:50 PM    Dictation workstation:   [id number]         Labs Reviewed   COMPREHENSIVE METABOLIC PANEL - Abnormal       Result Value    Glucose 97      Sodium 135 (*)     Potassium 4.0      Chloride 103      Bicarbonate 25      Anion Gap 11      Urea Nitrogen 14      Creatinine 0.87      eGFR >90      Calcium 9.0      Albumin 3.7      Alkaline Phosphatase 110      Total Protein 7.0      AST 10      Bilirubin, Total 0.3      ALT 11     B-TYPE NATRIURETIC PEPTIDE - Abnormal     (*)     Narrative:        <100 pg/mL - Heart failure unlikely  100-299 pg/mL - Intermediate probability of acute heart                  failure exacerbation. Correlate with clinical                  context and patient history.    >=300 pg/mL - Heart Failure likely. Correlate with clinical                  context and patient history.    BNP testing is performed using different testing methodology at [facility] than at other [facility]. Direct result comparisons should only be made within the same method.      PROTIME-INR - Abnormal    Protime 22.3 (*)     INR 2.0 (*)    D-DIMER, VTE EXCLUSION - Abnormal    D-Dimer, Quantitative VTE Exclusion 791 (*)     Narrative:     The VTE Exclusion D-Dimer assay is reported in ng/mL Fibrinogen Equivalent Units (FEU).    Per 's instructions for use, a value of less than 500 ng/mL (FEU) may help to exclude DVT or PE in outpatients when the assay is used with a clinical pretest probability assessment.(AEMR must utilize and document eCalc 'Wells Score Deep Vein Thrombosis Risk' for DVT exclusion only. Emergency Department should utilize  Guidelines for Emergency Department Use of the VTE Exclusion D-Dimer and Clinical Pretest probability assessment model for DVT or PE exclusion.)   MAGNESIUM - Normal    Magnesium 1.95     SERIAL TROPONIN-INITIAL - Normal    Troponin I, High Sensitivity 18      Narrative:     Less than 99th percentile of normal range cutoff-  Female and children  under 18 years old <14 ng/L; Male <21 ng/L: Negative  Repeat testing should be performed if clinically indicated.     Female and children under 18 years old 14-50 ng/L; Male 21-50 ng/L:  Consistent with possible cardiac damage and possible increased clinical   risk. Serial measurements may help to assess extent of myocardial damage.     >50 ng/L: Consistent with cardiac damage, increased clinical risk and  myocardial infarction. Serial measurements may help assess extent of   myocardial damage.      NOTE: Children less than 1 year old may have higher baseline troponin   levels and results should be interpreted in conjunction with the overall   clinical context.     NOTE: Troponin I testing is performed using a different   testing methodology at [facility] than at other   [facility]. Direct result comparisons should only   be made within the same method.   SERIAL TROPONIN, 1 HOUR - Normal    Troponin I, High Sensitivity 20      Narrative:     Less than 99th percentile of normal range cutoff-  Female and children under 18 years old <14 ng/L; Male <21 ng/L: Negative  Repeat testing should be performed if clinically indicated.     Female and children under 18 years old 14-50 ng/L; Male 21-50 ng/L:  Consistent with possible cardiac damage and possible increased clinical   risk. Serial measurements may help to assess extent of myocardial damage.     >50 ng/L: Consistent with cardiac damage, increased clinical risk and  myocardial infarction. Serial measurements may help assess extent of   myocardial damage.      NOTE: Children less than 1 year old may have higher baseline troponin   levels and results should be interpreted in conjunction with the overall   clinical context.     NOTE: Troponin I testing is performed using a different   testing methodology at [facility] than at other   [facility]. Direct result comparisons should only   be made within the same method.   TSH WITH  REFLEX TO FREE T4 IF ABNORMAL - Normal    Thyroid Stimulating Hormone 1.32      Narrative:     TSH testing is performed using different testing methodology at [facility] than at other [facility]. Direct result comparisons should only be made within the same method.     CBC WITH AUTO DIFFERENTIAL    WBC 8.5      nRBC 0.0      RBC 5.06      Hemoglobin 14.2      Hematocrit 43.8      MCV 87      MCH 28.1      MCHC 32.4      RDW 14.4      Platelets 234      Neutrophils % 57.5      Immature Granulocytes %, Automated 0.2      Lymphocytes % 27.4      Monocytes % 10.9      Eosinophils % 3.3      Basophils % 0.7      Neutrophils Absolute 4.87      Immature Granulocytes Absolute, Automated 0.02      Lymphocytes Absolute 2.32      Monocytes Absolute 0.92      Eosinophils Absolute 0.28      Basophils Absolute 0.06     TROPONIN SERIES- (INITIAL, 1 HR)    Narrative:     The following orders were created for panel order Troponin I Series, High Sensitivity (0, 1 HR).  Procedure                               Abnormality         Status                     ---------                               -----------         ------                     Troponin I, High Sensiti...[[id number]]  Normal              Final result               Troponin, High Sensitivi...[[id number]]  Normal              Final result                 Please view results for these tests on the individual orders.      Procedure  Procedures     Christina Garcia PA-C  07/16/24 1859       Christina Garcia PA-C  07/16/24 1915

## 2024-07-16 NOTE — DISCHARGE INSTRUCTIONS
You were seen today for atrial fibrillation.  When you first arrived, you were in what we call A-fib with RVR, or rapid ventricular rate.  During your time here, we gave you fluids and check some labs, all of which were normal.  While you were here, your heart rate slowed down to the 90s, where we would expected to be if you have A-fib.  You should follow-up with your cardiologist regarding your medications, as it may be that sotalol is wearing off in between your morning and evening dose.  Please return here if you develop any chest pain, shortness of breath, or any worsening of your symptoms.

## 2024-07-16 NOTE — ED PROVIDER NOTES
HPI   Chief Complaint   Patient presents with    Rapid Heart Rate       63-year-old male past medical history of hypertension mitral valve replacement 6/17/2024 with A-fib with RVR status post cardioversion who presents today for irregular heart rate.  Patient states that for the past few days he has been feeling overall well since he started a new medication when he was recently here.  He states that since about 6:00, he is felt like his heart has been racing.  He states that he can see his pulse in his left arm, and states that it is beating very quickly.  He does feel some chest discomfort, but not necessarily chest pain.  He states that this is been occurring occasionally since he had his valve replacement.  He denies any new leg swelling, no chest pain shortness of breath.  He denies any cough, fever, recent illness.  He denies any new numbness weakness tingling in his arms or legs.  He states that the last time that he was here, his magnesium was low, which she believes is the reason why he was in RVR at that time.  He denies any back pain, dysuria, hematuria.  Denies any nausea vomiting diarrhea.      History provided by:  Patient and spouse   used: No                        Douglas Coma Scale Score: 15                     Patient History   Past Medical History:   Diagnosis Date    A-fib (Multi)     Acute bronchitis 06/21/2024    Acute right lower quadrant pain 06/21/2024    Atherosclerosis of abdominal aorta (CMS-HCC) 03/28/2016    Last Assessment & Plan:    Formatting of this note might be different from the original.   Stable asymptomatic  Formatting of this note might be different from the original.   Repeat CT reveals moderate partially calcified wall changes. Stable 6 mm thickness/plaque in the inferior aspect of the isthmus and 6 mm wall thickening/plaque along the minor curvature of the proximal descending aorta.   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   Previous history of asthma   Current everyday smoker   Also CT chest 3/28 with findings for COPD   Duonebs as needed    Costochondritis 06/21/2024    Cough 06/21/2024    Disease due to severe acute respiratory syndrome coronavirus 2 (SARS-CoV-2) 09/06/2022    Comment on above: COVID SYMPTOMS    Diverticular disease 06/21/2024    Diverticulosis     Diverticulosis 08/15/2023    Formatting of this note might be different from the original.   Formatting of this note might be different from the original. hx diverticulitis at same time as pneumonia  Formatting of this note might be different from the original.   hx diverticulitis at same time as pneumonia    Dizziness     Dyspnea 06/21/2024    Fecal smearing 06/21/2024    Fecal soiling 06/21/2024    Headache 06/16/2023    Heart failure with mid-range ejection fraction (HFmEF) (Multi)     (45%, 3/28/24)    Hypotension 06/21/2024    Incomplete passage of stool 06/21/2024    Insomnia 06/21/2024    Irregular heart beat     afib on coumadin    Irritable bowel syndrome     Irritable bowel syndrome with diarrhea 06/21/2024    Low back pain 06/21/2024    Mitral valve stenosis     Nausea & vomiting 06/21/2024    Nonspecific syndrome suggestive of viral illness 06/21/2024    Other chest pain 03/10/2016    Chest discomfort    Other emphysema (Multi) 02/06/2024    Last Assessment & Plan:    Formatting of this note might be different from the original.   As seen on cxr.  Cough is likely related to viral infection.  COPD contributes highly recommended smoking cessation to reduce progression of COPD.  With symptoms improving pt declined inhaler at this time    Pleurodynia 11/29/2017    Chest pain, pleuritic    Pneumonia 06/21/2024    Prediabetes 03/29/2016    Formatting of this note might be different from the original.   Hemoglobin  a1c 6.0.   Need education on lifestyle modification.    Right lower quadrant pain 11/14/2018    Abdominal pain, acute, right lower quadrant    Shortness of breath     VANCE    Syncope 06/21/2024    Tension type headache 06/21/2024    Thrombocytopenia (CMS-HCC)     Tobacco dependence 08/15/2023    Last Assessment & Plan:    Formatting of this note might be different from the original.   Once again highly recommended cessation    Tubular adenoma of colon 06/21/2024     Past Surgical History:   Procedure Laterality Date    CARDIAC CATHETERIZATION N/A 04/22/2024    Procedure: Left And Right Heart Cath, Without LV;  Surgeon: Yobani Soto MD;  Location: [place] [address] [facility];  Service: Cardiovascular;  Laterality: N/A;  Schedule at 3:30pm Dr. Soto    CARDIAC ELECTROPHYSIOLOGY PROCEDURE N/A 6/17/2024    Procedure: Cardioversion;  Surgeon: Dick Edouard MD;  Location: [place] [address] [facility];  Service: Electrophysiology;  Laterality: N/A;    COLONOSCOPY      DENTAL SURGERY      TONSILLECTOMY       Family History   Problem Relation Name Age of Onset    Cancer Father  74    Brain Aneurysm Father       Social History     Tobacco Use    Smoking status: Every Day     Current packs/day: 0.50     Average packs/day: 0.5 packs/day for 49.5 years (24.8 ttl pk-yrs)     Types: Cigarettes     Start date: 1/1/1975     Passive exposure: Current    Smokeless tobacco: Never   Vaping Use    Vaping status: Never Used   Substance Use Topics    Alcohol use: Never    Drug use: Never       Physical Exam   ED Triage Vitals [07/15/24 1853]   Temperature Heart Rate Respirations BP   36.6 °C (97.9 °F) (!) 131 18 (!) 135/98      Pulse Ox Temp Source Heart Rate Source Patient Position   98 % Temporal Monitor --      BP Location FiO2 (%)     -- --       Physical Exam  Constitutional:       General: He is not in acute distress.     Appearance: Normal appearance. He is not ill-appearing or diaphoretic.   HENT:      Head:  Normocephalic and atraumatic.      Nose: No congestion or rhinorrhea.      Mouth/Throat:      Mouth: Mucous membranes are moist.      Pharynx: Oropharynx is clear. No oropharyngeal exudate or posterior oropharyngeal erythema.   Eyes:      General: No scleral icterus.     Extraocular Movements: Extraocular movements intact.      Pupils: Pupils are equal, round, and reactive to light.   Cardiovascular:      Rate and Rhythm: Tachycardia present. Rhythm irregular.      Pulses: Normal pulses.      Heart sounds: Normal heart sounds. No murmur heard.     No gallop.      Comments: 2+ radial and PT pulses bilaterally  Pulmonary:      Effort: Pulmonary effort is normal. No respiratory distress.      Breath sounds: Normal breath sounds. No stridor. No wheezing, rhonchi or rales.   Abdominal:      General: Bowel sounds are normal. There is no distension.      Palpations: Abdomen is soft. There is no mass.      Tenderness: There is no abdominal tenderness. There is no guarding or rebound.      Hernia: No hernia is present.   Musculoskeletal:         General: No swelling, deformity or signs of injury. Normal range of motion.      Cervical back: Normal range of motion and neck supple. No tenderness.   Skin:     General: Skin is warm and dry.      Capillary Refill: Capillary refill takes less than 2 seconds.      Findings: No erythema, lesion or rash.   Neurological:      General: No focal deficit present.      Mental Status: He is alert and oriented to person, place, and time. Mental status is at baseline.   Psychiatric:         Mood and Affect: Mood normal.         Behavior: Behavior normal.         ED Course & MDM   Diagnoses as of 07/15/24 2031   Atrial fibrillation with RVR (Multi)       Medical Decision Making  63-year-old male past medical history of hypertension status post MVR 6/17/2024 with subsequent A-fib with RVR status post cardioversion who presents today for palpitations.  Upon presentation, the patient does appear  to be in A-fib.  EKG here did demonstrate A-fib with RVR with a rate of 129.  I did evaluate the patient shortly after EKG was performed, and the patient overall appears well.  However, we will look for possible exacerbating factors potential causes for this.  Additionally, I will also start him on fluids.  Given the fact the patient's blood pressure is 120s over 90s, I will abstain from placing him on any additional medications.  While awaiting return of his labs, the patient's heart rate did appear to slowly downtrend from the 120s to the 1 teens to the 90s.  Patient's labs do demonstrate a decrease in his BNP, troponin of 18, which is down from 41 and 42 previously.  His electrolytes appear normal.  We did discuss with him that it may be secondary to his sotalol dose running out, which he feels comfortable with.  Patient states that he does follow with Dr. Johnson, and will call them tomorrow.  I feel comfortable for the patient to return home at this time.  All questions answered prior to discharge, return precaution provided.    Amount and/or Complexity of Data Reviewed  Labs: ordered.  Radiology: ordered.     Details: Chest x-ray does not demonstrate any effusion or consolidation suggestive of pneumonia.  ECG/medicine tests: ordered.     Details: EKG demonstrates A-fib with RVR with a rate of 129, no ST elevation, T wave inversion.        Procedure  Procedures     Jeffy Valentin MD  Resident  07/15/24 2037

## 2024-07-17 ENCOUNTER — APPOINTMENT (OUTPATIENT)
Dept: CARDIOLOGY | Facility: HOSPITAL | Age: 64
End: 2024-07-17
Payer: COMMERCIAL

## 2024-07-17 LAB
ALBUMIN SERPL BCP-MCNC: 3.4 G/DL (ref 3.4–5)
ALP SERPL-CCNC: 96 U/L (ref 33–136)
ALT SERPL W P-5'-P-CCNC: 10 U/L (ref 10–52)
ANION GAP SERPL CALC-SCNC: 9 MMOL/L (ref 10–20)
AST SERPL W P-5'-P-CCNC: 8 U/L (ref 9–39)
ATRIAL RATE: 133 BPM
ATRIAL RATE: 277 BPM
BILIRUB SERPL-MCNC: 0.3 MG/DL (ref 0–1.2)
BUN SERPL-MCNC: 12 MG/DL (ref 6–23)
CALCIUM SERPL-MCNC: 8.8 MG/DL (ref 8.6–10.3)
CARDIAC TROPONIN I PNL SERPL HS: 21 NG/L (ref 0–20)
CHLORIDE SERPL-SCNC: 105 MMOL/L (ref 98–107)
CO2 SERPL-SCNC: 29 MMOL/L (ref 21–32)
CREAT SERPL-MCNC: 0.99 MG/DL (ref 0.5–1.3)
EGFRCR SERPLBLD CKD-EPI 2021: 85 ML/MIN/1.73M*2
ERYTHROCYTE [DISTWIDTH] IN BLOOD BY AUTOMATED COUNT: 14.6 % (ref 11.5–14.5)
GLUCOSE SERPL-MCNC: 98 MG/DL (ref 74–99)
HCT VFR BLD AUTO: 40.4 % (ref 41–52)
HGB BLD-MCNC: 13 G/DL (ref 13.5–17.5)
INR PPP: 1.9 (ref 0.9–1.1)
MCH RBC QN AUTO: 28.3 PG (ref 26–34)
MCHC RBC AUTO-ENTMCNC: 32.2 G/DL (ref 32–36)
MCV RBC AUTO: 88 FL (ref 80–100)
NRBC BLD-RTO: 0 /100 WBCS (ref 0–0)
PLATELET # BLD AUTO: 204 X10*3/UL (ref 150–450)
POTASSIUM SERPL-SCNC: 4.2 MMOL/L (ref 3.5–5.3)
PR INTERVAL: 128 MS
PROT SERPL-MCNC: 6.3 G/DL (ref 6.4–8.2)
PROTHROMBIN TIME: 22 SECONDS (ref 9.8–12.8)
Q ONSET: 221 MS
Q ONSET: 225 MS
QRS COUNT: 19 BEATS
QRS COUNT: 22 BEATS
QRS DURATION: 82 MS
QRS DURATION: 88 MS
QT INTERVAL: 318 MS
QT INTERVAL: 344 MS
QTC CALCULATION(BAZETT): 467 MS
QTC CALCULATION(BAZETT): 473 MS
QTC FREDERICIA: 414 MS
QTC FREDERICIA: 422 MS
R AXIS: 55 DEGREES
R AXIS: 76 DEGREES
RBC # BLD AUTO: 4.59 X10*6/UL (ref 4.5–5.9)
SODIUM SERPL-SCNC: 139 MMOL/L (ref 136–145)
T AXIS: 47 DEGREES
T AXIS: 65 DEGREES
T OFFSET: 384 MS
T OFFSET: 393 MS
VENTRICULAR RATE: 111 BPM
VENTRICULAR RATE: 133 BPM
WBC # BLD AUTO: 8.7 X10*3/UL (ref 4.4–11.3)

## 2024-07-17 PROCEDURE — 93010 ELECTROCARDIOGRAM REPORT: CPT | Performed by: INTERNAL MEDICINE

## 2024-07-17 PROCEDURE — 93005 ELECTROCARDIOGRAM TRACING: CPT

## 2024-07-17 PROCEDURE — 2500000004 HC RX 250 GENERAL PHARMACY W/ HCPCS (ALT 636 FOR OP/ED)

## 2024-07-17 PROCEDURE — 80053 COMPREHEN METABOLIC PANEL: CPT

## 2024-07-17 PROCEDURE — 85610 PROTHROMBIN TIME: CPT

## 2024-07-17 PROCEDURE — 2060000001 HC INTERMEDIATE ICU ROOM DAILY

## 2024-07-17 PROCEDURE — 84484 ASSAY OF TROPONIN QUANT: CPT

## 2024-07-17 PROCEDURE — 2500000002 HC RX 250 W HCPCS SELF ADMINISTERED DRUGS (ALT 637 FOR MEDICARE OP, ALT 636 FOR OP/ED)

## 2024-07-17 PROCEDURE — 36415 COLL VENOUS BLD VENIPUNCTURE: CPT

## 2024-07-17 PROCEDURE — 85027 COMPLETE CBC AUTOMATED: CPT

## 2024-07-17 PROCEDURE — 9420000001 HC RT PATIENT EDUCATION 5 MIN

## 2024-07-17 PROCEDURE — 2500000001 HC RX 250 WO HCPCS SELF ADMINISTERED DRUGS (ALT 637 FOR MEDICARE OP)

## 2024-07-17 PROCEDURE — S4991 NICOTINE PATCH NONLEGEND: HCPCS

## 2024-07-17 PROCEDURE — 99222 1ST HOSP IP/OBS MODERATE 55: CPT | Performed by: STUDENT IN AN ORGANIZED HEALTH CARE EDUCATION/TRAINING PROGRAM

## 2024-07-17 RX ORDER — ALBUTEROL SULFATE 0.83 MG/ML
2.5 SOLUTION RESPIRATORY (INHALATION) EVERY 2 HOUR PRN
Status: DISCONTINUED | OUTPATIENT
Start: 2024-07-17 | End: 2024-07-18 | Stop reason: HOSPADM

## 2024-07-17 ASSESSMENT — COGNITIVE AND FUNCTIONAL STATUS - GENERAL
MOBILITY SCORE: 24
DAILY ACTIVITIY SCORE: 24
MOBILITY SCORE: 24
DAILY ACTIVITIY SCORE: 24

## 2024-07-17 ASSESSMENT — ENCOUNTER SYMPTOMS
COUGH: 0
PALPITATIONS: 1
WEAKNESS: 0
ABDOMINAL PAIN: 0
FEVER: 0
ABDOMINAL DISTENTION: 0
CHILLS: 0
SHORTNESS OF BREATH: 1
DIZZINESS: 0

## 2024-07-17 ASSESSMENT — ACTIVITIES OF DAILY LIVING (ADL): LACK_OF_TRANSPORTATION: NO

## 2024-07-17 ASSESSMENT — PAIN SCALES - GENERAL
PAINLEVEL_OUTOF10: 0 - NO PAIN
PAINLEVEL_OUTOF10: 0 - NO PAIN

## 2024-07-17 NOTE — CARE PLAN
The patient's goals for the shift include      The clinical goals for the shift include tolerate amio drip    Over the shift, the patient did not make progress toward the following goals. Barriers to progression include patient tolerating drip. Recommendations to address these barriers include continue with plan of care.

## 2024-07-17 NOTE — CONSULTS
Inpatient consult to Cardiology  Consult performed by: Patricia Rdz, LENY-CNP  Consult ordered by: Ledy Ross DO  Reason for consult: afib          History Of Present Illness  Farooq Lang is a 63 y.o. male presenting with complaints of palpitations, elevated HR and shortness of breath. He had been hospitalized last week for AF RVR and was started on Sotalol. He states it helped a little but then his HR went back up.    Lab work in the ER showed glucose 97, sodium 135, potassium 4.0, BUN/Cr 14/0.87, magnesium 1.95, , troponin 18, 20, INR 2.0, H&H 14.2/43.8, TSH 1.32, CTA of the chest showed moderate emphysema.     EKG showed atrial flutter with RVR, Qtc WNL.    He was started on an amiodarone gtt.       Past Medical History  Past Medical History:   Diagnosis Date    A-fib (Multi)     Acute bronchitis 06/21/2024    Acute right lower quadrant pain 06/21/2024    Atherosclerosis of abdominal aorta (CMS-HCC) 03/28/2016    Last Assessment & Plan:    Formatting of this note might be different from the original.   Stable asymptomatic  Formatting of this note might be different from the original.   Repeat CT reveals moderate partially calcified wall changes. Stable 6 mm thickness/plaque in the inferior aspect of the isthmus and 6 mm wall thickening/plaque along the minor curvature of the proximal descending aorta.   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   Previous history of asthma   Current everyday smoker   Also CT chest 3/28 with findings for COPD   Duonebs as needed    Costochondritis 06/21/2024    Cough 06/21/2024    Disease due to severe acute respiratory syndrome coronavirus 2 (SARS-CoV-2) 09/06/2022    Comment on above: COVID SYMPTOMS    Diverticular disease 06/21/2024    Diverticulosis     Diverticulosis 08/15/2023     Formatting of this note might be different from the original.   Formatting of this note might be different from the original. hx diverticulitis at same time as pneumonia  Formatting of this note might be different from the original.   hx diverticulitis at same time as pneumonia    Dizziness     Dyspnea 06/21/2024    Fecal smearing 06/21/2024    Fecal soiling 06/21/2024    Headache 06/16/2023    Heart failure with mid-range ejection fraction (HFmEF) (Multi)     (45%, 3/28/24)    Hypotension 06/21/2024    Incomplete passage of stool 06/21/2024    Insomnia 06/21/2024    Irregular heart beat     afib on coumadin    Irritable bowel syndrome     Irritable bowel syndrome with diarrhea 06/21/2024    Low back pain 06/21/2024    Mitral valve stenosis     Nausea & vomiting 06/21/2024    Nonspecific syndrome suggestive of viral illness 06/21/2024    Other chest pain 03/10/2016    Chest discomfort    Other emphysema (Multi) 02/06/2024    Last Assessment & Plan:    Formatting of this note might be different from the original.   As seen on cxr.  Cough is likely related to viral infection.  COPD contributes highly recommended smoking cessation to reduce progression of COPD.  With symptoms improving pt declined inhaler at this time    Pleurodynia 11/29/2017    Chest pain, pleuritic    Pneumonia 06/21/2024    Prediabetes 03/29/2016    Formatting of this note might be different from the original.   Hemoglobin a1c 6.0.   Need education on lifestyle modification.    Right lower quadrant pain 11/14/2018    Abdominal pain, acute, right lower quadrant    Shortness of breath     VANCE    Syncope 06/21/2024    Tension type headache 06/21/2024    Thrombocytopenia (CMS-HCC)     Tobacco dependence 08/15/2023    Last Assessment & Plan:    Formatting of this note might be different from the original.   Once again highly recommended cessation    Tubular adenoma of colon 06/21/2024       Surgical History  Past Surgical History:   Procedure  Laterality Date    CARDIAC CATHETERIZATION N/A 04/22/2024    Procedure: Left And Right Heart Cath, Without LV;  Surgeon: Yobani Soto MD;  Location: [place] [address] Cardiac Cath [facility];  Service: Cardiovascular;  Laterality: N/A;  Schedule at 3:30pm Dr. Soto    CARDIAC ELECTROPHYSIOLOGY PROCEDURE N/A 6/17/2024    Procedure: Cardioversion;  Surgeon: Dick Edouard MD;  Location: [place] [address] Cardiac Cath Lab;  Service: Electrophysiology;  Laterality: N/A;    COLONOSCOPY      DENTAL SURGERY      TONSILLECTOMY     S/p mitral valve replacement with 27mm mitris bioprosthetic valve and left atrial appendage ligation      Social History  He reports that he has been smoking cigarettes. He started smoking about 49 years ago. He has a 24.8 pack-year smoking history. He has been exposed to tobacco smoke. He has never used smokeless tobacco. He reports that he does not drink alcohol and does not use drugs.    Family History  Family History   Problem Relation Name Age of Onset    Cancer Father  74    Brain Aneurysm Father          Allergies  Metoprolol    Review of Systems  Review of Systems   Constitutional:  Negative for chills and fever.   Respiratory:  Positive for shortness of breath. Negative for cough.    Cardiovascular:  Positive for palpitations. Negative for chest pain and leg swelling.   Gastrointestinal:  Negative for abdominal distention and abdominal pain.   Neurological:  Negative for dizziness and weakness.   All other systems reviewed and are negative.         Physical Exam  Constitutional: Well developed, awake/alert/oriented x3, no distress, alert and cooperative  Eyes: PERRL, EOMI, clear sclera  ENMT: mucous membranes moist, no apparent injury, no lesions seen  Head/Neck: Neck supple, no apparent injury, thyroid without mass or tenderness, No JVD, trachea midline, no bruits  Respiratory/Thorax: Patent airways, CTAB, normal breath sounds with good chest expansion, thorax symmetric  Cardiovascular:  "tachycardic, irregular rhythm, no murmurs, 2+ equal pulses of the extremities, normal S 1and S 2  Gastrointestinal: Nondistended, soft, non-tender, no rebound tenderness or guarding, no masses palpable, no organomegaly, +BS, no bruits  Musculoskeletal: ROM intact, no joint swelling, normal strength  Extremities: normal extremities, no cyanosis edema, contusions or wounds, no clubbing  Neurological: alert and oriented x3, intact senses, motor, response and reflexes, normal strength  Lymphatic: No significant lymphadenopathy  Psychological: Appropriate mood and behavior  Skin: Warm and dry, no lesions, no rashes       Last Recorded Vitals  Blood pressure 117/64, pulse (!) 126, temperature 36.6 °C (97.9 °F), temperature source Temporal, resp. rate 16, height 1.676 m (5' 6\"), weight 58.1 kg (128 lb), SpO2 97%.    Relevant Results    Scheduled medications  aspirin, 81 mg, oral, Daily  nicotine, 1 patch, transdermal, Daily  senna, 10 mL, oral, BID  warfarin, 6 mg, oral, Daily      Continuous medications  amiodarone, 1 mg/min, Last Rate: 1 mg/min (07/17/24 1224)      PRN medications  PRN medications: acetaminophen **OR** acetaminophen **OR** acetaminophen, albuterol    Results for orders placed or performed during the hospital encounter of 07/16/24 (from the past 24 hour(s))   ECG 12 lead   Result Value Ref Range    Ventricular Rate 133 BPM    Atrial Rate 133 BPM    FL Interval 128 ms    QRS Duration 82 ms    QT Interval 318 ms    QTC Calculation(Bazett) 473 ms    R Axis 76 degrees    T Axis 65 degrees    QRS Count 22 beats    Q Onset 225 ms    T Offset 384 ms    QTC Fredericia 414 ms   CBC and Auto Differential   Result Value Ref Range    WBC 8.5 4.4 - 11.3 x10*3/uL    nRBC 0.0 0.0 - 0.0 /100 WBCs    RBC 5.06 4.50 - 5.90 x10*6/uL    Hemoglobin 14.2 13.5 - 17.5 g/dL    Hematocrit 43.8 41.0 - 52.0 %    MCV 87 80 - 100 fL    MCH 28.1 26.0 - 34.0 pg    MCHC 32.4 32.0 - 36.0 g/dL    RDW 14.4 11.5 - 14.5 %    Platelets 234 150 " - 450 x10*3/uL    Neutrophils % 57.5 40.0 - 80.0 %    Immature Granulocytes %, Automated 0.2 0.0 - 0.9 %    Lymphocytes % 27.4 13.0 - 44.0 %    Monocytes % 10.9 2.0 - 10.0 %    Eosinophils % 3.3 0.0 - 6.0 %    Basophils % 0.7 0.0 - 2.0 %    Neutrophils Absolute 4.87 1.20 - 7.70 x10*3/uL    Immature Granulocytes Absolute, Automated 0.02 0.00 - 0.70 x10*3/uL    Lymphocytes Absolute 2.32 1.20 - 4.80 x10*3/uL    Monocytes Absolute 0.92 0.10 - 1.00 x10*3/uL    Eosinophils Absolute 0.28 0.00 - 0.70 x10*3/uL    Basophils Absolute 0.06 0.00 - 0.10 x10*3/uL   Comprehensive metabolic panel   Result Value Ref Range    Glucose 97 74 - 99 mg/dL    Sodium 135 (L) 136 - 145 mmol/L    Potassium 4.0 3.5 - 5.3 mmol/L    Chloride 103 98 - 107 mmol/L    Bicarbonate 25 21 - 32 mmol/L    Anion Gap 11 10 - 20 mmol/L    Urea Nitrogen 14 6 - 23 mg/dL    Creatinine 0.87 0.50 - 1.30 mg/dL    eGFR >90 >60 mL/min/1.73m*2    Calcium 9.0 8.6 - 10.3 mg/dL    Albumin 3.7 3.4 - 5.0 g/dL    Alkaline Phosphatase 110 33 - 136 U/L    Total Protein 7.0 6.4 - 8.2 g/dL    AST 10 9 - 39 U/L    Bilirubin, Total 0.3 0.0 - 1.2 mg/dL    ALT 11 10 - 52 U/L   Magnesium   Result Value Ref Range    Magnesium 1.95 1.60 - 2.40 mg/dL   B-Type Natriuretic Peptide   Result Value Ref Range     (H) 0 - 99 pg/mL   Protime-INR   Result Value Ref Range    Protime 22.3 (H) 9.8 - 12.8 seconds    INR 2.0 (H) 0.9 - 1.1   Troponin I, High Sensitivity, Initial   Result Value Ref Range    Troponin I, High Sensitivity 18 0 - 20 ng/L   D-Dimer, VTE Exclusion   Result Value Ref Range    D-Dimer, Quantitative VTE Exclusion 791 (H) <=500 ng/mL FEU   TSH with reflex to Free T4 if abnormal   Result Value Ref Range    Thyroid Stimulating Hormone 1.32 0.44 - 3.98 mIU/L   Troponin, High Sensitivity, 1 Hour   Result Value Ref Range    Troponin I, High Sensitivity 20 0 - 20 ng/L   ECG 12 lead   Result Value Ref Range    Ventricular Rate 111 BPM    Atrial Rate 277 BPM    QRS Duration 88  ms    QT Interval 344 ms    QTC Calculation(Bazett) 467 ms    R Axis 55 degrees    T Axis 47 degrees    QRS Count 19 beats    Q Onset 221 ms    T Offset 393 ms    QTC Fredericia 422 ms   Troponin I, High Sensitivity   Result Value Ref Range    Troponin I, High Sensitivity 21 (H) 0 - 20 ng/L   CBC   Result Value Ref Range    WBC 8.7 4.4 - 11.3 x10*3/uL    nRBC 0.0 0.0 - 0.0 /100 WBCs    RBC 4.59 4.50 - 5.90 x10*6/uL    Hemoglobin 13.0 (L) 13.5 - 17.5 g/dL    Hematocrit 40.4 (L) 41.0 - 52.0 %    MCV 88 80 - 100 fL    MCH 28.3 26.0 - 34.0 pg    MCHC 32.2 32.0 - 36.0 g/dL    RDW 14.6 (H) 11.5 - 14.5 %    Platelets 204 150 - 450 x10*3/uL   Comprehensive metabolic panel   Result Value Ref Range    Glucose 98 74 - 99 mg/dL    Sodium 139 136 - 145 mmol/L    Potassium 4.2 3.5 - 5.3 mmol/L    Chloride 105 98 - 107 mmol/L    Bicarbonate 29 21 - 32 mmol/L    Anion Gap 9 (L) 10 - 20 mmol/L    Urea Nitrogen 12 6 - 23 mg/dL    Creatinine 0.99 0.50 - 1.30 mg/dL    eGFR 85 >60 mL/min/1.73m*2    Calcium 8.8 8.6 - 10.3 mg/dL    Albumin 3.4 3.4 - 5.0 g/dL    Alkaline Phosphatase 96 33 - 136 U/L    Total Protein 6.3 (L) 6.4 - 8.2 g/dL    AST 8 (L) 9 - 39 U/L    Bilirubin, Total 0.3 0.0 - 1.2 mg/dL    ALT 10 10 - 52 U/L   Protime-INR   Result Value Ref Range    Protime 22.0 (H) 9.8 - 12.8 seconds    INR 1.9 (H) 0.9 - 1.1       CT angio chest for pulmonary embolism   Final Result   No pulmonary embolism.        Mild bibasilar atelectasis. Otherwise, acute cardiopulmonary process.        Moderate emphysema.             MACRO:   None        Signed by: Nona Baltazar 7/16/2024 6:50 PM   Dictation workstation:   [id number]          Transthoracic Echo (TTE) Complete    Result Date: 6/8/2024   [facility], [address] [place] [address]                Tel [phone] and Fax [phone] TRANSTHORACIC ECHOCARDIOGRAM REPORT  Patient Name:      MADYSON Barclay Physician:    [id number] Tyler Tyson                                                                MD Study Date:        6/8/2024             Ordering Provider:    [id number] FAROOQ LOMAS MRN/PID:           [id number]             Fellow: Accession#:        [id number]         Nurse: Date of Birth/Age: 1960 / 63 years Sonographer:          Adiel Rai RDCS Gender:            M                    Additional Staff: Height:            167.64 cm            Admit Date: Weight:            56.70 kg             Admission Status:     Inpatient -                                                               Routine BSA / BMI:         1.64 m2 / 20.18      Encounter#:           [id number]                    kg/m2                                         Department Location:  [facility] Non                                                               Invasive Blood Pressure: 111 /60 mmHg Study Type:    TRANSTHORACIC ECHO (TTE) COMPLETE Diagnosis/ICD: Presence of prosthetic heart valve-Z95.2 Indication:    S/p MVR, KONRAD closure (06/04/24) CPT Code:      Echo Complete w Full Doppler-[id number] Patient History: Pertinent History: Chest Pain and Dyspnea. S/p MVR #27mm mitris bioprosthetic                    valve, OKNRAD closure (06/04/24) Afib, HFrEF, SOB and                    thrombocytopenia. Study Detail: The following Echo studies were performed: 2D, M-Mode, Doppler and               color flow.  PHYSICIAN INTERPRETATION: Left Ventricle: The left ventricular systolic function is normal, with an estimated ejection fraction of 55-60%. The patient is in atrial fibrillation which may influence the estimate of left ventricular function and transvalvular flows. There are no regional wall motion abnormalities. The left ventricular cavity size is normal. Abnormal (paradoxical) septal motion consistent with post-operative status and abnormal (paradoxical) septal motion, consistent with RV pacemaker. Left ventricular diastolic  filling was indeterminate. Left Atrium: The left atrium is severely dilated. Right Ventricle: The right ventricle is normal in size. There is normal right ventricular global systolic function. Right Atrium: The right atrium is normal in size. Aortic Valve: The aortic valve is trileaflet. There is mild aortic valve cusp calcification. There is no evidence of reduced aortic valve leaflet excursion excursion. There is no evidence of aortic valve regurgitation. The peak instantaneous gradient of the aortic valve is 6.4 mmHg. Mitral Valve: There is a prosthetic mitral valve present. There is a Mitris mitral valve bioprosthesis with a 27 mm reported size. There is no evidence of mitral valve regurgitation. Peak and mean mitral diastolic gradients are 14.5 and 5.4 mmHg at a heart rate of 72 bpm. The DI is ~ 2. The pressure half-time is ~ 90 ms, resulting in a mitral orifice area of 2.4. Tricuspid Valve: The tricuspid valve is structurally normal. There is mild tricuspid regurgitation. Pulmonic Valve: The pulmonic valve is structurally normal. There is trace pulmonic valve regurgitation. Pericardium: There is no pericardial effusion noted. Aorta: The aortic root is normal. In comparison to the previous echocardiogram(s): Although previous studies are available for review, their comparison with the current echocardiogram is clinically irrelevant.  CONCLUSIONS:  1. Left ventricular systolic function is normal with a 55-60% estimated ejection fraction.  2. Abnormal septal motion consistent with post-operative status and abnormal septal motion consistent with RV pacemaker.  3. The left atrium is severely dilated.  4. Peak and mean mitral diastolic gradients are 14.5 and 5.4 mmHg at a heart rate of 72 bpm. The DI is ~ 2. The pressure half-time is ~ 90 ms, resulting in a mitral orifice area of 2.4.  5. The patient is in atrial fibrillation which may influence the estimate of left ventricular function and transvalvular flows.  QUANTITATIVE DATA SUMMARY: 2D MEASUREMENTS:                           Normal Ranges: Ao Root d:     3.30 cm    (2.0-3.7cm) LAs:           5.80 cm    (2.7-4.0cm) IVSd:          1.10 cm    (0.6-1.1cm) LVPWd:         1.10 cm    (0.6-1.1cm) LVIDd:         4.60 cm    (3.9-5.9cm) LVIDs:         3.60 cm LV Mass Index: 110.6 g/m2 LV % FS        21.7 % LA VOLUME:                               Normal Ranges: LA Vol A4C:        106.5 ml   (22+/-6mL/m2) LA Vol A2C:        118.7 ml LA Vol BP:         113.9 ml LA Vol Index A4C:  65.0ml/m2 LA Vol Index A2C:  72.5 ml/m2 LA Vol Index BP:   69.5 ml/m2 LA Area A4C:       28.6 cm2 LA Area A2C:       29.8 cm2 LA Major Axis A4C: 6.5 cm LA Major Axis A2C: 6.4 cm LA Volume Index:   69.5 ml/m2 AORTA MEASUREMENTS:                      Normal Ranges: Ao Sinus, d: 3.30 cm (2.1-3.5cm) LV SYSTOLIC FUNCTION BY 2D PLANIMETRY (MOD):                     Normal Ranges: EF-A4C View: 50.1 % (>=55%) EF-A2C View: 69.2 % EF-Biplane:  61.4 % LV DIASTOLIC FUNCTION:                     Normal Ranges: MV Peak E: 1.50 m/s (0.7-1.2 m/s) MITRAL VALVE:                       Normal Ranges: MV Vmax:    1.91 m/s  (<=1.3m/s) MV peak P.5 mmHg (<5mmHg) MV mean P.4 mmHg  (<2mmHg) MV DT:      226 msec  (150-240msec) AORTIC VALVE:                         Normal Ranges: AoV Vmax:      1.26 m/s (<=1.7m/s) AoV Peak P.4 mmHg (<20mmHg) LVOT Max Christian:  1.00 m/s (<=1.1m/s) LVOT VTI:      20.40 cm LVOT Diameter: 1.80 cm  (1.8-2.4cm) AoV Area,Vmax: 2.02 cm2 (2.5-4.5cm2) TRICUSPID VALVE/RVSP:                             Normal Ranges: Peak TR Velocity: 2.44 m/s RV Syst Pressure: 26.8 mmHg (< 30mmHg) IVC Diam:         2.48 cm PULMONIC VALVE:                      Normal Ranges: PV Max Christian: 0.9 m/s  (0.6-0.9m/s) PV Max PG:  3.5 mmHg  [id number] Tyler Tyson MD Electronically signed on 2024 at 2:33:10 PM  ** Final **     Transthoracic Echo (TTE) Complete    Result Date: 3/28/2024   [facility], [address]  [place] [address]               Tel [phone] and Fax [phone] TRANSTHORACIC ECHOCARDIOGRAM REPORT  Patient Name:      MADYSON BERNARDO ADRIANA      Reading Physician:    [id number] Morris Johnson MD Study Date:        3/28/2024            Ordering Provider:    [id number] CHARISSA HILLIARD MRN/PID:           [id number]             Fellow: Accession#:        [id number]         Nurse:                Natalia Aleman RN Date of Birth/Age: 1960 / 63 years Sonographer:          Helen Linda JAQUELINE Gender:            M                    Additional Staff: Height:            167.64 cm            Admit Date:           3/27/2024 Weight:            57.15 kg             Admission Status:     Inpatient -                                                               Routine BSA / BMI:         1.64 m2 / 20.34      Encounter#:           [id number]                    kg/m2                                         Department Location:  [facility] Non                                                               Invasive Blood Pressure: 124 /85 mmHg Study Type:    TRANSTHORACIC ECHO (TTE) COMPLETE Diagnosis/ICD: Unspecified atrial fibrillation-I48.91 Indication:    Atrial Fibrillation CPT Code:      Echo Complete w Full Doppler-[id number] Patient History: Smoker:            Current. Pertinent History: Chest Pain and Dyspnea. Dizziness. Study Detail: The following Echo studies were performed: 2D, M-Mode, Doppler and               color flow. Definity used as a contrast agent for endocardial               border definition. Total contrast used for this procedure was 1.0               mL via IV push. The patient was awake.  Critical Event Critical Event: Test was completed as per department protocol. Critical Finding: Mitral stenosis. Time Test was Completed: 10:45:33 AM Notified: Patricia Morrison NP. Attending notification time: 11:01:31 AM  PHYSICIAN INTERPRETATION: Left  Ventricle: The left ventricular systolic function is mildly decreased, with an estimated ejection fraction of 45%. There is global hypokinesis of the left ventricle with minor regional variations. The left ventricular cavity size is mildly dilated. Left ventricular diastolic filling was indeterminate. Left Atrium: The left atrium is severely dilated. Right Ventricle: The right ventricle is normal in size. There is normal right ventricular global systolic function. Right Atrium: The right atrium is mildly dilated. Aortic Valve: The aortic valve is trileaflet. There is mild aortic valve cusp calcification. There is trace to mild aortic valve regurgitation. The peak instantaneous gradient of the aortic valve is 3.1 mmHg. The mean gradient of the aortic valve is 2.0 mmHg. Mitral Valve: The mitral valve is moderately thickened. 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 There is evidence of severe mitral valve stenosis. The doppler estimated mean and peak diastolic pressure gradients are 12.7 mmHg and 21.8 mmHg respectively. There is moderate mitral annular calcification. There is moderate mitral valve regurgitation. Tricuspid Valve: The tricuspid valve is structurally normal. There is mild tricuspid regurgitation. Pulmonic Valve: The pulmonic valve is not well visualized. The pulmonic valve regurgitation was not well visualized. Pericardium: There is no pericardial effusion noted. Aorta: The aortic root is normal. Pulmonary Artery: The tricuspid regurgitant velocity is 3.32 m/s, and with an estimated right atrial pressure of 3 mmHg, the estimated pulmonary artery pressure is mild to moderately elevated with the RVSP at 47.1 mmHg.  CONCLUSIONS:  1. Left ventricular systolic function is mildly decreased with a 45% estimated ejection fraction.  2. The left atrium is severely dilated.  3. The mitral valve is moderately thickened. 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  4. There is moderate mitral annular calcification.   5. There is severe mitral valve stenosis.  6. Moderate mitral valve regurgitation.  7. Mild to moderately elevated pulmonary artery pressure.  8. There is global hypokinesis of the left ventricle with minor regional variations. QUANTITATIVE DATA SUMMARY: 2D MEASUREMENTS:                          Normal Ranges: IVSd:          0.60 cm   (0.6-1.1cm) LVPWd:         0.90 cm   (0.6-1.1cm) LVIDd:         4.47 cm   (3.9-5.9cm) LVIDs:         3.60 cm LV Mass Index: 63.0 g/m2 LV % FS        19.4 % LA VOLUME:                               Normal Ranges: LA Vol A4C:        71.6 ml    (22+/-6mL/m2) LA Vol A2C:        67.4 ml LA Vol BP:         70.7 ml LA Vol Index A4C:  43.6 ml/m2 LA Vol Index A2C:  41.0 ml/m2 LA Vol Index BP:   43.0 ml/m2 LA Area A4C:       22.3 cm2 LA Area A2C:       22.0 cm2 LA Major Axis A4C: 5.9 cm LA Major Axis A2C: 6.1 cm LA Volume Index:   40.8 ml/m2 LA Vol A4C:        66.6 ml LA Vol A2C:        64.7 ml RA VOLUME BY A/L METHOD:                       Normal Ranges: RA Area A4C: 17.6 cm2 M-MODE MEASUREMENTS:                  Normal Ranges: Ao Root: 3.20 cm (2.0-3.7cm) LAs:     5.42 cm (2.7-4.0cm) AORTA MEASUREMENTS:                      Normal Ranges: Ao Sinus, d: 3.30 cm (2.1-3.5cm) Asc Ao, d:   3.00 cm (2.1-3.4cm) LV SYSTOLIC FUNCTION BY 2D PLANIMETRY (MOD):                     Normal Ranges: EF-A4C View: 41.3 % (>=55%) EF-A2C View: 40.4 % EF-Biplane:  41.5 % LV DIASTOLIC FUNCTION:                            Normal Ranges: MV Peak E:      1.85 m/s   (0.7-1.2 m/s) MV e'           0.05 m/s   (>8.0) MV lateral e'   0.05 m/s MV medial e'    0.04 m/s E/e' Ratio:     36.95      (<8.0) PulmV Sys Christian:  17.52 cm/s PulmV Thompson Christian: 32.92 cm/s PulmV S/D Christian:  0.53 MITRAL VALVE:                       Normal Ranges: MV Vmax:    2.33 m/s  (<=1.3m/s) MV peak P.8 mmHg (<5mmHg) MV mean P.7 mmHg (<2mmHg) MV VTI:     60.69 cm  (10-13cm) MITRAL INSUFFICIENCY:                      Normal Ranges: MR VTI:  150.95 cm  MR Vmax: 491.71 cm/s AORTIC VALVE:                                   Normal Ranges: AoV Vmax:                0.88 m/s (<=1.7m/s) AoV Peak PG:             3.1 mmHg (<20mmHg) AoV Mean P.0 mmHg (1.7-11.5mmHg) LVOT Max Christian:            0.68 m/s (<=1.1m/s) AoV VTI:                 14.53 cm (18-25cm) LVOT VTI:                12.65 cm LVOT Diameter:           1.98 cm  (1.8-2.4cm) AoV Area, VTI:           2.69 cm2 (2.5-5.5cm2) AoV Area,Vmax:           2.38 cm2 (2.5-4.5cm2) AoV Dimensionless Index: 0.87  RIGHT VENTRICLE: RV Basal 3.10 cm RV Mid   2.20 cm RV Major 6.7 cm TAPSE:   18.0 mm RV s'    0.10 m/s TRICUSPID VALVE/RVSP:                             Normal Ranges: Peak TR Velocity: 3.32 m/s RV Syst Pressure: 47.1 mmHg (< 30mmHg) IVC Diam:         2.21 cm PULMONIC VALVE:                      Normal Ranges: PV Max Christian: 0.7 m/s  (0.6-0.9m/s) PV Max P.7 mmHg Pulmonary Veins: PulmV Thompson Christian: 32.92 cm/s PulmV S/D Christian:  0.53 PulmV Sys Christian:  17.52 cm/s AORTA: Asc Ao Diam 3.01 cm  [id number] Morris Johnson MD Electronically signed on 3/28/2024 at 12:19:54 PM  ** Final **         Assessment/Plan   Paroxysmal atrial fibrillation/flutter with RVR  -s/p DCCV 2024 post MVR  -Also had LAAL  -EKG reviewed, AF RVR  -Stop Sotalol  -Started on amiodarone gtt->cont another 24 hours  -Cont coumadin, goal INR 2-3     2. S/p Mitral valve replacement  -Post op echo showed normal functioning valve     3. Elevated troponin-Non MI elevation 2/2 AF RVR  -[facility] 2024 showed normal coronary arteries  -Echo as above     4. Chronic diastolic CHF  -Euvolemic  -2gm na diet  -Daily weights  -Monitor     5. Tobacco use  -advised to quit      Patricia Rdz, APRN-CNP

## 2024-07-17 NOTE — CARE PLAN
The patient's goals for the shift include      The clinical goals for the shift include tolerate amio drip    Over the shift, the patient did not make progress toward the following goals. Barriers to progression include none patient currently tolerating drip. Recommendations to address these barriers include continue with plan of care.

## 2024-07-17 NOTE — H&P
Internal Medicine-History and Physical Note   Patient: Farooq Lang  Room/bed:  250/250-B  Admitted on: 7/16/2024    Age: 63 y.o.   Gender:male   Code Status:  Full Code   Admitting Dx: Atrial fibrillation, unspecified type (Multi) [I48.91]    MRN: [id number]  PCP: Munir Amado DO     Attending: [unfilled] Nurse: No care team member to display  TCC: No care team member to display        HPI    HPI: Farooq Lang is a 63 y.o. male with a history of A-fib on warfarin, HFrEF, mitral valve stenosis secondary to rheumatic disease s/p MVR Bioprosthetic Valve and KONRAD clipping with Dr. Nugent at [facility] in 6/2024 who presented to the hospital for rapid heart rate and palpitations. Patient was admitted with the same complaint on 7/08/24. He was discharged the following day on sotalol 40mg twice daily, but symptoms persisted and the patient returned to the ED yesterday and today for continued rapid heart rate.    Patient states that his at-home heart rate has been 135 consistently for the past couple days. He was taking the sotalol as prescribed and states that the medication would improve his heart rate temporarily in the morning before it would become rapid again. He complains of mild SOB on exertion but denies all other symptoms.     Patient's last dose of sotalol was 6am this morning. Patient informed Dr. Johnson of his symptoms and was told to discontinue sotalol but after reading information online, was concerned that he would have a heart attack if discontinued.     In the ED, patient was started on IV amiodarone. CTA chest was negative for PE.     Chief Complaint   Patient presents with    Rapid Heart Rate     Was here yesterday for same issue. Was told to FU with Dr Johnson, States Dr Johnson told him to stop taking his Sotalol. States he read on google if you stop taking the Sotalol he could have a heart attack. Currently denies CP. Endorses SOB       ED course:     Vitals: T:97.9 ,BP: 135/98 , ,RR18 Spo2 98 on  RA    Labs:   - CBC: Unremarkable  - CMP: Unremarkable  -BNP: 403, Troponin 18, INR 2.0, D-dimer 791      Imaging:   - CTA: negative for PE    Micro:   - None    Interventions:     ROS: 12 points review of system is negative except as stated in the HPI above.   ROS  Review of Systems   Cardiovascular:  Positive for palpitations.   All other systems reviewed and are negative.       Past Medical History     Past Medical History:   Diagnosis Date    A-fib (Multi)     Acute bronchitis 06/21/2024    Acute right lower quadrant pain 06/21/2024    Atherosclerosis of abdominal aorta (CMS-HCC) 03/28/2016    Last Assessment & Plan:    Formatting of this note might be different from the original.   Stable asymptomatic  Formatting of this note might be different from the original.   Repeat CT reveals moderate partially calcified wall changes. Stable 6 mm thickness/plaque in the inferior aspect of the isthmus and 6 mm wall thickening/plaque along the minor curvature of the proximal descending aorta.   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   Previous history of asthma   Current everyday smoker   Also CT chest 3/28 with findings for COPD   Duonebs as needed    Costochondritis 06/21/2024    Cough 06/21/2024    Disease due to severe acute respiratory syndrome coronavirus 2 (SARS-CoV-2) 09/06/2022    Comment on above: COVID SYMPTOMS    Diverticular disease 06/21/2024    Diverticulosis     Diverticulosis 08/15/2023    Formatting of this note might be different from the original.   Formatting of this note might be different from the original. hx diverticulitis at same time as pneumonia  Formatting of this note might be different from the original.   hx diverticulitis at same time as pneumonia    Dizziness     Dyspnea 06/21/2024    Fecal smearing 06/21/2024     Fecal soiling 06/21/2024    Headache 06/16/2023    Heart failure with mid-range ejection fraction (HFmEF) (Multi)     (45%, 3/28/24)    Hypotension 06/21/2024    Incomplete passage of stool 06/21/2024    Insomnia 06/21/2024    Irregular heart beat     afib on coumadin    Irritable bowel syndrome     Irritable bowel syndrome with diarrhea 06/21/2024    Low back pain 06/21/2024    Mitral valve stenosis     Nausea & vomiting 06/21/2024    Nonspecific syndrome suggestive of viral illness 06/21/2024    Other chest pain 03/10/2016    Chest discomfort    Other emphysema (Multi) 02/06/2024    Last Assessment & Plan:    Formatting of this note might be different from the original.   As seen on cxr.  Cough is likely related to viral infection.  COPD contributes highly recommended smoking cessation to reduce progression of COPD.  With symptoms improving pt declined inhaler at this time    Pleurodynia 11/29/2017    Chest pain, pleuritic    Pneumonia 06/21/2024    Prediabetes 03/29/2016    Formatting of this note might be different from the original.   Hemoglobin a1c 6.0.   Need education on lifestyle modification.    Right lower quadrant pain 11/14/2018    Abdominal pain, acute, right lower quadrant    Shortness of breath     VANCE    Syncope 06/21/2024    Tension type headache 06/21/2024    Thrombocytopenia (CMS-HCC)     Tobacco dependence 08/15/2023    Last Assessment & Plan:    Formatting of this note might be different from the original.   Once again highly recommended cessation    Tubular adenoma of colon 06/21/2024        Surgical History     Past Surgical History:   Procedure Laterality Date    CARDIAC CATHETERIZATION N/A 04/22/2024    Procedure: Left And Right Heart Cath, Without LV;  Surgeon: Yobani Soto MD;  Location: [place] [address] Cardiac Cath Lab;  Service: Cardiovascular;  Laterality: N/A;  Schedule at 3:30pm Dr. Soto    CARDIAC ELECTROPHYSIOLOGY PROCEDURE N/A 6/17/2024    Procedure: Cardioversion;   Surgeon: Dick Edouard MD;  Location: [place] [address] Cardiac Cath Lab;  Service: Electrophysiology;  Laterality: N/A;    COLONOSCOPY      DENTAL SURGERY      TONSILLECTOMY         Family History     Family History   Problem Relation Name Age of Onset    Cancer Father  74    Brain Aneurysm Father         Social History     Social History     Socioeconomic History    Marital status:      Spouse name: Not on file    Number of children: Not on file    Years of education: Not on file    Highest education level: Not on file   Occupational History    Not on file   Tobacco Use    Smoking status: Every Day     Current packs/day: 0.50     Average packs/day: 0.5 packs/day for 49.5 years (24.8 ttl pk-yrs)     Types: Cigarettes     Start date: 1/1/1975     Passive exposure: Current    Smokeless tobacco: Never   Vaping Use    Vaping status: Never Used   Substance and Sexual Activity    Alcohol use: Never    Drug use: Never    Sexual activity: Defer   Other Topics Concern    Not on file   Social History Narrative    Not on file     Social Determinants of Health     Financial Resource Strain: Low Risk  (7/8/2024)    Overall Financial Resource Strain (CARDIA)     Difficulty of Paying Living Expenses: Not hard at all   Food Insecurity: No Food Insecurity (6/5/2024)    Hunger Vital Sign     Worried About Running Out of Food in the Last Year: Never true     Ran Out of Food in the Last Year: Never true   Transportation Needs: No Transportation Needs (7/8/2024)    PRAPARE - Transportation     Lack of Transportation (Medical): No     Lack of Transportation (Non-Medical): No   Physical Activity: Inactive (6/5/2024)    Exercise Vital Sign     Days of Exercise per Week: 0 days     Minutes of Exercise per Session: 0 min   Stress: No Stress Concern Present (6/5/2024)    [place] [place] [employer] - Occupational Stress Questionnaire     Feeling of Stress : Not at all   Social Connections: Feeling Socially Integrated  "(6/20/2024)    OASIS : Social Isolation     Frequency of experiencing loneliness or isolation: Never   Recent Concern: Social Connections - Moderately Isolated (6/5/2024)    Social Connection and Isolation Panel [NHANES]     Frequency of Communication with Friends and Family: Three times a week     Frequency of Social Gatherings with Friends and Family: Three times a week     Attends Yarsani Services: Never     Active Member of Clubs or Organizations: No     Attends Club or Organization Meetings: Never     Marital Status:    Intimate Partner Violence: Not At Risk (6/5/2024)    Humiliation, Afraid, Rape, and Kick questionnaire     Fear of Current or Ex-Partner: No     Emotionally Abused: No     Physically Abused: No     Sexually Abused: No   Housing Stability: Low Risk  (7/8/2024)    Housing Stability Vital Sign     Unable to Pay for Housing in the Last Year: No     Number of Places Lived in the Last Year: 1     Unstable Housing in the Last Year: No       Tobacco Use: High Risk (7/16/2024)    Patient History     Smoking Tobacco Use: Every Day     Smokeless Tobacco Use: Never     Passive Exposure: Current        Social History     Substance and Sexual Activity   Alcohol Use Never        Allergies   No Known Allergies       Meds    Scheduled medications  [START ON 7/17/2024] aspirin, 81 mg, oral, Daily  nicotine, 1 patch, transdermal, Daily  senna, 10 mL, oral, BID  [START ON 7/17/2024] warfarin, 6 mg, oral, Daily      Continuous medications  amiodarone, 1 mg/min, Last Rate: 1 mg/min (07/16/24 2018)      PRN medications  PRN medications: acetaminophen **OR** acetaminophen **OR** acetaminophen, albuterol     Objective     Vitals  Visit Vitals  /81   Pulse (!) 133   Temp 36.4 °C (97.5 °F)   Resp 18   Ht 1.676 m (5' 6\")   Wt 58.1 kg (128 lb)   SpO2 96%   BMI 20.66 kg/m²   Smoking Status Every Day   BSA 1.64 m²        Physical Examination:  Gen: well appearing, in NAD  HEENT: AT/NC, no conjunctival " "pallor, anicteric sclera, EOMI   Neck: supple, no LAD/JVD  Chest: Mild wheezing bilateral lungs  CVS: Irregularly irregular rhythm, tachycardic  Abd: soft, flat, NT/ND, BS+  Ext: no cyanosis/clubbing or pedal edema  Neuro: Aox3, cn 2-12 intact, motor 5/5 globally, sensation intact    I/Os  No intake or output data in the 24 hours ending 07/16/24 2150    Vent Settings         Labs:   Results from last 72 hours   Lab Units 07/16/24  1543 07/15/24  1912   SODIUM mmol/L 135* 138   POTASSIUM mmol/L 4.0 3.9   CHLORIDE mmol/L 103 101   CO2 mmol/L 25 28   BUN mg/dL 14 15   CREATININE mg/dL 0.87 0.99   GLUCOSE mg/dL 97 90   CALCIUM mg/dL 9.0 9.3   ANION GAP mmol/L 11 13   EGFR mL/min/1.73m*2 >90 85   PHOSPHORUS mg/dL  --  3.1      Results from last 72 hours   Lab Units 07/16/24  1543 07/15/24  1912   WBC AUTO x10*3/uL 8.5 9.4   HEMOGLOBIN g/dL 14.2 14.2   HEMATOCRIT % 43.8 44.3   PLATELETS AUTO x10*3/uL 234 257   NEUTROS PCT AUTO % 57.5 55.4   LYMPHS PCT AUTO % 27.4 28.9   MONOS PCT AUTO % 10.9 10.8   EOS PCT AUTO % 3.3 4.0      Lab Results   Component Value Date    CALCIUM 9.0 07/16/2024    PHOS 3.1 07/15/2024      Lab Results   Component Value Date    CRP 0.75 05/06/2024      [unfilled]     Micro/ID:   Susceptibility data from last 90 days.  Collected Specimen Info Organism   06/04/24 Swab from Anterior Nares Methicillin Susceptible Staphylococcus aureus (MSSA)   05/06/24 Swab from Nares/Axilla/Groin Methicillin Susceptible Staphylococcus aureus (MSSA)                    No lab exists for component: \"AGALPCRNB\"   .ID  No results found for: \"URINECULTURE\", \"BLOODCULT\", \"CSFCULTSMEAR\"      Images    CT angio chest for pulmonary embolism  Narrative: Interpreted By:  Nona Baltazar,   STUDY:  CT ANGIO CHEST FOR PULMONARY EMBOLISM;  7/16/2024 5:17 pm      INDICATION:  Signs/Symptoms:r/o PE.      COMPARISON:  04/10/2024      ACCESSION NUMBER(S):  [id number]      ORDERING CLINICIAN:  PARRISH MCCLENDON"   TECHNIQUE:  Axial CT images of the chest obtained  after intravenous  administration of contrast. Maximum intensity projection images were  created and reviewed.      FINDINGS:  VESSELS: No aortic aneurysm. No pulmonary embolism.  HEART: Normal size. Prosthetic mitral valve noted. Epicardial leads  are present. Mild-to-moderate coronary artery calcifications noted.  No pericardial effusion. MEDIASTINUM AND ELODIA: No pathologically  enlarged lymph nodes. Subcentimeter mediastinal are stable to  slightly decreased in size and likely reactive. LUNG, PLEURA, AND  LARGE AIRWAYS: No pleural effusion or pneumothorax. Moderate  emphysema. 3 mm lingular nodule, series 5, image 46, is stable from  03/28/2016. Mild bilateral dependent atelectasis. CHEST WALL AND  LOWER NECK: Within normal limits.      UPPER ABDOMEN: No acute abnormality of the visualized abdomen.      BONES: No acute osseous abnormality. Chronic T6 compression deformity.      Impression: No pulmonary embolism.      Mild bibasilar atelectasis. Otherwise, acute cardiopulmonary process.      Moderate emphysema.          MACRO:  None      Signed by: Nona Baltazar 7/16/2024 6:50 PM  Dictation workstation:   [id number]      Assessment and Plan    Problem list:   Principal Problem:    Atrial fibrillation with RVR (Multi)      Farooq Lang is a 63 y.o. male admitted for rapid heart rate secondary to A-fib with rapid ventricular response.       Acute Medical Issues   #A-fib with RVR  -Resting heart rate remains irregular and around 130-140bpm.   -Discontinue sotalol. Patient had been taking it for several days with minimal improvement.   -Continue amiodarone drip. Amio was held at 3am due to hypotension, consider resuming pending vitals.   -Patient given 1L LR. Fluids improved heart rate during last admission.   -Cardiology consulted, appreciate recommendations.   -Begin cardiac diet  -Continue warfarin. INR 2.0.   -Continue aspirin  -Placed on telemetry  -CTA  negative for PE, CXR from 7/15: minimal basilar atelectasis otherwise unremarkable.         Chronic Medical Issues   #Emphysema  -Albuterol as needed    #History of mitral valve replacement  - Continue aspirin    IVF: PRN   Electrolytes: Replete as needed   Diet: Cardiac diet  GI ppx: PPI  DVT ppx: SCD. On home warfarin  Antibiotics: None  Lines: Peripheral  Code: Full Code       Disposition: 63 y.o.male admitted for rapid heart beat and palpitations, currently been managed for A-fib with RVR, anticipate >48hr inpatient LOS.    Carrie Roberts MD

## 2024-07-17 NOTE — HOSPITAL COURSE
Farooq Lagn is a 63 y.o. male with a history of A-fib on warfarin, HFrEF, mitral valve stenosis secondary to rheumatic disease s/p MVR Bioprosthetic Valve and KONRAD clipping with Dr. Nugent at [facility] in 6/2024 who presented to the hospital for rapid heart rate and palpitations. Found to be in recurrent afib with RVR with associated SOB. Cardiology on board, home sotalol held. Pt on amio drip and continued due to pt going in and out of afib with RVR HR up to 130s. Switched to PO amio and started on PO diltiazem to improve HR control. Patient feels much improved on these medications.    Discussed in detail with patient the dosing for amiodarone going home and instructions to take amiodarone 400mg BID for 3 days, then amiodarone 400 mg daily for 14 days, and then amiodarone 200 mg daily. Patient also to take 120 mg diltiazem daily. Patient states that he understands the regimen. He does have an appointment scheduled with cardiology on Monday.    Patient takes warfarin with  INR goal is 2-3 and he has been sub-therapeutic throughout admission. Discussed with cardiology, and decided to switch to DOAC therapy with Eliquis. Discussed this in detail with patient and he is agreeable to this.

## 2024-07-17 NOTE — PROGRESS NOTES
07/17/24 1136   Discharge Planning   Living Arrangements Spouse/significant other   Support Systems Spouse/significant other   Assistance Needed A&0x3, independent with ADLs, no DME, not driving since valve replacement surgery 6/4/24 ( spouse transporting), room air, no cpap or bipap, active with [facility] for SN, on coumadin ( INR checked at Dr. Johnson office)   Type of Residence Private residence   Number of Stairs to Enter Residence 12   Number of Stairs Within Residence 0   Do you have animals or pets at home? No   Who is requesting discharge planning? Provider   Home or Post Acute Services In home services   Type of Home Care Services Home nursing visits   Expected Discharge Disposition Home  (Patient active with [facility] for SN but does not want to resume HHC on d/c)   Does the patient need discharge transport arranged? No   Financial Resource Strain   How hard is it for you to pay for the very basics like food, housing, medical care, and heating? Not hard   Housing Stability   In the last 12 months, was there a time when you were not able to pay the mortgage or rent on time? N   In the past 12 months, how many times have you moved where you were living? 1   At any time in the past 12 months, were you homeless or living in a shelter (including now)? N   Transportation Needs   In the past 12 months, has lack of transportation kept you from medical appointments or from getting medications? no   In the past 12 months, has lack of transportation kept you from meetings, work, or from getting things needed for daily living? No   Patient Choice   Provider Choice list and CMS website (https://medicare.gov/care-compare#search) for post-acute Quality and Resource Measure Data were provided and reviewed with: Patient

## 2024-07-17 NOTE — CARE PLAN
The clinical goals for the shift include pt will deny shortness of breath and palpitations this shift

## 2024-07-17 NOTE — PROGRESS NOTES
Internal Medicine - Daily Progress Note   Hospital Day: 2       Name:Farooq Lang, AGE: 63 y.o., GENDER: male, MRN: [id number], ROOM: 250/[facility]   CODE STATUS: Full Code  Attending Physician: Ledy Ross DO  Resident: Juliana Sahni MD        Chief Complaint     Chief Complaint   Patient presents with    Rapid Heart Rate     Was here yesterday for same issue. Was told to FU with Dr Johnson, States Dr Johnson told him to stop taking his Sotalol. States he read on google if you stop taking the Sotalol he could have a heart attack. Currently denies CP. Endorses SOB        Subjective    Farooq Lang is a 63 y.o. year old male patient on Hospital Day: 2    Overnight events: Overnight patient became mildly hypotensive 105/67 with HR 130s and amiodarone gtt was stopped.    Patient seen and examined at bedside this morning, resting comfortably in bed. States he has been having some orthopnea throughout the night, he sits on the edge of the bed and feels better. Did not have symptoms overnight when had borderline low blood pressure. He cannot feel any palpitations. Has very minor pain to L side chest wall, state he may have slept on it wrong. Denies dizziness, nausea, abdominal pain, fever, chills.      Meds    Scheduled medications  aspirin, 81 mg, oral, Daily  nicotine, 1 patch, transdermal, Daily  senna, 10 mL, oral, BID  warfarin, 6 mg, oral, Daily      Continuous medications  amiodarone, 0.5 mg/min, Last Rate: 0.5 mg/min (07/17/24 1517)      PRN medications  PRN medications: acetaminophen **OR** acetaminophen **OR** acetaminophen, albuterol     Objective    Physical Exam  Constitutional:       General: He is not in acute distress.     Appearance: Normal appearance.   HENT:      Head: Normocephalic and atraumatic.      Nose: Nose normal.      Mouth/Throat:      Mouth: Mucous membranes are moist.      Pharynx: Oropharynx is clear.   Cardiovascular:      Rate and Rhythm: Tachycardia present. Rhythm irregular.  "  Pulmonary:      Effort: Pulmonary effort is normal.      Breath sounds: Normal breath sounds.   Abdominal:      General: Abdomen is flat. Bowel sounds are normal. There is no distension.      Palpations: Abdomen is soft.   Musculoskeletal:         General: Normal range of motion.      Right lower leg: No edema.      Left lower leg: No edema.   Skin:     General: Skin is warm and dry.   Neurological:      General: No focal deficit present.      Mental Status: He is alert and oriented to person, place, and time.   Psychiatric:         Mood and Affect: Mood normal.         Behavior: Behavior normal.        Visit Vitals  /69 (BP Location: Right arm, Patient Position: Lying)   Pulse (!) 47   Temp 36.5 °C (97.7 °F) (Temporal)   Resp 13   Ht 1.676 m (5' 6\")   Wt 58.1 kg (128 lb)   SpO2 97%   BMI 20.66 kg/m²   Smoking Status Every Day   BSA 1.64 m²        Intake/Output Summary (Last 24 hours) at 7/17/2024 1634  Last data filed at 7/17/2024 1517  Gross per 24 hour   Intake 854.34 ml   Output 0 ml   Net 854.34 ml       Labs:   Results from last 72 hours   Lab Units 07/17/24  0552 07/16/24  1543 07/15/24  1912   SODIUM mmol/L 139 135* 138   POTASSIUM mmol/L 4.2 4.0 3.9   CHLORIDE mmol/L 105 103 101   CO2 mmol/L 29 25 28   BUN mg/dL 12 14 15   CREATININE mg/dL 0.99 0.87 0.99   GLUCOSE mg/dL 98 97 90   CALCIUM mg/dL 8.8 9.0 9.3   ANION GAP mmol/L 9* 11 13   EGFR mL/min/1.73m*2 85 >90 85   PHOSPHORUS mg/dL  --   --  3.1      Results from last 72 hours   Lab Units 07/17/24  0552 07/16/24  1543 07/15/24  1912   WBC AUTO x10*3/uL 8.7 8.5 9.4   HEMOGLOBIN g/dL 13.0* 14.2 14.2   HEMATOCRIT % 40.4* 43.8 44.3   PLATELETS AUTO x10*3/uL 204 234 257   NEUTROS PCT AUTO %  --  57.5 55.4   LYMPHS PCT AUTO %  --  27.4 28.9   MONOS PCT AUTO %  --  10.9 10.8   EOS PCT AUTO %  --  3.3 4.0      Lab Results   Component Value Date    CALCIUM 8.8 07/17/2024    PHOS 3.1 07/15/2024      Lab Results   Component Value Date    CRP 0.75 05/06/2024 " "      [unfilled]     Micro/ID:   Susceptibility data from last 90 days.  Collected Specimen Info Organism   06/04/24 Swab from Anterior Nares Methicillin Susceptible Staphylococcus aureus (MSSA)   05/06/24 Swab from Nares/Axilla/Groin Methicillin Susceptible Staphylococcus aureus (MSSA)                    No lab exists for component: \"AGALPCRNB\"     No results found for: \"URINECULTURE\", \"BLOODCULT\", \"CSFCULTSMEAR\"    Images    ECG 12 lead    Result Date: 7/17/2024  Sinus tachycardia Nonspecific ST and T wave abnormality Abnormal ECG When compared with ECG of 15-JUL-2024 18:51, (unconfirmed) Premature supraventricular complexes are no longer Present ST depression has replaced ST elevation in Inferior leads See ED provider note for full interpretation and clinical correlation Confirmed by Albania Lackey ([id number]) on 7/17/2024 10:07:33 AM    ECG 12 lead    Result Date: 7/17/2024  Undetermined rhythm Otherwise normal ECG When compared with ECG of 16-JUL-2024 15:29, (unconfirmed) Current undetermined rhythm precludes rhythm comparison, needs review ST no longer depressed in Inferior leads ST no longer depressed in Anterior leads    CT angio chest for pulmonary embolism    Result Date: 7/16/2024  Interpreted By:  Nona Baltazar, STUDY: CT ANGIO CHEST FOR PULMONARY EMBOLISM;  7/16/2024 5:17 pm   INDICATION: Signs/Symptoms:r/o PE.   COMPARISON: 04/10/2024   ACCESSION NUMBER(S): [id number]   ORDERING CLINICIAN: PARRISH MCCLENDON   TECHNIQUE: Axial CT images of the chest obtained  after intravenous administration of contrast. Maximum intensity projection images were created and reviewed.   FINDINGS: VESSELS: No aortic aneurysm. No pulmonary embolism. HEART: Normal size. Prosthetic mitral valve noted. Epicardial leads are present. Mild-to-moderate coronary artery calcifications noted. No pericardial effusion. MEDIASTINUM AND ELODIA: No pathologically enlarged lymph nodes. Subcentimeter mediastinal are stable to slightly " decreased in size and likely reactive. LUNG, PLEURA, AND LARGE AIRWAYS: No pleural effusion or pneumothorax. Moderate emphysema. 3 mm lingular nodule, series 5, image 46, is stable from 03/28/2016. Mild bilateral dependent atelectasis. CHEST WALL AND LOWER NECK: Within normal limits.   UPPER ABDOMEN: No acute abnormality of the visualized abdomen.   BONES: No acute osseous abnormality. Chronic T6 compression deformity.       No pulmonary embolism.   Mild bibasilar atelectasis. Otherwise, acute cardiopulmonary process.   Moderate emphysema.     MACRO: None   Signed by: Nona Baltazar 7/16/2024 6:50 PM Dictation workstation:   [id number]    XR chest 1 view    Result Date: 7/15/2024  Interpreted By:  Benja Abraham, STUDY: XR CHEST 1 VIEW   INDICATION: Signs/Symptoms:afib, chest discomfort.   COMPARISON: July 8   ACCESSION NUMBER(S): [id number]   ORDERING CLINICIAN: ALBERT DANIEL   FINDINGS: Previous valve replacement unchanged.   No consolidation or edema. Minimal basilar atelectasis.       Minimal basilar atelectasis.   Cardiac postsurgical changes similar to prior exam.   Signed by: Benja Abraham 7/15/2024 7:23 PM Dictation workstation:   [id number]    ECG 12 lead    Result Date: 7/14/2024  Atrial fibrillation with rapid ventricular response Abnormal ECG When compared with ECG of 12-JUN-2024 14:54, Atrial fibrillation has replaced Atrial flutter Vent. rate has increased BY  53 BPM Questionable change in QRS axis T wave inversion no longer evident in Inferior leads See ED provider note for full interpretation and clinical correlation Confirmed by Elizabeth Rivas ([id number]) on 7/14/2024 10:36:04 AM    Electrocardiogram, 12-lead PRN ACS symptoms    Result Date: 7/10/2024  Atrial fibrillation with rapid ventricular response Abnormal ECG When compared with ECG of 08-JUL-2024 16:23, (unconfirmed) No significant change was found    XR chest 2 views    Result Date: 7/8/2024  STUDY: Chest Radiographs;  7/8/2024 17:32  INDICATION: Chest pain. COMPARISON: 3/30/2024 XR Chest, 3/27/2024 XR Chest. ACCESSION NUMBER(S): [id number] ORDERING CLINICIAN: ANJUM ACKERMAN TECHNIQUE:  Frontal and lateral chest. FINDINGS: CARDIOMEDIASTINAL SILHOUETTE: Cardiomediastinal silhouette is normal in size and configuration.  LUNGS: Lungs are clear.  ABDOMEN: No remarkable upper abdominal findings.  BONES: No acute osseous changes.    Chronic interstitial changes.  No consolidation. Sternotomy and changes of valve replacement. Signed by Lambert Webber MD    Holter Or Event Cardiac Monitor    Result Date: 7/8/2024  Refer to scanned images       Assessment and Plan    Farooq Lang is a 63 y.o. male admitted on 7/16/2024       ACUTE MEDICAL ISSUES:  #A-fib with RVR  -Home sotalol discontinued  -Cardiology consulted, appreciate recs  -Per cardiology, continue amiodarone drip for 24 hours  -Cardiology consulted, appreciate recommendations.   -Continue warfarin. INR 2.0. with INR goal of 2-3  -Continue aspirin  -On telemetry        CHRONIC MEDICAL ISSUES:  #Emphysema  -Albuterol as needed     #History of mitral valve replacement  - Continue aspirin        Fluids: PRN  Electrolytes: Replete PRN  Nutrition: Cardiac diet  Antimicrobials: None  DVT ppx: SCDs, warfarin  GI ppx: PPI  Catheter: None  Lines: PIV  Supplemental Oxygen: None  Emergency Contact: Extended Emergency Contact Information  Primary Emergency Contact: JAMES BRIONES  Address: [address] [place] [id number] [facility]  Home Phone: [phone]  Mobile Phone: [phone]  Relation: Spouse  Secondary Emergency Contact: Lilian Lang  Home Phone: [phone]  Relation: Mother   Code: Full Code     Disposition: 63 y.o.male admitted for rapid heart beat and palpitations, currently been managed for A-fib with RVR, anticipate >48hr inpatient LOS.       Juliana Sahni MD  Internal Medicine, PGY- 1  07/17/24 at 4:34 PM

## 2024-07-18 ENCOUNTER — PHARMACY VISIT (OUTPATIENT)
Dept: PHARMACY | Facility: CLINIC | Age: 64
End: 2024-07-18
Payer: COMMERCIAL

## 2024-07-18 VITALS
SYSTOLIC BLOOD PRESSURE: 117 MMHG | HEIGHT: 66 IN | OXYGEN SATURATION: 99 % | RESPIRATION RATE: 18 BRPM | WEIGHT: 128 LBS | DIASTOLIC BLOOD PRESSURE: 74 MMHG | BODY MASS INDEX: 20.57 KG/M2 | TEMPERATURE: 97.5 F | HEART RATE: 83 BPM

## 2024-07-18 PROBLEM — I48.91 ATRIAL FIBRILLATION WITH RVR (MULTI): Status: RESOLVED | Noted: 2024-07-16 | Resolved: 2024-07-18

## 2024-07-18 LAB
ANION GAP SERPL CALC-SCNC: 13 MMOL/L (ref 10–20)
ATRIAL RATE: 131 BPM
BUN SERPL-MCNC: 13 MG/DL (ref 6–23)
CALCIUM SERPL-MCNC: 8.7 MG/DL (ref 8.6–10.3)
CHLORIDE SERPL-SCNC: 105 MMOL/L (ref 98–107)
CO2 SERPL-SCNC: 25 MMOL/L (ref 21–32)
CREAT SERPL-MCNC: 1 MG/DL (ref 0.5–1.3)
EGFRCR SERPLBLD CKD-EPI 2021: 84 ML/MIN/1.73M*2
ERYTHROCYTE [DISTWIDTH] IN BLOOD BY AUTOMATED COUNT: 14.5 % (ref 11.5–14.5)
GLUCOSE SERPL-MCNC: 99 MG/DL (ref 74–99)
HCT VFR BLD AUTO: 40.8 % (ref 41–52)
HGB BLD-MCNC: 13.5 G/DL (ref 13.5–17.5)
INR PPP: 1.7 (ref 0.9–1.1)
MAGNESIUM SERPL-MCNC: 1.82 MG/DL (ref 1.6–2.4)
MCH RBC QN AUTO: 28.8 PG (ref 26–34)
MCHC RBC AUTO-ENTMCNC: 33.1 G/DL (ref 32–36)
MCV RBC AUTO: 87 FL (ref 80–100)
NRBC BLD-RTO: 0 /100 WBCS (ref 0–0)
PLATELET # BLD AUTO: 186 X10*3/UL (ref 150–450)
POTASSIUM SERPL-SCNC: 4.1 MMOL/L (ref 3.5–5.3)
PR INTERVAL: 128 MS
PROTHROMBIN TIME: 18.7 SECONDS (ref 9.8–12.8)
Q ONSET: 225 MS
QRS COUNT: 21 BEATS
QRS DURATION: 80 MS
QT INTERVAL: 330 MS
QTC CALCULATION(BAZETT): 483 MS
QTC FREDERICIA: 426 MS
R AXIS: 78 DEGREES
RBC # BLD AUTO: 4.68 X10*6/UL (ref 4.5–5.9)
SODIUM SERPL-SCNC: 139 MMOL/L (ref 136–145)
T AXIS: 77 DEGREES
T OFFSET: 390 MS
VENTRICULAR RATE: 129 BPM
WBC # BLD AUTO: 7 X10*3/UL (ref 4.4–11.3)

## 2024-07-18 PROCEDURE — 36415 COLL VENOUS BLD VENIPUNCTURE: CPT

## 2024-07-18 PROCEDURE — RXMED WILLOW AMBULATORY MEDICATION CHARGE

## 2024-07-18 PROCEDURE — 82374 ASSAY BLOOD CARBON DIOXIDE: CPT | Performed by: STUDENT IN AN ORGANIZED HEALTH CARE EDUCATION/TRAINING PROGRAM

## 2024-07-18 PROCEDURE — S4991 NICOTINE PATCH NONLEGEND: HCPCS

## 2024-07-18 PROCEDURE — 85027 COMPLETE CBC AUTOMATED: CPT

## 2024-07-18 PROCEDURE — 2500000004 HC RX 250 GENERAL PHARMACY W/ HCPCS (ALT 636 FOR OP/ED): Performed by: NURSE PRACTITIONER

## 2024-07-18 PROCEDURE — 2500000004 HC RX 250 GENERAL PHARMACY W/ HCPCS (ALT 636 FOR OP/ED)

## 2024-07-18 PROCEDURE — 85610 PROTHROMBIN TIME: CPT | Performed by: STUDENT IN AN ORGANIZED HEALTH CARE EDUCATION/TRAINING PROGRAM

## 2024-07-18 PROCEDURE — 36415 COLL VENOUS BLD VENIPUNCTURE: CPT | Performed by: STUDENT IN AN ORGANIZED HEALTH CARE EDUCATION/TRAINING PROGRAM

## 2024-07-18 PROCEDURE — 2500000002 HC RX 250 W HCPCS SELF ADMINISTERED DRUGS (ALT 637 FOR MEDICARE OP, ALT 636 FOR OP/ED)

## 2024-07-18 PROCEDURE — 99239 HOSP IP/OBS DSCHRG MGMT >30: CPT | Performed by: STUDENT IN AN ORGANIZED HEALTH CARE EDUCATION/TRAINING PROGRAM

## 2024-07-18 PROCEDURE — 83735 ASSAY OF MAGNESIUM: CPT | Performed by: STUDENT IN AN ORGANIZED HEALTH CARE EDUCATION/TRAINING PROGRAM

## 2024-07-18 PROCEDURE — 2500000002 HC RX 250 W HCPCS SELF ADMINISTERED DRUGS (ALT 637 FOR MEDICARE OP, ALT 636 FOR OP/ED): Performed by: NURSE PRACTITIONER

## 2024-07-18 PROCEDURE — 2500000001 HC RX 250 WO HCPCS SELF ADMINISTERED DRUGS (ALT 637 FOR MEDICARE OP): Performed by: NURSE PRACTITIONER

## 2024-07-18 PROCEDURE — 2500000001 HC RX 250 WO HCPCS SELF ADMINISTERED DRUGS (ALT 637 FOR MEDICARE OP)

## 2024-07-18 RX ORDER — AMIODARONE HYDROCHLORIDE 200 MG/1
200 TABLET ORAL DAILY
Qty: 30 TABLET | Refills: 0 | Status: SHIPPED | OUTPATIENT
Start: 2024-08-04 | End: 2024-07-18 | Stop reason: HOSPADM

## 2024-07-18 RX ORDER — DILTIAZEM HYDROCHLORIDE 120 MG/1
120 CAPSULE, COATED, EXTENDED RELEASE ORAL DAILY
Status: DISCONTINUED | OUTPATIENT
Start: 2024-07-18 | End: 2024-07-18 | Stop reason: HOSPADM

## 2024-07-18 RX ORDER — ENOXAPARIN SODIUM 100 MG/ML
1 INJECTION SUBCUTANEOUS EVERY 12 HOURS
Status: DISCONTINUED | OUTPATIENT
Start: 2024-07-18 | End: 2024-07-18 | Stop reason: HOSPADM

## 2024-07-18 RX ORDER — AMIODARONE HYDROCHLORIDE 200 MG/1
400 TABLET ORAL DAILY
Qty: 68 TABLET | Refills: 0 | Status: SHIPPED | OUTPATIENT
Start: 2024-07-18

## 2024-07-18 RX ORDER — MAGNESIUM SULFATE HEPTAHYDRATE 40 MG/ML
2 INJECTION, SOLUTION INTRAVENOUS ONCE
Status: DISCONTINUED | OUTPATIENT
Start: 2024-07-18 | End: 2024-07-18

## 2024-07-18 RX ORDER — AMIODARONE HYDROCHLORIDE 400 MG/1
400 TABLET ORAL 2 TIMES DAILY
Qty: 5 TABLET | Refills: 0 | Status: SHIPPED | OUTPATIENT
Start: 2024-07-18 | End: 2024-07-18 | Stop reason: HOSPADM

## 2024-07-18 RX ORDER — DILTIAZEM HYDROCHLORIDE 120 MG/1
120 CAPSULE, EXTENDED RELEASE ORAL DAILY
Qty: 30 CAPSULE | Refills: 0 | Status: SHIPPED | OUTPATIENT
Start: 2024-07-19

## 2024-07-18 RX ORDER — AMIODARONE HYDROCHLORIDE 200 MG/1
400 TABLET ORAL 2 TIMES DAILY
Status: DISCONTINUED | OUTPATIENT
Start: 2024-07-18 | End: 2024-07-18 | Stop reason: HOSPADM

## 2024-07-18 RX ORDER — LANOLIN ALCOHOL/MO/W.PET/CERES
400 CREAM (GRAM) TOPICAL DAILY
Status: COMPLETED | OUTPATIENT
Start: 2024-07-18 | End: 2024-07-18

## 2024-07-18 ASSESSMENT — COGNITIVE AND FUNCTIONAL STATUS - GENERAL
DAILY ACTIVITIY SCORE: 24
MOBILITY SCORE: 24

## 2024-07-18 ASSESSMENT — PAIN SCALES - GENERAL: PAINLEVEL_OUTOF10: 0 - NO PAIN

## 2024-07-18 NOTE — CARE PLAN
The patient's goals for the shift include      The clinical goals for the shift include pt will deny shortness of breath and palpitations this shift    Over the shift, the patient did not make progress toward the following goals. Barriers to progression include patient remained hds this shift. Recommendations to address these barriers include continue with plan of care.

## 2024-07-18 NOTE — DISCHARGE SUMMARY
Discharge Diagnosis  Atrial fibrillation with RVR (Multi)    Issues Requiring Follow-Up  Atrial fibrillation with RVR (Multi) - follow up with cardiology and PCP    Discharge Meds     Your medication list        START taking these medications        Instructions Last Dose Given Next Dose Due   amiodarone 200 mg tablet  Commonly known as: Pacerone      Take 2 tablets (400 mg) by mouth 2 times a day for 5 doses. Then, take 2 tablets (400 mg) by mouth once daily for 14 days starting July 21, 2024. Then, take 1 tablet (200 mg) by mouth once daily starting August 4, 2024.       apixaban 5 mg tablet  Commonly known as: Eliquis      Take 1 tablet (5 mg) by mouth 2 times a day.       dilTIAZem  mg 24 hr capsule  Commonly known as: Tiazac  Start taking on: July 19, 2024      Take 1 capsule (120 mg) by mouth once daily.              CONTINUE taking these medications        Instructions Last Dose Given Next Dose Due   aspirin 81 mg chewable tablet      Chew 1 tablet (81 mg) once daily.       multivitamin with minerals tablet      Take 1 tablet by mouth once daily.              STOP taking these medications      sotalol 80 mg tablet  Commonly known as: Betapace        warfarin 3 mg tablet  Commonly known as: Coumadin        WARFARIN ORAL                  Where to Get Your Medications        These medications were sent to [facility]  [address] [facility] [id number]      Hours: 9 AM to 5 PM Mon-Fri Phone: [phone]   amiodarone 200 mg tablet  apixaban 5 mg tablet  dilTIAZem  mg 24 hr capsule         Test Results Pending At Discharge  Pending Labs       No current pending labs.            Hospital Course  Farooq Lang is a 63 y.o. male with a history of A-fib on warfarin, HFrEF, mitral valve stenosis secondary to rheumatic disease s/p MVR Bioprosthetic Valve and KONRAD clipping with Dr. Nugent at [facility] in 6/2024 who presented to the hospital for rapid heart rate and palpitations. Found to be in  recurrent afib with RVR with associated SOB. Cardiology on board, home sotalol held. Pt on amio drip and continued due to pt going in and out of afib with RVR HR up to 130s. Switched to PO amio and started on PO diltiazem to improve HR control. Patient feels much improved on these medications.    Discussed in detail with patient the dosing for amiodarone going home and instructions to take amiodarone 400mg BID for 3 days, then amiodarone 400 mg daily for 14 days, and then amiodarone 200 mg daily. Patient also to take 120 mg diltiazem daily. Patient states that he understands the regimen. He does have an appointment scheduled with cardiology on Monday.    Patient takes warfarin with  INR goal is 2-3 and he has been sub-therapeutic throughout admission. Discussed with cardiology, and decided to switch to DOAC therapy with Eliquis. Discussed this in detail with patient and he is agreeable to this.    Pertinent Physical Exam At Time of Discharge  Physical Exam  Constitutional:       General: He is not in acute distress.     Appearance: Normal appearance.   HENT:      Head: Normocephalic.      Nose: Nose normal.      Mouth/Throat:      Mouth: Mucous membranes are moist.      Pharynx: Oropharynx is clear.   Cardiovascular:      Rate and Rhythm: Normal rate. Rhythm irregular.   Pulmonary:      Effort: Pulmonary effort is normal.      Breath sounds: Normal breath sounds.   Abdominal:      General: Abdomen is flat. Bowel sounds are normal.      Palpations: Abdomen is soft.   Musculoskeletal:         General: Normal range of motion.   Skin:     General: Skin is warm and dry.      Capillary Refill: Capillary refill takes less than 2 seconds.   Neurological:      General: No focal deficit present.      Mental Status: He is alert and oriented to person, place, and time. Mental status is at baseline.   Psychiatric:         Mood and Affect: Mood normal.         Outpatient Follow-Up  Future Appointments   Date Time Provider  Department Center   7/25/2024  3:30 PM Lukasz Nugent MD [id number] [facility]   8/15/2024  1:00 PM Dick Edouard MD [id number] [facility]           Juliana Sahni MD    Attending Physician Attestation  I saw and evaluated the patient.  I personally obtained the key and critical portions of the history and physical exam or was physically present for key and  critical portions performed by the resident/student. I reviewed the resident/student's documentation and discussed the patient with the resident/student. I agree with the documentation as detailed in the note unless stated otherwise in this attestation. Physical exam findings as documented in attestation.     S: pt reports not feeling great this morning. He has appreciated an improvement from yesterday but still not 100%. He is antsy and eager for DC home. His pulse remains labile. Changes made to regimen this morning - he requests DC home later today if appropriate rate control is observed.     O:  Constitutional: resting comfortably in bed, no acute distress   Eyes: clear sclera, making eye contact   Respiratory/Thorax: no acute respiratory distress  Cardiovascular: irregularly irregular rhythm, tachycardic   Musculoskeletal: no significant peripheral edema appreciated   Neurological: alert, answering questions appropriately, speech is clear and fluent   Psychological: Appropriate mood and behavior  Skin: Warm and dry     A/P:  PAF RVR   MV replacement 6/2024  HFpEF     Appreciate cardiology recommendations. Pt has hard time tolerating rate controlling agents in the past. Per discussion with cardiology - will continue amiodarone and cardizem for now. Will attempt to up titrate cardizem on outpatient basis with goal to eventually stop amiodarone given side effects and his young age. His INR has been intermittently subtherapeutic during admission and on outpatient basis. Amiodarone will likely interfere with coumadin and potentially result in more labile INRs.  Discussed with cardiology who recommended transition to DOAC now that his MV is replaced with bioprosthetic valve, he no longer requires coumadin. Pt was agreeable to this - price checked and affordable.     On re-evaluation in the afternoon by Dr. Sahni - heart rate is much better controlled and largely around 80 bpm. Pt feels well and remains eager for DC home. DCI discussed with him, all questions were addressed. Pt has an appointment on Monday with cardiology and agrees to follow up. Discussed with cardiology LENY Rdz who is in agreement with discharge and close outpatient follow up.      Discussed with Dr. Sahni and resident team   Discussed with pt   Discussed with cardiology   Discussed with RN      Time spent in coordination of care 55 min      Ledy Ross DO

## 2024-07-18 NOTE — PROGRESS NOTES
Farooq Lang is a 63 y.o. male on day 2 of admission presenting with Atrial fibrillation with RVR (Multi).      Subjective   He is sitting up in the bed, states he just doesn't feel well today. States he has a scratchy, kind of sore throat today, concerned that it's from the amiodarone.       Review of systems:  Constitutional: negative for fever, chills, or malaise  Neuro: negative for dizziness, headache, numbness, tingling  ENT: Negative for nasal congestion  Resp: negative for shortness of breath, cough, or wheezing  CV: negative for chest pain, palpitations  GI: negative for abd pain, nausea, vomiting or diarrhea  : negative for dysuria, frequency, or urgency  Skin: negative for lesions, wounds, or rash  Musculoskeletal: Negative for weakness, myalgia, or arthralgia  Endocrine: Negative for polyuria or polydipsia         Objective   Constitutional: Well developed, awake/alert/oriented x3, no distress, alert and cooperative  Eyes: PERRL, EOMI, clear sclera  ENMT: mucous membranes moist, no apparent injury, no lesions seen  Head/Neck: Neck supple, no apparent injury, thyroid without mass or tenderness, No JVD, trachea midline, no bruits  Respiratory/Thorax: Patent airways, CTAB, normal breath sounds with good chest expansion, thorax symmetric  Cardiovascular: tachycardic, irregular rhythm, no murmurs, 2+ equal pulses of the extremities, normal S 1and S 2  Gastrointestinal: Nondistended, soft, non-tender, no rebound tenderness or guarding, no masses palpable, no organomegaly, +BS, no bruits  Musculoskeletal: ROM intact, no joint swelling, normal strength  Extremities: normal extremities, no cyanosis edema, contusions or wounds, no clubbing  Neurological: alert and oriented x3, intact senses, motor, response and reflexes, normal strength  Lymphatic: No significant lymphadenopathy  Psychological: Appropriate mood and behavior  Skin: Warm and dry, no lesions, no rashes      Last Recorded Vitals  BP 98/78 (BP  "Location: Left arm, Patient Position: Sitting)   Pulse 91   Temp 36.4 °C (97.5 °F) (Temporal)   Resp 17   Ht 1.676 m (5' 6\")   Wt 58.1 kg (128 lb)   SpO2 99%   BMI 20.66 kg/m²     Intake/Output last 3 Shifts:  I/O last 3 completed shifts:  In: 900.7 (15.5 mL/kg) [P.O.:360; I.V.:540.7 (9.3 mL/kg)]  Out: 0 (0 mL/kg)   Weight: 58.1 kg   I/O this shift:  In: 282.8 [I.V.:282.8]  Out: -     Relevant Results  Scheduled medications  amiodarone, 400 mg, oral, BID  aspirin, 81 mg, oral, Daily  dilTIAZem CD, 120 mg, oral, Daily  enoxaparin, 1 mg/kg, subcutaneous, q12h  nicotine, 1 patch, transdermal, Daily  senna, 10 mL, oral, BID  [Held by provider] warfarin, 6 mg, oral, Daily  warfarin, 9 mg, oral, Daily      Continuous medications     PRN medications  PRN medications: acetaminophen **OR** acetaminophen **OR** acetaminophen, albuterol    Results for orders placed or performed during the hospital encounter of 07/16/24 (from the past 24 hour(s))   Protime-INR   Result Value Ref Range    Protime 18.7 (H) 9.8 - 12.8 seconds    INR 1.7 (H) 0.9 - 1.1   Basic metabolic panel   Result Value Ref Range    Glucose 99 74 - 99 mg/dL    Sodium 139 136 - 145 mmol/L    Potassium 4.1 3.5 - 5.3 mmol/L    Chloride 105 98 - 107 mmol/L    Bicarbonate 25 21 - 32 mmol/L    Anion Gap 13 10 - 20 mmol/L    Urea Nitrogen 13 6 - 23 mg/dL    Creatinine 1.00 0.50 - 1.30 mg/dL    eGFR 84 >60 mL/min/1.73m*2    Calcium 8.7 8.6 - 10.3 mg/dL   Magnesium   Result Value Ref Range    Magnesium 1.82 1.60 - 2.40 mg/dL   CBC   Result Value Ref Range    WBC 7.0 4.4 - 11.3 x10*3/uL    nRBC 0.0 0.0 - 0.0 /100 WBCs    RBC 4.68 4.50 - 5.90 x10*6/uL    Hemoglobin 13.5 13.5 - 17.5 g/dL    Hematocrit 40.8 (L) 41.0 - 52.0 %    MCV 87 80 - 100 fL    MCH 28.8 26.0 - 34.0 pg    MCHC 33.1 32.0 - 36.0 g/dL    RDW 14.5 11.5 - 14.5 %    Platelets 186 150 - 450 x10*3/uL       CT angio chest for pulmonary embolism   Final Result   No pulmonary embolism.        Mild bibasilar " atelectasis. Otherwise, acute cardiopulmonary process.        Moderate emphysema.             MACRO:   None        Signed by: Nona Baltazar 7/16/2024 6:50 PM   Dictation workstation:   [id number]          Transthoracic Echo (TTE) Complete    Result Date: 6/8/2024   [facility], [address] [place] [address]                Tel [phone] and Fax [phone] TRANSTHORACIC ECHOCARDIOGRAM REPORT  Patient Name:      MADYSON Barclay Physician:    [id number] Tyler Tyson MD Study Date:        6/8/2024             Ordering Provider:    [id number] FAROOQ LOMAS MRN/PID:           [id number]             Fellow: Accession#:        [id number]         Nurse: Date of Birth/Age: 1960 / 63 years Sonographer:          Adiel Rai RDCS Gender:            M                    Additional Staff: Height:            167.64 cm            Admit Date: Weight:            56.70 kg             Admission Status:     Inpatient -                                                               Routine BSA / BMI:         1.64 m2 / 20.18      Encounter#:           [id number]                    kg/m2                                         Department Location:  [facility] Non                                                               Invasive Blood Pressure: 111 /60 mmHg Study Type:    TRANSTHORACIC ECHO (TTE) COMPLETE Diagnosis/ICD: Presence of prosthetic heart valve-Z95.2 Indication:    S/p MVR, KONRAD closure (06/04/24) CPT Code:      Echo Complete w Full Doppler-[id number] Patient History: Pertinent History: Chest Pain and Dyspnea. S/p MVR #27mm mitris bioprosthetic                    valve, KONRAD closure (06/04/24) Afib, HFrEF, SOB and                    thrombocytopenia. Study Detail: The following Echo studies were performed: 2D, M-Mode, Doppler and               color flow.  PHYSICIAN  INTERPRETATION: Left Ventricle: The left ventricular systolic function is normal, with an estimated ejection fraction of 55-60%. The patient is in atrial fibrillation which may influence the estimate of left ventricular function and transvalvular flows. There are no regional wall motion abnormalities. The left ventricular cavity size is normal. Abnormal (paradoxical) septal motion consistent with post-operative status and abnormal (paradoxical) septal motion, consistent with RV pacemaker. Left ventricular diastolic filling was indeterminate. Left Atrium: The left atrium is severely dilated. Right Ventricle: The right ventricle is normal in size. There is normal right ventricular global systolic function. Right Atrium: The right atrium is normal in size. Aortic Valve: The aortic valve is trileaflet. There is mild aortic valve cusp calcification. There is no evidence of reduced aortic valve leaflet excursion excursion. There is no evidence of aortic valve regurgitation. The peak instantaneous gradient of the aortic valve is 6.4 mmHg. Mitral Valve: There is a prosthetic mitral valve present. There is a Mitris mitral valve bioprosthesis with a 27 mm reported size. There is no evidence of mitral valve regurgitation. Peak and mean mitral diastolic gradients are 14.5 and 5.4 mmHg at a heart rate of 72 bpm. The DI is ~ 2. The pressure half-time is ~ 90 ms, resulting in a mitral orifice area of 2.4. Tricuspid Valve: The tricuspid valve is structurally normal. There is mild tricuspid regurgitation. Pulmonic Valve: The pulmonic valve is structurally normal. There is trace pulmonic valve regurgitation. Pericardium: There is no pericardial effusion noted. Aorta: The aortic root is normal. In comparison to the previous echocardiogram(s): Although previous studies are available for review, their comparison with the current echocardiogram is clinically irrelevant.  CONCLUSIONS:  1. Left ventricular systolic function is normal with  a 55-60% estimated ejection fraction.  2. Abnormal septal motion consistent with post-operative status and abnormal septal motion consistent with RV pacemaker.  3. The left atrium is severely dilated.  4. Peak and mean mitral diastolic gradients are 14.5 and 5.4 mmHg at a heart rate of 72 bpm. The DI is ~ 2. The pressure half-time is ~ 90 ms, resulting in a mitral orifice area of 2.4.  5. The patient is in atrial fibrillation which may influence the estimate of left ventricular function and transvalvular flows. QUANTITATIVE DATA SUMMARY: 2D MEASUREMENTS:                           Normal Ranges: Ao Root d:     3.30 cm    (2.0-3.7cm) LAs:           5.80 cm    (2.7-4.0cm) IVSd:          1.10 cm    (0.6-1.1cm) LVPWd:         1.10 cm    (0.6-1.1cm) LVIDd:         4.60 cm    (3.9-5.9cm) LVIDs:         3.60 cm LV Mass Index: 110.6 g/m2 LV % FS        21.7 % LA VOLUME:                               Normal Ranges: LA Vol A4C:        106.5 ml   (22+/-6mL/m2) LA Vol A2C:        118.7 ml LA Vol BP:         113.9 ml LA Vol Index A4C:  65.0ml/m2 LA Vol Index A2C:  72.5 ml/m2 LA Vol Index BP:   69.5 ml/m2 LA Area A4C:       28.6 cm2 LA Area A2C:       29.8 cm2 LA Major Axis A4C: 6.5 cm LA Major Axis A2C: 6.4 cm LA Volume Index:   69.5 ml/m2 AORTA MEASUREMENTS:                      Normal Ranges: Ao Sinus, d: 3.30 cm (2.1-3.5cm) LV SYSTOLIC FUNCTION BY 2D PLANIMETRY (MOD):                     Normal Ranges: EF-A4C View: 50.1 % (>=55%) EF-A2C View: 69.2 % EF-Biplane:  61.4 % LV DIASTOLIC FUNCTION:                     Normal Ranges: MV Peak E: 1.50 m/s (0.7-1.2 m/s) MITRAL VALVE:                       Normal Ranges: MV Vmax:    1.91 m/s  (<=1.3m/s) MV peak P.5 mmHg (<5mmHg) MV mean P.4 mmHg  (<2mmHg) MV DT:      226 msec  (150-240msec) AORTIC VALVE:                         Normal Ranges: AoV Vmax:      1.26 m/s (<=1.7m/s) AoV Peak P.4 mmHg (<20mmHg) LVOT Max Christian:  1.00 m/s (<=1.1m/s) LVOT VTI:      20.40 cm LVOT  Diameter: 1.80 cm  (1.8-2.4cm) AoV Area,Vmax: 2.02 cm2 (2.5-4.5cm2) TRICUSPID VALVE/RVSP:                             Normal Ranges: Peak TR Velocity: 2.44 m/s RV Syst Pressure: 26.8 mmHg (< 30mmHg) IVC Diam:         2.48 cm PULMONIC VALVE:                      Normal Ranges: PV Max Christian: 0.9 m/s  (0.6-0.9m/s) PV Max PG:  3.5 mmHg  [id number] Tyler Tyson MD Electronically signed on 6/8/2024 at 2:33:10 PM  ** Final **     Transthoracic Echo (TTE) Complete    Result Date: 3/28/2024   [facility], [address] [place] [address]               Tel [phone] and Fax [phone] TRANSTHORACIC ECHOCARDIOGRAM REPORT  Patient Name:      MADYSON Barclay Physician:    [id number] Morris Johnson MD Study Date:        3/28/2024            Ordering Provider:    [id number] CHARISSA HILLIARD MRN/PID:           [id number]             Fellow: Accession#:        [id number]         Nurse:                Natalia Aleman RN Date of Birth/Age: 1960 / 63 years Sonographer:          Helen Linda RDCS Gender:            M                    Additional Staff: Height:            167.64 cm            Admit Date:           3/27/2024 Weight:            57.15 kg             Admission Status:     Inpatient -                                                               Routine BSA / BMI:         1.64 m2 / 20.34      Encounter#:           [id number]                    kg/m2                                         Department Location:  [facility] Non                                                               Invasive Blood Pressure: 124 /85 mmHg Study Type:    TRANSTHORACIC ECHO (TTE) COMPLETE Diagnosis/ICD: Unspecified atrial fibrillation-I48.91 Indication:    Atrial Fibrillation CPT Code:      Echo Complete w Full Doppler-[id number] Patient History: Smoker:            Current. Pertinent History: Chest Pain and Dyspnea. Dizziness. Study Detail: The following Echo studies  were performed: 2D, M-Mode, Doppler and               color flow. Definity used as a contrast agent for endocardial               border definition. Total contrast used for this procedure was 1.0               mL via IV push. The patient was awake.  Critical Event Critical Event: Test was completed as per department protocol. Critical Finding: Mitral stenosis. Time Test was Completed: 10:45:33 AM Notified: Patricia Morrison, ANA. Attending notification time: 11:01:31 AM  PHYSICIAN INTERPRETATION: Left Ventricle: The left ventricular systolic function is mildly decreased, with an estimated ejection fraction of 45%. There is global hypokinesis of the left ventricle with minor regional variations. The left ventricular cavity size is mildly dilated. Left ventricular diastolic filling was indeterminate. Left Atrium: The left atrium is severely dilated. Right Ventricle: The right ventricle is normal in size. There is normal right ventricular global systolic function. Right Atrium: The right atrium is mildly dilated. Aortic Valve: The aortic valve is trileaflet. There is mild aortic valve cusp calcification. There is trace to mild aortic valve regurgitation. The peak instantaneous gradient of the aortic valve is 3.1 mmHg. The mean gradient of the aortic valve is 2.0 mmHg. Mitral Valve: The mitral valve is moderately thickened. 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 There is evidence of severe mitral valve stenosis. The doppler estimated mean and peak diastolic pressure gradients are 12.7 mmHg and 21.8 mmHg respectively. There is moderate mitral annular calcification. There is moderate mitral valve regurgitation. Tricuspid Valve: The tricuspid valve is structurally normal. There is mild tricuspid regurgitation. Pulmonic Valve: The pulmonic valve is not well visualized. The pulmonic valve regurgitation was not well visualized. Pericardium: There is no pericardial effusion noted. Aorta: The aortic root is normal.  Pulmonary Artery: The tricuspid regurgitant velocity is 3.32 m/s, and with an estimated right atrial pressure of 3 mmHg, the estimated pulmonary artery pressure is mild to moderately elevated with the RVSP at 47.1 mmHg.  CONCLUSIONS:  1. Left ventricular systolic function is mildly decreased with a 45% estimated ejection fraction.  2. The left atrium is severely dilated.  3. The mitral valve is moderately thickened. 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  4. There is moderate mitral annular calcification.  5. There is severe mitral valve stenosis.  6. Moderate mitral valve regurgitation.  7. Mild to moderately elevated pulmonary artery pressure.  8. There is global hypokinesis of the left ventricle with minor regional variations. QUANTITATIVE DATA SUMMARY: 2D MEASUREMENTS:                          Normal Ranges: IVSd:          0.60 cm   (0.6-1.1cm) LVPWd:         0.90 cm   (0.6-1.1cm) LVIDd:         4.47 cm   (3.9-5.9cm) LVIDs:         3.60 cm LV Mass Index: 63.0 g/m2 LV % FS        19.4 % LA VOLUME:                               Normal Ranges: LA Vol A4C:        71.6 ml    (22+/-6mL/m2) LA Vol A2C:        67.4 ml LA Vol BP:         70.7 ml LA Vol Index A4C:  43.6 ml/m2 LA Vol Index A2C:  41.0 ml/m2 LA Vol Index BP:   43.0 ml/m2 LA Area A4C:       22.3 cm2 LA Area A2C:       22.0 cm2 LA Major Axis A4C: 5.9 cm LA Major Axis A2C: 6.1 cm LA Volume Index:   40.8 ml/m2 LA Vol A4C:        66.6 ml LA Vol A2C:        64.7 ml RA VOLUME BY A/L METHOD:                       Normal Ranges: RA Area A4C: 17.6 cm2 M-MODE MEASUREMENTS:                  Normal Ranges: Ao Root: 3.20 cm (2.0-3.7cm) LAs:     5.42 cm (2.7-4.0cm) AORTA MEASUREMENTS:                      Normal Ranges: Ao Sinus, d: 3.30 cm (2.1-3.5cm) Asc Ao, d:   3.00 cm (2.1-3.4cm) LV SYSTOLIC FUNCTION BY 2D PLANIMETRY (MOD):                     Normal Ranges: EF-A4C View: 41.3 % (>=55%) EF-A2C View: 40.4 % EF-Biplane:  41.5 % LV DIASTOLIC FUNCTION:                             Normal Ranges: MV Peak E:      1.85 m/s   (0.7-1.2 m/s) MV e'           0.05 m/s   (>8.0) MV lateral e'   0.05 m/s MV medial e'    0.04 m/s E/e' Ratio:     36.95      (<8.0) PulmV Sys Christian:  17.52 cm/s PulmV Thompson Christian: 32.92 cm/s PulmV S/D Christian:  0.53 MITRAL VALVE:                       Normal Ranges: MV Vmax:    2.33 m/s  (<=1.3m/s) MV peak P.8 mmHg (<5mmHg) MV mean P.7 mmHg (<2mmHg) MV VTI:     60.69 cm  (10-13cm) MITRAL INSUFFICIENCY:                      Normal Ranges: MR VTI:  150.95 cm MR Vmax: 491.71 cm/s AORTIC VALVE:                                   Normal Ranges: AoV Vmax:                0.88 m/s (<=1.7m/s) AoV Peak PG:             3.1 mmHg (<20mmHg) AoV Mean P.0 mmHg (1.7-11.5mmHg) LVOT Max Christian:            0.68 m/s (<=1.1m/s) AoV VTI:                 14.53 cm (18-25cm) LVOT VTI:                12.65 cm LVOT Diameter:           1.98 cm  (1.8-2.4cm) AoV Area, VTI:           2.69 cm2 (2.5-5.5cm2) AoV Area,Vmax:           2.38 cm2 (2.5-4.5cm2) AoV Dimensionless Index: 0.87  RIGHT VENTRICLE: RV Basal 3.10 cm RV Mid   2.20 cm RV Major 6.7 cm TAPSE:   18.0 mm RV s'    0.10 m/s TRICUSPID VALVE/RVSP:                             Normal Ranges: Peak TR Velocity: 3.32 m/s RV Syst Pressure: 47.1 mmHg (< 30mmHg) IVC Diam:         2.21 cm PULMONIC VALVE:                      Normal Ranges: PV Max Christian: 0.7 m/s  (0.6-0.9m/s) PV Max P.7 mmHg Pulmonary Veins: PulmV Thompson Christian: 32.92 cm/s PulmV S/D Christian:  0.53 PulmV Sys Christian:  17.52 cm/s AORTA: Asc Ao Diam 3.01 cm  [id number] Morris Johnson MD Electronically signed on 3/28/2024 at 12:19:54 PM  ** Final **           Assessment/Plan   Principal Problem:    Atrial fibrillation with RVR (Multi)    Paroxysmal atrial fibrillation/flutter with RVR  -s/p DCCV 2024 post MVR  -Also had LAAL  -EKG reviewed, AF RVR  -Stopped Sotalol  -Started on amiodarone gtt->change to amiodarone 400mg BID x3 days then 400mg daily x2 weeks then 200mg  daily  -Telemetry reviewed, HR occasionally up to 100's  -Start Diltiazem 120mg daily  -INR subtherapeutic->Lovenox SQ given  -Could switch coumadin to Eliquis 5mg BID if affordable  -Monitor on tele    2. S/p Mitral valve replacement  -Post op echo showed normal functioning valve     3. Elevated troponin-Non MI elevation 2/2 AF RVR  -[facility] April 2024 showed normal coronary arteries  -Echo as above     4. Chronic diastolic CHF  -Euvolemic  -2gm na diet  -Daily weights  -Monitor     5. Tobacco use  -advised to quit           Patricia Rdz, APRN-CNP

## 2024-07-18 NOTE — DISCHARGE INSTRUCTIONS
Farooq,    Please make the following changes to your medications:    STOP taking warfarin  START taking Eliquis (apixiban) 5mg TWICE per day. You can take your first dose tonight around 9pm    Start taking Diltiazem 120 mg daily    Take Amiodarone 400 mg tonight (7/18) and then take 400 mg Amiodarone TWICE a day for the next 2 days (7/19 and 7/20)  On 7/21 reduce Amiodarone dose to 400 mg ONCE per day for 14 days, the last day of this dose will be 8/3  On 8/4 reduce Amiodarone dose to 200 mg ONCE per day      Please follow up with your cardiologist appointment on Monday as previously scheduled.    Please follow-up with your primary care physician within one week to discuss this hospital stay.    Please call your primary care physician or return to the emergency department for new or worsening symptoms.

## 2024-07-19 DIAGNOSIS — Z98.890 S/P AORTIC VALVE REPAIR: ICD-10-CM

## 2024-07-19 NOTE — SIGNIFICANT EVENT
Follow Up Phone Call    Outgoing phone call    Spoke to: Farooq BERNARDO Rickey Relationship:self   Phone number: [phone]      Outcome: contacted patient/ family   Chief Complaint   Patient presents with    Rapid Heart Rate     Was here yesterday for same issue. Was told to FU with Dr Johnson, States Dr Johnson told him to stop taking his Sotalol. States he read on google if you stop taking the Sotalol he could have a heart attack. Currently denies CP. Endorses SOB          Diagnosis:Not applicable    States he is feeling better. No further questions or concerns.

## 2024-07-23 ENCOUNTER — HOSPITAL ENCOUNTER (OUTPATIENT)
Dept: CARDIOLOGY | Facility: HOSPITAL | Age: 64
Discharge: HOME | End: 2024-07-23
Payer: COMMERCIAL

## 2024-07-23 DIAGNOSIS — Z95.2 PRESENCE OF PROSTHETIC HEART VALVE: ICD-10-CM

## 2024-07-23 DIAGNOSIS — Z98.890 S/P AORTIC VALVE REPAIR: ICD-10-CM

## 2024-07-23 LAB
AORTIC VALVE PEAK VELOCITY: 0.99 M/S
AV PEAK GRADIENT: 3.9 MMHG
AVA (PEAK VEL): 2.71 CM2
EJECTION FRACTION APICAL 4 CHAMBER: 47.5
EJECTION FRACTION: 53 %
LEFT ATRIUM VOLUME AREA LENGTH INDEX BSA: 55.4 ML/M2
LEFT VENTRICLE INTERNAL DIMENSION DIASTOLE: 4.56 CM (ref 3.5–6)
LEFT VENTRICULAR OUTFLOW TRACT DIAMETER: 2 CM
MITRAL VALVE E/A RATIO: 2.03
RIGHT VENTRICLE FREE WALL PEAK S': 7.51 CM/S
RIGHT VENTRICLE PEAK SYSTOLIC PRESSURE: 21.5 MMHG
TRICUSPID ANNULAR PLANE SYSTOLIC EXCURSION: 0.9 CM

## 2024-07-23 PROCEDURE — 93306 TTE W/DOPPLER COMPLETE: CPT | Performed by: STUDENT IN AN ORGANIZED HEALTH CARE EDUCATION/TRAINING PROGRAM

## 2024-07-23 PROCEDURE — 2500000004 HC RX 250 GENERAL PHARMACY W/ HCPCS (ALT 636 FOR OP/ED): Performed by: PHYSICIAN ASSISTANT

## 2024-07-23 PROCEDURE — 93306 TTE W/DOPPLER COMPLETE: CPT

## 2024-07-25 ENCOUNTER — OFFICE VISIT (OUTPATIENT)
Dept: CARDIAC SURGERY | Facility: CLINIC | Age: 64
End: 2024-07-25
Payer: COMMERCIAL

## 2024-07-25 VITALS
RESPIRATION RATE: 16 BRPM | TEMPERATURE: 98.6 F | WEIGHT: 128 LBS | BODY MASS INDEX: 20.57 KG/M2 | OXYGEN SATURATION: 99 % | HEIGHT: 66 IN | HEART RATE: 51 BPM | DIASTOLIC BLOOD PRESSURE: 76 MMHG | SYSTOLIC BLOOD PRESSURE: 133 MMHG

## 2024-07-25 DIAGNOSIS — Z95.2 STATUS POST MITRAL VALVE REPLACEMENT: ICD-10-CM

## 2024-07-25 DIAGNOSIS — I05.0 MITRAL VALVE STENOSIS, UNSPECIFIED ETIOLOGY: ICD-10-CM

## 2024-07-25 DIAGNOSIS — Z98.890 S/P MVR (MITRAL VALVE REPAIR): Primary | ICD-10-CM

## 2024-07-25 PROCEDURE — 99211 OFF/OP EST MAY X REQ PHY/QHP: CPT | Performed by: NURSE PRACTITIONER

## 2024-07-25 ASSESSMENT — LIFESTYLE VARIABLES
HOW OFTEN DO YOU HAVE SIX OR MORE DRINKS ON ONE OCCASION: NEVER
AUDIT-C TOTAL SCORE: 0
SKIP TO QUESTIONS 9-10: 1
HOW MANY STANDARD DRINKS CONTAINING ALCOHOL DO YOU HAVE ON A TYPICAL DAY: PATIENT DOES NOT DRINK
HOW OFTEN DO YOU HAVE A DRINK CONTAINING ALCOHOL: NEVER

## 2024-07-25 ASSESSMENT — PATIENT HEALTH QUESTIONNAIRE - PHQ9
SUM OF ALL RESPONSES TO PHQ9 QUESTIONS 1 AND 2: 0
1. LITTLE INTEREST OR PLEASURE IN DOING THINGS: NOT AT ALL
2. FEELING DOWN, DEPRESSED OR HOPELESS: NOT AT ALL

## 2024-07-25 ASSESSMENT — PAIN SCALES - GENERAL: PAINLEVEL: 0-NO PAIN

## 2024-07-25 ASSESSMENT — ENCOUNTER SYMPTOMS
LOSS OF SENSATION IN FEET: 0
DEPRESSION: 0
OCCASIONAL FEELINGS OF UNSTEADINESS: 0

## 2024-07-25 NOTE — PROGRESS NOTES
"CARDIOTHORACIC SURGERY FOLLOW-UP PROGRESS NOTE    Subjective   Mr. Lang is a 63 y.o. male with a history chronic atrial fibrillation on AC who presented to the ED with complaints of chest pain. Workup included an echo revealing severe mitral valve stenosis. On 6/4/2024 he underwent mitral valve replacement with a 27mm Wale Schrader Mitris valve with left atrial appendage [place] [facility] [place] has been readmitted for AF recurrence and currently on Amiodarone. He takes Eliquis for stroke prevention. Overall he is feeling well.     Objective   Ambulating, NAD, no SOB    Physical Exam  /76 (BP Location: Left arm, Patient Position: Sitting, BP Cuff Size: Adult)   Pulse 51   Temp 37 °C (98.6 °F)   Resp 16   Ht 1.676 m (5' 6\")   Wt 58.1 kg (128 lb)   SpO2 99%   BMI 20.66 kg/m²   Heart: regular rate and rhythm, no murmurs  Lungs: clear to auscultation bilaterally  Extremities: warm and well-perfused, peripheral pulses intact, no cyanosis, clubbing, or edema  Incision(s):  MSI healing well    Data Review:  X-ray:  7/15/24 unremarkable    Assessment/Plan   Mr. Lang is a 64 yo male s/p mitral valve replacement. He is progressing well and may resume driving. Will follow up in 6 months with an echo at that time. Order placed for cardiac rehab.    Elisa Justin, APRN-CNP  "

## 2024-07-27 LAB
ATRIAL RATE: 277 BPM
Q ONSET: 221 MS
QRS COUNT: 19 BEATS
QRS DURATION: 88 MS
QT INTERVAL: 344 MS
QTC CALCULATION(BAZETT): 467 MS
QTC FREDERICIA: 422 MS
R AXIS: 55 DEGREES
T AXIS: 47 DEGREES
T OFFSET: 393 MS
VENTRICULAR RATE: 111 BPM

## 2024-08-13 DIAGNOSIS — Z95.2 HEART VALVE REPLACED: Primary | ICD-10-CM

## 2024-08-14 ENCOUNTER — APPOINTMENT (OUTPATIENT)
Dept: CARDIAC SURGERY | Facility: HOSPITAL | Age: 64
End: 2024-08-14
Payer: COMMERCIAL

## 2024-08-15 ENCOUNTER — APPOINTMENT (OUTPATIENT)
Dept: CARDIOLOGY | Facility: CLINIC | Age: 64
End: 2024-08-15
Payer: COMMERCIAL

## 2024-08-27 ENCOUNTER — APPOINTMENT (OUTPATIENT)
Dept: CARDIAC REHAB | Facility: HOSPITAL | Age: 64
End: 2024-08-27
Payer: COMMERCIAL

## 2024-08-29 ENCOUNTER — APPOINTMENT (OUTPATIENT)
Dept: CARDIOLOGY | Facility: HOSPITAL | Age: 64
End: 2024-08-29
Payer: COMMERCIAL

## 2024-08-29 ENCOUNTER — HOSPITAL ENCOUNTER (EMERGENCY)
Facility: HOSPITAL | Age: 64
Discharge: HOME | End: 2024-08-29
Attending: STUDENT IN AN ORGANIZED HEALTH CARE EDUCATION/TRAINING PROGRAM
Payer: COMMERCIAL

## 2024-08-29 ENCOUNTER — APPOINTMENT (OUTPATIENT)
Dept: RADIOLOGY | Facility: HOSPITAL | Age: 64
End: 2024-08-29
Payer: COMMERCIAL

## 2024-08-29 VITALS
HEART RATE: 69 BPM | HEIGHT: 66 IN | DIASTOLIC BLOOD PRESSURE: 89 MMHG | TEMPERATURE: 97.9 F | RESPIRATION RATE: 15 BRPM | SYSTOLIC BLOOD PRESSURE: 124 MMHG | WEIGHT: 130 LBS | BODY MASS INDEX: 20.89 KG/M2 | OXYGEN SATURATION: 96 %

## 2024-08-29 DIAGNOSIS — R42 DIZZINESS: Primary | ICD-10-CM

## 2024-08-29 LAB
ALBUMIN SERPL BCP-MCNC: 4 G/DL (ref 3.4–5)
ALP SERPL-CCNC: 105 U/L (ref 33–136)
ALT SERPL W P-5'-P-CCNC: 14 U/L (ref 10–52)
ANION GAP SERPL CALC-SCNC: 12 MMOL/L (ref 10–20)
APPEARANCE UR: CLEAR
AST SERPL W P-5'-P-CCNC: 11 U/L (ref 9–39)
BASOPHILS # BLD AUTO: 0.05 X10*3/UL (ref 0–0.1)
BASOPHILS NFR BLD AUTO: 0.7 %
BILIRUB SERPL-MCNC: 0.4 MG/DL (ref 0–1.2)
BILIRUB UR STRIP.AUTO-MCNC: NEGATIVE MG/DL
BNP SERPL-MCNC: 83 PG/ML (ref 0–99)
BUN SERPL-MCNC: 21 MG/DL (ref 6–23)
CALCIUM SERPL-MCNC: 9.2 MG/DL (ref 8.6–10.3)
CARDIAC TROPONIN I PNL SERPL HS: 8 NG/L (ref 0–20)
CARDIAC TROPONIN I PNL SERPL HS: 8 NG/L (ref 0–20)
CHLORIDE SERPL-SCNC: 104 MMOL/L (ref 98–107)
CO2 SERPL-SCNC: 24 MMOL/L (ref 21–32)
COLOR UR: NORMAL
CREAT SERPL-MCNC: 1.23 MG/DL (ref 0.5–1.3)
EGFRCR SERPLBLD CKD-EPI 2021: 66 ML/MIN/1.73M*2
EOSINOPHIL # BLD AUTO: 0.17 X10*3/UL (ref 0–0.7)
EOSINOPHIL NFR BLD AUTO: 2.3 %
ERYTHROCYTE [DISTWIDTH] IN BLOOD BY AUTOMATED COUNT: 14.7 % (ref 11.5–14.5)
GLUCOSE SERPL-MCNC: 86 MG/DL (ref 74–99)
GLUCOSE UR STRIP.AUTO-MCNC: NORMAL MG/DL
HCT VFR BLD AUTO: 47 % (ref 41–52)
HGB BLD-MCNC: 15.4 G/DL (ref 13.5–17.5)
IMM GRANULOCYTES # BLD AUTO: 0.02 X10*3/UL (ref 0–0.7)
IMM GRANULOCYTES NFR BLD AUTO: 0.3 % (ref 0–0.9)
KETONES UR STRIP.AUTO-MCNC: NEGATIVE MG/DL
LEUKOCYTE ESTERASE UR QL STRIP.AUTO: NEGATIVE
LYMPHOCYTES # BLD AUTO: 1.92 X10*3/UL (ref 1.2–4.8)
LYMPHOCYTES NFR BLD AUTO: 26 %
MAGNESIUM SERPL-MCNC: 1.94 MG/DL (ref 1.6–2.4)
MCH RBC QN AUTO: 28.3 PG (ref 26–34)
MCHC RBC AUTO-ENTMCNC: 32.8 G/DL (ref 32–36)
MCV RBC AUTO: 86 FL (ref 80–100)
MONOCYTES # BLD AUTO: 0.73 X10*3/UL (ref 0.1–1)
MONOCYTES NFR BLD AUTO: 9.9 %
NEUTROPHILS # BLD AUTO: 4.49 X10*3/UL (ref 1.2–7.7)
NEUTROPHILS NFR BLD AUTO: 60.8 %
NITRITE UR QL STRIP.AUTO: NEGATIVE
NRBC BLD-RTO: 0 /100 WBCS (ref 0–0)
PH UR STRIP.AUTO: 5 [PH]
PLATELET # BLD AUTO: 194 X10*3/UL (ref 150–450)
POTASSIUM SERPL-SCNC: 4.4 MMOL/L (ref 3.5–5.3)
PROT SERPL-MCNC: 7.4 G/DL (ref 6.4–8.2)
PROT UR STRIP.AUTO-MCNC: NEGATIVE MG/DL
RBC # BLD AUTO: 5.45 X10*6/UL (ref 4.5–5.9)
RBC # UR STRIP.AUTO: NEGATIVE /UL
SODIUM SERPL-SCNC: 136 MMOL/L (ref 136–145)
SP GR UR STRIP.AUTO: 1.02
UROBILINOGEN UR STRIP.AUTO-MCNC: NORMAL MG/DL
WBC # BLD AUTO: 7.4 X10*3/UL (ref 4.4–11.3)

## 2024-08-29 PROCEDURE — 84484 ASSAY OF TROPONIN QUANT: CPT

## 2024-08-29 PROCEDURE — 99283 EMERGENCY DEPT VISIT LOW MDM: CPT | Mod: 25

## 2024-08-29 PROCEDURE — 85025 COMPLETE CBC W/AUTO DIFF WBC: CPT

## 2024-08-29 PROCEDURE — 80053 COMPREHEN METABOLIC PANEL: CPT

## 2024-08-29 PROCEDURE — 81003 URINALYSIS AUTO W/O SCOPE: CPT

## 2024-08-29 PROCEDURE — 36415 COLL VENOUS BLD VENIPUNCTURE: CPT

## 2024-08-29 PROCEDURE — 83880 ASSAY OF NATRIURETIC PEPTIDE: CPT

## 2024-08-29 PROCEDURE — 71046 X-RAY EXAM CHEST 2 VIEWS: CPT | Performed by: RADIOLOGY

## 2024-08-29 PROCEDURE — 83735 ASSAY OF MAGNESIUM: CPT

## 2024-08-29 PROCEDURE — 71046 X-RAY EXAM CHEST 2 VIEWS: CPT

## 2024-08-29 PROCEDURE — 93005 ELECTROCARDIOGRAM TRACING: CPT

## 2024-08-29 ASSESSMENT — PAIN - FUNCTIONAL ASSESSMENT: PAIN_FUNCTIONAL_ASSESSMENT: 0-10

## 2024-08-29 ASSESSMENT — ENCOUNTER SYMPTOMS: DENIES PAIN: 1

## 2024-08-29 ASSESSMENT — ACTIVITIES OF DAILY LIVING (ADL)
PHYSICAL TRANSFERS ASSESSED: 1
CURRENT_FUNCTION: INDEPENDENT
TOILETING: INDEPENDENT
OASIS_M1830: 00
AMBULATION ASSISTANCE: 1
DRESSING_LB_CURRENT_FUNCTION: INDEPENDENT
AMBULATION ASSISTANCE: INDEPENDENT
DRESSING_UB_CURRENT_FUNCTION: INDEPENDENT
TRANSPORTATION ASSESSED: 1
HOME_HEALTH_OASIS: 00
TRANSPORTATION: NEEDS ASSISTANCE
TOILETING: 1

## 2024-08-29 ASSESSMENT — PAIN DESCRIPTION - DESCRIPTORS: DESCRIPTORS: DISCOMFORT

## 2024-08-29 ASSESSMENT — PAIN DESCRIPTION - PAIN TYPE: TYPE: ACUTE PAIN

## 2024-08-29 ASSESSMENT — PAIN DESCRIPTION - LOCATION: LOCATION: CHEST

## 2024-08-29 ASSESSMENT — PAIN SCALES - GENERAL: PAINLEVEL_OUTOF10: 3

## 2024-08-29 NOTE — ED TRIAGE NOTES
Patient c/o dizziness and chest discomfort that started yesterday. Patient states he had open heart 11 weeks ago and yesterday he was using spray paint without a mask and is concerned he may have inhaled some of the fumes.

## 2024-08-29 NOTE — DISCHARGE INSTRUCTIONS
You are seen for evaluation of symptoms that have been ongoing for few weeks but worsened after you were using spray paints.  All of your lab work is within normal limits with the exception of your troponin which was very mildly elevated and lower than it was a few weeks ago when you were in the hospital.  We are discharging you with the second troponin still pending.  As we have discussed if your troponin comes back higher than the previous it will be important that you stay in the hospital for further testing and observation.  I will give you a call with the results of this test once it is available.  Please follow-up with your cardiologist on your appointment next week.  I believe that your symptoms are related to the medicines that you are taking and you may need your doses adjusted by your cardiologist.  If you develop any worsening chest pain if you pass out or having a significant heart palpitations please return for immediate evaluation.

## 2024-08-30 LAB
ATRIAL RATE: 208 BPM
P AXIS: 270 DEGREES
P OFFSET: 219 MS
P ONSET: 170 MS
Q ONSET: 221 MS
QRS COUNT: 11 BEATS
QRS DURATION: 92 MS
QT INTERVAL: 434 MS
QTC CALCULATION(BAZETT): 461 MS
QTC FREDERICIA: 452 MS
R AXIS: 70 DEGREES
T AXIS: 72 DEGREES
T OFFSET: 438 MS
VENTRICULAR RATE: 68 BPM

## 2024-10-21 ENCOUNTER — HOME CARE VISIT (OUTPATIENT)
Dept: HOME HEALTH SERVICES | Facility: HOME HEALTH | Age: 64
End: 2024-10-21

## 2025-02-13 ENCOUNTER — APPOINTMENT (OUTPATIENT)
Dept: CARDIAC SURGERY | Facility: CLINIC | Age: 65
End: 2025-02-13
Payer: COMMERCIAL

## 2025-02-27 ENCOUNTER — TELEPHONE (OUTPATIENT)
Dept: CARDIAC SURGERY | Facility: CLINIC | Age: 65
End: 2025-02-27
Payer: COMMERCIAL

## 2025-02-27 NOTE — TELEPHONE ENCOUNTER
I reached out to Farooq regarding his testing that needs to be completed prior to his office visit with Dr. Nugent.  It shows that he was scheduled for an Echo and no showed.  If he is unable to have the Echo completed prior to his appointment I asked that he contact the office to reschedule his appointment with Dr. Nugent.     Surgery HD#6    Subjective:  Patient with increased cramping this morning.  Still passing gas and BMs.        Objective:  Vitals:    10/04/23 2241 10/05/23 0256 10/05/23 0423 10/05/23 0846   BP: 131/60 (!) 147/75  124/67   BP Location: Right arm Right arm  Right arm   Pulse: 57 64  63   Resp: 20 20  20   Temp: 98.5  F (36.9  C) 98.5  F (36.9  C)  98.4  F (36.9  C)   TempSrc: Oral Oral  Oral   SpO2: 95% 95%  90%   Weight:   107.8 kg (237 lb 10.5 oz)    Height:         Abdomen: Soft, nondistended, left lower quadrant tenderness increased from yesterday        Results for orders placed or performed during the hospital encounter of 09/29/23 (from the past 24 hour(s))   CBC with Platelets & Differential    Narrative    The following orders were created for panel order CBC with Platelets & Differential.  Procedure                               Abnormality         Status                     ---------                               -----------         ------                     CBC with platelets and d...[[id number]]                      Final result                 Please view results for these tests on the individual orders.   Basic metabolic panel   Result Value Ref Range    Sodium 140 135 - 145 mmol/L    Potassium 4.3 3.4 - 5.3 mmol/L    Chloride 103 98 - 107 mmol/L    Carbon Dioxide (CO2) 28 22 - 29 mmol/L    Anion Gap 9 7 - 15 mmol/L    Urea Nitrogen 7.1 (L) 8.0 - 23.0 mg/dL    Creatinine 0.66 0.51 - 0.95 mg/dL    GFR Estimate >90 >60 mL/min/1.73m2    Calcium 9.1 8.8 - 10.2 mg/dL    Glucose 105 (H) 70 - 99 mg/dL   CBC with platelets and differential   Result Value Ref Range    WBC Count 5.7 4.0 - 11.0 10e3/uL    RBC Count 3.88 3.80 - 5.20 10e6/uL    Hemoglobin 12.2 11.7 - 15.7 g/dL    Hematocrit 36.6 35.0 - 47.0 %    MCV 94 78 - 100 fL    MCH 31.4 26.5 - 33.0 pg    MCHC 33.3 31.5 - 36.5 g/dL    RDW 12.8 10.0 - 15.0 %    Platelet Count 303 150 - 450 10e3/uL    % Neutrophils 62 %    % Lymphocytes 24 %    % Monocytes 11 %     Mids % (Monos, Eos, Basos)      % Eosinophils 3 %    % Basophils 0 %    % Immature Granulocytes 0 %    NRBCs per 100 WBC 0 <1 /100    Absolute Neutrophils 3.6 1.6 - 8.3 10e3/uL    Absolute Lymphocytes 1.4 0.8 - 5.3 10e3/uL    Absolute Monocytes 0.6 0.0 - 1.3 10e3/uL    Mids Abs (Monos, Eos, Basos)      Absolute Eosinophils 0.2 0.0 - 0.7 10e3/uL    Absolute Basophils 0.0 0.0 - 0.2 10e3/uL    Absolute Immature Granulocytes 0.0 <=0.4 10e3/uL    Absolute NRBCs 0.0 10e3/uL         Assessment/plan:  This is 61-year-old lady with sigmoid diverticulitis and intramural abscess.  Worsened abdominal exam today.   Continue IV antibiotics.  CT scan abdomen pelvis to rule out worsening of abscess.        Omari Chavez MD, FACS   8

## 2025-03-06 ENCOUNTER — APPOINTMENT (OUTPATIENT)
Dept: CARDIAC SURGERY | Facility: CLINIC | Age: 65
End: 2025-03-06
Payer: COMMERCIAL

## 2025-05-01 ENCOUNTER — APPOINTMENT (OUTPATIENT)
Dept: RADIOLOGY | Facility: HOSPITAL | Age: 65
End: 2025-05-01
Payer: COMMERCIAL

## 2025-05-01 ENCOUNTER — HOSPITAL ENCOUNTER (INPATIENT)
Facility: HOSPITAL | Age: 65
LOS: 1 days | Discharge: HOME HEALTH | End: 2025-05-03
Attending: STUDENT IN AN ORGANIZED HEALTH CARE EDUCATION/TRAINING PROGRAM | Admitting: STUDENT IN AN ORGANIZED HEALTH CARE EDUCATION/TRAINING PROGRAM
Payer: COMMERCIAL

## 2025-05-01 ENCOUNTER — APPOINTMENT (OUTPATIENT)
Dept: CARDIOLOGY | Facility: HOSPITAL | Age: 65
End: 2025-05-01
Payer: COMMERCIAL

## 2025-05-01 DIAGNOSIS — S32.000A COMPRESSION FRACTURE OF LUMBAR VERTEBRA, UNSPECIFIED LUMBAR VERTEBRAL LEVEL, INITIAL ENCOUNTER (MULTI): ICD-10-CM

## 2025-05-01 DIAGNOSIS — J96.01 ACUTE RESPIRATORY FAILURE WITH HYPOXIA: ICD-10-CM

## 2025-05-01 DIAGNOSIS — M43.9 COMPRESSION DEFORMITY OF VERTEBRA: ICD-10-CM

## 2025-05-01 DIAGNOSIS — H72.91 PERFORATION OF RIGHT TYMPANIC MEMBRANE: ICD-10-CM

## 2025-05-01 DIAGNOSIS — R56.9 SEIZURE (MULTI): Primary | ICD-10-CM

## 2025-05-01 DIAGNOSIS — S22.000A COMPRESSION FRACTURE OF THORACIC VERTEBRA, UNSPECIFIED THORACIC VERTEBRAL LEVEL, INITIAL ENCOUNTER (MULTI): ICD-10-CM

## 2025-05-01 DIAGNOSIS — I05.0 RHEUMATIC MITRAL STENOSIS: ICD-10-CM

## 2025-05-01 DIAGNOSIS — J44.1 COPD EXACERBATION (MULTI): ICD-10-CM

## 2025-05-01 DIAGNOSIS — I48.91 ATRIAL FIBRILLATION, UNSPECIFIED TYPE (MULTI): ICD-10-CM

## 2025-05-01 DIAGNOSIS — R93.89 ABNORMAL CT SCAN: ICD-10-CM

## 2025-05-01 DIAGNOSIS — I50.9 ACUTE ON CHRONIC CONGESTIVE HEART FAILURE, UNSPECIFIED HEART FAILURE TYPE: ICD-10-CM

## 2025-05-01 LAB
ALBUMIN SERPL BCP-MCNC: 4.1 G/DL (ref 3.4–5)
ALP SERPL-CCNC: 108 U/L (ref 33–136)
ALT SERPL W P-5'-P-CCNC: 21 U/L (ref 10–52)
ANION GAP SERPL CALC-SCNC: 30 MMOL/L (ref 10–20)
APTT PPP: 34 SECONDS (ref 26–36)
AST SERPL W P-5'-P-CCNC: 20 U/L (ref 9–39)
BASE EXCESS BLDV CALC-SCNC: -2.8 MMOL/L (ref -2–3)
BASOPHILS # BLD AUTO: 0.08 X10*3/UL (ref 0–0.1)
BASOPHILS NFR BLD AUTO: 0.6 %
BILIRUB SERPL-MCNC: 0.6 MG/DL (ref 0–1.2)
BNP SERPL-MCNC: 183 PG/ML (ref 0–99)
BODY TEMPERATURE: ABNORMAL
BUN SERPL-MCNC: 23 MG/DL (ref 6–23)
CALCIUM SERPL-MCNC: 9.3 MG/DL (ref 8.6–10.3)
CARDIAC TROPONIN I PNL SERPL HS: 13 NG/L (ref 0–20)
CARDIAC TROPONIN I PNL SERPL HS: 27 NG/L (ref 0–20)
CHLORIDE SERPL-SCNC: 98 MMOL/L (ref 98–107)
CO2 SERPL-SCNC: 14 MMOL/L (ref 21–32)
CREAT SERPL-MCNC: 1.4 MG/DL (ref 0.5–1.3)
EGFRCR SERPLBLD CKD-EPI 2021: 56 ML/MIN/1.73M*2
EOSINOPHIL # BLD AUTO: 0.16 X10*3/UL (ref 0–0.7)
EOSINOPHIL NFR BLD AUTO: 1.2 %
ERYTHROCYTE [DISTWIDTH] IN BLOOD BY AUTOMATED COUNT: 14.1 % (ref 11.5–14.5)
FLUAV RNA RESP QL NAA+PROBE: NOT DETECTED
FLUBV RNA RESP QL NAA+PROBE: NOT DETECTED
GLUCOSE SERPL-MCNC: 171 MG/DL (ref 74–99)
HCO3 BLDV-SCNC: 24.1 MMOL/L (ref 22–26)
HCT VFR BLD AUTO: 53.5 % (ref 41–52)
HGB BLD-MCNC: 16.9 G/DL (ref 13.5–17.5)
IMM GRANULOCYTES # BLD AUTO: 0.35 X10*3/UL (ref 0–0.7)
IMM GRANULOCYTES NFR BLD AUTO: 2.7 % (ref 0–0.9)
INHALED O2 CONCENTRATION: 21 %
INR PPP: 1.3 (ref 0.9–1.1)
LACTATE SERPL-SCNC: 1.2 MMOL/L (ref 0.4–2)
LACTATE SERPL-SCNC: 3.4 MMOL/L (ref 0.4–2)
LYMPHOCYTES # BLD AUTO: 6.13 X10*3/UL (ref 1.2–4.8)
LYMPHOCYTES NFR BLD AUTO: 47.1 %
MCH RBC QN AUTO: 29.5 PG (ref 26–34)
MCHC RBC AUTO-ENTMCNC: 31.6 G/DL (ref 32–36)
MCV RBC AUTO: 93 FL (ref 80–100)
MONOCYTES # BLD AUTO: 0.93 X10*3/UL (ref 0.1–1)
MONOCYTES NFR BLD AUTO: 7.1 %
NEUTROPHILS # BLD AUTO: 5.36 X10*3/UL (ref 1.2–7.7)
NEUTROPHILS NFR BLD AUTO: 41.3 %
NRBC BLD-RTO: 0.4 /100 WBCS (ref 0–0)
OXYHGB MFR BLDV: 32.5 % (ref 45–75)
PCO2 BLDV: 49 MM HG (ref 41–51)
PH BLDV: 7.3 PH (ref 7.33–7.43)
PLATELET # BLD AUTO: 151 X10*3/UL (ref 150–450)
PO2 BLDV: 23 MM HG (ref 35–45)
POTASSIUM SERPL-SCNC: 3.9 MMOL/L (ref 3.5–5.3)
PROT SERPL-MCNC: 7.5 G/DL (ref 6.4–8.2)
PROTHROMBIN TIME: 14.2 SECONDS (ref 9.8–12.4)
RBC # BLD AUTO: 5.73 X10*6/UL (ref 4.5–5.9)
RBC MORPH BLD: NORMAL
SAO2 % BLDV: 34 % (ref 45–75)
SARS-COV-2 RNA RESP QL NAA+PROBE: NOT DETECTED
SODIUM SERPL-SCNC: 138 MMOL/L (ref 136–145)
WBC # BLD AUTO: 13 X10*3/UL (ref 4.4–11.3)

## 2025-05-01 PROCEDURE — 96361 HYDRATE IV INFUSION ADD-ON: CPT

## 2025-05-01 PROCEDURE — 93005 ELECTROCARDIOGRAM TRACING: CPT

## 2025-05-01 PROCEDURE — 85610 PROTHROMBIN TIME: CPT | Performed by: STUDENT IN AN ORGANIZED HEALTH CARE EDUCATION/TRAINING PROGRAM

## 2025-05-01 PROCEDURE — 36415 COLL VENOUS BLD VENIPUNCTURE: CPT | Performed by: STUDENT IN AN ORGANIZED HEALTH CARE EDUCATION/TRAINING PROGRAM

## 2025-05-01 PROCEDURE — 2550000001 HC RX 255 CONTRASTS: Performed by: STUDENT IN AN ORGANIZED HEALTH CARE EDUCATION/TRAINING PROGRAM

## 2025-05-01 PROCEDURE — 82810 BLOOD GASES O2 SAT ONLY: CPT | Mod: CCI | Performed by: STUDENT IN AN ORGANIZED HEALTH CARE EDUCATION/TRAINING PROGRAM

## 2025-05-01 PROCEDURE — 71045 X-RAY EXAM CHEST 1 VIEW: CPT | Performed by: RADIOLOGY

## 2025-05-01 PROCEDURE — 71275 CT ANGIOGRAPHY CHEST: CPT

## 2025-05-01 PROCEDURE — 87040 BLOOD CULTURE FOR BACTERIA: CPT | Mod: GEALAB | Performed by: STUDENT IN AN ORGANIZED HEALTH CARE EDUCATION/TRAINING PROGRAM

## 2025-05-01 PROCEDURE — 99291 CRITICAL CARE FIRST HOUR: CPT | Performed by: STUDENT IN AN ORGANIZED HEALTH CARE EDUCATION/TRAINING PROGRAM

## 2025-05-01 PROCEDURE — 70450 CT HEAD/BRAIN W/O DYE: CPT

## 2025-05-01 PROCEDURE — 2500000004 HC RX 250 GENERAL PHARMACY W/ HCPCS (ALT 636 FOR OP/ED): Mod: JZ | Performed by: STUDENT IN AN ORGANIZED HEALTH CARE EDUCATION/TRAINING PROGRAM

## 2025-05-01 PROCEDURE — 80053 COMPREHEN METABOLIC PANEL: CPT | Performed by: STUDENT IN AN ORGANIZED HEALTH CARE EDUCATION/TRAINING PROGRAM

## 2025-05-01 PROCEDURE — 2500000004 HC RX 250 GENERAL PHARMACY W/ HCPCS (ALT 636 FOR OP/ED)

## 2025-05-01 PROCEDURE — 83880 ASSAY OF NATRIURETIC PEPTIDE: CPT | Performed by: STUDENT IN AN ORGANIZED HEALTH CARE EDUCATION/TRAINING PROGRAM

## 2025-05-01 PROCEDURE — 96365 THER/PROPH/DIAG IV INF INIT: CPT | Mod: 59

## 2025-05-01 PROCEDURE — 70450 CT HEAD/BRAIN W/O DYE: CPT | Performed by: STUDENT IN AN ORGANIZED HEALTH CARE EDUCATION/TRAINING PROGRAM

## 2025-05-01 PROCEDURE — 85025 COMPLETE CBC W/AUTO DIFF WBC: CPT | Performed by: STUDENT IN AN ORGANIZED HEALTH CARE EDUCATION/TRAINING PROGRAM

## 2025-05-01 PROCEDURE — 84484 ASSAY OF TROPONIN QUANT: CPT | Performed by: STUDENT IN AN ORGANIZED HEALTH CARE EDUCATION/TRAINING PROGRAM

## 2025-05-01 PROCEDURE — 87636 SARSCOV2 & INF A&B AMP PRB: CPT | Performed by: STUDENT IN AN ORGANIZED HEALTH CARE EDUCATION/TRAINING PROGRAM

## 2025-05-01 PROCEDURE — 71045 X-RAY EXAM CHEST 1 VIEW: CPT

## 2025-05-01 PROCEDURE — 74174 CTA ABD&PLVS W/CONTRAST: CPT | Performed by: STUDENT IN AN ORGANIZED HEALTH CARE EDUCATION/TRAINING PROGRAM

## 2025-05-01 PROCEDURE — 71275 CT ANGIOGRAPHY CHEST: CPT | Performed by: STUDENT IN AN ORGANIZED HEALTH CARE EDUCATION/TRAINING PROGRAM

## 2025-05-01 PROCEDURE — 82805 BLOOD GASES W/O2 SATURATION: CPT | Performed by: STUDENT IN AN ORGANIZED HEALTH CARE EDUCATION/TRAINING PROGRAM

## 2025-05-01 PROCEDURE — 83605 ASSAY OF LACTIC ACID: CPT | Performed by: STUDENT IN AN ORGANIZED HEALTH CARE EDUCATION/TRAINING PROGRAM

## 2025-05-01 PROCEDURE — 2500000005 HC RX 250 GENERAL PHARMACY W/O HCPCS: Performed by: EMERGENCY MEDICINE

## 2025-05-01 RX ORDER — LEVETIRACETAM 10 MG/ML
1000 INJECTION INTRAVASCULAR ONCE
Status: COMPLETED | OUTPATIENT
Start: 2025-05-01 | End: 2025-05-01

## 2025-05-01 RX ORDER — ONDANSETRON HYDROCHLORIDE 2 MG/ML
INJECTION, SOLUTION INTRAVENOUS
Status: COMPLETED
Start: 2025-05-01 | End: 2025-05-01

## 2025-05-01 RX ADMIN — ONDANSETRON 4 MG: 2 INJECTION, SOLUTION INTRAMUSCULAR; INTRAVENOUS at 23:14

## 2025-05-01 RX ADMIN — Medication 4 L/MIN: at 21:35

## 2025-05-01 RX ADMIN — IOHEXOL 89 ML: 350 INJECTION, SOLUTION INTRAVENOUS at 23:11

## 2025-05-01 RX ADMIN — LEVETIRACETAM 1000 MG: 10 INJECTION INTRAVENOUS at 23:34

## 2025-05-01 RX ADMIN — SODIUM CHLORIDE 1000 ML: 0.9 INJECTION, SOLUTION INTRAVENOUS at 22:31

## 2025-05-01 ASSESSMENT — PAIN SCALES - GENERAL
PAINLEVEL_OUTOF10: 2
PAINLEVEL_OUTOF10: 8

## 2025-05-01 ASSESSMENT — PAIN DESCRIPTION - LOCATION: LOCATION: BACK

## 2025-05-01 ASSESSMENT — PAIN - FUNCTIONAL ASSESSMENT
PAIN_FUNCTIONAL_ASSESSMENT: 0-10
PAIN_FUNCTIONAL_ASSESSMENT: 0-10

## 2025-05-01 ASSESSMENT — PAIN DESCRIPTION - PAIN TYPE: TYPE: ACUTE PAIN

## 2025-05-01 ASSESSMENT — COLUMBIA-SUICIDE SEVERITY RATING SCALE - C-SSRS
6. HAVE YOU EVER DONE ANYTHING, STARTED TO DO ANYTHING, OR PREPARED TO DO ANYTHING TO END YOUR LIFE?: NO
1. IN THE PAST MONTH, HAVE YOU WISHED YOU WERE DEAD OR WISHED YOU COULD GO TO SLEEP AND NOT WAKE UP?: NO

## 2025-05-01 NOTE — LETTER
May 3, 2025     Patient: Farooq Lang   YOB: 1960   Date of Visit: 5/1/2025       To Whom It May Concern:    t is my medical opinion that Farooq Lang may NOT drive or operate heavy machinery/equipment for 6 months. Please excuse him from any and all work that would require this from 5/3/2025 through 11/3/2025.     If you have any questions or concerns, please don't hesitate to call.         Sincerely,  MD [email or web address]  [phone]

## 2025-05-02 ENCOUNTER — TELEPHONE (OUTPATIENT)
Dept: RADIOLOGY | Facility: HOSPITAL | Age: 65
End: 2025-05-02
Payer: COMMERCIAL

## 2025-05-02 ENCOUNTER — APPOINTMENT (OUTPATIENT)
Dept: RADIOLOGY | Facility: HOSPITAL | Age: 65
End: 2025-05-02
Payer: COMMERCIAL

## 2025-05-02 ENCOUNTER — APPOINTMENT (OUTPATIENT)
Dept: CARDIOLOGY | Facility: HOSPITAL | Age: 65
End: 2025-05-02
Payer: COMMERCIAL

## 2025-05-02 PROBLEM — R56.9 SEIZURE (MULTI): Status: ACTIVE | Noted: 2025-05-02

## 2025-05-02 PROBLEM — M43.9 COMPRESSION DEFORMITY OF VERTEBRA: Status: ACTIVE | Noted: 2025-05-02

## 2025-05-02 PROBLEM — J44.1 COPD EXACERBATION (MULTI): Status: ACTIVE | Noted: 2025-05-02

## 2025-05-02 PROBLEM — J96.01 ACUTE HYPOXIC RESPIRATORY FAILURE: Status: ACTIVE | Noted: 2025-05-02

## 2025-05-02 PROBLEM — R79.89 ELEVATED SERUM CREATININE: Status: ACTIVE | Noted: 2025-05-02

## 2025-05-02 LAB
ALBUMIN SERPL BCP-MCNC: 3.8 G/DL (ref 3.4–5)
AMPHETAMINES UR QL SCN: NORMAL
ANION GAP SERPL CALC-SCNC: 11 MMOL/L (ref 10–20)
APPEARANCE UR: CLEAR
BARBITURATES UR QL SCN: NORMAL
BENZODIAZ UR QL SCN: NORMAL
BILIRUB UR STRIP.AUTO-MCNC: NEGATIVE MG/DL
BODY SURFACE AREA: 1.77 M2
BUN SERPL-MCNC: 21 MG/DL (ref 6–23)
BZE UR QL SCN: NORMAL
CALCIUM SERPL-MCNC: 8.7 MG/DL (ref 8.6–10.3)
CANNABINOIDS UR QL SCN: NORMAL
CHLORIDE SERPL-SCNC: 105 MMOL/L (ref 98–107)
CHLORIDE UR-SCNC: 109 MMOL/L
CHLORIDE/CREATININE (MMOL/G) IN URINE: 167 MMOL/G CREAT (ref 23–275)
CO2 SERPL-SCNC: 24 MMOL/L (ref 21–32)
COLOR UR: ABNORMAL
CREAT SERPL-MCNC: 1.16 MG/DL (ref 0.5–1.3)
CREAT UR-MCNC: 65.2 MG/DL (ref 20–370)
EGFRCR SERPLBLD CKD-EPI 2021: 70 ML/MIN/1.73M*2
ERYTHROCYTE [DISTWIDTH] IN BLOOD BY AUTOMATED COUNT: 13.9 % (ref 11.5–14.5)
FENTANYL+NORFENTANYL UR QL SCN: NORMAL
GLUCOSE SERPL-MCNC: 115 MG/DL (ref 74–99)
GLUCOSE UR STRIP.AUTO-MCNC: NORMAL MG/DL
HCT VFR BLD AUTO: 46.1 % (ref 41–52)
HGB BLD-MCNC: 15.3 G/DL (ref 13.5–17.5)
HOLD SPECIMEN: 293
KETONES UR STRIP.AUTO-MCNC: NEGATIVE MG/DL
LEUKOCYTE ESTERASE UR QL STRIP.AUTO: NEGATIVE
MAGNESIUM SERPL-MCNC: 1.94 MG/DL (ref 1.6–2.4)
MCH RBC QN AUTO: 29.4 PG (ref 26–34)
MCHC RBC AUTO-ENTMCNC: 33.2 G/DL (ref 32–36)
MCV RBC AUTO: 89 FL (ref 80–100)
METHADONE UR QL SCN: NORMAL
NITRITE UR QL STRIP.AUTO: NEGATIVE
NRBC BLD-RTO: 0 /100 WBCS (ref 0–0)
OPIATES UR QL SCN: NORMAL
OSMOLALITY UR: 555 MOSM/KG (ref 200–1200)
OXYCODONE+OXYMORPHONE UR QL SCN: NORMAL
PCP UR QL SCN: NORMAL
PH UR STRIP.AUTO: 5.5 [PH]
PHOSPHATE SERPL-MCNC: 3.1 MG/DL (ref 2.5–4.9)
PLATELET # BLD AUTO: 129 X10*3/UL (ref 150–450)
POTASSIUM SERPL-SCNC: 4.2 MMOL/L (ref 3.5–5.3)
POTASSIUM UR-SCNC: 33 MMOL/L
POTASSIUM/CREAT UR-RTO: 51 MMOL/G CREAT
PROCALCITONIN SERPL-MCNC: 0.06 NG/ML
PROT UR STRIP.AUTO-MCNC: NEGATIVE MG/DL
RBC # BLD AUTO: 5.2 X10*6/UL (ref 4.5–5.9)
RBC # UR STRIP.AUTO: NEGATIVE MG/DL
SODIUM SERPL-SCNC: 136 MMOL/L (ref 136–145)
SODIUM UR-SCNC: 101 MMOL/L
SODIUM/CREAT UR-RTO: 155 MMOL/G CREAT
SP GR UR STRIP.AUTO: 1.04
UROBILINOGEN UR STRIP.AUTO-MCNC: NORMAL MG/DL
WBC # BLD AUTO: 9.2 X10*3/UL (ref 4.4–11.3)

## 2025-05-02 PROCEDURE — 82436 ASSAY OF URINE CHLORIDE: CPT | Mod: GEALAB

## 2025-05-02 PROCEDURE — 2500000004 HC RX 250 GENERAL PHARMACY W/ HCPCS (ALT 636 FOR OP/ED)

## 2025-05-02 PROCEDURE — 85027 COMPLETE CBC AUTOMATED: CPT

## 2025-05-02 PROCEDURE — 83735 ASSAY OF MAGNESIUM: CPT

## 2025-05-02 PROCEDURE — 93306 TTE W/DOPPLER COMPLETE: CPT

## 2025-05-02 PROCEDURE — 2500000002 HC RX 250 W HCPCS SELF ADMINISTERED DRUGS (ALT 637 FOR MEDICARE OP, ALT 636 FOR OP/ED)

## 2025-05-02 PROCEDURE — 70551 MRI BRAIN STEM W/O DYE: CPT

## 2025-05-02 PROCEDURE — 87449 NOS EACH ORGANISM AG IA: CPT | Mod: GEALAB

## 2025-05-02 PROCEDURE — G0378 HOSPITAL OBSERVATION PER HR: HCPCS

## 2025-05-02 PROCEDURE — 2500000004 HC RX 250 GENERAL PHARMACY W/ HCPCS (ALT 636 FOR OP/ED): Mod: JZ

## 2025-05-02 PROCEDURE — 36415 COLL VENOUS BLD VENIPUNCTURE: CPT

## 2025-05-02 PROCEDURE — 97161 PT EVAL LOW COMPLEX 20 MIN: CPT | Mod: GP

## 2025-05-02 PROCEDURE — 96375 TX/PRO/DX INJ NEW DRUG ADDON: CPT

## 2025-05-02 PROCEDURE — 70551 MRI BRAIN STEM W/O DYE: CPT | Performed by: RADIOLOGY

## 2025-05-02 PROCEDURE — 72131 CT LUMBAR SPINE W/O DYE: CPT

## 2025-05-02 PROCEDURE — 84145 PROCALCITONIN (PCT): CPT | Mod: GEALAB

## 2025-05-02 PROCEDURE — 99223 1ST HOSP IP/OBS HIGH 75: CPT

## 2025-05-02 PROCEDURE — 2500000004 HC RX 250 GENERAL PHARMACY W/ HCPCS (ALT 636 FOR OP/ED): Mod: JZ | Performed by: STUDENT IN AN ORGANIZED HEALTH CARE EDUCATION/TRAINING PROGRAM

## 2025-05-02 PROCEDURE — 2500000001 HC RX 250 WO HCPCS SELF ADMINISTERED DRUGS (ALT 637 FOR MEDICARE OP): Performed by: STUDENT IN AN ORGANIZED HEALTH CARE EDUCATION/TRAINING PROGRAM

## 2025-05-02 PROCEDURE — 71045 X-RAY EXAM CHEST 1 VIEW: CPT

## 2025-05-02 PROCEDURE — 87899 AGENT NOS ASSAY W/OPTIC: CPT | Mod: GEALAB

## 2025-05-02 PROCEDURE — 1200000002 HC GENERAL ROOM WITH TELEMETRY DAILY

## 2025-05-02 PROCEDURE — 2500000001 HC RX 250 WO HCPCS SELF ADMINISTERED DRUGS (ALT 637 FOR MEDICARE OP)

## 2025-05-02 PROCEDURE — 80069 RENAL FUNCTION PANEL: CPT

## 2025-05-02 PROCEDURE — 96367 TX/PROPH/DG ADDL SEQ IV INF: CPT

## 2025-05-02 PROCEDURE — 94640 AIRWAY INHALATION TREATMENT: CPT

## 2025-05-02 PROCEDURE — S4991 NICOTINE PATCH NONLEGEND: HCPCS

## 2025-05-02 PROCEDURE — 83935 ASSAY OF URINE OSMOLALITY: CPT | Mod: GEALAB

## 2025-05-02 PROCEDURE — 2500000005 HC RX 250 GENERAL PHARMACY W/O HCPCS

## 2025-05-02 PROCEDURE — 96361 HYDRATE IV INFUSION ADD-ON: CPT

## 2025-05-02 PROCEDURE — 2500000005 HC RX 250 GENERAL PHARMACY W/O HCPCS: Performed by: STUDENT IN AN ORGANIZED HEALTH CARE EDUCATION/TRAINING PROGRAM

## 2025-05-02 PROCEDURE — 99223 1ST HOSP IP/OBS HIGH 75: CPT | Performed by: PSYCHIATRY & NEUROLOGY

## 2025-05-02 PROCEDURE — 94760 N-INVAS EAR/PLS OXIMETRY 1: CPT

## 2025-05-02 PROCEDURE — 81003 URINALYSIS AUTO W/O SCOPE: CPT | Performed by: STUDENT IN AN ORGANIZED HEALTH CARE EDUCATION/TRAINING PROGRAM

## 2025-05-02 PROCEDURE — 71045 X-RAY EXAM CHEST 1 VIEW: CPT | Performed by: STUDENT IN AN ORGANIZED HEALTH CARE EDUCATION/TRAINING PROGRAM

## 2025-05-02 PROCEDURE — 72131 CT LUMBAR SPINE W/O DYE: CPT | Performed by: STUDENT IN AN ORGANIZED HEALTH CARE EDUCATION/TRAINING PROGRAM

## 2025-05-02 PROCEDURE — 84100 ASSAY OF PHOSPHORUS: CPT

## 2025-05-02 PROCEDURE — 80307 DRUG TEST PRSMV CHEM ANLYZR: CPT | Performed by: STUDENT IN AN ORGANIZED HEALTH CARE EDUCATION/TRAINING PROGRAM

## 2025-05-02 PROCEDURE — 96376 TX/PRO/DX INJ SAME DRUG ADON: CPT | Mod: 59

## 2025-05-02 RX ORDER — ACETAMINOPHEN 325 MG/1
975 TABLET ORAL EVERY 6 HOURS
Status: DISCONTINUED | OUTPATIENT
Start: 2025-05-02 | End: 2025-05-03 | Stop reason: HOSPADM

## 2025-05-02 RX ORDER — NAPROXEN SODIUM 220 MG/1
81 TABLET, FILM COATED ORAL DAILY
Status: DISCONTINUED | OUTPATIENT
Start: 2025-05-02 | End: 2025-05-03 | Stop reason: HOSPADM

## 2025-05-02 RX ORDER — AMOXICILLIN AND CLAVULANATE POTASSIUM 875; 125 MG/1; MG/1
1 TABLET, FILM COATED ORAL EVERY 12 HOURS SCHEDULED
Status: DISCONTINUED | OUTPATIENT
Start: 2025-05-02 | End: 2025-05-02

## 2025-05-02 RX ORDER — FENTANYL CITRATE 50 UG/ML
50 INJECTION, SOLUTION INTRAMUSCULAR; INTRAVENOUS ONCE
Status: COMPLETED | OUTPATIENT
Start: 2025-05-02 | End: 2025-05-02

## 2025-05-02 RX ORDER — NAPROXEN SODIUM 220 MG/1
81 TABLET, FILM COATED ORAL DAILY
Status: CANCELLED | OUTPATIENT
Start: 2025-05-02

## 2025-05-02 RX ORDER — PREDNISONE 20 MG/1
40 TABLET ORAL DAILY
Status: DISCONTINUED | OUTPATIENT
Start: 2025-05-02 | End: 2025-05-03 | Stop reason: HOSPADM

## 2025-05-02 RX ORDER — IPRATROPIUM BROMIDE AND ALBUTEROL SULFATE 2.5; .5 MG/3ML; MG/3ML
3 SOLUTION RESPIRATORY (INHALATION) EVERY 2 HOUR PRN
Status: DISCONTINUED | OUTPATIENT
Start: 2025-05-02 | End: 2025-05-03 | Stop reason: HOSPADM

## 2025-05-02 RX ORDER — LEVETIRACETAM 500 MG/1
750 TABLET ORAL 2 TIMES DAILY
Status: DISCONTINUED | OUTPATIENT
Start: 2025-05-02 | End: 2025-05-03 | Stop reason: HOSPADM

## 2025-05-02 RX ORDER — LORAZEPAM 2 MG/ML
2 INJECTION INTRAMUSCULAR EVERY 5 MIN PRN
Status: DISCONTINUED | OUTPATIENT
Start: 2025-05-02 | End: 2025-05-03 | Stop reason: HOSPADM

## 2025-05-02 RX ORDER — IPRATROPIUM BROMIDE AND ALBUTEROL SULFATE 2.5; .5 MG/3ML; MG/3ML
3 SOLUTION RESPIRATORY (INHALATION) EVERY 2 HOUR PRN
Status: DISCONTINUED | OUTPATIENT
Start: 2025-05-02 | End: 2025-05-02

## 2025-05-02 RX ORDER — CEFTRIAXONE 2 G/50ML
2 INJECTION, SOLUTION INTRAVENOUS EVERY 24 HOURS
Status: DISCONTINUED | OUTPATIENT
Start: 2025-05-02 | End: 2025-05-03 | Stop reason: HOSPADM

## 2025-05-02 RX ORDER — GUAIFENESIN 600 MG/1
600 TABLET, EXTENDED RELEASE ORAL 2 TIMES DAILY PRN
Status: DISCONTINUED | OUTPATIENT
Start: 2025-05-02 | End: 2025-05-02

## 2025-05-02 RX ORDER — OXYCODONE HYDROCHLORIDE 5 MG/1
5 TABLET ORAL EVERY 6 HOURS PRN
Status: DISCONTINUED | OUTPATIENT
Start: 2025-05-02 | End: 2025-05-03 | Stop reason: HOSPADM

## 2025-05-02 RX ORDER — OXYCODONE HYDROCHLORIDE 10 MG/1
10 TABLET ORAL EVERY 6 HOURS PRN
Status: DISCONTINUED | OUTPATIENT
Start: 2025-05-02 | End: 2025-05-03 | Stop reason: HOSPADM

## 2025-05-02 RX ORDER — LORAZEPAM 2 MG/ML
1 INJECTION INTRAMUSCULAR AS NEEDED
Status: DISCONTINUED | OUTPATIENT
Start: 2025-05-02 | End: 2025-05-02

## 2025-05-02 RX ORDER — FUROSEMIDE 10 MG/ML
40 INJECTION INTRAMUSCULAR; INTRAVENOUS ONCE
Status: COMPLETED | OUTPATIENT
Start: 2025-05-02 | End: 2025-05-02

## 2025-05-02 RX ORDER — AZITHROMYCIN 500 MG/1
500 TABLET, FILM COATED ORAL
Status: DISCONTINUED | OUTPATIENT
Start: 2025-05-02 | End: 2025-05-03 | Stop reason: HOSPADM

## 2025-05-02 RX ORDER — OFLOXACIN 3 MG/ML
5 SOLUTION AURICULAR (OTIC) 2 TIMES DAILY
Status: DISCONTINUED | OUTPATIENT
Start: 2025-05-02 | End: 2025-05-03 | Stop reason: HOSPADM

## 2025-05-02 RX ORDER — NEBIVOLOL 5 MG/1
5 TABLET ORAL NIGHTLY
Status: DISCONTINUED | OUTPATIENT
Start: 2025-05-02 | End: 2025-05-03

## 2025-05-02 RX ORDER — NEBIVOLOL 5 MG/1
5 TABLET ORAL DAILY
Status: DISCONTINUED | OUTPATIENT
Start: 2025-05-03 | End: 2025-05-02

## 2025-05-02 RX ORDER — AMOXICILLIN 250 MG
2 CAPSULE ORAL NIGHTLY
Status: DISCONTINUED | OUTPATIENT
Start: 2025-05-02 | End: 2025-05-03 | Stop reason: HOSPADM

## 2025-05-02 RX ORDER — ONDANSETRON HYDROCHLORIDE 2 MG/ML
4 INJECTION, SOLUTION INTRAVENOUS EVERY 6 HOURS PRN
Status: DISCONTINUED | OUTPATIENT
Start: 2025-05-02 | End: 2025-05-03 | Stop reason: HOSPADM

## 2025-05-02 RX ORDER — NALOXONE HYDROCHLORIDE 0.4 MG/ML
0.2 INJECTION, SOLUTION INTRAMUSCULAR; INTRAVENOUS; SUBCUTANEOUS EVERY 5 MIN PRN
Status: DISCONTINUED | OUTPATIENT
Start: 2025-05-02 | End: 2025-05-03 | Stop reason: HOSPADM

## 2025-05-02 RX ORDER — FUROSEMIDE 10 MG/ML
20 INJECTION INTRAMUSCULAR; INTRAVENOUS ONCE
Status: DISCONTINUED | OUTPATIENT
Start: 2025-05-02 | End: 2025-05-02

## 2025-05-02 RX ORDER — DILTIAZEM HYDROCHLORIDE 120 MG/1
120 CAPSULE, COATED, EXTENDED RELEASE ORAL DAILY
Status: DISCONTINUED | OUTPATIENT
Start: 2025-05-02 | End: 2025-05-02

## 2025-05-02 RX ORDER — LIDOCAINE 560 MG/1
1 PATCH PERCUTANEOUS; TOPICAL; TRANSDERMAL DAILY
Status: DISCONTINUED | OUTPATIENT
Start: 2025-05-02 | End: 2025-05-03 | Stop reason: HOSPADM

## 2025-05-02 RX ORDER — AMOXICILLIN AND CLAVULANATE POTASSIUM 875; 125 MG/1; MG/1
1 TABLET, FILM COATED ORAL ONCE
Status: COMPLETED | OUTPATIENT
Start: 2025-05-02 | End: 2025-05-02

## 2025-05-02 RX ORDER — AMIODARONE HYDROCHLORIDE 200 MG/1
200 TABLET ORAL DAILY
Status: DISCONTINUED | OUTPATIENT
Start: 2025-05-02 | End: 2025-05-02

## 2025-05-02 RX ORDER — IBUPROFEN 200 MG
1 TABLET ORAL DAILY
Status: DISCONTINUED | OUTPATIENT
Start: 2025-05-02 | End: 2025-05-03 | Stop reason: HOSPADM

## 2025-05-02 RX ORDER — HYDROMORPHONE HYDROCHLORIDE 1 MG/ML
0.6 INJECTION, SOLUTION INTRAMUSCULAR; INTRAVENOUS; SUBCUTANEOUS
Status: DISCONTINUED | OUTPATIENT
Start: 2025-05-02 | End: 2025-05-02

## 2025-05-02 RX ORDER — IPRATROPIUM BROMIDE AND ALBUTEROL SULFATE 2.5; .5 MG/3ML; MG/3ML
3 SOLUTION RESPIRATORY (INHALATION)
Status: DISCONTINUED | OUTPATIENT
Start: 2025-05-02 | End: 2025-05-03 | Stop reason: HOSPADM

## 2025-05-02 RX ORDER — GUAIFENESIN 600 MG/1
600 TABLET, EXTENDED RELEASE ORAL 2 TIMES DAILY
Status: DISCONTINUED | OUTPATIENT
Start: 2025-05-02 | End: 2025-05-03 | Stop reason: HOSPADM

## 2025-05-02 RX ADMIN — ACETAMINOPHEN 975 MG: 325 TABLET, FILM COATED ORAL at 08:51

## 2025-05-02 RX ADMIN — IPRATROPIUM BROMIDE AND ALBUTEROL SULFATE 3 ML: 2.5; .5 SOLUTION RESPIRATORY (INHALATION) at 07:30

## 2025-05-02 RX ADMIN — HYDROMORPHONE HYDROCHLORIDE 0.4 MG: 1 INJECTION, SOLUTION INTRAMUSCULAR; INTRAVENOUS; SUBCUTANEOUS at 03:57

## 2025-05-02 RX ADMIN — ACETAMINOPHEN 975 MG: 325 TABLET, FILM COATED ORAL at 03:23

## 2025-05-02 RX ADMIN — APIXABAN 5 MG: 5 TABLET, FILM COATED ORAL at 08:50

## 2025-05-02 RX ADMIN — OFLOXACIN OTIC 5 DROP: 3 SOLUTION AURICULAR (OTIC) at 21:11

## 2025-05-02 RX ADMIN — AMOXICILLIN AND CLAVULANATE POTASSIUM 1 TABLET: 875; 125 TABLET, FILM COATED ORAL at 01:23

## 2025-05-02 RX ADMIN — FUROSEMIDE 40 MG: 10 INJECTION, SOLUTION INTRAMUSCULAR; INTRAVENOUS at 01:28

## 2025-05-02 RX ADMIN — Medication 2 L/MIN: at 20:24

## 2025-05-02 RX ADMIN — OFLOXACIN OTIC 5 DROP: 3 SOLUTION AURICULAR (OTIC) at 08:52

## 2025-05-02 RX ADMIN — HYDROMORPHONE HYDROCHLORIDE 0.4 MG: 1 INJECTION, SOLUTION INTRAMUSCULAR; INTRAVENOUS; SUBCUTANEOUS at 10:25

## 2025-05-02 RX ADMIN — IPRATROPIUM BROMIDE AND ALBUTEROL SULFATE 3 ML: 2.5; .5 SOLUTION RESPIRATORY (INHALATION) at 04:57

## 2025-05-02 RX ADMIN — PREDNISONE 40 MG: 20 TABLET ORAL at 08:50

## 2025-05-02 RX ADMIN — SODIUM CHLORIDE 500 ML: 0.9 INJECTION, SOLUTION INTRAVENOUS at 03:59

## 2025-05-02 RX ADMIN — IPRATROPIUM BROMIDE AND ALBUTEROL SULFATE 3 ML: 2.5; .5 SOLUTION RESPIRATORY (INHALATION) at 20:21

## 2025-05-02 RX ADMIN — ASPIRIN 81 MG: 81 TABLET, CHEWABLE ORAL at 08:51

## 2025-05-02 RX ADMIN — Medication 4 L/MIN: at 07:30

## 2025-05-02 RX ADMIN — METHYLPREDNISOLONE SODIUM SUCCINATE 40 MG: 40 INJECTION, POWDER, FOR SOLUTION INTRAMUSCULAR; INTRAVENOUS at 05:08

## 2025-05-02 RX ADMIN — FENTANYL CITRATE 50 MCG: 50 INJECTION INTRAMUSCULAR; INTRAVENOUS at 01:19

## 2025-05-02 RX ADMIN — LEVETIRACETAM 750 MG: 500 TABLET, FILM COATED ORAL at 21:10

## 2025-05-02 RX ADMIN — GUAIFENESIN 600 MG: 600 TABLET ORAL at 05:08

## 2025-05-02 RX ADMIN — Medication 1 PATCH: at 12:25

## 2025-05-02 RX ADMIN — ACETAMINOPHEN 975 MG: 325 TABLET, FILM COATED ORAL at 21:08

## 2025-05-02 RX ADMIN — LIDOCAINE 4% 1 PATCH: 40 PATCH TOPICAL at 03:23

## 2025-05-02 RX ADMIN — AZITHROMYCIN DIHYDRATE 500 MG: 500 TABLET ORAL at 08:50

## 2025-05-02 RX ADMIN — CEFTRIAXONE 2 G: 2 INJECTION, SOLUTION INTRAVENOUS at 11:47

## 2025-05-02 RX ADMIN — APIXABAN 5 MG: 5 TABLET, FILM COATED ORAL at 21:08

## 2025-05-02 SDOH — SOCIAL STABILITY: SOCIAL INSECURITY: HAS ANYONE EVER THREATENED TO HURT YOUR FAMILY OR YOUR PETS?: NO

## 2025-05-02 SDOH — SOCIAL STABILITY: SOCIAL INSECURITY: DOES ANYONE TRY TO KEEP YOU FROM HAVING/CONTACTING OTHER FRIENDS OR DOING THINGS OUTSIDE YOUR HOME?: NO

## 2025-05-02 SDOH — SOCIAL STABILITY: SOCIAL INSECURITY: DO YOU FEEL ANYONE HAS EXPLOITED OR TAKEN ADVANTAGE OF YOU FINANCIALLY OR OF YOUR PERSONAL PROPERTY?: NO

## 2025-05-02 SDOH — SOCIAL STABILITY: SOCIAL INSECURITY: DO YOU FEEL UNSAFE GOING BACK TO THE PLACE WHERE YOU ARE LIVING?: NO

## 2025-05-02 SDOH — ECONOMIC STABILITY: FOOD INSECURITY: WITHIN THE PAST 12 MONTHS, THE FOOD YOU BOUGHT JUST DIDN'T LAST AND YOU DIDN'T HAVE MONEY TO GET MORE.: NEVER TRUE

## 2025-05-02 SDOH — SOCIAL STABILITY: SOCIAL INSECURITY: HAVE YOU HAD ANY THOUGHTS OF HARMING ANYONE ELSE?: NO

## 2025-05-02 SDOH — SOCIAL STABILITY: SOCIAL INSECURITY: WITHIN THE LAST YEAR, HAVE YOU BEEN AFRAID OF YOUR PARTNER OR EX-PARTNER?: NO

## 2025-05-02 SDOH — SOCIAL STABILITY: SOCIAL INSECURITY: WITHIN THE LAST YEAR, HAVE YOU BEEN HUMILIATED OR EMOTIONALLY ABUSED IN OTHER WAYS BY YOUR PARTNER OR EX-PARTNER?: NO

## 2025-05-02 SDOH — ECONOMIC STABILITY: FOOD INSECURITY: WITHIN THE PAST 12 MONTHS, YOU WORRIED THAT YOUR FOOD WOULD RUN OUT BEFORE YOU GOT THE MONEY TO BUY MORE.: NEVER TRUE

## 2025-05-02 SDOH — SOCIAL STABILITY: SOCIAL INSECURITY: WERE YOU ABLE TO COMPLETE ALL THE BEHAVIORAL HEALTH SCREENINGS?: YES

## 2025-05-02 SDOH — ECONOMIC STABILITY: INCOME INSECURITY: IN THE PAST 12 MONTHS HAS THE ELECTRIC, GAS, OIL, OR WATER COMPANY THREATENED TO SHUT OFF SERVICES IN YOUR HOME?: NO

## 2025-05-02 SDOH — SOCIAL STABILITY: SOCIAL INSECURITY
WITHIN THE LAST YEAR, HAVE YOU BEEN RAPED OR FORCED TO HAVE ANY KIND OF SEXUAL ACTIVITY BY YOUR PARTNER OR EX-PARTNER?: NO

## 2025-05-02 SDOH — SOCIAL STABILITY: SOCIAL INSECURITY: ARE THERE ANY APPARENT SIGNS OF INJURIES/BEHAVIORS THAT COULD BE RELATED TO ABUSE/NEGLECT?: NO

## 2025-05-02 SDOH — SOCIAL STABILITY: SOCIAL INSECURITY: ABUSE: ADULT

## 2025-05-02 SDOH — SOCIAL STABILITY: SOCIAL INSECURITY: HAVE YOU HAD THOUGHTS OF HARMING ANYONE ELSE?: NO

## 2025-05-02 SDOH — SOCIAL STABILITY: SOCIAL INSECURITY: ARE YOU OR HAVE YOU BEEN THREATENED OR ABUSED PHYSICALLY, EMOTIONALLY, OR SEXUALLY BY ANYONE?: NO

## 2025-05-02 ASSESSMENT — LIFESTYLE VARIABLES
SKIP TO QUESTIONS 9-10: 1
AUDIT-C TOTAL SCORE: 0
HOW OFTEN DO YOU HAVE 6 OR MORE DRINKS ON ONE OCCASION: NEVER
AUDIT-C TOTAL SCORE: 0
HOW MANY STANDARD DRINKS CONTAINING ALCOHOL DO YOU HAVE ON A TYPICAL DAY: PATIENT DOES NOT DRINK
HOW OFTEN DO YOU HAVE A DRINK CONTAINING ALCOHOL: NEVER

## 2025-05-02 ASSESSMENT — ACTIVITIES OF DAILY LIVING (ADL)
PATIENT'S MEMORY ADEQUATE TO SAFELY COMPLETE DAILY ACTIVITIES?: YES
ASSISTIVE_DEVICE: EYEGLASSES
WALKS IN HOME: INDEPENDENT
BATHING: INDEPENDENT
JUDGMENT_ADEQUATE_SAFELY_COMPLETE_DAILY_ACTIVITIES: NO
LACK_OF_TRANSPORTATION: NO
ADEQUATE_TO_COMPLETE_ADL: YES
TOILETING: INDEPENDENT
GROOMING: INDEPENDENT
HEARING - LEFT EAR: FUNCTIONAL
DRESSING YOURSELF: INDEPENDENT
FEEDING YOURSELF: INDEPENDENT
HEARING - RIGHT EAR: FUNCTIONAL

## 2025-05-02 ASSESSMENT — COGNITIVE AND FUNCTIONAL STATUS - GENERAL
STANDING UP FROM CHAIR USING ARMS: A LOT
TURNING FROM BACK TO SIDE WHILE IN FLAT BAD: A LOT
TOILETING: A LITTLE
MOBILITY SCORE: 12
EATING MEALS: A LITTLE
CLIMB 3 TO 5 STEPS WITH RAILING: TOTAL
DAILY ACTIVITIY SCORE: 14
DRESSING REGULAR LOWER BODY CLOTHING: A LOT
MOVING FROM LYING ON BACK TO SITTING ON SIDE OF FLAT BED WITH BEDRAILS: A LITTLE
DAILY ACTIVITIY SCORE: 19
MOVING TO AND FROM BED TO CHAIR: A LOT
MOBILITY SCORE: 10
PERSONAL GROOMING: A LITTLE
TURNING FROM BACK TO SIDE WHILE IN FLAT BAD: A LOT
DRESSING REGULAR LOWER BODY CLOTHING: A LITTLE
DRESSING REGULAR UPPER BODY CLOTHING: A LOT
PERSONAL GROOMING: A LITTLE
PATIENT BASELINE BEDBOUND: NO
HELP NEEDED FOR BATHING: A LOT
MOVING TO AND FROM BED TO CHAIR: A LOT
WALKING IN HOSPITAL ROOM: A LOT
MOVING FROM LYING ON BACK TO SITTING ON SIDE OF FLAT BED WITH BEDRAILS: A LITTLE
HELP NEEDED FOR BATHING: A LITTLE
DRESSING REGULAR UPPER BODY CLOTHING: A LITTLE
TOILETING: A LOT
WALKING IN HOSPITAL ROOM: TOTAL
CLIMB 3 TO 5 STEPS WITH RAILING: TOTAL
STANDING UP FROM CHAIR USING ARMS: TOTAL

## 2025-05-02 ASSESSMENT — PAIN DESCRIPTION - LOCATION
LOCATION: BACK
LOCATION: BACK

## 2025-05-02 ASSESSMENT — ENCOUNTER SYMPTOMS
SEIZURES: 1
FEVER: 0
ABDOMINAL PAIN: 0
PALPITATIONS: 0
ABDOMINAL DISTENTION: 0
DIZZINESS: 0
COUGH: 0
CHILLS: 0
SHORTNESS OF BREATH: 0
WEAKNESS: 0

## 2025-05-02 ASSESSMENT — PATIENT HEALTH QUESTIONNAIRE - PHQ9
2. FEELING DOWN, DEPRESSED OR HOPELESS: NOT AT ALL
SUM OF ALL RESPONSES TO PHQ9 QUESTIONS 1 & 2: 0
1. LITTLE INTEREST OR PLEASURE IN DOING THINGS: NOT AT ALL

## 2025-05-02 ASSESSMENT — PAIN SCALES - GENERAL
PAINLEVEL_OUTOF10: 10 - WORST POSSIBLE PAIN
PAINLEVEL_OUTOF10: 8

## 2025-05-02 ASSESSMENT — PAIN - FUNCTIONAL ASSESSMENT
PAIN_FUNCTIONAL_ASSESSMENT: 0-10

## 2025-05-02 NOTE — CONSULTS
Inpatient consult to Cardiology  Consult performed by: LENY Alexander-CNP  Consult ordered by: Nicolas Nava MD  Reason for consult: Afib          History Of Present Illness  Farooq Lang is a 64 y.o. male presenting with a seizure. 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 He does not remember anything after that. According to the wife, she had just gotten home, talked to him while he was on the couch and then heard him moan from the other room and came in to finding him shaking and unresponsive. He was unresponsive for about 10 minutes.     Lab work in the ER showed glucose 171, sodium 138, potassium 3.9, BUN/Cr 23/1.4, troponin 27, 13, , lactate 3.4, INR 1.3, WBC 13, H&H 16.9/53.5, VBGs showed ph 7.3, PCO2 49, PO2 23, PO2 34, HCO3 24.1, CXR showed cardiomegaly, pulmonary interstitial edema, CT head negative, CTA of the chest/abd/pelvis showed mild cardiomegaly, emphysema.     EKG showed atrial flutter.      Past Medical History  Medical History[1]  S/p mitral valve replacement with 27mm mitris bioprosthetic valve and left atrial appendage ligation     Surgical History  Surgical History[2]     Social History  He reports that he has been smoking cigarettes. He started smoking about 50 years ago. He has a 25.2 pack-year smoking history. He has been exposed to tobacco smoke. He has never used smokeless tobacco. He reports that he does not drink alcohol and does not use drugs.    Family History  Family History[3]     Allergies  Metoprolol    Review of Systems  Review of Systems   Constitutional:  Negative for chills and fever.   Respiratory:  Negative for cough and shortness of breath.    Cardiovascular:  Negative for chest pain, palpitations and leg swelling.   Gastrointestinal:  Negative for abdominal distention and abdominal pain.   Musculoskeletal:  Negative for gait problem.   Neurological:  Positive  "for seizures. Negative for dizziness and weakness.   All other systems reviewed and are negative.         Physical Exam  Constitutional: Well developed, awake/alert/oriented x3, no distress, alert and cooperative  Eyes: PERRL, EOMI, clear sclera  ENMT: mucous membranes moist, no apparent injury, no lesions seen  Head/Neck: Neck supple, no apparent injury, thyroid without mass or tenderness, No JVD, trachea midline, no bruits  Respiratory/Thorax: Patent airways, CTAB, normal breath sounds with good chest expansion, thorax symmetric  Cardiovascular: irregular rhythm, no murmurs, 2+ equal pulses of the extremities, normal S 1and S 2  Gastrointestinal: Nondistended, soft, non-tender, no rebound tenderness or guarding, no masses palpable, no organomegaly, +BS, no bruits  Musculoskeletal: ROM intact, no joint swelling, normal strength  Extremities: normal extremities, no cyanosis edema, contusions or wounds, no clubbing  Neurological: alert and oriented x3, intact senses, motor, response and reflexes, normal strength  Lymphatic: No significant lymphadenopathy  Psychological: Appropriate mood and behavior  Skin: Face flushed, Warm and dry, no lesions, no rashes       Last Recorded Vitals  Blood pressure 102/69, pulse (!) 121, temperature 36.7 °C (98.1 °F), temperature source Temporal, resp. rate 18, height 1.676 m (5' 6\"), weight 67 kg (147 lb 12.8 oz), SpO2 92%.    Medications  Scheduled medications  Scheduled Medications[4]  Continuous medications  Continuous Medications[5]  PRN medications  PRN Medications[6]    Relevant Results  Results for orders placed or performed during the hospital encounter of 05/01/25 (from the past 24 hours)   CBC with Differential   Result Value Ref Range    WBC 13.0 (H) 4.4 - 11.3 x10*3/uL    nRBC 0.4 (H) 0.0 - 0.0 /100 WBCs    RBC 5.73 4.50 - 5.90 x10*6/uL    Hemoglobin 16.9 13.5 - 17.5 g/dL    Hematocrit 53.5 (H) 41.0 - 52.0 %    MCV 93 80 - 100 fL    MCH 29.5 26.0 - 34.0 pg    MCHC 31.6 " (L) 32.0 - 36.0 g/dL    RDW 14.1 11.5 - 14.5 %    Platelets 151 150 - 450 x10*3/uL    Neutrophils % 41.3 40.0 - 80.0 %    Immature Granulocytes %, Automated 2.7 (H) 0.0 - 0.9 %    Lymphocytes % 47.1 13.0 - 44.0 %    Monocytes % 7.1 2.0 - 10.0 %    Eosinophils % 1.2 0.0 - 6.0 %    Basophils % 0.6 0.0 - 2.0 %    Neutrophils Absolute 5.36 1.20 - 7.70 x10*3/uL    Immature Granulocytes Absolute, Automated 0.35 0.00 - 0.70 x10*3/uL    Lymphocytes Absolute 6.13 (H) 1.20 - 4.80 x10*3/uL    Monocytes Absolute 0.93 0.10 - 1.00 x10*3/uL    Eosinophils Absolute 0.16 0.00 - 0.70 x10*3/uL    Basophils Absolute 0.08 0.00 - 0.10 x10*3/uL   Comprehensive Metabolic Panel   Result Value Ref Range    Glucose 171 (H) 74 - 99 mg/dL    Sodium 138 136 - 145 mmol/L    Potassium 3.9 3.5 - 5.3 mmol/L    Chloride 98 98 - 107 mmol/L    Bicarbonate 14 (L) 21 - 32 mmol/L    Anion Gap 30 (H) 10 - 20 mmol/L    Urea Nitrogen 23 6 - 23 mg/dL    Creatinine 1.40 (H) 0.50 - 1.30 mg/dL    eGFR 56 (L) >60 mL/min/1.73m*2    Calcium 9.3 8.6 - 10.3 mg/dL    Albumin 4.1 3.4 - 5.0 g/dL    Alkaline Phosphatase 108 33 - 136 U/L    Total Protein 7.5 6.4 - 8.2 g/dL    AST 20 9 - 39 U/L    Bilirubin, Total 0.6 0.0 - 1.2 mg/dL    ALT 21 10 - 52 U/L   Coagulation Screen   Result Value Ref Range    Protime 14.2 (H) 9.8 - 12.4 seconds    INR 1.3 (H) 0.9 - 1.1    aPTT 34 26 - 36 seconds   Troponin I, High Sensitivity   Result Value Ref Range    Troponin I, High Sensitivity 27 (H) 0 - 20 ng/L   Morphology   Result Value Ref Range    RBC Morphology No significant RBC morphology present    Blood Gas Venous   Result Value Ref Range    POCT pH, Venous 7.30 (L) 7.33 - 7.43 pH    POCT pCO2, Venous 49 41 - 51 mm Hg    POCT pO2, Venous 23 (L) 35 - 45 mm Hg    POCT SO2, Venous 34 (L) 45 - 75 %    POCT Oxy Hemoglobin, Venous 32.5 (L) 45.0 - 75.0 %    POCT Base Excess, Venous -2.8 (L) -2.0 - 3.0 mmol/L    POCT HCO3 Calculated, Venous 24.1 22.0 - 26.0 mmol/L    Patient Temperature       FiO2 21 %   B-Type Natriuretic Peptide   Result Value Ref Range     (H) 0 - 99 pg/mL   Lactate   Result Value Ref Range    Lactate 3.4 (H) 0.4 - 2.0 mmol/L   Blood Culture    Specimen: Peripheral Venipuncture; Blood culture   Result Value Ref Range    Blood Culture Loaded on Instrument - Culture in progress    Blood Culture    Specimen: Peripheral Venipuncture; Blood culture   Result Value Ref Range    Blood Culture Loaded on Instrument - Culture in progress    Troponin I, High Sensitivity   Result Value Ref Range    Troponin I, High Sensitivity 13 0 - 20 ng/L   Sars-CoV-2 and Influenza A/B PCR   Result Value Ref Range    Flu A Result Not Detected Not Detected    Flu B Result Not Detected Not Detected    Coronavirus 2019, PCR Not Detected Not Detected   Lactate   Result Value Ref Range    Lactate 1.2 0.4 - 2.0 mmol/L   Drug Screen, Urine   Result Value Ref Range    Amphetamine Screen, Urine Presumptive Negative Presumptive Negative    Barbiturate Screen, Urine Presumptive Negative Presumptive Negative    Benzodiazepines Screen, Urine Presumptive Negative Presumptive Negative    Cannabinoid Screen, Urine Presumptive Negative Presumptive Negative    Cocaine Metabolite Screen, Urine Presumptive Negative Presumptive Negative    Fentanyl Screen, Urine Presumptive Negative Presumptive Negative    Opiate Screen, Urine Presumptive Negative Presumptive Negative    Oxycodone Screen, Urine Presumptive Negative Presumptive Negative    PCP Screen, Urine Presumptive Negative Presumptive Negative    Methadone Screen, Urine Presumptive Negative Presumptive Negative   Urinalysis with Reflex Culture and Microscopic   Result Value Ref Range    Color, Urine Light-Yellow Light-Yellow, Yellow, Dark-Yellow    Appearance, Urine Clear Clear    Specific Gravity, Urine 1.040 (N) 1.005 - 1.035    pH, Urine 5.5 5.0, 5.5, 6.0, 6.5, 7.0, 7.5, 8.0    Protein, Urine NEGATIVE NEGATIVE, 10 (TRACE), 20 (TRACE) mg/dL    Glucose, Urine Normal  Normal mg/dL    Blood, Urine NEGATIVE NEGATIVE mg/dL    Ketones, Urine NEGATIVE NEGATIVE mg/dL    Bilirubin, Urine NEGATIVE NEGATIVE mg/dL    Urobilinogen, Urine Normal Normal mg/dL    Nitrite, Urine NEGATIVE NEGATIVE    Leukocyte Esterase, Urine NEGATIVE NEGATIVE   Extra Urine Gray Tube   Result Value Ref Range    Extra Tube 293    Osmolality, urine   Result Value Ref Range    Osmolality, Urine Random 555 200 - 1,200 mOsm/kg   Urine electrolytes   Result Value Ref Range    Sodium, Urine Random 101 mmol/L    Sodium/Creatinine Ratio 155 Not established. mmol/g Creat    Potassium, Urine Random 33 mmol/L    Potassium/Creatinine Ratio 51 Not established mmol/g Creat    Chloride, Urine Random 109 mmol/L    Chloride/Creatinine Ratio 167 23 - 275 mmol/g creat    Creatinine, Urine Random 65.2 20.0 - 370.0 mg/dL   CBC   Result Value Ref Range    WBC 9.2 4.4 - 11.3 x10*3/uL    nRBC 0.0 0.0 - 0.0 /100 WBCs    RBC 5.20 4.50 - 5.90 x10*6/uL    Hemoglobin 15.3 13.5 - 17.5 g/dL    Hematocrit 46.1 41.0 - 52.0 %    MCV 89 80 - 100 fL    MCH 29.4 26.0 - 34.0 pg    MCHC 33.2 32.0 - 36.0 g/dL    RDW 13.9 11.5 - 14.5 %    Platelets 129 (L) 150 - 450 x10*3/uL   Renal Function Panel   Result Value Ref Range    Glucose 115 (H) 74 - 99 mg/dL    Sodium 136 136 - 145 mmol/L    Potassium 4.2 3.5 - 5.3 mmol/L    Chloride 105 98 - 107 mmol/L    Bicarbonate 24 21 - 32 mmol/L    Anion Gap 11 10 - 20 mmol/L    Urea Nitrogen 21 6 - 23 mg/dL    Creatinine 1.16 0.50 - 1.30 mg/dL    eGFR 70 >60 mL/min/1.73m*2    Calcium 8.7 8.6 - 10.3 mg/dL    Phosphorus 3.1 2.5 - 4.9 mg/dL    Albumin 3.8 3.4 - 5.0 g/dL   Magnesium   Result Value Ref Range    Magnesium 1.94 1.60 - 2.40 mg/dL       XR chest 1 view   Final Result   1. Cardiomegaly with mild pulmonary venous congestion similar to   prior.             Signed by: Wai Walker 5/2/2025 5:42 AM   Dictation workstation:   [id number]      CT angio chest abdomen pelvis   Final Result   CHEST   1.  No evidence  of dissection or acute intramural hematoma. Ectatic   ascending aorta with mild aortic valve atherosclerotic   calcifications. Sternotomy changes. Prosthetic mitral valve. Mild   cardiomegaly with left atrial enlargement similar to prior.   2. Moderate upper lobe predominant centrilobular emphysema.        ABDOMEN - PELVIS   1. Osteopenia. Unchanged chronic moderate T6 compression deformity.   New age-indeterminate mild compression deformities of T4, T10, L1,   and L2. Correlation with point tenderness recommended.   2. Moderate atherosclerotic plaque of aorta and its branches.   Prostatomegaly.   3. Stable cystic lesion in the pancreatic head measuring 1.3 cm   compared to 2021.             Signed by: Wai Walker 5/1/2025 11:40 PM   Dictation workstation:   [id number]      CT head wo IV contrast   Final Result   No acute intracranial abnormality.        There is new thickening of the right tympanic membrane, clinical   correlation for tympanic membrane perforation recommended. Mastoid   air cells and middle ear cavities are grossly clear.        Signed by: Wai Walker 5/1/2025 11:23 PM   Dictation workstation:   [id number]      XR chest 1 view   Final Result   Cardiomegaly and pulmonary interstitial edema.        MACRO:   None        Signed by: Librado Lepe 5/1/2025 11:19 PM   Dictation workstation:   [id number]      MR brain wo IV contrast    (Results Pending)   Transthoracic Echo Complete    (Results Pending)       Transthoracic Echo (TTE) Complete  Result Date: 7/23/2024  TRANSTHORACIC ECHOCARDIOGRAM REPORT  Patient Name:      MADYSON BERNARDO ADRIANA Barclay Physician:    [id number] Chary Murillo MD Study Date:        7/23/2024            Ordering Provider:    [id number] ЕЛЕНА FRANCISCO MRN/PID:           [id number]             Fellow: Accession#:        [id number]         Nurse:                 Dot King RN Date of Birth/Age: 1960 / 63 years Sonographer:          Tyler Aleman RDJAQUELINE Gender:            M                    Additional Staff: Height:            167.64 cm            Admit Date: Weight:            58.97 kg             Admission Status:     Outpatient BSA / BMI:         1.67 m2 / 20.98      Encounter#:           [id number]                    kg/m2 Blood Pressure:    146/79 mmHg          Department Location:  [facility] Study Type:    TRANSTHORACIC ECHO (TTE) COMPLETE Diagnosis/ICD: Presence of prosthetic heart valve-Z95.2 Indication:    MVR CPT Code:      Echo Complete w Full [id number] Patient History: Pertinent         MS, MVR ( 27mm Mitris Wale-Schrader [2024]), AF (new History:          onset). Study Detail: The following Echo studies were performed: 2D, M-Mode, Doppler and               color flow. Definity used as a contrast agent for endocardial               border definition. Total contrast used for this procedure was 2 mL               via IV push.  PHYSICIAN INTERPRETATION: Left Ventricle: The left ventricular systolic function is low normal, with a visually estimated ejection fraction of 50-55%. There are no regional wall motion abnormalities. The left ventricular cavity size is normal. The left ventricular septal wall thickness is mildly increased. Abnormal (paradoxical) septal motion consistent with post-operative status. Left ventricular diastolic filling was indeterminate. Left Atrium: The left atrium is severely dilated. Right Ventricle: The right ventricle is normal in size. There is mildly reduced right ventricular systolic function. Prominent RV trabeculations. Right Atrium: The right atrium is mildly dilated. Aortic Valve: The aortic valve is trileaflet. There is no evidence of aortic valve stenosis. There is no evidence of aortic valve regurgitation. The peak instantaneous gradient of the aortic valve is 3.9 mmHg. Mitral Valve: There is a prosthetic mitral  valve present. There is a Mitris mitral valve bioprosthesis with a 27 mm reported size. There is trace mitral valve regurgitation. There is trace to mild PVL. Tricuspid Valve: The tricuspid valve is structurally normal. There is mild tricuspid regurgitation. The Doppler estimated RVSP is within normal limits at 21.5 mmHg. Pulmonic Valve: The pulmonic valve is structurally normal. There is trace to mild pulmonic valve regurgitation. Pericardium: There is no pericardial effusion noted. Aorta: The aortic root is normal. Systemic Veins: The inferior vena cava appears to be of normal size. There is IVC inspiratory collapse greater than 50%. In comparison to the previous echocardiogram(s): Compared with study dated 6/8/2024, no significant change.  CONCLUSIONS:  1. The left ventricular systolic function is low normal, with a visually estimated ejection fraction of 50-55%.  2. Abnormal septal motion consistent with post-operative status.  3. There is mildly reduced right ventricular systolic function.  4. RVSP within normal limits.  5. The left atrium is severely dilated. QUANTITATIVE DATA SUMMARY: 2D MEASUREMENTS:                          Normal Ranges: Ao Root d:     3.40 cm   (2.0-3.7cm) LAs:           4.60 cm   (2.7-4.0cm) IVSd:          1.00 cm   (0.6-1.1cm) LVPWd:         0.89 cm   (0.6-1.1cm) LVIDd:         4.56 cm   (3.9-5.9cm) LVIDs:         3.50 cm LV Mass Index: 86.8 g/m2 LV % FS        23.2 % LA VOLUME:                               Normal Ranges: LA Vol A4C:        101.6 ml   (22+/-6mL/m2) LA Vol A2C:        77.7 ml LA Vol BP:         92.3 ml LA Vol Index A4C:  61.0ml/m2 LA Vol Index A2C:  46.6 ml/m2 LA Vol Index BP:   55.4 ml/m2 LA Area A4C:       28.3 cm2 LA Area A2C:       23.8 cm2 LA Major Axis A4C: 6.7 cm LA Major Axis A2C: 6.2 cm RA VOLUME BY A/L METHOD:                               Normal Ranges: RA Vol A4C:        34.7 ml    (8.3-19.5ml) RA Vol Index A4C:  20.8 ml/m2 RA Area A4C:       14.0 cm2 RA  Major Axis A4C: 4.8 cm M-MODE MEASUREMENTS:                  Normal Ranges: Ao Root: 3.50 cm (2.0-3.7cm) LAs:     4.70 cm (2.7-4.0cm) AORTA MEASUREMENTS:                    Normal Ranges: Asc Ao, d: 3.00 cm (2.1-3.4cm) LV SYSTOLIC FUNCTION BY 2D PLANIMETRY (MOD):                      Normal Ranges: EF-A4C View:    47 % (>=55%) EF-A2C View:    46 % EF-Biplane:     47 % EF-Visual:      53 % LV EF Reported: 53 % LV DIASTOLIC FUNCTION:                            Normal Ranges: MV Peak E:      1.36 m/s   (0.7-1.2 m/s) MV Peak A:      0.67 m/s   (0.42-0.7 m/s) E/A Ratio:      2.03       (1.0-2.2) MV e'           0.065 m/s  (>8.0) MV lateral e'   0.08 m/s MV medial e'    0.05 m/s E/e' Ratio:     20.84      (<8.0) PulmV Sys Christian:  31.00 cm/s PulmV Thompson Christian: 54.70 cm/s PulmV S/D Christian:  0.60 MITRAL VALVE:                      Normal Ranges: MV Vmax:    1.48 m/s (<=1.3m/s) MV peak P.8 mmHg (<5mmHg) MV mean PG: 3.0 mmHg (<2mmHg) MV DT:      249 msec (150-240msec) AORTIC VALVE:                         Normal Ranges: AoV Vmax:      0.99 m/s (<=1.7m/s) AoV Peak PG:   3.9 mmHg (<20mmHg) LVOT Max Christian:  0.85 m/s (<=1.1m/s) LVOT VTI:      17.50 cm LVOT Diameter: 2.00 cm  (1.8-2.4cm) AoV Area,Vmax: 2.71 cm2 (2.5-4.5cm2)  RIGHT VENTRICLE: RV Basal 3.10 cm RV Mid   2.00 cm RV Major 6.1 cm TAPSE:   9.1 mm RV s'    0.08 m/s TRICUSPID VALVE/RVSP:                             Normal Ranges: Peak TR Velocity: 2.15 m/s RV Syst Pressure: 21.5 mmHg (< 30mmHg) IVC Diam:         1.80 cm PULMONIC VALVE:                      Normal Ranges: PV Max Christian: 0.7 m/s  (0.6-0.9m/s) PV Max P.2 mmHg Pulmonary Veins: PulmV Thompson Christian: 54.70 cm/s PulmV S/D Christian:  0.60 PulmV Sys Christian:  31.00 cm/s  [id number] Chary Murillo MD Electronically signed on 2024 at 1:45:14 PM  ** Final **     Transthoracic Echo (TTE) Complete  Result Date: 2024   [facility], [address] [place] [address]                Tel [phone] and Fax  [phone] TRANSTHORACIC ECHOCARDIOGRAM REPORT  Patient Name:      MADYSON WRIGHT      Reading Physician:    [id number] Tyler Tyson MD Study Date:        6/8/2024             Ordering Provider:    [id number] FAROOQ LOMAS MRN/PID:           [id number]             Fellow: Accession#:        [id number]         Nurse: Date of Birth/Age: 1960 / 63 years Sonographer:          Adiel Rai RDCS Gender:            M                    Additional Staff: Height:            167.64 cm            Admit Date: Weight:            56.70 kg             Admission Status:     Inpatient -                                                               Routine BSA / BMI:         1.64 m2 / 20.18      Encounter#:           [id number]                    kg/m2                                         Department Location:  [facility] Non                                                               Invasive Blood Pressure: 111 /60 mmHg Study Type:    TRANSTHORACIC ECHO (TTE) COMPLETE Diagnosis/ICD: Presence of prosthetic heart valve-Z95.2 Indication:    S/p MVR, KONRAD closure (06/04/24) CPT Code:      Echo Complete w Full Doppler-[id number] Patient History: Pertinent History: Chest Pain and Dyspnea. S/p MVR #27mm mitris bioprosthetic                    valve, KONRAD closure (06/04/24) Afib, HFrEF, SOB and                    thrombocytopenia. Study Detail: The following Echo studies were performed: 2D, M-Mode, Doppler and               color flow.  PHYSICIAN INTERPRETATION: Left Ventricle: The left ventricular systolic function is normal, with an estimated ejection fraction of 55-60%. The patient is in atrial fibrillation which may influence the estimate of left ventricular function and transvalvular flows. There are no regional wall motion abnormalities. The left ventricular cavity size is normal. Abnormal (paradoxical) septal  motion consistent with post-operative status and abnormal (paradoxical) septal motion, consistent with RV pacemaker. Left ventricular diastolic filling was indeterminate. Left Atrium: The left atrium is severely dilated. Right Ventricle: The right ventricle is normal in size. There is normal right ventricular global systolic function. Right Atrium: The right atrium is normal in size. Aortic Valve: The aortic valve is trileaflet. There is mild aortic valve cusp calcification. There is no evidence of reduced aortic valve leaflet excursion excursion. There is no evidence of aortic valve regurgitation. The peak instantaneous gradient of the aortic valve is 6.4 mmHg. Mitral Valve: There is a prosthetic mitral valve present. There is a Mitris mitral valve bioprosthesis with a 27 mm reported size. There is no evidence of mitral valve regurgitation. Peak and mean mitral diastolic gradients are 14.5 and 5.4 mmHg at a heart rate of 72 bpm. The DI is ~ 2. The pressure half-time is ~ 90 ms, resulting in a mitral orifice area of 2.4. Tricuspid Valve: The tricuspid valve is structurally normal. There is mild tricuspid regurgitation. Pulmonic Valve: The pulmonic valve is structurally normal. There is trace pulmonic valve regurgitation. Pericardium: There is no pericardial effusion noted. Aorta: The aortic root is normal. In comparison to the previous echocardiogram(s): Although previous studies are available for review, their comparison with the current echocardiogram is clinically irrelevant.  CONCLUSIONS:  1. Left ventricular systolic function is normal with a 55-60% estimated ejection fraction.  2. Abnormal septal motion consistent with post-operative status and abnormal septal motion consistent with RV pacemaker.  3. The left atrium is severely dilated.  4. Peak and mean mitral diastolic gradients are 14.5 and 5.4 mmHg at a heart rate of 72 bpm. The DI is ~ 2. The pressure half-time is ~ 90 ms, resulting in a mitral orifice  area of 2.4.  5. The patient is in atrial fibrillation which may influence the estimate of left ventricular function and transvalvular flows. QUANTITATIVE DATA SUMMARY: 2D MEASUREMENTS:                           Normal Ranges: Ao Root d:     3.30 cm    (2.0-3.7cm) LAs:           5.80 cm    (2.7-4.0cm) IVSd:          1.10 cm    (0.6-1.1cm) LVPWd:         1.10 cm    (0.6-1.1cm) LVIDd:         4.60 cm    (3.9-5.9cm) LVIDs:         3.60 cm LV Mass Index: 110.6 g/m2 LV % FS        21.7 % LA VOLUME:                               Normal Ranges: LA Vol A4C:        106.5 ml   (22+/-6mL/m2) LA Vol A2C:        118.7 ml LA Vol BP:         113.9 ml LA Vol Index A4C:  65.0ml/m2 LA Vol Index A2C:  72.5 ml/m2 LA Vol Index BP:   69.5 ml/m2 LA Area A4C:       28.6 cm2 LA Area A2C:       29.8 cm2 LA Major Axis A4C: 6.5 cm LA Major Axis A2C: 6.4 cm LA Volume Index:   69.5 ml/m2 AORTA MEASUREMENTS:                      Normal Ranges: Ao Sinus, d: 3.30 cm (2.1-3.5cm) LV SYSTOLIC FUNCTION BY 2D PLANIMETRY (MOD):                     Normal Ranges: EF-A4C View: 50.1 % (>=55%) EF-A2C View: 69.2 % EF-Biplane:  61.4 % LV DIASTOLIC FUNCTION:                     Normal Ranges: MV Peak E: 1.50 m/s (0.7-1.2 m/s) MITRAL VALVE:                       Normal Ranges: MV Vmax:    1.91 m/s  (<=1.3m/s) MV peak P.5 mmHg (<5mmHg) MV mean P.4 mmHg  (<2mmHg) MV DT:      226 msec  (150-240msec) AORTIC VALVE:                         Normal Ranges: AoV Vmax:      1.26 m/s (<=1.7m/s) AoV Peak P.4 mmHg (<20mmHg) LVOT Max Christian:  1.00 m/s (<=1.1m/s) LVOT VTI:      20.40 cm LVOT Diameter: 1.80 cm  (1.8-2.4cm) AoV Area,Vmax: 2.02 cm2 (2.5-4.5cm2) TRICUSPID VALVE/RVSP:                             Normal Ranges: Peak TR Velocity: 2.44 m/s RV Syst Pressure: 26.8 mmHg (< 30mmHg) IVC Diam:         2.48 cm PULMONIC VALVE:                      Normal Ranges: PV Max Christian: 0.9 m/s  (0.6-0.9m/s) PV Max PG:  3.5 mmHg  [id number] Tyler Tyson MD Electronically signed on  6/8/2024 at 2:33:10 PM  ** Final **          Assessment/Plan   Seizure  -Lactate 3.4  -CT head negative  -MRI brain pending  -EEG pending  -Neuro consulted    2. Permanent atrial fibrillation/flutter with intermittent RVR  -EKG and telemetry reviewed, atrial flutter with occasional RVR      --This is pt's usual->has been offered to have an ablation but pt has wanted to wait and think about it and give his valve replacement a year to heal  -Has had reactions to other medications in the past  -Most recently started on Nebivolol 5mg daily  -Cont eliquis   -Monitor on tele    3. S/p Mitral valve replacement  -Post op echo showed normal functioning valve  -Repeat echo     4. Elevated troponin-Non MI elevation   -[facility] April 2024 showed normal coronary arteries     5. Chronic diastolic CHF  -Euvolemic  -2gm na diet  -Daily weights  -Monitor     6. Tobacco use  -advised to quit    7. COPD exac, possible pneumonia  -CXR showed cardiomegaly and interstitial edema  -WBC 13  -Lactate 3.4  -Pt with seizure and possible aspiration  -antibiotics  -Steroids  -bronchodilators  -oxygen support  -sputum culture  -blood cultures    8. Acute kidney injury  -Improved with IVF  -Monitor      LENY Alexander-CNP         [1]   Past Medical History:  Diagnosis Date    A-fib (Multi)     Acute bronchitis 06/21/2024    Acute right lower quadrant pain 06/21/2024    Atherosclerosis of abdominal aorta 03/28/2016    Last Assessment & Plan:    Formatting of this note might be different from the original.   Stable asymptomatic  Formatting of this note might be different from the original.   Repeat CT reveals moderate partially calcified wall changes. Stable 6 mm thickness/plaque in the inferior aspect of the isthmus and 6 mm wall thickening/plaque along the minor curvature of the proximal descending aorta.   -     Benign paroxysmal positional vertigo 06/21/2024    Chronic diarrhea 06/21/2024    Chronic diarrhea 06/21/2024    COPD with asthma  (Multi) 03/28/2016    Formatting of this note might be different from the original.   Previous history of asthma   Current everyday smoker   Also CT chest 3/28 with findings for COPD   Duonebs as needed    Costochondritis 06/21/2024    Cough 06/21/2024    Disease due to severe acute respiratory syndrome coronavirus 2 (SARS-CoV-2) 09/06/2022    Comment on above: COVID SYMPTOMS    Diverticular disease 06/21/2024    Diverticulosis     Diverticulosis 08/15/2023    Formatting of this note might be different from the original.   Formatting of this note might be different from the original. hx diverticulitis at same time as pneumonia  Formatting of this note might be different from the original.   hx diverticulitis at same time as pneumonia    Dizziness     Dyspnea 06/21/2024    Fecal smearing 06/21/2024    Fecal soiling 06/21/2024    Headache 06/16/2023    Heart failure with mid-range ejection fraction (HFmEF)     (45%, 3/28/24)    Hypotension 06/21/2024    Incomplete passage of stool 06/21/2024    Insomnia 06/21/2024    Irregular heart beat     afib on coumadin    Irritable bowel syndrome     Irritable bowel syndrome with diarrhea 06/21/2024    Low back pain 06/21/2024    Mitral valve stenosis     Nausea & vomiting 06/21/2024    Nonspecific syndrome suggestive of viral illness 06/21/2024    Other chest pain 03/10/2016    Chest discomfort    Other emphysema (Multi) 02/06/2024    Last Assessment & Plan:    Formatting of this note might be different from the original.   As seen on cxr.  Cough is likely related to viral infection.  COPD contributes highly recommended smoking cessation to reduce progression of COPD.  With symptoms improving pt declined inhaler at this time    Pleurodynia 11/29/2017    Chest pain, pleuritic    Pneumonia 06/21/2024    Prediabetes 03/29/2016    Formatting of this note might be different from the original.   Hemoglobin a1c 6.0.   Need education on lifestyle modification.    Right lower quadrant  pain 11/14/2018    Abdominal pain, acute, right lower quadrant    Shortness of breath     VANCE    Syncope 06/21/2024    Tension type headache 06/21/2024    Thrombocytopenia (CMS-HCC)     Tobacco dependence 08/15/2023    Last Assessment & Plan:    Formatting of this note might be different from the original.   Once again highly recommended cessation    Tubular adenoma of colon 06/21/2024   [2]   Past Surgical History:  Procedure Laterality Date    CARDIAC CATHETERIZATION N/A 04/22/2024    Procedure: Left And Right Heart Cath, Without LV;  Surgeon: Yobani Soto MD;  Location: [place] [address] [facility];  Service: Cardiovascular;  Laterality: N/A;  Schedule at 3:30pm Dr. Soto    CARDIAC ELECTROPHYSIOLOGY PROCEDURE N/A 06/17/2024    Procedure: Cardioversion;  Surgeon: iDck Edouard MD;  Location: [place] [address] [facility];  Service: Electrophysiology;  Laterality: N/A;    COLONOSCOPY      DENTAL SURGERY      MITRAL VALVE REPAIR  06/04/2024    TONSILLECTOMY     [3]   Family History  Problem Relation Name Age of Onset    Cancer Father  74    Brain Aneurysm Father     [4] acetaminophen, 975 mg, oral, q6h  [Held by provider] amiodarone, 200 mg, oral, Daily  apixaban, 5 mg, oral, BID  aspirin, 81 mg, oral, Daily  azithromycin, 500 mg, oral, q24h AUGUSTINE  cefTRIAXone, 2 g, intravenous, q24h  [Held by provider] dilTIAZem ER, 120 mg, oral, Daily  guaiFENesin, 600 mg, oral, BID  ipratropium-albuteroL, 3 mL, nebulization, TID  lidocaine, 1 patch, transdermal, Daily  nicotine, 1 patch, transdermal, Daily  ofloxacin, 5 drop, Right Ear, BID  predniSONE, 40 mg, oral, Daily  sennosides-docusate sodium, 2 tablet, oral, Nightly  [5]    [6] PRN medications: HYDROmorphone, ipratropium-albuteroL, LORazepam, naloxone, ondansetron, oxyCODONE, oxyCODONE, oxygen

## 2025-05-02 NOTE — CONSULTS
Inpatient consult to Neurology  Consult performed by: Abran Weaver MD  Consult ordered by: Nicolas Nava MD          History Of Present Illness  Farooq Lang is a 64 y.o. male presenting with a seizure.  The patient states that he was in his usual state of health until recently.  The patient wife gave the history in the ER as the patient does not remember the event.  She states that the patient was lying on the couch and watching TV when he suddenly made a moaning voice.  She looked over and thought it was a leg cramp however the patient abruptly started seizing.  She described the episode as his entire body shaking.  His dentures ejected from his mouth and he was foaming at his mouth.  His eyes were closed.  He subsequently turned pale and was unable to speak.  The patient's wife states that this lasted for about 10 minutes before EMS arrived.  This is the first time the patient has had a seizure.  The patient states that he feels back to his baseline at this time.  He was admitted to the ER and had a CT scan of the brain done that was negative for any acute process.  He denies that he bit his tongue or lost control of his urine during the event denies any history of head trauma, seizure activity or cerebral infarction.  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  He states that his back pain is significantly better when he is walking around or worse when he is lying flat.      Past Medical History  Patient's past medical history significant for COPD, asthma, emphysema, A-fib, heart failure, mitral valve stenosis, rheumatic heart disease and tobacco abuse.  Surgical History  The patient's past surgical history is significant for bioprosthetic valve implantation and left atrial appendage clipping.  The patient also has had a cardioversion,  "tonsillectomy and teeth extraction.  Social History  The patient social history shows that he lives at home and smokes 1/2 pack cigarettes a day.  He used to smoke a pack and 1/2 cigarettes a day but is trying to cut down.  The patient is  and lives at home.  He does not drink alcohol.  Allergies  Metoprolol  Prescriptions Prior to Admission[1]    Review of Systems   Neurological:  Positive for seizures.   All other systems reviewed and are negative.      Neurological Exam  Physical Exam  Last Recorded Vitals  Blood pressure 102/69, pulse (!) 121, temperature 36.7 °C (98.1 °F), temperature source Temporal, resp. rate 18, height 1.676 m (5' 6\"), weight 67 kg (147 lb 12.8 oz), SpO2 92%.    The patient is a well developed, [well-nourished] [male] in no acute distress.    The patient's funduscopic examination shows no papilledema bilaterally.    The patient's extremity examination shows that the pulses are 2+ in the upper and lower extremities bilaterally and there is no edema in the lower extremities bilaterally.    The patient's mental status testing is alert and oriented ×3 with no evidence of aphasia or dysarthria.  The patient's memory testing, fund of knowledge and concentration are all within normal limits.  The patient's cranial nerves 2, 3, 4, 5, 6, 7, 8, 9, 10, 11 and 12 are all within normal limits.  The patient's motor testing shows normal tone, bulk, and power in the upper and lower extremities bilaterally.  The patient's sensory testing is intact to light touch in the upper and lower extremities bilaterally.  The patient's cerebellar testing is intact in the upper and lower extremities bilaterally.  The patient's station and gait are normal.  The patient's reflexes are 1+ in the upper and lower extremities and symmetrical.    Relevant Results  Scheduled medications  Scheduled Medications[2]  Continuous medications  Continuous Medications[3]  PRN medications  PRN Medications[4]    Results for orders " placed or performed during the hospital encounter of 05/01/25 (from the past 96 hours)   CBC with Differential   Result Value Ref Range    WBC 13.0 (H) 4.4 - 11.3 x10*3/uL    nRBC 0.4 (H) 0.0 - 0.0 /100 WBCs    RBC 5.73 4.50 - 5.90 x10*6/uL    Hemoglobin 16.9 13.5 - 17.5 g/dL    Hematocrit 53.5 (H) 41.0 - 52.0 %    MCV 93 80 - 100 fL    MCH 29.5 26.0 - 34.0 pg    MCHC 31.6 (L) 32.0 - 36.0 g/dL    RDW 14.1 11.5 - 14.5 %    Platelets 151 150 - 450 x10*3/uL    Neutrophils % 41.3 40.0 - 80.0 %    Immature Granulocytes %, Automated 2.7 (H) 0.0 - 0.9 %    Lymphocytes % 47.1 13.0 - 44.0 %    Monocytes % 7.1 2.0 - 10.0 %    Eosinophils % 1.2 0.0 - 6.0 %    Basophils % 0.6 0.0 - 2.0 %    Neutrophils Absolute 5.36 1.20 - 7.70 x10*3/uL    Immature Granulocytes Absolute, Automated 0.35 0.00 - 0.70 x10*3/uL    Lymphocytes Absolute 6.13 (H) 1.20 - 4.80 x10*3/uL    Monocytes Absolute 0.93 0.10 - 1.00 x10*3/uL    Eosinophils Absolute 0.16 0.00 - 0.70 x10*3/uL    Basophils Absolute 0.08 0.00 - 0.10 x10*3/uL   Comprehensive Metabolic Panel   Result Value Ref Range    Glucose 171 (H) 74 - 99 mg/dL    Sodium 138 136 - 145 mmol/L    Potassium 3.9 3.5 - 5.3 mmol/L    Chloride 98 98 - 107 mmol/L    Bicarbonate 14 (L) 21 - 32 mmol/L    Anion Gap 30 (H) 10 - 20 mmol/L    Urea Nitrogen 23 6 - 23 mg/dL    Creatinine 1.40 (H) 0.50 - 1.30 mg/dL    eGFR 56 (L) >60 mL/min/1.73m*2    Calcium 9.3 8.6 - 10.3 mg/dL    Albumin 4.1 3.4 - 5.0 g/dL    Alkaline Phosphatase 108 33 - 136 U/L    Total Protein 7.5 6.4 - 8.2 g/dL    AST 20 9 - 39 U/L    Bilirubin, Total 0.6 0.0 - 1.2 mg/dL    ALT 21 10 - 52 U/L   Coagulation Screen   Result Value Ref Range    Protime 14.2 (H) 9.8 - 12.4 seconds    INR 1.3 (H) 0.9 - 1.1    aPTT 34 26 - 36 seconds   Troponin I, High Sensitivity   Result Value Ref Range    Troponin I, High Sensitivity 27 (H) 0 - 20 ng/L   Morphology   Result Value Ref Range    RBC Morphology No significant RBC morphology present    Blood Gas  Venous   Result Value Ref Range    POCT pH, Venous 7.30 (L) 7.33 - 7.43 pH    POCT pCO2, Venous 49 41 - 51 mm Hg    POCT pO2, Venous 23 (L) 35 - 45 mm Hg    POCT SO2, Venous 34 (L) 45 - 75 %    POCT Oxy Hemoglobin, Venous 32.5 (L) 45.0 - 75.0 %    POCT Base Excess, Venous -2.8 (L) -2.0 - 3.0 mmol/L    POCT HCO3 Calculated, Venous 24.1 22.0 - 26.0 mmol/L    Patient Temperature      FiO2 21 %   B-Type Natriuretic Peptide   Result Value Ref Range     (H) 0 - 99 pg/mL   Lactate   Result Value Ref Range    Lactate 3.4 (H) 0.4 - 2.0 mmol/L   Blood Culture    Specimen: Peripheral Venipuncture; Blood culture   Result Value Ref Range    Blood Culture Loaded on Instrument - Culture in progress    Blood Culture    Specimen: Peripheral Venipuncture; Blood culture   Result Value Ref Range    Blood Culture Loaded on Instrument - Culture in progress    Troponin I, High Sensitivity   Result Value Ref Range    Troponin I, High Sensitivity 13 0 - 20 ng/L   Sars-CoV-2 and Influenza A/B PCR   Result Value Ref Range    Flu A Result Not Detected Not Detected    Flu B Result Not Detected Not Detected    Coronavirus 2019, PCR Not Detected Not Detected   Lactate   Result Value Ref Range    Lactate 1.2 0.4 - 2.0 mmol/L   Drug Screen, Urine   Result Value Ref Range    Amphetamine Screen, Urine Presumptive Negative Presumptive Negative    Barbiturate Screen, Urine Presumptive Negative Presumptive Negative    Benzodiazepines Screen, Urine Presumptive Negative Presumptive Negative    Cannabinoid Screen, Urine Presumptive Negative Presumptive Negative    Cocaine Metabolite Screen, Urine Presumptive Negative Presumptive Negative    Fentanyl Screen, Urine Presumptive Negative Presumptive Negative    Opiate Screen, Urine Presumptive Negative Presumptive Negative    Oxycodone Screen, Urine Presumptive Negative Presumptive Negative    PCP Screen, Urine Presumptive Negative Presumptive Negative    Methadone Screen, Urine Presumptive Negative  Presumptive Negative   Urinalysis with Reflex Culture and Microscopic   Result Value Ref Range    Color, Urine Light-Yellow Light-Yellow, Yellow, Dark-Yellow    Appearance, Urine Clear Clear    Specific Gravity, Urine 1.040 (N) 1.005 - 1.035    pH, Urine 5.5 5.0, 5.5, 6.0, 6.5, 7.0, 7.5, 8.0    Protein, Urine NEGATIVE NEGATIVE, 10 (TRACE), 20 (TRACE) mg/dL    Glucose, Urine Normal Normal mg/dL    Blood, Urine NEGATIVE NEGATIVE mg/dL    Ketones, Urine NEGATIVE NEGATIVE mg/dL    Bilirubin, Urine NEGATIVE NEGATIVE mg/dL    Urobilinogen, Urine Normal Normal mg/dL    Nitrite, Urine NEGATIVE NEGATIVE    Leukocyte Esterase, Urine NEGATIVE NEGATIVE   Extra Urine Gray Tube   Result Value Ref Range    Extra Tube 293    Osmolality, urine   Result Value Ref Range    Osmolality, Urine Random 555 200 - 1,200 mOsm/kg   Urine electrolytes   Result Value Ref Range    Sodium, Urine Random 101 mmol/L    Sodium/Creatinine Ratio 155 Not established. mmol/g Creat    Potassium, Urine Random 33 mmol/L    Potassium/Creatinine Ratio 51 Not established mmol/g Creat    Chloride, Urine Random 109 mmol/L    Chloride/Creatinine Ratio 167 23 - 275 mmol/g creat    Creatinine, Urine Random 65.2 20.0 - 370.0 mg/dL   CBC   Result Value Ref Range    WBC 9.2 4.4 - 11.3 x10*3/uL    nRBC 0.0 0.0 - 0.0 /100 WBCs    RBC 5.20 4.50 - 5.90 x10*6/uL    Hemoglobin 15.3 13.5 - 17.5 g/dL    Hematocrit 46.1 41.0 - 52.0 %    MCV 89 80 - 100 fL    MCH 29.4 26.0 - 34.0 pg    MCHC 33.2 32.0 - 36.0 g/dL    RDW 13.9 11.5 - 14.5 %    Platelets 129 (L) 150 - 450 x10*3/uL   Renal Function Panel   Result Value Ref Range    Glucose 115 (H) 74 - 99 mg/dL    Sodium 136 136 - 145 mmol/L    Potassium 4.2 3.5 - 5.3 mmol/L    Chloride 105 98 - 107 mmol/L    Bicarbonate 24 21 - 32 mmol/L    Anion Gap 11 10 - 20 mmol/L    Urea Nitrogen 21 6 - 23 mg/dL    Creatinine 1.16 0.50 - 1.30 mg/dL    eGFR 70 >60 mL/min/1.73m*2    Calcium 8.7 8.6 - 10.3 mg/dL    Phosphorus 3.1 2.5 - 4.9 mg/dL     Albumin 3.8 3.4 - 5.0 g/dL   Magnesium   Result Value Ref Range    Magnesium 1.94 1.60 - 2.40 mg/dL   Transthoracic Echo Complete   Result Value Ref Range    BSA 1.77 m2       I did review the images of the CT scan of the brain and MRI of the brain.     I have personally reviewed the following imaging results:   Imaging  MR brain wo IV contrast  Result Date: 5/2/2025  1. No evidence of acute infarct, intracranial mass effect or midline shift. 2. Mild white matter FLAIR signal increase most commonly seen with early chronic small vessel ischemic change. 3. Degree of ventriculomegaly is commensurate with overall degree of brain parenchymal volume loss, which is mild. 4. Multiple old lacunar infarctions left-greater-than-right cerebellar hemispheres. 5. No discernible etiology for seizures.   MACRO: None   Signed by: Abran Cook 5/2/2025 1:15 PM Dictation workstation:   [id number]    XR chest 1 view  Result Date: 5/2/2025  1. Cardiomegaly with mild pulmonary venous congestion similar to prior.     Signed by: Wai Walker 5/2/2025 5:42 AM Dictation workstation:   [id number]    CT angio chest abdomen pelvis  Result Date: 5/1/2025  CHEST 1.  No evidence of dissection or acute intramural hematoma. Ectatic ascending aorta with mild aortic valve atherosclerotic calcifications. Sternotomy changes. Prosthetic mitral valve. Mild cardiomegaly with left atrial enlargement similar to prior. 2. Moderate upper lobe predominant centrilobular emphysema.   ABDOMEN - PELVIS 1. Osteopenia. Unchanged chronic moderate T6 compression deformity. New age-indeterminate mild compression deformities of T4, T10, L1, and L2. Correlation with point tenderness recommended. 2. Moderate atherosclerotic plaque of aorta and its branches. Prostatomegaly. 3. Stable cystic lesion in the pancreatic head measuring 1.3 cm compared to 2021.     Signed by: Wai Walker 5/1/2025 11:40 PM Dictation workstation:   [id number]    CT head wo IV  contrast  Result Date: 5/1/2025  No acute intracranial abnormality.   There is new thickening of the right tympanic membrane, clinical correlation for tympanic membrane perforation recommended. Mastoid air cells and middle ear cavities are grossly clear.   Signed by: Wai Walker 5/1/2025 11:23 PM Dictation workstation:   [id number]    XR chest 1 view  Result Date: 5/1/2025  Cardiomegaly and pulmonary interstitial edema.   MACRO: None   Signed by: Librado Lepe 5/1/2025 11:19 PM Dictation workstation:   [id number]      Cardiology, Vascular, and Other Imaging  No other imaging results found for the past 7 days  The patient's echocardiogram is pending.    Assessment/Plan   Assessment & Plan  Seizure (Multi)    Compression deformity of vertebra      Impression: The patient is a 64-year-old male who suffered a seizure and has back pain.  His neurological examination is normal.  The differential diagnosis for seizure includes epilepsy versus cerebral infarction however none was seen on the MRI scan.  The differential diagnosis for his back pain includes compression fracture, vertebral fracture, ligament sprain and radiculopathy.    Plan: The patient needs an EEG can have one as an outpatient.  I would like to start the patient on Keppra at 750 mg p.o. twice daily.  I did warn the patient that possibly sedation and personality changes with this medicine.  After the patient's EEG has been completed and the results are back if it is negative for epilepsy then he can discontinue the Keppra.    The patient should not drive until he has been seizure-free for 6 months.  The patient does work around heavy machinery and I counseled him to avoid working around heavy machinery for at least 6 months.  The patient needs to get a least 8 hours sleep at night and avoid excessive alcohol intake.  I would certainly continue all of his medicines and stroke risk factor modification.  I will check a CT scan of the lumbar spine.  The  patient can use symptomatic pain medicines and needs to follow-up with his primary care doctor for his low back pain.  I discussed all these issues in detail with the patient and answered all of his questions.  I will sign off for now the patient can be discharged home.  I also discussed the case with Dr. Nava.  The patient will follow up with the neuro NP within 1 month of discharge.      Abran Weaver MD       [1]   Medications Prior to Admission   Medication Sig Dispense Refill Last Dose/Taking    apixaban (Eliquis) 5 mg tablet Take 1 tablet (5 mg) by mouth 2 times a day. 60 tablet 0 5/1/2025 Morning    aspirin 81 mg chewable tablet Chew 1 tablet (81 mg) once daily. 30 tablet 0 5/1/2025 Morning    multivitamin with minerals tablet Take 1 tablet by mouth once daily.   5/1/2025 Morning    amiodarone (Pacerone) 200 mg tablet Take 2 tablets (400 mg) by mouth 2 times a day for 5 doses. Then, take 2 tablets (400 mg) by mouth once daily for 14 days starting July 21, 2024. Then, take 1 tablet (200 mg) by mouth once daily starting August 4, 2024. 68 tablet 0     dilTIAZem ER (Tiazac) 120 mg 24 hr capsule Take 1 capsule (120 mg) by mouth once daily. 30 capsule 0    [2] acetaminophen, 975 mg, oral, q6h  apixaban, 5 mg, oral, BID  aspirin, 81 mg, oral, Daily  azithromycin, 500 mg, oral, q24h AUGUSTINE  cefTRIAXone, 2 g, intravenous, q24h  guaiFENesin, 600 mg, oral, BID  ipratropium-albuteroL, 3 mL, nebulization, TID  levETIRAcetam, 750 mg, oral, BID  lidocaine, 1 patch, transdermal, Daily  [START ON 5/3/2025] nebivolol, 5 mg, oral, Daily  nicotine, 1 patch, transdermal, Daily  ofloxacin, 5 drop, Right Ear, BID  predniSONE, 40 mg, oral, Daily  sennosides-docusate sodium, 2 tablet, oral, Nightly  [3]    [4] PRN medications: HYDROmorphone, ipratropium-albuteroL, LORazepam, naloxone, ondansetron, oxyCODONE, oxyCODONE, oxygen

## 2025-05-02 NOTE — PROGRESS NOTES
Physical Therapy    Physical Therapy Evaluation    Patient Name: Farooq Lang  MRN: [id number]  Department: [address]  Room: [address] [place] [address]  Today's Date: 5/2/2025   Time Calculation  Start Time: 1124  Stop Time: 1141  Time Calculation (min): 17 min    Assessment/Plan   PT Assessment  PT Assessment Results: Decreased strength, Decreased range of motion, Impaired balance, Decreased mobility  Rehab Prognosis: Good  Barriers to Discharge Home: No anticipated barriers  Evaluation/Treatment Tolerance: Patient limited by pain  Medical Staff Made Aware: Yes  End of Session Communication: Bedside nurse  Assessment Comment: pt demonstrates decreased functional mobility, transfers and ambulation tolerance due to apparent acute on chronic back pain.  Recommend LOW intensity skilled PT to address symptoms and increase function  End of Session Patient Position: Alarm off, caregiver present, Up in chair  IP OR SWING BED PT PLAN  Inpatient or Swing Bed: Inpatient  PT Plan  Treatment/Interventions: Bed mobility, Transfer training, Gait training, Balance training, Stair training  PT Plan: Ongoing PT  PT Frequency: 3 times per week  PT Discharge Recommendations: Low intensity level of continued care  PT Recommended Transfer Status: Assist x1  PT - OK to Discharge: Yes    Subjective   General Visit Information:  General  Reason for Referral: 64 YOM admitted with new-onset seizures  Referred By: CATARINO Nava  Past Medical History Relevant to Rehab: PMH: COPD, compression fractures T4,6,10, L1, 2, Afib, mitral stenosis, s/p open heart surgery  Family/Caregiver Present: Yes  Caregiver Feedback: spouse present and involved in session  Patient Position Received: Bed, 3 rail up  General Comment: pt sideling in bed, seizure pads in place. spouse at bedside and involved in session. pt reports mid to low back pain, appears acute on chronic onset. Reports he was moving heavy bags last week, and has been in pain since. Now with worsened pain s/p  seizures.  Home Living:  Home Living  Type of Home: House  Lives With: Spouse  Home Adaptive Equipment: None  Home Living Comments: split level home, 12 steps up to main level. 1 handrail  Prior Level of Function:  Prior Function Per Pt/Caregiver Report  Level of [place]: Independent with ADLs and functional transfers, Independent with homemaking with ambulation  Precautions:                Objective   Pain:  Pain Assessment  Pain Assessment: 0-10  0-10 (Numeric) Pain Score: 10 - Worst possible pain  Pain Interventions: Repositioned  Cognition:  Cognition  Overall Cognitive Status: Within Functional Limits    General Assessments:                                       Functional Assessments:       Bed Mobility  Bed Mobility: Yes  Bed Mobility 1  Bed Mobility 1: Supine to sitting  Level of Assistance 1: Minimum assistance  Bed Mobility Comments 1: assist for log roll technique    Transfers  Transfer: Yes  Transfer 1  Transfer From 1: Sit to  Transfer to 1: Stand  Technique 1: Sit to stand, Stand to sit  Transfer Device 1:  (hand held assist)  Transfer Level of Assistance 1: Minimum assistance    Ambulation/Gait Training  Ambulation/Gait Training Performed: Yes  Ambulation/Gait Training 1  Surface 1: Level tile  Device 1:  (handheld assist)  Assistance 1: Minimum assistance  Comments/Distance (ft) 1: 5 feet from bed to chair, limited by pain    Outcome Measures:       Encounter Problems       Encounter Problems (Active)       Balance       Pt will demo static/dynamic standing balance x5+ minutes with b Ellis UE support and SBA to improve stability and decrease falls        Start:  05/02/25    Expected End:  05/16/25               Mobility       X75+ feet with/without use of DME and SBA assist  necessary to initiate return to PLOF        Start:  05/02/25    Expected End:  05/16/25            Pt will completed bed mobility with S/I  necessary for homegoing         Start:  05/02/25    Expected End:  05/16/25                PT Transfers       Pt will complete all functional transfers with SBA necessary to initiate return to PLOF         Start:  05/02/25    Expected End:  05/16/25               Pain - Adult              Education Documentation  Mobility Training, taught by Belinda Plummer PT at 5/2/2025 12:32 PM.  Learner: Patient  Readiness: Acceptance  Method: Explanation  Response: Verbalizes Understanding    Education Comments  No comments found.

## 2025-05-02 NOTE — CARE PLAN
The patient's goals for the shift include  patient will remain comfortable throughout shift.     The clinical goals for the shift include Patient's pain will be managed throughout shift.

## 2025-05-02 NOTE — PROGRESS NOTES
05/02/25 1028   Discharge Planning   Living Arrangements Spouse/significant other   Support Systems Spouse/significant other   Assistance Needed patient is A&Ox3, independent in ADL's, no AD, no DME, RA at baseline, PCP Dr. Amado   Type of Residence Private residence   Number of Stairs Within Residence 12   Do you have animals or pets at home? No   Who is requesting discharge planning? Provider   Home or Post Acute Services None   Expected Discharge Disposition Home  (patient currently denies home going needs)

## 2025-05-02 NOTE — H&P
"  HPI    Farooq Lang is a 64 y.o. male with a PMHx of a PMHx of COPD/asthma, emphysema, A-fib on Eliquis, HFpEF, mitral valve stenosis secondary to rheumatic disease s/p MVR Bioprosthetic Valve and KONRAD clipping with Dr. Nugent at [facility] in 6/2024 who presented to [facility] on (05/02/2025) with a chief complaint of seizure. Pt presents with wife and mother at bedside who provide most of the history. Pt's wife reports pt was lying on the cough watching TV when suddenly he made a \"moaning voice\". She reports she looked over and thought it may be a leg cramp; however, he abruptly started seizing. She describes the episode as his entire body shaking, dentures ejecting from mouth, pt was foaming at his mouth, eyes were closed, pt turned pale and was unable to speak. Pt's wife states this lasted for a total of 10 minutes before EMS arrived and this is the first time this has happened. Pt reports severe mid back pain, rating it a 6-7/10 presently, onset post seizure. Of note, pt's wife states he was recently diagnosed with sinusitis after initial symptoms of \"ear popping\". Pt was prescribed a nasal spray, which he did not take. Pt denies HA, fever/chills, nausea/vomiting, chest pain/discomfort, orthopnea/PND, palpitations, abdominal pain, changes to weight, appetite, urination or bowel movement. Pt admits to SOB, no home O2 use.     Chief Complaint   Patient presents with    Seizures    Back Pain     Back pain this week per family     ROS: 10 point review of systems negative with the exception of above.    ED Course:  Vitals: T:96.6      BP:105/73        HR:49       RR:16       SPO2:90     Labs:  - CBC: WBC:13.0,   - RFP: Cr:1.40   - Blood glucose: 171  - Lactate: 3.4-->1.2  - Pt:14.2; INR:1.3   - BNP: 183  - COVID negative   - EKG: Atrial flutter with variable AV block    Imaging:  - CT angio Chest/Pelvis:   CHEST  1.  No evidence of dissection or acute intramural hematoma. Ectatic  ascending aorta with mild " "aortic valve atherosclerotic  calcifications. Sternotomy changes. Prosthetic mitral valve. Mild  cardiomegaly with left atrial enlargement similar to prior.  2. Moderate upper lobe predominant centrilobular emphysema.      ABDOMEN - PELVIS  1. Osteopenia. Unchanged chronic moderate T6 compression deformity.  New age-indeterminate mild compression deformities of T4, T10, L1,  and L2. Correlation with point tenderness recommended.  2. Moderate atherosclerotic plaque of aorta and its branches.  Prostatomegaly.  3. Stable cystic lesion in the pancreatic head measuring 1.3 cm  compared to 2021.    CT Head wo contrast: No acute intracranial abnormality.  There is new thickening of the right tympanic membrane, clinical  correlation for tympanic membrane perforation recommended. Mastoid  air cells and middle ear cavities are grossly clear.    CXR: Cardiomegaly and pulmonary interstitial edema.       Intervention: In ED, patient received Keppra, Zofran, NaCL 2.836 L. Patient was then transferred to the floor for further management    PMH: mentioned above in HPI  PSH: mentioned above in HPI  FH: noncontributory     Social Hx:  - EtOH: Denies  - Tobacco: Denies  - Illicit Drugs: Denies    Allergies: as documented in EMR  Meds: as documented in med rec    Past Medical History   Medical History[1]   Surgical History   Surgical History[2]  Family History   Family History[3]  Social History     Tobacco Use: High Risk (5/1/2025)    Patient History     Smoking Tobacco Use: Every Day     Smokeless Tobacco Use: Never     Passive Exposure: Current      Social History     Substance and Sexual Activity   Alcohol Use Never      Allergies   RX Allergies[4]   Meds    Scheduled medications  Scheduled Medications[5]  Continuous medications  Continuous Medications[6]  PRN medications  PRN Medications[7]   Objective     Vitals  Visit Vitals  /62   Pulse 89   Temp 35.9 °C (96.6 °F) (Temporal)   Resp (!) 25   Ht 1.676 m (5' 6\")   Wt 61.2 kg " (135 lb)   SpO2 (!) 90%   BMI 21.79 kg/m²   Smoking Status Every Day   BSA 1.69 m²        Physical Examination:  Constitutional: Appears stated age.  HEENT: EOMI, clear sclera, moist mucous membranes. Face erythematous. R ear with dried blood and ear wax, difficulty visualizing tympanic membrane  CV: RRR, No M/R/G  PULM: wheezing heard in all lung fields R>L   ABDOMEN: Soft, NT/ND. No TTP. + BSx4.  SKIN: Normal Color, Warm, Dry  EXTREMITIES: Non-Tender, Full ROM, No Pedal Edema  NEURO: A&O x 3  PSYCH: Normal Mood & Behavior    I/Os  No intake or output data in the 24 hours ending 05/02/25 0113    Labs:   Results from last 72 hours   Lab Units 05/01/25  2130   SODIUM mmol/L 138   POTASSIUM mmol/L 3.9   CHLORIDE mmol/L 98   CO2 mmol/L 14*   BUN mg/dL 23   CREATININE mg/dL 1.40*   GLUCOSE mg/dL 171*   CALCIUM mg/dL 9.3   ANION GAP mmol/L 30*   EGFR mL/min/1.73m*2 56*      Results from last 72 hours   Lab Units 05/01/25  2130   WBC AUTO x10*3/uL 13.0*   HEMOGLOBIN g/dL 16.9   HEMATOCRIT % 53.5*   PLATELETS AUTO x10*3/uL 151   NEUTROS PCT AUTO % 41.3   LYMPHS PCT AUTO % 47.1   MONOS PCT AUTO % 7.1   EOS PCT AUTO % 1.2      Lab Results   Component Value Date    CALCIUM 9.3 05/01/2025    PHOS 3.1 07/15/2024      Lab Results   Component Value Date    CRP 0.75 05/06/2024      Images    CT angio chest abdomen pelvis  Result Date: 5/1/2025  CHEST 1.  No evidence of dissection or acute intramural hematoma. Ectatic ascending aorta with mild aortic valve atherosclerotic calcifications. Sternotomy changes. Prosthetic mitral valve. Mild cardiomegaly with left atrial enlargement similar to prior. 2. Moderate upper lobe predominant centrilobular emphysema.   ABDOMEN - PELVIS 1. Osteopenia. Unchanged chronic moderate T6 compression deformity. New age-indeterminate mild compression deformities of T4, T10, L1, and L2. Correlation with point tenderness recommended. 2. Moderate atherosclerotic plaque of aorta and its branches.  Prostatomegaly. 3. Stable cystic lesion in the pancreatic head measuring 1.3 cm compared to 2021.     Signed by: Wai Walker 5/1/2025 11:40 PM Dictation workstation:   [id number]    CT head wo IV contrast  Result Date: 5/1/2025  No acute intracranial abnormality.   There is new thickening of the right tympanic membrane, clinical correlation for tympanic membrane perforation recommended. Mastoid air cells and middle ear cavities are grossly clear.   Signed by: Wai Walker 5/1/2025 11:23 PM Dictation workstation:   [id number]    XR chest 1 view  Result Date: 5/1/2025  Cardiomegaly and pulmonary interstitial edema.   MACRO: None   Signed by: Librado Lepe 5/1/2025 11:19 PM Dictation workstation:   [id number]    Assessment and Plan    Farooq Lang is a 64 y.o. male with a PMHx of COPD/asthma, emphysema, A-fib on Eliquis, HFpEF, mitral valve stenosis secondary to rheumatic disease s/p MVR Bioprosthetic Valve and KONRAD clipping with Dr. Nugent at [facility] in 6/2024 who is admitted for first time generalized seizure, spinal compression fractures, and acute hypoxic respiratory failure 2/2 COPD exacerbation or medication induced from ED.    Acute Medical Issues   #Seizure   - Pt's wife describes episode as body wide shaking, dentures ejecting from mouth, pt was foaming at his mouth, eyes were closed, pt turned pale and was unable to speak  - Pt's wife states seizure lasted for a total of 10 minutes before EMS arrived and this is the first time this has happened.  - PE: erythematous face s/p seizure  - CT head negative for acute intracranial abnormality  - S/p 1000 mg Keppra load in ED  Plan:    - MRI of Brain ordered    - EEG ordered   - Consult neurology   - Given the fact that this is his first seizure, and it was a singular event, will hold off on further anti-epileptic medications pending neurology recommendations    #Acute hypoxic respiratory failure 2/2 COPD exacerbation vs medication induced  - Hx of  COPD/asthma and emphysema  - PE: wheezing heard in all lung fields R>L   - CXR showed cardiomegaly and pulmonary interstitial edema, however CT chest did not show any signs of pulmonary edema, only showing chronic changes of known emphysema  - Supplemental O2 4L, baseline RA  - S/p 1000 mg Keppra, 50 mcg fentanyl, 40 mg IV Lasix in ED  Plan:   - Start Prednisone 40 mg for 5 days   - Duonebs scheduled  - Guaifenesin   - On 4L currently, wean O2 as tolerated    #Right ear bleed  #Concern for ruptured tympanic membrane on CT head  - CT head wo IV contrast: There is new thickening of the right tympanic membrane, clinical correlation for tympanic membrane perforation recommended  - PE: R ear with dried blood and ear wax, difficulty visualizing tympanic membrane  - S/p 1 dose of Augmentin in ER  Plan:  - Spoke with Dr. Gomez of ENT, recommending to avoid breaking the clot in his ear as he's on Eliquis and recommending to start ofloxacin ear drops BID for a 7 day course and to follow up with him upon discharge  - Monitor for signs of worsening ear pain or ear bleeding    #Lower back pain 2/2 compression deformities   - Pt reports onset of back pain after seizure rating it a 6-7/10   - CT abdomen-pelvis:  Osteopenia. Unchanged chronic moderate T6 compression deformity. New age-indeterminate mild compression deformities of T4, T10, L1, and L2.   - ER staff spoke with Dr. Martins, ortho spine @ [facility], recommending just supportive care and to follow up with ortho spine as outpatient  Plan:   - Tylenol 975 scheduled, Oxycodone 5mg for moderate pain, Oxycodone 10mg for severe pain, dilaudid 0.4 mg for breakthrough    #Prerenal ELIE   - Cr: 1.40 (Baseline 0.86-1.00)  - S/p 1L IVF and 40 mg IV Lasix in ED  Plan:  - Monitor with daily labs, consider further workup if worsening      Chronic Medical Issues   #Afib: continue home Eliquis, amiodarone, hold diltiazem  #CAD/HFpEF: continue home aspirin       F: PO intake & IVF PRN   E:  Replete as needed  N: Regular diet  GI ppx: None  DVT ppx: home Eliquis   Antibiotics: none   Tubes/Lines/Drains: PIV  Oxygenation: Supplemental O2, 4L     Code Status: Full Code     Disposition: 64 y.o.male admitted for seizure, acute hypoxic respiratory failure, and compression deformities. Anticipate LOS > 48 hrs.     Cyril Sauer DO  Internal Medicine, PGY-1          [1]   Past Medical History:  Diagnosis Date    A-fib (Multi)     Acute bronchitis 06/21/2024    Acute right lower quadrant pain 06/21/2024    Atherosclerosis of abdominal aorta 03/28/2016    Last Assessment & Plan:    Formatting of this note might be different from the original.   Stable asymptomatic  Formatting of this note might be different from the original.   Repeat CT reveals moderate partially calcified wall changes. Stable 6 mm thickness/plaque in the inferior aspect of the isthmus and 6 mm wall thickening/plaque along the minor curvature of the proximal descending aorta.   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   Previous history of asthma   Current everyday smoker   Also CT chest 3/28 with findings for COPD   Duonebs as needed    Costochondritis 06/21/2024    Cough 06/21/2024    Disease due to severe acute respiratory syndrome coronavirus 2 (SARS-CoV-2) 09/06/2022    Comment on above: COVID SYMPTOMS    Diverticular disease 06/21/2024    Diverticulosis     Diverticulosis 08/15/2023    Formatting of this note might be different from the original.   Formatting of this note might be different from the original. hx diverticulitis at same time as pneumonia  Formatting of this note might be different from the original.   hx diverticulitis at same time as pneumonia    Dizziness     Dyspnea 06/21/2024    Fecal smearing 06/21/2024    Fecal soiling 06/21/2024     Headache 06/16/2023    Heart failure with mid-range ejection fraction (HFmEF)     (45%, 3/28/24)    Hypotension 06/21/2024    Incomplete passage of stool 06/21/2024    Insomnia 06/21/2024    Irregular heart beat     afib on coumadin    Irritable bowel syndrome     Irritable bowel syndrome with diarrhea 06/21/2024    Low back pain 06/21/2024    Mitral valve stenosis     Nausea & vomiting 06/21/2024    Nonspecific syndrome suggestive of viral illness 06/21/2024    Other chest pain 03/10/2016    Chest discomfort    Other emphysema (Multi) 02/06/2024    Last Assessment & Plan:    Formatting of this note might be different from the original.   As seen on cxr.  Cough is likely related to viral infection.  COPD contributes highly recommended smoking cessation to reduce progression of COPD.  With symptoms improving pt declined inhaler at this time    Pleurodynia 11/29/2017    Chest pain, pleuritic    Pneumonia 06/21/2024    Prediabetes 03/29/2016    Formatting of this note might be different from the original.   Hemoglobin a1c 6.0.   Need education on lifestyle modification.    Right lower quadrant pain 11/14/2018    Abdominal pain, acute, right lower quadrant    Shortness of breath     VANCE    Syncope 06/21/2024    Tension type headache 06/21/2024    Thrombocytopenia (CMS-HCC)     Tobacco dependence 08/15/2023    Last Assessment & Plan:    Formatting of this note might be different from the original.   Once again highly recommended cessation    Tubular adenoma of colon 06/21/2024   [2]   Past Surgical History:  Procedure Laterality Date    CARDIAC CATHETERIZATION N/A 04/22/2024    Procedure: Left And Right Heart Cath, Without LV;  Surgeon: Yobani Soto MD;  Location: [place] [address] Cardiac Cath Lab;  Service: Cardiovascular;  Laterality: N/A;  Schedule at 3:30pm Dr. Soto    CARDIAC ELECTROPHYSIOLOGY PROCEDURE N/A 06/17/2024    Procedure: Cardioversion;  Surgeon: Dick Edouard MD;  Location: [place] [address]  Cardiac Cath Lab;  Service: Electrophysiology;  Laterality: N/A;    COLONOSCOPY      DENTAL SURGERY      MITRAL VALVE REPAIR  06/04/2024    TONSILLECTOMY     [3]   Family History  Problem Relation Name Age of Onset    Cancer Father  74    Brain Aneurysm Father     [4]   Allergies  Allergen Reactions    Metoprolol Shortness of breath     Concern for bronchospasm   [5] amoxicillin-clavulanate, 1 tablet, oral, Once  fentaNYL, 50 mcg, intravenous, Once  furosemide, 40 mg, intravenous, Once  [6]    [7] PRN medications: oxygen

## 2025-05-02 NOTE — DISCHARGE INSTRUCTIONS
Please, take your home medications as instructed after being discharged from the hospital.     NEW MEDICATIONS:  -Keppra 750 mg twice daily - this medication is to help prevent seizures  -Ofloxacin drops - 5 drops in the R ear twice daily for the next 5 days   -Prednisone 40 mg daily for the next 3 days     For back pain  -Use a lidocaine patch, place for 12 hours and remove for 12 hours and repeat  -Take Flexeril 5 mg as needed for muscle spasms    For atrial fibrillation:  -Continue taking Nebivolol at 5 mg daily as you were previously    OTHER INSTRUCTIONS:  -No driving or operating heavy machinery for 6 months  -Follow up with neurology, Mckenna Delgado, within 1 month - referral placed  -You will also need to get an EEG done - you can set this up to be done outpatient - Please call [phone] to schedule diagnostic imaging     Referrals have been placed for you to follow up with:  -Neurology as noted above  -Cardiology regarding your atrial fibrillation and heart rate   -ENT regarding ruptured ear drum  -Orthopedics regarding compressions fractures in your back   -Your primary care provider       UPCOMING APPOINTMENTS:     Please, follow-up with your PCP within 7 to 14 days after leaving the hospital. /appointment services has been requested to make an appointment for you, however if you do not hear back from them within 1 to 2 days, please call your primary physician's office to schedule an appointment. Bring your photo ID and insurance card to your appointment.   [facility]  services can be reached at [phone].     If you experience any worsening symptoms or have any concerns, please contact your primary care provider to schedule an appointment. If you cannot get in touch with your primary care physician, please return to the nearest emergency room or urgent care clinic for an evaluation and treatment.     Thank you for choosing [facility] and allowing us to partake in  your medical care!     - Your [facility] inpatient primary care team.

## 2025-05-02 NOTE — PROGRESS NOTES
Internal Medicine - Daily Progress Note   Hospital Day: 2       Name:Farooq Lang, AGE: 64 y.o., GENDER: male, MRN: [id number], ROOM: 106/106-A   CODE STATUS: Full Code  Attending Physician: Nicolas Nava MD  Resident: Juliana Sahni MD        Chief Complaint     Chief Complaint   Patient presents with    Seizures    Back Pain     Back pain this week per family        Subjective    Farooq Lang is a 64 y.o. year old male patient on Hospital Day: 2    Overnight events: Patient admitted overnight - had episodes of coughing with significant desaturation, was increased to 15L NC at one point but then reduced back to 5L NC.    Patient seen and examined at bedside this morning, states he feels better overall. Back pain has improved but has not been out of bed today. States he hurt his back initially about 1 week ago but pain was significantly worse after seizure. He is still having an occasional cough that is new since this admission. Oxygen nasal cannula did fall out of nose during conversation and patient maintained O2 sats > 90% - reduced to 2L.      Meds    Scheduled medications  Scheduled Medications[1]  Continuous medications  Continuous Medications[2]  PRN medications  PRN Medications[3]     Objective    Physical Exam  Constitutional:       General: He is not in acute distress.  HENT:      Head: Normocephalic and atraumatic.      Comments: Face appears flushed     Ears:      Comments: R ear with bloody fluid leakage   Eyes:      Comments: Redness of R eye noted   Cardiovascular:      Rate and Rhythm: Tachycardia present. Rhythm irregular.   Pulmonary:      Effort: Pulmonary effort is normal. No respiratory distress.      Breath sounds: Normal breath sounds. No wheezing or rales.   Abdominal:      General: There is no distension.      Palpations: Abdomen is soft.      Tenderness: There is no abdominal tenderness.   Musculoskeletal:      Right lower leg: No edema.      Left lower leg: No edema.   Skin:      "General: Skin is warm and dry.   Neurological:      General: No focal deficit present.      Mental Status: He is alert.   Psychiatric:         Mood and Affect: Mood normal.         Behavior: Behavior normal.        Visit Vitals  BP 85/61 Comment: Dr Roberts notified   Pulse 84   Temp 37 °C (98.6 °F) (Temporal)   Resp 22   Ht 1.676 m (5' 6\")   Wt 67 kg (147 lb 12.8 oz)   SpO2 95%   BMI 23.86 kg/m²   Smoking Status Every Day   BSA 1.77 m²        Intake/Output Summary (Last 24 hours) at 5/2/2025 0726  Last data filed at 5/2/2025 0415  Gross per 24 hour   Intake --   Output 1100 ml   Net -1100 ml       Labs:   Results from last 72 hours   Lab Units 05/01/25  2130   SODIUM mmol/L 138   POTASSIUM mmol/L 3.9   CHLORIDE mmol/L 98   CO2 mmol/L 14*   BUN mg/dL 23   CREATININE mg/dL 1.40*   GLUCOSE mg/dL 171*   CALCIUM mg/dL 9.3   ANION GAP mmol/L 30*   EGFR mL/min/1.73m*2 56*      Results from last 72 hours   Lab Units 05/01/25  2130   WBC AUTO x10*3/uL 13.0*   HEMOGLOBIN g/dL 16.9   HEMATOCRIT % 53.5*   PLATELETS AUTO x10*3/uL 151   NEUTROS PCT AUTO % 41.3   LYMPHS PCT AUTO % 47.1   MONOS PCT AUTO % 7.1   EOS PCT AUTO % 1.2      Lab Results   Component Value Date    CALCIUM 9.3 05/01/2025    PHOS 3.1 07/15/2024      Lab Results   Component Value Date    CRP 0.75 05/06/2024       [unfilled]     Micro/ID:   No results found for the last 90 days.                   No lab exists for component: \"AGALPCRNB\"     No results found for: \"URINECULTURE\", \"BLOODCULT\", \"CSFCULTSMEAR\"    Images    XR chest 1 view  Result Date: 5/2/2025  1. Cardiomegaly with mild pulmonary venous congestion similar to prior.     Signed by: Wai Walker 5/2/2025 5:42 AM Dictation workstation:   [id number]    CT angio chest abdomen pelvis  Result Date: 5/1/2025  CHEST 1.  No evidence of dissection or acute intramural hematoma. Ectatic ascending aorta with mild aortic valve atherosclerotic calcifications. Sternotomy changes. Prosthetic mitral valve. " Mild cardiomegaly with left atrial enlargement similar to prior. 2. Moderate upper lobe predominant centrilobular emphysema.   ABDOMEN - PELVIS 1. Osteopenia. Unchanged chronic moderate T6 compression deformity. New age-indeterminate mild compression deformities of T4, T10, L1, and L2. Correlation with point tenderness recommended. 2. Moderate atherosclerotic plaque of aorta and its branches. Prostatomegaly. 3. Stable cystic lesion in the pancreatic head measuring 1.3 cm compared to 2021.     Signed by: Wai Walker 5/1/2025 11:40 PM Dictation workstation:   [id number]    CT head wo IV contrast  Result Date: 5/1/2025  No acute intracranial abnormality.   There is new thickening of the right tympanic membrane, clinical correlation for tympanic membrane perforation recommended. Mastoid air cells and middle ear cavities are grossly clear.   Signed by: Wai Walker 5/1/2025 11:23 PM Dictation workstation:   [id number]    XR chest 1 view  Result Date: 5/1/2025  Cardiomegaly and pulmonary interstitial edema.   MACRO: None   Signed by: Librado Lepe 5/1/2025 11:19 PM Dictation workstation:   [id number]      Assessment and Plan    Farooq Lang is a 64 y.o. male  with a PMHx of COPD/asthma, emphysema, A-fib on Eliquis, HFpEF, mitral valve stenosis secondary to rheumatic disease s/p MVR Bioprosthetic Valve and KONRAD clipping with Dr. Nugent at [facility] in 6/2024 admitted on 5/1/2025 for first time generalized seizure, spinal compression fractures, and acute hypoxic respiratory failure 2/2 COPD exacerbation or medication induced from ED.     ACUTE MEDICAL ISSUES:  #Seizure   ::Typical seizure activity; tonic clonic movement, lasted 10 min, with urinary incontinence   ::Unsure of trigger - patient with significant sleep deprivation and recent sinusitis with TM perforation on CT head  ::CT head negative for acute intracranial abnormality  ::S/p 1000 mg Keppra load in ED - further Keppra held as this is first seizure  occurrence of patient's life   PLAN:  -Neurology consulted, appreciate recs  -Ativan 2mg PRN ordered for seizures  -MRI brained ordered and pending   -EEG ordered and pending   -Given the fact that this is his first seizure, and it was a singular event, will hold off on further anti-epileptic medications pending neurology recommendations     #Acute hypoxic respiratory failure 2/2 COPD exacerbation vs medication induced vs aspiration event  ::Hx of COPD/asthma and emphysema  ::CXR showed cardiomegaly and pulmonary interstitial edema, however CT chest did not show any signs of pulmonary edema, only showing chronic changes of known emphysema  ::Possible aspiration during seizure  PLAN:  -Continue Prednisone 40 mg x 5 day course  -Empirically start CFTX/azithro for PNA coverage  -Procal and urine strep/legionella antigens ordered and pending - de-escalate as appropriate   -Duonebs scheduled and PRN  -Guaifenesin 600 mg BID for cough/sputum   -On 2L currently, wean O2 as tolerated - baseline room air     #Lower back pain 2/2 compression deformities   ::Pt reports onset of back pain last weekend 4/26 but with significant worsening after seizure   ::CT abdomen-pelvis:  Osteopenia. Unchanged chronic moderate T6 compression deformity. New age-indeterminate mild compression deformities of T4, T10, L1, and L2.   ::ER staff spoke with Dr. Martins, ortho spine @ [facility], recommending just supportive care and to follow up with ortho spine as outpatient  PLAN:  -Tylenol 975 scheduled, Oxycodone 5mg for moderate pain, Oxycodone 10mg for severe pain, dilaudid 0.4 mg for breakthrough  -Senna nightly to avoid opioid induced constipation     #Right ear bleed  #Concern for ruptured tympanic membrane on CT head  ::CT head wo IV contrast: There is new thickening of the right tympanic membrane, clinical correlation for tympanic membrane perforation recommended  ::PE: R ear with dried blood and ear wax, difficulty visualizing tympanic  membrane  ::S/p 1 dose of Augmentin in ER  PLAN:  -Spoke with Dr. Gomez of ENT, recommending to avoid breaking the clot in his ear as he's on Eliquis and recommending to start ofloxacin ear drops BID x 7 days - to follow up with him upon discharge  -Monitor for signs of worsening ear pain or ear bleeding     #Atrial fibrillation with poor rate control  ::Patient states HR fluctuates 30s to 120s on a daily basis  ::States his meds were recently switched, appears changed from amiodarone to metoprolol  PLAN:  -Continue to monitor patient on tele  -Cardiology, Dr. Johnson's group, consulted for medication management/adjustment            ADDRESSED/ RESOLVED MEDICAL ISSUES:  #Prerenal ELIE, resolved   - Cr: 1.40 (Baseline 0.86-1.00) ->1.16  Plan:  - Monitor with daily labs      CHRONIC MEDICAL ISSUES:  #Afib: continue home Eliquis,  HOLD amiodarone and diltiazem as does not appear patient taking any longer at home - pending cardiology consult   #CAD/HFpEF: continue home aspirin     Fluids: PO and IV PRN  Electrolytes: Replete PRN  Nutrition: Regular  Antimicrobials: Ceftriaxone and azithromycin  DVT ppx: On Eliquis  GI ppx: None  Catheter: None  Lines: PIV  Supplemental Oxygen: 2L NC   Emergency Contact: Extended Emergency Contact Information  Primary Emergency Contact: JAMES BRIONES  Address: [address] [place] [id number] [facility]  Home Phone: [phone]  Mobile Phone: [phone]  Relation: Spouse  Secondary Emergency Contact: Lilian Lang  Home Phone: [phone]  Relation: Mother   Code: Full Code     Disposition: 64 year old male admitted for seizure with unknown cause. Pending neurology recommendations.      Juliana Sahni MD  Internal Medicine, PGY- 1  05/02/25 at 7:26 AM                 [1] acetaminophen, 975 mg, oral, q6h  amiodarone, 200 mg, oral, Daily  apixaban, 5 mg, oral, BID  aspirin, 81 mg, oral, Daily  [Held by provider] dilTIAZem ER, 120 mg, oral,  Daily  ipratropium-albuteroL, 3 mL, nebulization, TID  lidocaine, 1 patch, transdermal, Daily  ofloxacin, 5 drop, Right Ear, BID  predniSONE, 40 mg, oral, Daily  [2]    [3] PRN medications: guaiFENesin, HYDROmorphone, ipratropium-albuteroL, naloxone, ondansetron, oxyCODONE, oxyCODONE, oxygen

## 2025-05-02 NOTE — ED TRIAGE NOTES
Pt BIB EMS from home for witnessed grand-mal seizure approximately 5 minutes long on the couch w/ no fall. No h/o sx. Pt is on a thinner. H/o open heart valve surgery June 2024. EMS arrived and pt was postictal, agonal and hypoxic. W/ EMS and upon arrival pt becoming more alert, responsive, oriented. Upon triage, pt oriented, GCS 15, c/o mild back pain. Pt is noted to have purple/red color to face, cuco eye injection, fresh blood to right external ear. Pupils PERRL 3mm.

## 2025-05-02 NOTE — ED PROVIDER NOTES
History/Exam limitations: none  HPI was provided by patient    HPI:    Chief Complaint   Patient presents with    Seizures    Back Pain     Back pain this week per family        Farooq Lang is a 64 y.o. male presents with chief complaint of seizure, back pain.  Has history of known back pain.  No history of seizure.  It was reported that he had a grand mal seizure approximately 5 minutes along and was lying on the couch.  No loss conscious or hitting his head.  Is on Eliquis for A-fib.  Arrives here postictal and is hypoxic placed on O2 nasal cannula.  Is arousable.  When arousable GCS 15.  After I picked him up when I arrived for my shift was back to alert and oriented.  No history of seizures no new medications.  Was lying on couch when this happened.  Some bleeding from the right ear for which he states he has had for 3 weeks of congestion and primary care doctor told him likely it is secondary to congestion that he is having some ear pressure.  Perforation occurred after the seizure.  Family does believe he may have urinated on himself during the episode.    ROS: All other review of systems are negative except as noted above and HPI or ROS.   CONSTITUTIONAL:       fever, chills  EYES:      Blurry vision, change in vision  ENT:       sore throat, congestion, rhinorrhea  CARDIOVASCULAR:       chest pain, palpitations, swelling  RESPIRATORY:       cough, wheeze, shortness of breath  GI:       nausea, vomiting, diarrhea, abdominal pain  GENITOURINARY:       dysuria, hematuria, frequency, perianal anesthesia or incontinence  MUSCULOSKELETAL:       deformity, neck pain  SKIN:       rash, lesion  NEUROLOGIC:       headache, numbness, focal weakness  ENDOCRINE:      weight loss, fatigue  NOTES: All systems reviewed, negative except as described above       Physical Exam:  GENERAL:  appears uncomfortable, oriented , cooperative,  in no acute distress.  HEAD: normocephalic, atraumatic  SKIN:  dry skin, purple dark  color to face  EYES: PERRL, EOMs intact,  Conjunctiva pink with no erythema or exudates. No scleral icterus.   ENT: No external deformities. Nares patent, mucus membranes moist.  Pharynx clear, uvula midline.  Left TM intact no effusion.  Mastoid nontender to palpation bilaterally.  Right TM perforated with dried blood in the extra auditory canal.  NECK: Supple, without meningismus. Trachea at midline. No lymphadenopathy.  PULMONARY: Clear bilaterally. No crackles, rhonchi, wheezing.  No respiratory distress.  No work of breathing.  CARDIAC: Regular rate irregular rhythm,.  Pulses 2+ in radials and dorsal pedal pulses bilaterally.  No murmur, rub, gallop.  No edema.  ABDOMEN: Soft, nontender, active bowel sounds.  No palpable organomegaly.  No rebound or guarding.  No CVA tenderness.  No pulsatile masses.  MUSCULOSKELETAL: Full range of motion throughout, no deformity.   NEUROLOGICAL:  CN II through XII are grossly intact, no focal neuro deficits.  Strength 5 out of 5 throughout bilateral upper and lower extremities neurovascular intact in bilateral upper and lower extremities.  Able to plantarflex dorsiflex bilateral lower extremities.  PSYCHIATRIC: Appropriate mood and affect. Calm.       MDM/ED COURSE:    The patient presented for evaluation of seizure-like activity.  Differential includes not limited to breakthrough seizure, anemia, electrolyte abnormality, sepsis, pneumonia, intracranial hemorrhage..  Imaging studies if performed were independently reviewed and interpreted by myself and confirmed by radiologist.    Patient requires continued workup and management of their symptoms and will be admitted to the hospital for further evaluation and treatment.        I discussed the differential, results and plan with the patient and/or family/friend/caregiver if present.      Note: This note was dictated by speech recognition. Minor errors in transcription may be present.    ED Course as of 05/04/25 0538   Thu May  01, 2025 2139 EKG interpreted by me shows atrial flutter with variable AV block.  No STEMI.  Rate 84.  Compared to prior EKG A-fib is not a new finding [WL]   2245 Patient developed pain here slightly pain in upper back. 2nd EKG done.  EKG interpreted by me shows atrial flutter with variable AV block.  No STEMI.  Rate 95.  Similar when compared to prior EKG [WL]   2303 Troponin I, High Sensitivity: 13  Troponin downtrending. [WL]   2351 Was started on Keppra. [WL]   2351 Was a sepsis alert.  Was not given full sepsis bolus given his [WL]   2351  CHF history. [WL]   2351 With concern of the pain radiating back into chest CT angio was performed. [WL]   2352 XR chest 1 view  Cardiomegaly and pulmonary interstitial edema. [WL]   2352 CT head wo IV contrast  No acute intracranial abnormality.      There is new thickening of the right tympanic membrane, clinical  correlation for tympanic membrane perforation recommended. Mastoid  air cells and middle ear cavities are grossly clear.   [WL]   2353 CT angio chest abdomen pelvis  CHEST  1.  No evidence of dissection or acute intramural hematoma. Ectatic  ascending aorta with mild aortic valve atherosclerotic  calcifications. Sternotomy changes. Prosthetic mitral valve. Mild  cardiomegaly with left atrial enlargement similar to prior.  2. Moderate upper lobe predominant centrilobular emphysema.      ABDOMEN - PELVIS  1. Osteopenia. Unchanged chronic moderate T6 compression deformity.  New age-indeterminate mild compression deformities of T4, T10, L1,  and L2. Correlation with point tenderness recommended.  2. Moderate atherosclerotic plaque of aorta and its branches.  Prostatomegaly.  3. Stable cystic lesion in the pancreatic head measuring 1.3 cm  compared to 2021.   [WL]   Fri May 02, 2025   0032 Discussed case with Dr. Martins, orthospine who recommends no need for transfer to main campus and can follow-up on outpatient basis for the compression fractures. [WL]   0105 Was  given Augmentin a dose here for the ear perforation. [WL]   0109 Spoke with Brandee kerns for Dr Clements who agrees with admission.  At this time still awaiting urine. [WL]   0124 Patient states he did hurt his back a few weeks ago but did not fall or injure and it was when he was lifting something. [WL]      ED Course User Index  [WL] Carloz Gorman,          Diagnoses as of 05/04/25 0538   Seizure (Multi)   Compression fracture of thoracic vertebra, unspecified thoracic vertebral level, initial encounter (Multi)   Compression fracture of lumbar vertebra, unspecified lumbar vertebral level, initial encounter (Multi)   Acute on chronic congestive heart failure, unspecified heart failure type   Perforation of right tympanic membrane   Abnormal CT scan   Acute respiratory failure with hypoxia   COPD exacerbation (Multi)   Atrial fibrillation, unspecified type (Multi)   Compression deformity of vertebra         Medical History[1]   Social History[2]  Current Outpatient Medications   Medication Instructions    aspirin 81 mg, oral, Daily    cyclobenzaprine (FLEXERIL) 5 mg, oral, 3 times daily PRN    Eliquis 5 mg, oral, 2 times daily    levETIRAcetam (KEPPRA) 750 mg, oral, 2 times daily    lidocaine 4 % patch 1 patch, transdermal, Daily, Remove & discard patch within 12 hours or as directed by MD.    multivitamin with minerals tablet 1 tablet, oral, Daily    nebivolol (BYSTOLIC) 5 mg, oral, Nightly    ofloxacin (Floxin) 0.3 % otic solution 5 drops, Right Ear, 2 times daily    predniSONE (DELTASONE) 40 mg, oral, Daily     RX Allergies[3]          ED Triage Vitals [05/01/25 2124]   Temperature Heart Rate Respirations BP   35.9 °C (96.6 °F) (!) 49 16 105/73      Pulse Ox Temp Source Heart Rate Source Patient Position   (!) 87 % Temporal Monitor Lying      BP Location FiO2 (%)     Right arm --               Labs and Imaging  CT lumbar spine wo IV contrast   Final Result   Findings are essentially unchanged compared to prior  CT from   05/01/2025:   1. Similar appearance of the age-indeterminate L1 and L2 vertebral   body superior endplate compression fractures with minimal height   loss. Correlate with focal pain on exam.   2. No traumatic subluxation.   3. Partially visualized patchy right lower lobe consolidation may   represent aspiration, pneumonia, or atelectasis.        MACRO:   None        Signed by: Bairon Medina 5/2/2025 8:27 PM   Dictation workstation:   [id number]      Transthoracic Echo Complete         MR brain wo IV contrast   Final Result   1. No evidence of acute infarct, intracranial mass effect or midline   shift.   2. Mild white matter FLAIR signal increase most commonly seen with   early chronic small vessel ischemic change.   3. Degree of ventriculomegaly is commensurate with overall degree of   brain parenchymal volume loss, which is mild.   4. Multiple old lacunar infarctions left-greater-than-right   cerebellar hemispheres.   5. No discernible etiology for seizures.        MACRO:   None        Signed by: Abran Cook 5/2/2025 1:15 PM   Dictation workstation:   [id number]      XR chest 1 view   Final Result   1. Cardiomegaly with mild pulmonary venous congestion similar to   prior.             Signed by: Wai Walker 5/2/2025 5:42 AM   Dictation workstation:   [id number]      CT angio chest abdomen pelvis   Final Result   CHEST   1.  No evidence of dissection or acute intramural hematoma. Ectatic   ascending aorta with mild aortic valve atherosclerotic   calcifications. Sternotomy changes. Prosthetic mitral valve. Mild   cardiomegaly with left atrial enlargement similar to prior.   2. Moderate upper lobe predominant centrilobular emphysema.        ABDOMEN - PELVIS   1. Osteopenia. Unchanged chronic moderate T6 compression deformity.   New age-indeterminate mild compression deformities of T4, T10, L1,   and L2. Correlation with point tenderness recommended.   2. Moderate atherosclerotic plaque of aorta and  its branches.   Prostatomegaly.   3. Stable cystic lesion in the pancreatic head measuring 1.3 cm   compared to 2021.             Signed by: Wai Walker 5/1/2025 11:40 PM   Dictation workstation:   [id number]      CT head wo IV contrast   Final Result   No acute intracranial abnormality.        There is new thickening of the right tympanic membrane, clinical   correlation for tympanic membrane perforation recommended. Mastoid   air cells and middle ear cavities are grossly clear.        Signed by: Wai Walker 5/1/2025 11:23 PM   Dictation workstation:   [id number]      XR chest 1 view   Final Result   Cardiomegaly and pulmonary interstitial edema.        MACRO:   None        Signed by: Librado Lepe 5/1/2025 11:19 PM   Dictation workstation:   [id number]        Labs Reviewed   CBC WITH AUTO DIFFERENTIAL - Abnormal       Result Value    WBC 13.0 (*)     nRBC 0.4 (*)     RBC 5.73      Hemoglobin 16.9      Hematocrit 53.5 (*)     MCV 93      MCH 29.5      MCHC 31.6 (*)     RDW 14.1      Platelets 151      Neutrophils % 41.3      Immature Granulocytes %, Automated 2.7 (*)     Lymphocytes % 47.1      Monocytes % 7.1      Eosinophils % 1.2      Basophils % 0.6      Neutrophils Absolute 5.36      Immature Granulocytes Absolute, Automated 0.35      Lymphocytes Absolute 6.13 (*)     Monocytes Absolute 0.93      Eosinophils Absolute 0.16      Basophils Absolute 0.08     COMPREHENSIVE METABOLIC PANEL - Abnormal    Glucose 171 (*)     Sodium 138      Potassium 3.9      Chloride 98      Bicarbonate 14 (*)     Anion Gap 30 (*)     Urea Nitrogen 23      Creatinine 1.40 (*)     eGFR 56 (*)     Calcium 9.3      Albumin 4.1      Alkaline Phosphatase 108      Total Protein 7.5      AST 20      Bilirubin, Total 0.6      ALT 21     COAGULATION SCREEN - Abnormal    Protime 14.2 (*)     INR 1.3 (*)     aPTT 34      Narrative:     The APTT is no longer used for monitoring Unfractionated Heparin Therapy. For monitoring Heparin  Therapy, use the Heparin Assay.   TROPONIN I, HIGH SENSITIVITY - Abnormal    Troponin I, High Sensitivity 27 (*)     Narrative:     Less than 99th percentile of normal range cutoff-  Female and children under 18 years old <14 ng/L; Male <21 ng/L: Negative  Repeat testing should be performed if clinically indicated.     Female and children under 18 years old 14-50 ng/L; Male 21-50 ng/L:  Consistent with possible cardiac damage and possible increased clinical   risk. Serial measurements may help to assess extent of myocardial damage.     >50 ng/L: Consistent with cardiac damage, increased clinical risk and  myocardial infarction. Serial measurements may help assess extent of   myocardial damage.      NOTE: Children less than 1 year old may have higher baseline troponin   levels and results should be interpreted in conjunction with the overall   clinical context.     NOTE: Troponin I testing is performed using a different   testing methodology at [facility] than at other   [facility]. Direct result comparisons should only   be made within the same method.   URINALYSIS WITH REFLEX CULTURE AND MICROSCOPIC - Abnormal    Color, Urine Light-Yellow      Appearance, Urine Clear      Specific Gravity, Urine 1.040 (*)     pH, Urine 5.5      Protein, Urine NEGATIVE      Glucose, Urine Normal      Blood, Urine NEGATIVE      Ketones, Urine NEGATIVE      Bilirubin, Urine NEGATIVE      Urobilinogen, Urine Normal      Nitrite, Urine NEGATIVE      Leukocyte Esterase, Urine NEGATIVE     BLOOD GAS VENOUS - Abnormal    POCT pH, Venous 7.30 (*)     POCT pCO2, Venous 49      POCT pO2, Venous 23 (*)     POCT SO2, Venous 34 (*)     POCT Oxy Hemoglobin, Venous 32.5 (*)     POCT Base Excess, Venous -2.8 (*)     POCT HCO3 Calculated, Venous 24.1      Patient Temperature        FiO2 21     B-TYPE NATRIURETIC PEPTIDE - Abnormal     (*)     Narrative:        <100 pg/mL - Heart failure unlikely  100-299 pg/mL -  Intermediate probability of acute heart                  failure exacerbation. Correlate with clinical                  context and patient history.    >=300 pg/mL - Heart Failure likely. Correlate with clinical                  context and patient history.    BNP testing is performed using different testing methodology at [facility] than at other [facility] hospitals. Direct result comparisons should only be made within the same method.      LACTATE - Abnormal    Lactate 3.4 (*)     Narrative:     Venipuncture immediately after or during the administration of Metamizole may lead to falsely low results. Testing should be performed immediately prior to Metamizole dosing.   CBC - Abnormal    WBC 9.2      nRBC 0.0      RBC 5.20      Hemoglobin 15.3      Hematocrit 46.1      MCV 89      MCH 29.4      MCHC 33.2      RDW 13.9      Platelets 129 (*)    RENAL FUNCTION PANEL - Abnormal    Glucose 115 (*)     Sodium 136      Potassium 4.2      Chloride 105      Bicarbonate 24      Anion Gap 11      Urea Nitrogen 21      Creatinine 1.16      eGFR 70      Calcium 8.7      Phosphorus 3.1      Albumin 3.8     CBC - Abnormal    WBC 14.6 (*)     nRBC 0.0      RBC 5.23      Hemoglobin 15.5      Hematocrit 46.2      MCV 88      MCH 29.6      MCHC 33.5      RDW 14.2      Platelets 121 (*)    RENAL FUNCTION PANEL - Abnormal    Glucose 122 (*)     Sodium 135 (*)     Potassium 4.5      Chloride 105      Bicarbonate 23      Anion Gap 12      Urea Nitrogen 27 (*)     Creatinine 1.08      eGFR 76      Calcium 9.1      Phosphorus 3.8      Albumin 3.8     BLOOD CULTURE - Normal    Blood Culture No growth at 1 day     BLOOD CULTURE - Normal    Blood Culture No growth at 1 day     LEGIONELLA ANTIGEN, URINE - Normal    L. pneumophila Urine Ag Negative     STREPTOCOCCUS PNEUMONIAE ANTIGEN, URINE - Normal    Streptococcus pneumoniae Ag, Urine Negative     DRUG SCREEN,URINE - Normal    Amphetamine Screen, Urine Presumptive Negative       Barbiturate Screen, Urine Presumptive Negative      Benzodiazepines Screen, Urine Presumptive Negative      Cannabinoid Screen, Urine Presumptive Negative      Cocaine Metabolite Screen, Urine Presumptive Negative      Fentanyl Screen, Urine Presumptive Negative      Opiate Screen, Urine Presumptive Negative      Oxycodone Screen, Urine Presumptive Negative      PCP Screen, Urine Presumptive Negative      Methadone Screen, Urine Presumptive Negative      Narrative:     Drug screen results are presumptive and should not be used to assess   compliance with prescribed medication. Contact the performing [facility] laboratory   to add-on definitive confirmatory testing if clinically indicated.    Toxicology screening results are reported qualitatively. The concentration must   be greater than or equal to the cutoff to be reported as positive. The concentration   at which the screening test can detect an individual drug or metabolite varies.   The absence of expected drug(s) and/or drug metabolite(s) may indicate non-compliance,   inappropriate timing of specimen collection relative to drug administration, poor drug   absorption, diluted/adulterated urine, or limitations of testing. For medical purposes   only; not valid for forensic use.    Interpretive questions should be directed to the laboratory medical directors.   TROPONIN I, HIGH SENSITIVITY - Normal    Troponin I, High Sensitivity 13      Narrative:     Less than 99th percentile of normal range cutoff-  Female and children under 18 years old <14 ng/L; Male <21 ng/L: Negative  Repeat testing should be performed if clinically indicated.     Female and children under 18 years old 14-50 ng/L; Male 21-50 ng/L:  Consistent with possible cardiac damage and possible increased clinical   risk. Serial measurements may help to assess extent of myocardial damage.     >50 ng/L: Consistent with cardiac damage, increased clinical risk and  myocardial infarction. Serial measurements  may help assess extent of   myocardial damage.      NOTE: Children less than 1 year old may have higher baseline troponin   levels and results should be interpreted in conjunction with the overall   clinical context.     NOTE: Troponin I testing is performed using a different   testing methodology at [facility] than at [facility]. Direct result comparisons should only   be made within the same method.   SARS-COV-2 AND INFLUENZA A/B PCR - Normal    Flu A Result Not Detected      Flu B Result Not Detected      Coronavirus 2019, PCR Not Detected      Narrative:     This assay is an FDA-cleared, in vitro diagnostic nucleic acid amplification test for the qualitative detection and differentiation of SARS CoV-2/ Influenza A/B from nasopharyngeal specimens collected from individuals with signs and symptoms of respiratory tract infections, and has been validated for use at [facility]. Negative results do not preclude COVID-19/ Influenza A/B infections and should not be used as the sole basis for diagnosis, treatment, or other management decisions. Testing for SARS CoV-2 is recommended only for patients who meet current clinical and/or epidemiological criteria defined by federal, state, or local public health directives.   LACTATE - Normal    Lactate 1.2      Narrative:     Venipuncture immediately after or during the administration of Metamizole may lead to falsely low results. Testing should be performed immediately prior to Metamizole dosing.   MAGNESIUM - Normal    Magnesium 1.94     OSMOLALITY, URINE - Normal    Osmolality, Urine Random 555     PROCALCITONIN - Normal    Procalcitonin 0.06      Narrative:     Procalcitonin (PCT) results measured serially can  aid in decision-making for antibiotic discontinuation in  patients with suspected or confirmed sepsis in conjunction  with additional clinical information. Antibiotic  discontinuation may be considered with a  change in PCT of  >80% from the peak result or when PCT falls below 0.50 ng/mL.    Procalcitonin results should not be used in isolation but  should be interpreted in conjunction with additional clinical  and laboratory findings. Procalcitonin results should not be  used to guide the initiation of antibiotic therapy.    Falsely low PCT values in the presence of bacterial infection  may occur in early infection, with atypical pathogens,  localized infections, and subacute infectious endocarditis.    Falsely elevated results outside of severe bacterial  infection/sepsis may be seen in patients with renal failure  or insufficiency, severe trauma or burns, recent major  abdominal/cardiac surgery, acute multi-organ failure, rarely  in patients with medullary thyroid carcinoma and rare  neuroendocrine tumors, and non-specific interfering antibodies  (heterophile antibodies, rheumatoid factor, human anti-mouse  antibodies (HAMA), etc).    Performance of the PCT test in pediatric patients (<19yo),  pregnant women, immunocompromised patients, and patients on  immunomodulatory medications has not been evaluated.   MAGNESIUM - Normal    Magnesium 2.04     RESPIRATORY CULTURE/SMEAR   URINALYSIS WITH REFLEX CULTURE AND MICROSCOPIC    Narrative:     The following orders were created for panel order Urinalysis with Reflex Culture and Microscopic.  Procedure                               Abnormality         Status                     ---------                               -----------         ------                     Urinalysis with Reflex C...[[id number]]  Abnormal            Final result               Extra Urine Gray Tube[[id number]]                            Final result                 Please view results for these tests on the individual orders.   EXTRA URINE GRAY TUBE    Extra Tube 293     ELECTROLYTE PANEL, URINE    Sodium, Urine Random 101      Sodium/Creatinine Ratio 155      Potassium, Urine Random 33       Potassium/Creatinine Ratio 51      Chloride, Urine Random 109      Chloride/Creatinine Ratio 167      Creatinine, Urine Random 65.2     POCT GLUCOSE METER   MORPHOLOGY    RBC Morphology No significant RBC morphology present             Procedure  Critical Care    Performed by: Carloz Gorman DO  Authorized by: Carloz Gorman DO    Critical care provider statement:     Critical care time (minutes):  31    Critical care time was exclusive of:  Separately billable procedures and treating other patients    Critical care was necessary to treat or prevent imminent or life-threatening deterioration of the following conditions:  Respiratory failure    Critical care was time spent personally by me on the following activities:  Ordering and performing treatments and interventions, ordering and review of laboratory studies, ordering and review of radiographic studies, pulse oximetry, re-evaluation of patient's condition, review of old charts, examination of patient, evaluation of patient's response to treatment, obtaining history from patient or surrogate and blood draw for specimens    Care discussed with: admitting provider                        Carloz Gorman DO  05/02/25 0207       [1]   Past Medical History:  Diagnosis Date    A-fib (Multi)     Acute bronchitis 06/21/2024    Acute right lower quadrant pain 06/21/2024    Atherosclerosis of abdominal aorta 03/28/2016    Last Assessment & Plan:    Formatting of this note might be different from the original.   Stable asymptomatic  Formatting of this note might be different from the original.   Repeat CT reveals moderate partially calcified wall changes. Stable 6 mm thickness/plaque in the inferior aspect of the isthmus and 6 mm wall thickening/plaque along the minor curvature of the proximal descending aorta.   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   Previous history of asthma   Current everyday smoker   Also CT chest 3/28 with findings for COPD   Duonebs as needed    Costochondritis 06/21/2024    Cough 06/21/2024    Disease due to severe acute respiratory syndrome coronavirus 2 (SARS-CoV-2) 09/06/2022    Comment on above: COVID SYMPTOMS    Diverticular disease 06/21/2024    Diverticulosis     Diverticulosis 08/15/2023    Formatting of this note might be different from the original.   Formatting of this note might be different from the original. hx diverticulitis at same time as pneumonia  Formatting of this note might be different from the original.   hx diverticulitis at same time as pneumonia    Dizziness     Dyspnea 06/21/2024    Fecal smearing 06/21/2024    Fecal soiling 06/21/2024    Headache 06/16/2023    Heart failure with mid-range ejection fraction (HFmEF)     (45%, 3/28/24)    Hypotension 06/21/2024    Incomplete passage of stool 06/21/2024    Insomnia 06/21/2024    Irregular heart beat     afib on coumadin    Irritable bowel syndrome     Irritable bowel syndrome with diarrhea 06/21/2024    Low back pain 06/21/2024    Mitral valve stenosis     Nausea & vomiting 06/21/2024    Nonspecific syndrome suggestive of viral illness 06/21/2024    Other chest pain 03/10/2016    Chest discomfort    Other emphysema (Multi) 02/06/2024    Last Assessment & Plan:    Formatting of this note might be different from the original.   As seen on cxr.  Cough is likely related to viral infection.  COPD contributes highly recommended smoking cessation to reduce progression of COPD.  With symptoms improving pt declined inhaler at this time    Pleurodynia 11/29/2017    Chest pain, pleuritic    Pneumonia 06/21/2024    Prediabetes 03/29/2016    Formatting of this note might be different from the original.   Hemoglobin a1c 6.0.   Need education on lifestyle modification.    Right lower quadrant pain 11/14/2018     Abdominal pain, acute, right lower quadrant    Shortness of breath     VANCE    Syncope 06/21/2024    Tension type headache 06/21/2024    Thrombocytopenia (CMS-HCC)     Tobacco dependence 08/15/2023    Last Assessment & Plan:    Formatting of this note might be different from the original.   Once again highly recommended cessation    Tubular adenoma of colon 06/21/2024   [2]   Social History  Socioeconomic History    Marital status:    Tobacco Use    Smoking status: Every Day     Current packs/day: 0.50     Average packs/day: 0.5 packs/day for 50.3 years (25.2 ttl pk-yrs)     Types: Cigarettes     Start date: 1/1/1975     Passive exposure: Current    Smokeless tobacco: Never   Vaping Use    Vaping status: Never Used   Substance and Sexual Activity    Alcohol use: Never    Drug use: Never    Sexual activity: Defer     Social Drivers of Health     Financial Resource Strain: Low Risk  (5/2/2025)    Overall Financial Resource Strain (CARDIA)     Difficulty of Paying Living Expenses: Not hard at all   Food Insecurity: No Food Insecurity (5/2/2025)    Hunger Vital Sign     Worried About Running Out of Food in the Last Year: Never true     Ran Out of Food in the Last Year: Never true   Transportation Needs: No Transportation Needs (5/2/2025)    PRAPARE - Transportation     Lack of Transportation (Medical): No     Lack of Transportation (Non-Medical): No   Physical Activity: Inactive (7/16/2024)    Exercise Vital Sign     Days of Exercise per Week: 0 days     Minutes of Exercise per Session: 0 min   Stress: No Stress Concern Present (7/16/2024)    [employer] [place] [employer] - Occupational Stress Questionnaire     Feeling of Stress : Not at all   Social Connections: Feeling Socially Integrated (7/3/2024)    OASIS : Social Isolation     Frequency of experiencing loneliness or isolation: Never   Recent Concern: Social Connections - Moderately Isolated (6/5/2024)    Social Connection and Isolation Panel  [NHANES]     Frequency of Communication with Friends and Family: Three times a week     Frequency of Social Gatherings with Friends and Family: Three times a week     Attends Church Services: Never     Active Member of Clubs or Organizations: No     Attends Club or Organization Meetings: Never     Marital Status:    Intimate Partner Violence: Not At Risk (5/2/2025)    Humiliation, Afraid, Rape, and Kick questionnaire     Fear of Current or Ex-Partner: No     Emotionally Abused: No     Physically Abused: No     Sexually Abused: No   Housing Stability: Low Risk  (5/2/2025)    Housing Stability Vital Sign     Unable to Pay for Housing in the Last Year: No     Number of Times Moved in the Last Year: 0     Homeless in the Last Year: No   [3]   Allergies  Allergen Reactions    Metoprolol Shortness of breath     Concern for bronchospasm        Carloz Gorman DO  05/04/25 0518

## 2025-05-02 NOTE — HOSPITAL COURSE
"HPI:  Farooq Lang is a 64 y.o. male with a PMHx of a PMHx of COPD/asthma, emphysema, A-fib on Eliquis, HFpEF, mitral valve stenosis secondary to rheumatic disease s/p MVR Bioprosthetic Valve and KONRAD clipping with Dr. Nugent at [facility] in 6/2024 who presented to [facility] on (05/02/2025) with a chief complaint of seizure. Pt presents with wife and mother at bedside who provide most of the history. Pt's wife reports pt was lying on the cough watching TV when suddenly he made a \"moaning noise\". She reports she looked over and thought it may be a leg cramp; however, he abruptly started seizing. She describes the episode as his entire body shaking, dentures ejecting from mouth, pt was foaming at his mouth, eyes were closed, pt turned pale and was unable to speak. Pt's wife states this lasted for a total of 10 minutes before EMS arrived and this is the first time this has happened. Pt reports severe mid back pain, rating it a 6-7/10 presently, states he had been having back pain throughout the weak but worsened after seizure. Of note, pt's wife states he was recently diagnosed with sinusitis after initial symptoms of \"ear popping\". Pt was prescribed a nasal spray, which he did not take. Patient denied any other recent illnesses or new medications. Was feeling well before this episode. Does believe he had an episode of urinary incontinence during seizure.    ED Course:  In the ED, vitally, patient was afebrile, hypotensive to 105/73, bradycardia at 49, and was placed on 6L NC - saturating 90%. Lab work was significant for leukocytosis and ELIE. Lactate initially elevated at 3.4 and improved to 1.2 with fluids. EKG showed atrial flutter with variable AV block. CT C/A/P was performed which showed new age-indeterminate mild compression deformities of T4, T10, L1, and L2. Ortho spin was contacted by the ED regarding this who recommended  CT head showed concern for tympanic membrane perforation. CXR showed " cardiomegaly and pulmonary interstitial edema. Patient was loaded with Keppra, received Zofran and dilaudid. Also given 2.8L NS for sepsis fluids.    Hospital Course:  Patient was admitted to regular medical floor for further work up. Neurology was consulted who recommended continuation of Keppra at 750 mg BID. Patient to get EEG done outpatient and follow up in neurology clinic with Mckenna Delgado. Plan is that if EEG is negative for epilepsy then he can discontinue the Keppra. Patient was given instructions to refrain from driving and operating heavy machinery until he is 6 month seizure free. He was also instructed to ensure he is getting 8 hours of sleep nightly and to avoid alcohol use.    For new onset acute hypoxic respiratory failure - patient was started on prednisone x 5 days for possible COPD exacerbation. Given concern for possible aspiration even during seizure, patient was started on CFTX and azithro for empiric coverage of PNA, however, procal resulted wnl and aspiration PNA was ruled out.    Regarding right TM rupture, ENT was consulted who recommended 7 days of ofloxacin ear drops and follow up with ENT.    Heart rate fluctuated greatly throughout admission with bradycardia to 40s and then tachycardia into 120s. Patient states this is very normal for him, but did consult patient's cardiology team while inpatient who recommended continuing nebivolol dose to 5 mg nightly.    Patient did have significant back pain from compression fractures, PT/OT recommended low intensity therapy so was discharged with an order for home care for PT. Was also discharged with short course of Flexeril and lidocaine patches as well as a walker to assist with mobility.

## 2025-05-02 NOTE — TELEPHONE ENCOUNTER
Farooq is listed on the radiology actionable findings list based on imaging completed 4/10/2024 while hospitalized in which radiology noted a stable (since 2021) cystic lesion in the pancreas. Farooq is still currently admitted.     *Please note: this note is for documentation purposes only. No phone conversation was had with the patient on this day/time.

## 2025-05-03 ENCOUNTER — HOME HEALTH ADMISSION (OUTPATIENT)
Dept: HOME HEALTH SERVICES | Facility: HOME HEALTH | Age: 65
End: 2025-05-03
Payer: COMMERCIAL

## 2025-05-03 ENCOUNTER — DOCUMENTATION (OUTPATIENT)
Dept: HOME HEALTH SERVICES | Facility: HOME HEALTH | Age: 65
End: 2025-05-03
Payer: COMMERCIAL

## 2025-05-03 VITALS
RESPIRATION RATE: 20 BRPM | SYSTOLIC BLOOD PRESSURE: 113 MMHG | OXYGEN SATURATION: 93 % | HEART RATE: 121 BPM | TEMPERATURE: 98.8 F | WEIGHT: 147.8 LBS | HEIGHT: 66 IN | BODY MASS INDEX: 23.75 KG/M2 | DIASTOLIC BLOOD PRESSURE: 77 MMHG

## 2025-05-03 PROBLEM — R79.89 ELEVATED SERUM CREATININE: Status: RESOLVED | Noted: 2025-05-02 | Resolved: 2025-05-03

## 2025-05-03 PROBLEM — J96.01 ACUTE HYPOXIC RESPIRATORY FAILURE: Status: RESOLVED | Noted: 2025-05-02 | Resolved: 2025-05-03

## 2025-05-03 LAB
ALBUMIN SERPL BCP-MCNC: 3.8 G/DL (ref 3.4–5)
ANION GAP SERPL CALC-SCNC: 12 MMOL/L (ref 10–20)
BUN SERPL-MCNC: 27 MG/DL (ref 6–23)
CALCIUM SERPL-MCNC: 9.1 MG/DL (ref 8.6–10.3)
CHLORIDE SERPL-SCNC: 105 MMOL/L (ref 98–107)
CO2 SERPL-SCNC: 23 MMOL/L (ref 21–32)
CREAT SERPL-MCNC: 1.08 MG/DL (ref 0.5–1.3)
EGFRCR SERPLBLD CKD-EPI 2021: 76 ML/MIN/1.73M*2
ERYTHROCYTE [DISTWIDTH] IN BLOOD BY AUTOMATED COUNT: 14.2 % (ref 11.5–14.5)
GLUCOSE SERPL-MCNC: 122 MG/DL (ref 74–99)
HCT VFR BLD AUTO: 46.2 % (ref 41–52)
HGB BLD-MCNC: 15.5 G/DL (ref 13.5–17.5)
LEGIONELLA AG UR QL: NEGATIVE
MAGNESIUM SERPL-MCNC: 2.04 MG/DL (ref 1.6–2.4)
MCH RBC QN AUTO: 29.6 PG (ref 26–34)
MCHC RBC AUTO-ENTMCNC: 33.5 G/DL (ref 32–36)
MCV RBC AUTO: 88 FL (ref 80–100)
NRBC BLD-RTO: 0 /100 WBCS (ref 0–0)
PHOSPHATE SERPL-MCNC: 3.8 MG/DL (ref 2.5–4.9)
PLATELET # BLD AUTO: 121 X10*3/UL (ref 150–450)
POTASSIUM SERPL-SCNC: 4.5 MMOL/L (ref 3.5–5.3)
RBC # BLD AUTO: 5.23 X10*6/UL (ref 4.5–5.9)
S PNEUM AG UR QL: NEGATIVE
SODIUM SERPL-SCNC: 135 MMOL/L (ref 136–145)
WBC # BLD AUTO: 14.6 X10*3/UL (ref 4.4–11.3)

## 2025-05-03 PROCEDURE — 84100 ASSAY OF PHOSPHORUS: CPT

## 2025-05-03 PROCEDURE — 2500000001 HC RX 250 WO HCPCS SELF ADMINISTERED DRUGS (ALT 637 FOR MEDICARE OP)

## 2025-05-03 PROCEDURE — 2500000005 HC RX 250 GENERAL PHARMACY W/O HCPCS

## 2025-05-03 PROCEDURE — 2500000002 HC RX 250 W HCPCS SELF ADMINISTERED DRUGS (ALT 637 FOR MEDICARE OP, ALT 636 FOR OP/ED)

## 2025-05-03 PROCEDURE — G0378 HOSPITAL OBSERVATION PER HR: HCPCS

## 2025-05-03 PROCEDURE — 85027 COMPLETE CBC AUTOMATED: CPT

## 2025-05-03 PROCEDURE — 2500000004 HC RX 250 GENERAL PHARMACY W/ HCPCS (ALT 636 FOR OP/ED): Mod: JZ

## 2025-05-03 PROCEDURE — 2500000004 HC RX 250 GENERAL PHARMACY W/ HCPCS (ALT 636 FOR OP/ED)

## 2025-05-03 PROCEDURE — 94668 MNPJ CHEST WALL SBSQ: CPT

## 2025-05-03 PROCEDURE — 99239 HOSP IP/OBS DSCHRG MGMT >30: CPT

## 2025-05-03 PROCEDURE — S4991 NICOTINE PATCH NONLEGEND: HCPCS

## 2025-05-03 PROCEDURE — 96366 THER/PROPH/DIAG IV INF ADDON: CPT

## 2025-05-03 PROCEDURE — 94760 N-INVAS EAR/PLS OXIMETRY 1: CPT

## 2025-05-03 PROCEDURE — 36415 COLL VENOUS BLD VENIPUNCTURE: CPT

## 2025-05-03 PROCEDURE — 80069 RENAL FUNCTION PANEL: CPT

## 2025-05-03 PROCEDURE — 83735 ASSAY OF MAGNESIUM: CPT

## 2025-05-03 PROCEDURE — 94640 AIRWAY INHALATION TREATMENT: CPT

## 2025-05-03 RX ORDER — NEBIVOLOL 5 MG/1
5 TABLET ORAL NIGHTLY
Qty: 30 TABLET | Refills: 0 | Status: CANCELLED | OUTPATIENT
Start: 2025-05-03 | End: 2025-06-02

## 2025-05-03 RX ORDER — PREDNISONE 20 MG/1
40 TABLET ORAL DAILY
Qty: 6 TABLET | Refills: 0 | Status: SHIPPED | OUTPATIENT
Start: 2025-05-04 | End: 2025-05-07

## 2025-05-03 RX ORDER — LEVETIRACETAM 750 MG/1
750 TABLET ORAL 2 TIMES DAILY
Qty: 60 TABLET | Refills: 0 | Status: SHIPPED | OUTPATIENT
Start: 2025-05-03 | End: 2025-06-02

## 2025-05-03 RX ORDER — CYCLOBENZAPRINE HCL 5 MG
5 TABLET ORAL 3 TIMES DAILY PRN
Qty: 20 TABLET | Refills: 0 | Status: SHIPPED | OUTPATIENT
Start: 2025-05-03

## 2025-05-03 RX ORDER — LIDOCAINE 560 MG/1
1 PATCH PERCUTANEOUS; TOPICAL; TRANSDERMAL DAILY
Qty: 30 PATCH | Refills: 0 | Status: SHIPPED | OUTPATIENT
Start: 2025-05-04 | End: 2025-06-03

## 2025-05-03 RX ORDER — NEBIVOLOL 5 MG/1
5 TABLET ORAL NIGHTLY
Qty: 30 TABLET | Refills: 0 | Status: SHIPPED | OUTPATIENT
Start: 2025-05-03 | End: 2025-06-02

## 2025-05-03 RX ORDER — NEBIVOLOL 5 MG/1
5 TABLET ORAL DAILY
Status: DISCONTINUED | OUTPATIENT
Start: 2025-05-03 | End: 2025-05-03 | Stop reason: HOSPADM

## 2025-05-03 RX ORDER — OFLOXACIN 3 MG/ML
5 SOLUTION AURICULAR (OTIC) 2 TIMES DAILY
Qty: 0.28 ML | Refills: 0 | Status: SHIPPED | OUTPATIENT
Start: 2025-05-03 | End: 2025-05-09

## 2025-05-03 RX ORDER — CYCLOBENZAPRINE HCL 5 MG
5 TABLET ORAL 3 TIMES DAILY PRN
Status: DISCONTINUED | OUTPATIENT
Start: 2025-05-03 | End: 2025-05-03 | Stop reason: HOSPADM

## 2025-05-03 RX ADMIN — APIXABAN 5 MG: 5 TABLET, FILM COATED ORAL at 08:24

## 2025-05-03 RX ADMIN — IPRATROPIUM BROMIDE AND ALBUTEROL SULFATE 3 ML: 2.5; .5 SOLUTION RESPIRATORY (INHALATION) at 08:42

## 2025-05-03 RX ADMIN — AZITHROMYCIN DIHYDRATE 500 MG: 500 TABLET ORAL at 08:24

## 2025-05-03 RX ADMIN — CYCLOBENZAPRINE HYDROCHLORIDE 5 MG: 5 TABLET, FILM COATED ORAL at 09:40

## 2025-05-03 RX ADMIN — ACETAMINOPHEN 975 MG: 325 TABLET, FILM COATED ORAL at 12:27

## 2025-05-03 RX ADMIN — GUAIFENESIN 600 MG: 600 TABLET ORAL at 08:24

## 2025-05-03 RX ADMIN — Medication 1 PATCH: at 08:26

## 2025-05-03 RX ADMIN — OFLOXACIN OTIC 5 DROP: 3 SOLUTION AURICULAR (OTIC) at 08:25

## 2025-05-03 RX ADMIN — LIDOCAINE 4% 1 PATCH: 40 PATCH TOPICAL at 08:24

## 2025-05-03 RX ADMIN — ASPIRIN 81 MG: 81 TABLET, CHEWABLE ORAL at 08:24

## 2025-05-03 RX ADMIN — CEFTRIAXONE 2 G: 2 INJECTION, SOLUTION INTRAVENOUS at 08:23

## 2025-05-03 RX ADMIN — LEVETIRACETAM 750 MG: 500 TABLET, FILM COATED ORAL at 08:24

## 2025-05-03 RX ADMIN — PREDNISONE 40 MG: 20 TABLET ORAL at 08:25

## 2025-05-03 RX ADMIN — NEBIVOLOL 5 MG: 5 TABLET ORAL at 09:40

## 2025-05-03 ASSESSMENT — COGNITIVE AND FUNCTIONAL STATUS - GENERAL
MOVING TO AND FROM BED TO CHAIR: A LOT
STANDING UP FROM CHAIR USING ARMS: A LOT
MOBILITY SCORE: 12
PERSONAL GROOMING: A LITTLE
WALKING IN HOSPITAL ROOM: A LOT
HELP NEEDED FOR BATHING: A LITTLE
TURNING FROM BACK TO SIDE WHILE IN FLAT BAD: A LOT
MOVING FROM LYING ON BACK TO SITTING ON SIDE OF FLAT BED WITH BEDRAILS: A LITTLE
CLIMB 3 TO 5 STEPS WITH RAILING: TOTAL
DAILY ACTIVITIY SCORE: 19
WALKING IN HOSPITAL ROOM: A LOT
DRESSING REGULAR UPPER BODY CLOTHING: A LITTLE
TOILETING: A LITTLE
DAILY ACTIVITIY SCORE: 21
DRESSING REGULAR LOWER BODY CLOTHING: A LITTLE
HELP NEEDED FOR BATHING: A LITTLE
CLIMB 3 TO 5 STEPS WITH RAILING: A LOT
STANDING UP FROM CHAIR USING ARMS: A LOT
MOBILITY SCORE: 16
MOVING TO AND FROM BED TO CHAIR: A LOT
DRESSING REGULAR UPPER BODY CLOTHING: A LITTLE
DRESSING REGULAR LOWER BODY CLOTHING: A LITTLE

## 2025-05-03 ASSESSMENT — PAIN SCALES - GENERAL
PAINLEVEL_OUTOF10: 0 - NO PAIN
PAINLEVEL_OUTOF10: 0 - NO PAIN

## 2025-05-03 NOTE — CARE PLAN
The patient's goals for the shift include  use call light for assistance     The clinical goals for the shift include pain management

## 2025-05-03 NOTE — PROGRESS NOTES
Occupational Therapy                 Therapy Communication Note    Patient Name: Farooq Lang  MRN: [id number]  Department: [address]  Room: [address] [place], [place] [address]  Today's Date: 5/3/2025     Discipline: Occupational Therapy    OT Missed Visit: Yes     Missed Visit Reason: Missed Visit Reason: Cancel (per PT pt presents at functional baseline with ADLs and has adequate assist at home if needed. No skilled OT needs identified.)    Missed Time: Cancel

## 2025-05-03 NOTE — DISCHARGE SUMMARY
Discharge Diagnosis  Seizure   Acute hypoxic respiratory failure 2/2 COPD exacerbation vs medication induced vs aspiration event - resolved  Aspiration PNA - ruled out   Lower back pain 2/2 compression deformities   Right ear bleed  Concern for ruptured tympanic membrane on CT head  Atrial fibrillation with poor rate control          Issues Requiring Follow-Up  Seizure   -Keppra 750 mg BID  -Outpatient EEG ordered  -To follow up with neurology, referral placed  -Strict no driving or operating heavy machinery x 6 months    Acute hypoxic respiratory failure 2/2 COPD exacerbation vs medication induced vs aspiration event  - resolved  -Patient weaned to room air during admission  -To complete 5 day course of prednisone at discharge for possible COPD exacerbation    Lower back pain 2/2 compression deformities   -Discharged with home PT/OT  -Discharged with walker to help with mobility  -Lidocaine patches and Flexeril PRN for pain  -Referral placed to follow up with orthopedics     Right ear bleed  Concern for ruptured tympanic membrane on CT head  -Ofloxacin ear drops x 7 days total  -ENT referral placed for follow up    Atrial fibrillation with poor rate control   -Continue home nebivolol at 5mg daily   -To follow up with Dr. Johnson    Discharge Meds     Medication List      START taking these medications     cyclobenzaprine 5 mg tablet; Commonly known as: Flexeril; Take 1 tablet   (5 mg) by mouth 3 times a day as needed for muscle spasms.   levETIRAcetam 750 mg tablet; Commonly known as: Keppra; Take 1 tablet   (750 mg) by mouth 2 times a day.   lidocaine 4 % patch; Place 1 patch over 12 hours on the skin once daily.   Remove & discard patch within 12 hours or as directed by MD.; Start taking   on: May 4, 2025   nebivolol 5 mg tablet; Commonly known as: Bystolic; Take 1 tablet (5 mg)   by mouth once daily at bedtime.   ofloxacin 0.3 % otic solution; Commonly known as: Floxin; Administer 5   drops into the right ear 2  "times a day for 11 doses.   predniSONE 20 mg tablet; Commonly known as: Deltasone; Take 2 tablets   (40 mg) by mouth once daily for 3 doses.; Start taking on: May 4, 2025     CONTINUE taking these medications     aspirin 81 mg chewable tablet; Chew 1 tablet (81 mg) once daily.   Eliquis 5 mg tablet; Generic drug: apixaban; Take 1 tablet (5 mg) by   mouth 2 times a day.   multivitamin with minerals tablet; Take 1 tablet by mouth once daily.     STOP taking these medications     amiodarone 200 mg tablet; Commonly known as: Pacerone   dilTIAZem  mg 24 hr capsule; Commonly known as: Tiazac       Test Results Pending At Discharge  Pending Labs       Order Current Status    Blood Culture Preliminary result    Blood Culture Preliminary result            Hospital Course  HPI:  Farooq Lang is a 64 y.o. male with a PMHx of a PMHx of COPD/asthma, emphysema, A-fib on Eliquis, HFpEF, mitral valve stenosis secondary to rheumatic disease s/p MVR Bioprosthetic Valve and KONRAD clipping with Dr. Nugent at [facility] in 6/2024 who presented to [facility] on (05/02/2025) with a chief complaint of seizure. Pt presents with wife and mother at bedside who provide most of the history. Pt's wife reports pt was lying on the cough watching TV when suddenly he made a \"moaning noise\". She reports she looked over and thought it may be a leg cramp; however, he abruptly started seizing. She describes the episode as his entire body shaking, dentures ejecting from mouth, pt was foaming at his mouth, eyes were closed, pt turned pale and was unable to speak. Pt's wife states this lasted for a total of 10 minutes before EMS arrived and this is the first time this has happened. Pt reports severe mid back pain, rating it a 6-7/10 presently, states he had been having back pain throughout the weak but worsened after seizure. Of note, pt's wife states he was recently diagnosed with sinusitis after initial symptoms of \"ear popping\". Pt was " prescribed a nasal spray, which he did not take. Patient denied any other recent illnesses or new medications. Was feeling well before this episode. Does believe he had an episode of urinary incontinence during seizure.    ED Course:  In the ED, vitally, patient was afebrile, hypotensive to 105/73, bradycardia at 49, and was placed on 6L NC - saturating 90%. Lab work was significant for leukocytosis and ELIE. Lactate initially elevated at 3.4 and improved to 1.2 with fluids. EKG showed atrial flutter with variable AV block. CT C/A/P was performed which showed new age-indeterminate mild compression deformities of T4, T10, L1, and L2. Ortho spin was contacted by the ED regarding this who recommended  CT head showed concern for tympanic membrane perforation. CXR showed cardiomegaly and pulmonary interstitial edema. Patient was loaded with Keppra, received Zofran and dilaudid. Also given 2.8L NS for sepsis fluids.    Hospital Course:  Patient was admitted to regular medical floor for further work up. Neurology was consulted who recommended continuation of Keppra at 750 mg BID. Patient to get EEG done outpatient and follow up in neurology clinic with Mckenna Delgado. Plan is that if EEG is negative for epilepsy then he can discontinue the Keppra. Patient was given instructions to refrain from driving and operating heavy machinery until he is 6 month seizure free. He was also instructed to ensure he is getting 8 hours of sleep nightly and to avoid alcohol use.    For new onset acute hypoxic respiratory failure - patient was started on prednisone x 5 days for possible COPD exacerbation. Given concern for possible aspiration even during seizure, patient was started on CFTX and azithro for empiric coverage of PNA, however, procal resulted wnl and aspiration PNA was ruled out.    Regarding right TM rupture, ENT was consulted who recommended 7 days of ofloxacin ear drops and follow up with ENT.    Heart rate fluctuated greatly  throughout admission with bradycardia to 40s and then tachycardia into 120s. Patient states this is very normal for him, but did consult patient's cardiology team while inpatient who recommended continuing nebivolol dose to 5 mg nightly.    Patient did have significant back pain from compression fractures, PT/OT recommended low intensity therapy so was discharged with an order for home care for PT. Was also discharged with short course of Flexeril and lidocaine patches as well as a walker to assist with mobility.           Pertinent Physical Exam At Time of Discharge  Physical Exam  Constitutional:       General: He is not in acute distress.  HENT:      Head: Normocephalic and atraumatic.      Ears:      Comments: R ear with bloody fluid leakage   Eyes:      Comments: Redness of R eye noted   Cardiovascular:      Rate and Rhythm: Tachycardia present. Rhythm irregular.   Pulmonary:      Effort: Pulmonary effort is normal. No respiratory distress.      Breath sounds: Normal breath sounds. No wheezing or rales.   Abdominal:      General: There is no distension.      Palpations: Abdomen is soft.      Tenderness: There is no abdominal tenderness.   Musculoskeletal:      Right lower leg: No edema.      Left lower leg: No edema.   Skin:     General: Skin is warm and dry.   Neurological:      General: No focal deficit present.      Mental Status: He is alert.   Psychiatric:         Mood and Affect: Mood normal.         Behavior: Behavior normal.     Outpatient Follow-Up  No future appointments.      Juliana Sahni MD  Internal Medicine, PGY-1

## 2025-05-03 NOTE — HH CARE COORDINATION
Home Care received a Referral for Nursing, Physical Therapy, and Occupational Therapy. We have processed the referral for a Start of Care on 5/5/25.     If you have any questions or concerns, please feel free to contact us at [phone]. Follow the prompts, enter your five digit zip code, and you will be directed to your care team on [facility].

## 2025-05-03 NOTE — PROGRESS NOTES
Farooq Lang is a 64 y.o. male on day 1 of admission presenting with Seizure (Multi).      Subjective     Farooq is laying in bed, , atrial flutter. Reports that his heart rate commonly ranges from , has discussed with Dr. Johnson. His dose of nebivolol was held last night due to low heart rate. He is complaining of back pain.     Review of systems:  Constitutional: negative for fever, chills, or malaise  Neuro: negative for dizziness, headache, numbness, tingling  ENT: Negative for nasal congestion or sore throat  Resp: negative for shortness of breath, cough, or wheezing  CV: negative for chest pain, palpitations  GI: negative for abd pain, nausea, vomiting or diarrhea  : negative for dysuria, frequency, or urgency  Skin: negative for lesions, wounds, or rash  Musculoskeletal: Negative for weakness, myalgia, or arthralgia  Endocrine: Negative for polyuria or polydipsia    Objective   Constitutional: Well developed, awake/alert/oriented x3, no distress, alert and cooperative  Eyes: PERRL, EOMI, clear sclera  ENMT: mucous membranes moist, no apparent injury, no lesions seen  Head/Neck: Neck supple, no apparent injury, thyroid without mass or tenderness, No JVD, trachea midline, no bruits  Respiratory/Thorax: Patent airways, CTAB, normal breath sounds with good chest expansion, thorax symmetric  Cardiovascular: Regular, rate and rhythm, tachycardic, no murmurs, 2+ equal pulses of the extremities, normal S 1and S 2  Gastrointestinal: Nondistended, soft, non-tender, no rebound tenderness or guarding, no masses palpable, no organomegaly, +BS, no bruits  Musculoskeletal: ROM intact, no joint swelling, normal strength  Extremities: normal extremities, no cyanosis edema, contusions or wounds, no clubbing  Neurological: alert and oriented x3, intact senses, motor, response and reflexes, normal strength  Lymphatic: No significant lymphadenopathy  Psychological: Appropriate mood and behavior  Skin: Warm and dry,  "no lesions, no rashes    Last Recorded Vitals  /80 (BP Location: Left arm, Patient Position: Lying)   Pulse 92   Temp 36.8 °C (98.2 °F) (Temporal)   Resp 16   Ht 1.676 m (5' 6\")   Wt 67 kg (147 lb 12.8 oz)   SpO2 96%   BMI 23.86 kg/m²     Intake/Output last 3 Shifts:  I/O last 3 completed shifts:  In: 50 (0.7 mL/kg) [IV Piggyback:50]  Out: 1480 (22.1 mL/kg) [Urine:1480 (0.6 mL/kg/hr)]  Weight: 67 kg   No intake/output data recorded.    Relevant Results  Scheduled medications  Scheduled Medications[1]  Continuous medications  Continuous Medications[2]  PRN medications  PRN Medications[3]    Results for orders placed or performed during the hospital encounter of 05/01/25 (from the past 24 hours)   Transthoracic Echo Complete   Result Value Ref Range    BSA 1.77 m2   Renal Function Panel   Result Value Ref Range    Glucose 122 (H) 74 - 99 mg/dL    Sodium 135 (L) 136 - 145 mmol/L    Potassium 4.5 3.5 - 5.3 mmol/L    Chloride 105 98 - 107 mmol/L    Bicarbonate 23 21 - 32 mmol/L    Anion Gap 12 10 - 20 mmol/L    Urea Nitrogen 27 (H) 6 - 23 mg/dL    Creatinine 1.08 0.50 - 1.30 mg/dL    eGFR 76 >60 mL/min/1.73m*2    Calcium 9.1 8.6 - 10.3 mg/dL    Phosphorus 3.8 2.5 - 4.9 mg/dL    Albumin 3.8 3.4 - 5.0 g/dL   Magnesium   Result Value Ref Range    Magnesium 2.04 1.60 - 2.40 mg/dL   CBC   Result Value Ref Range    WBC 14.6 (H) 4.4 - 11.3 x10*3/uL    nRBC 0.0 0.0 - 0.0 /100 WBCs    RBC 5.23 4.50 - 5.90 x10*6/uL    Hemoglobin 15.5 13.5 - 17.5 g/dL    Hematocrit 46.2 41.0 - 52.0 %    MCV 88 80 - 100 fL    MCH 29.6 26.0 - 34.0 pg    MCHC 33.5 32.0 - 36.0 g/dL    RDW 14.2 11.5 - 14.5 %    Platelets 121 (L) 150 - 450 x10*3/uL       CT lumbar spine wo IV contrast   Final Result   Findings are essentially unchanged compared to prior CT from   05/01/2025:   1. Similar appearance of the age-indeterminate L1 and L2 vertebral   body superior endplate compression fractures with minimal height   loss. Correlate with focal pain " on exam.   2. No traumatic subluxation.   3. Partially visualized patchy right lower lobe consolidation may   represent aspiration, pneumonia, or atelectasis.        MACRO:   None        Signed by: Bairon Medina 5/2/2025 8:27 PM   Dictation workstation:   [id number]      Transthoracic Echo Complete         MR brain wo IV contrast   Final Result   1. No evidence of acute infarct, intracranial mass effect or midline   shift.   2. Mild white matter FLAIR signal increase most commonly seen with   early chronic small vessel ischemic change.   3. Degree of ventriculomegaly is commensurate with overall degree of   brain parenchymal volume loss, which is mild.   4. Multiple old lacunar infarctions left-greater-than-right   cerebellar hemispheres.   5. No discernible etiology for seizures.        MACRO:   None        Signed by: Abran Cook 5/2/2025 1:15 PM   Dictation workstation:   [id number]      XR chest 1 view   Final Result   1. Cardiomegaly with mild pulmonary venous congestion similar to   prior.             Signed by: Wai Walker 5/2/2025 5:42 AM   Dictation workstation:   [id number]      CT angio chest abdomen pelvis   Final Result   CHEST   1.  No evidence of dissection or acute intramural hematoma. Ectatic   ascending aorta with mild aortic valve atherosclerotic   calcifications. Sternotomy changes. Prosthetic mitral valve. Mild   cardiomegaly with left atrial enlargement similar to prior.   2. Moderate upper lobe predominant centrilobular emphysema.        ABDOMEN - PELVIS   1. Osteopenia. Unchanged chronic moderate T6 compression deformity.   New age-indeterminate mild compression deformities of T4, T10, L1,   and L2. Correlation with point tenderness recommended.   2. Moderate atherosclerotic plaque of aorta and its branches.   Prostatomegaly.   3. Stable cystic lesion in the pancreatic head measuring 1.3 cm   compared to 2021.             Signed by: aWi Walker 5/1/2025 11:40 PM   Dictation  workstation:   [id number]      CT head wo IV contrast   Final Result   No acute intracranial abnormality.        There is new thickening of the right tympanic membrane, clinical   correlation for tympanic membrane perforation recommended. Mastoid   air cells and middle ear cavities are grossly clear.        Signed by: Wai Walker 5/1/2025 11:23 PM   Dictation workstation:   [id number]      XR chest 1 view   Final Result   Cardiomegaly and pulmonary interstitial edema.        MACRO:   None        Signed by: Librado Lepe 5/1/2025 11:19 PM   Dictation workstation:   [id number]          Transthoracic Echo (TTE) Complete  Result Date: 7/23/2024  TRANSTHORACIC ECHOCARDIOGRAM REPORT  Patient Name:      MADYSON WRIGHT      Reading Physician:    [id number] Chary Murillo MD Study Date:        7/23/2024            Ordering Provider:    [id number] ЕЛЕНА FRANCISCO MRN/PID:           [id number]             Fellow: Accession#:        [id number]         Nurse:                Dot King RN Date of Birth/Age: 1960 / 63 years Sonographer:          Tyler Aleman [facility] Gender:            M                    Additional Staff: Height:            167.64 cm            Admit Date: Weight:            58.97 kg             Admission Status:     Outpatient BSA / BMI:         1.67 m2 / 20.98      Encounter#:           [id number]                    kg/m2 Blood Pressure:    146/79 mmHg          Department Location:  [facility] Study Type:    TRANSTHORACIC ECHO (TTE) COMPLETE Diagnosis/ICD: Presence of prosthetic heart valve-Z95.2 Indication:    MVR CPT Code:      Echo Complete w Full Doppler-84113 Patient History: Pertinent         MS, MVR ( 27mm Mitris Wale-Schrader [2024]), AF (new History:          onset). Study Detail: The following Echo studies were performed: 2D, M-Mode, Doppler and               color flow.  Definity used as a contrast agent for endocardial               border definition. Total contrast used for this procedure was 2 mL               via IV push.  PHYSICIAN INTERPRETATION: Left Ventricle: The left ventricular systolic function is low normal, with a visually estimated ejection fraction of 50-55%. There are no regional wall motion abnormalities. The left ventricular cavity size is normal. The left ventricular septal wall thickness is mildly increased. Abnormal (paradoxical) septal motion consistent with post-operative status. Left ventricular diastolic filling was indeterminate. Left Atrium: The left atrium is severely dilated. Right Ventricle: The right ventricle is normal in size. There is mildly reduced right ventricular systolic function. Prominent RV trabeculations. Right Atrium: The right atrium is mildly dilated. Aortic Valve: The aortic valve is trileaflet. There is no evidence of aortic valve stenosis. There is no evidence of aortic valve regurgitation. The peak instantaneous gradient of the aortic valve is 3.9 mmHg. Mitral Valve: There is a prosthetic mitral valve present. There is a Mitris mitral valve bioprosthesis with a 27 mm reported size. There is trace mitral valve regurgitation. There is trace to mild PVL. Tricuspid Valve: The tricuspid valve is structurally normal. There is mild tricuspid regurgitation. The Doppler estimated RVSP is within normal limits at 21.5 mmHg. Pulmonic Valve: The pulmonic valve is structurally normal. There is trace to mild pulmonic valve regurgitation. Pericardium: There is no pericardial effusion noted. Aorta: The aortic root is normal. Systemic Veins: The inferior vena cava appears to be of normal size. There is IVC inspiratory collapse greater than 50%. In comparison to the previous echocardiogram(s): Compared with study dated 6/8/2024, no significant change.  CONCLUSIONS:  1. The left ventricular systolic function is low normal, with a visually estimated  ejection fraction of 50-55%.  2. Abnormal septal motion consistent with post-operative status.  3. There is mildly reduced right ventricular systolic function.  4. RVSP within normal limits.  5. The left atrium is severely dilated. QUANTITATIVE DATA SUMMARY: 2D MEASUREMENTS:                          Normal Ranges: Ao Root d:     3.40 cm   (2.0-3.7cm) LAs:           4.60 cm   (2.7-4.0cm) IVSd:          1.00 cm   (0.6-1.1cm) LVPWd:         0.89 cm   (0.6-1.1cm) LVIDd:         4.56 cm   (3.9-5.9cm) LVIDs:         3.50 cm LV Mass Index: 86.8 g/m2 LV % FS        23.2 % LA VOLUME:                               Normal Ranges: LA Vol A4C:        101.6 ml   (22+/-6mL/m2) LA Vol A2C:        77.7 ml LA Vol BP:         92.3 ml LA Vol Index A4C:  61.0ml/m2 LA Vol Index A2C:  46.6 ml/m2 LA Vol Index BP:   55.4 ml/m2 LA Area A4C:       28.3 cm2 LA Area A2C:       23.8 cm2 LA Major Axis A4C: 6.7 cm LA Major Axis A2C: 6.2 cm RA VOLUME BY A/L METHOD:                               Normal Ranges: RA Vol A4C:        34.7 ml    (8.3-19.5ml) RA Vol Index A4C:  20.8 ml/m2 RA Area A4C:       14.0 cm2 RA Major Axis A4C: 4.8 cm M-MODE MEASUREMENTS:                  Normal Ranges: Ao Root: 3.50 cm (2.0-3.7cm) LAs:     4.70 cm (2.7-4.0cm) AORTA MEASUREMENTS:                    Normal Ranges: Asc Ao, d: 3.00 cm (2.1-3.4cm) LV SYSTOLIC FUNCTION BY 2D PLANIMETRY (MOD):                      Normal Ranges: EF-A4C View:    47 % (>=55%) EF-A2C View:    46 % EF-Biplane:     47 % EF-Visual:      53 % LV EF Reported: 53 % LV DIASTOLIC FUNCTION:                            Normal Ranges: MV Peak E:      1.36 m/s   (0.7-1.2 m/s) MV Peak A:      0.67 m/s   (0.42-0.7 m/s) E/A Ratio:      2.03       (1.0-2.2) MV e'           0.065 m/s  (>8.0) MV lateral e'   0.08 m/s MV medial e'    0.05 m/s E/e' Ratio:     20.84      (<8.0) PulmV Sys Christian:  31.00 cm/s PulmV Thompson Christian: 54.70 cm/s PulmV S/D Christian:  0.60 MITRAL VALVE:                      Normal Ranges: MV Vmax:     1.48 m/s (<=1.3m/s) MV peak P.8 mmHg (<5mmHg) MV mean PG: 3.0 mmHg (<2mmHg) MV DT:      249 msec (150-240msec) AORTIC VALVE:                         Normal Ranges: AoV Vmax:      0.99 m/s (<=1.7m/s) AoV Peak PG:   3.9 mmHg (<20mmHg) LVOT Max Christian:  0.85 m/s (<=1.1m/s) LVOT VTI:      17.50 cm LVOT Diameter: 2.00 cm  (1.8-2.4cm) AoV Area,Vmax: 2.71 cm2 (2.5-4.5cm2)  RIGHT VENTRICLE: RV Basal 3.10 cm RV Mid   2.00 cm RV Major 6.1 cm TAPSE:   9.1 mm RV s'    0.08 m/s TRICUSPID VALVE/RVSP:                             Normal Ranges: Peak TR Velocity: 2.15 m/s RV Syst Pressure: 21.5 mmHg (< 30mmHg) IVC Diam:         1.80 cm PULMONIC VALVE:                      Normal Ranges: PV Max Christian: 0.7 m/s  (0.6-0.9m/s) PV Max P.2 mmHg Pulmonary Veins: PulmV Thompson Christian: 54.70 cm/s PulmV S/D Christian:  0.60 PulmV Sys Christian:  31.00 cm/s  [id number] Chary Murillo MD Electronically signed on 2024 at 1:45:14 PM  ** Final **     Transthoracic Echo (TTE) Complete  Result Date: 2024   [facility], [address] [place] [address]                Tel [phone] and Fax [phone] TRANSTHORACIC ECHOCARDIOGRAM REPORT  Patient Name:      MADYSON Barclay Physician:    [id number] Tyler Tyson MD Study Date:        2024             Ordering Provider:    [id number] FAROOQ LOMAS MRN/PID:           [id number]             Fellow: Accession#:        [id number]         Nurse: Date of Birth/Age: 1960 / 63 years Sonographer:          Adiel Rai RDCS Gender:            M                    Additional Staff: Height:            167.64 cm            Admit Date: Weight:            56.70 kg             Admission Status:     Inpatient -                                                               Routine BSA / BMI:         1.64 m2 / 20.18      Encounter#:           [id number]                     kg/m2                                         Department Location:  [facility] Non                                                               Invasive Blood Pressure: 111 /60 mmHg Study Type:    TRANSTHORACIC ECHO (TTE) COMPLETE Diagnosis/ICD: Presence of prosthetic heart valve-Z95.2 Indication:    S/p MVR, KONRAD closure (06/04/24) CPT Code:      Echo Complete w Full Doppler-[id number] Patient History: Pertinent History: Chest Pain and Dyspnea. S/p MVR #27mm mitris bioprosthetic                    valve, KONRAD closure (06/04/24) Afib, HFrEF, SOB and                    thrombocytopenia. Study Detail: The following Echo studies were performed: 2D, M-Mode, Doppler and               color flow.  PHYSICIAN INTERPRETATION: Left Ventricle: The left ventricular systolic function is normal, with an estimated ejection fraction of 55-60%. The patient is in atrial fibrillation which may influence the estimate of left ventricular function and transvalvular flows. There are no regional wall motion abnormalities. The left ventricular cavity size is normal. Abnormal (paradoxical) septal motion consistent with post-operative status and abnormal (paradoxical) septal motion, consistent with RV pacemaker. Left ventricular diastolic filling was indeterminate. Left Atrium: The left atrium is severely dilated. Right Ventricle: The right ventricle is normal in size. There is normal right ventricular global systolic function. Right Atrium: The right atrium is normal in size. Aortic Valve: The aortic valve is trileaflet. There is mild aortic valve cusp calcification. There is no evidence of reduced aortic valve leaflet excursion excursion. There is no evidence of aortic valve regurgitation. The peak instantaneous gradient of the aortic valve is 6.4 mmHg. Mitral Valve: There is a prosthetic mitral valve present. There is a Mitris mitral valve bioprosthesis with a 27 mm reported size. There is no evidence of mitral valve regurgitation. Peak and  mean mitral diastolic gradients are 14.5 and 5.4 mmHg at a heart rate of 72 bpm. The DI is ~ 2. The pressure half-time is ~ 90 ms, resulting in a mitral orifice area of 2.4. Tricuspid Valve: The tricuspid valve is structurally normal. There is mild tricuspid regurgitation. Pulmonic Valve: The pulmonic valve is structurally normal. There is trace pulmonic valve regurgitation. Pericardium: There is no pericardial effusion noted. Aorta: The aortic root is normal. In comparison to the previous echocardiogram(s): Although previous studies are available for review, their comparison with the current echocardiogram is clinically irrelevant.  CONCLUSIONS:  1. Left ventricular systolic function is normal with a 55-60% estimated ejection fraction.  2. Abnormal septal motion consistent with post-operative status and abnormal septal motion consistent with RV pacemaker.  3. The left atrium is severely dilated.  4. Peak and mean mitral diastolic gradients are 14.5 and 5.4 mmHg at a heart rate of 72 bpm. The DI is ~ 2. The pressure half-time is ~ 90 ms, resulting in a mitral orifice area of 2.4.  5. The patient is in atrial fibrillation which may influence the estimate of left ventricular function and transvalvular flows. QUANTITATIVE DATA SUMMARY: 2D MEASUREMENTS:                           Normal Ranges: Ao Root d:     3.30 cm    (2.0-3.7cm) LAs:           5.80 cm    (2.7-4.0cm) IVSd:          1.10 cm    (0.6-1.1cm) LVPWd:         1.10 cm    (0.6-1.1cm) LVIDd:         4.60 cm    (3.9-5.9cm) LVIDs:         3.60 cm LV Mass Index: 110.6 g/m2 LV % FS        21.7 % LA VOLUME:                               Normal Ranges: LA Vol A4C:        106.5 ml   (22+/-6mL/m2) LA Vol A2C:        118.7 ml LA Vol BP:         113.9 ml LA Vol Index A4C:  65.0ml/m2 LA Vol Index A2C:  72.5 ml/m2 LA Vol Index BP:   69.5 ml/m2 LA Area A4C:       28.6 cm2 LA Area A2C:       29.8 cm2 LA Major Axis A4C: 6.5 cm LA Major Axis A2C: 6.4 cm LA Volume Index:   69.5  ml/m2 AORTA MEASUREMENTS:                      Normal Ranges: Ao Sinus, d: 3.30 cm (2.1-3.5cm) LV SYSTOLIC FUNCTION BY 2D PLANIMETRY (MOD):                     Normal Ranges: EF-A4C View: 50.1 % (>=55%) EF-A2C View: 69.2 % EF-Biplane:  61.4 % LV DIASTOLIC FUNCTION:                     Normal Ranges: MV Peak E: 1.50 m/s (0.7-1.2 m/s) MITRAL VALVE:                       Normal Ranges: MV Vmax:    1.91 m/s  (<=1.3m/s) MV peak P.5 mmHg (<5mmHg) MV mean P.4 mmHg  (<2mmHg) MV DT:      226 msec  (150-240msec) AORTIC VALVE:                         Normal Ranges: AoV Vmax:      1.26 m/s (<=1.7m/s) AoV Peak P.4 mmHg (<20mmHg) LVOT Max Christian:  1.00 m/s (<=1.1m/s) LVOT VTI:      20.40 cm LVOT Diameter: 1.80 cm  (1.8-2.4cm) AoV Area,Vmax: 2.02 cm2 (2.5-4.5cm2) TRICUSPID VALVE/RVSP:                             Normal Ranges: Peak TR Velocity: 2.44 m/s RV Syst Pressure: 26.8 mmHg (< 30mmHg) IVC Diam:         2.48 cm PULMONIC VALVE:                      Normal Ranges: PV Max Christian: 0.9 m/s  (0.6-0.9m/s) PV Max PG:  3.5 mmHg  [id number] Tyler Tyson MD Electronically signed on 2024 at 2:33:10 PM  ** Final **            Assessment/Plan   Assessment & Plan  Seizure (Multi)    Acute hypoxic respiratory failure    COPD exacerbation (Multi)    Compression deformity of vertebra    Elevated serum creatinine      Seizure  -Lactate 3.4  -CT head negative  -MRI brain pending  -EEG pending  -Neuro consulted     2. Permanent atrial fibrillation/flutter with intermittent RVR  -EKG and telemetry reviewed, atrial flutter with occasional RVR      --This is pt's usual->has been offered to have an ablation but pt has wanted to wait and think about it and give his valve replacement a year to heal  -Has had reactions to other medications in the past  -Most recently started on Nebivolol 5mg daily  -Cont eliquis   -Monitor on tele  - monitor BP and heart rate at home, he will bring a log with him next office visit and nebivolol may be adjusted  accordingly      3. S/p Mitral valve replacement  -Post op echo showed normal functioning valve  -Repeat echo     4. Elevated troponin-Non MI elevation   -[facility] April 2024 showed normal coronary arteries     5. Chronic diastolic CHF  -Euvolemic  -2gm na diet  -Daily weights  -Monitor     6. Tobacco use  -advised to quit     7. COPD exac, possible pneumonia  -CXR showed cardiomegaly and interstitial edema  -WBC 13  -Lactate 3.4  -Pt with seizure and possible aspiration  -antibiotics  -Steroids  -bronchodilators  -oxygen support  -sputum culture  -blood cultures     8. Acute kidney injury  -Improved with IVF  -Monitor       LENY Del Cid-CNP        [1] acetaminophen, 975 mg, oral, q6h  apixaban, 5 mg, oral, BID  aspirin, 81 mg, oral, Daily  azithromycin, 500 mg, oral, q24h AUGUSTINE  cefTRIAXone, 2 g, intravenous, q24h  guaiFENesin, 600 mg, oral, BID  ipratropium-albuteroL, 3 mL, nebulization, TID  levETIRAcetam, 750 mg, oral, BID  lidocaine, 1 patch, transdermal, Daily  nebivolol, 5 mg, oral, Nightly  nicotine, 1 patch, transdermal, Daily  ofloxacin, 5 drop, Right Ear, BID  predniSONE, 40 mg, oral, Daily  sennosides-docusate sodium, 2 tablet, oral, Nightly  [2]    [3] PRN medications: HYDROmorphone, ipratropium-albuteroL, LORazepam, naloxone, ondansetron, oxyCODONE, oxyCODONE, oxygen

## 2025-05-04 LAB
BACTERIA BLD CULT: NORMAL
BACTERIA BLD CULT: NORMAL

## 2025-05-05 LAB
AORTIC VALVE MEAN GRADIENT: 2 MMHG
AORTIC VALVE PEAK VELOCITY: 0.91 M/S
ATRIAL RATE: 234 BPM
AV PEAK GRADIENT: 3 MMHG
AVA (PEAK VEL): 1.48 CM2
AVA (VTI): 1.65 CM2
EJECTION FRACTION APICAL 4 CHAMBER: 55.4
EJECTION FRACTION: 43 %
LEFT ATRIUM VOLUME AREA LENGTH INDEX BSA: 48 ML/M2
LEFT VENTRICLE INTERNAL DIMENSION DIASTOLE: 3.93 CM (ref 3.5–6)
LEFT VENTRICULAR OUTFLOW TRACT DIAMETER: 1.78 CM
MITRAL VALVE E/A RATIO: 1.07
Q ONSET: 223 MS
QRS COUNT: 16 BEATS
QRS DURATION: 90 MS
QT INTERVAL: 392 MS
QTC CALCULATION(BAZETT): 492 MS
QTC FREDERICIA: 456 MS
R AXIS: 75 DEGREES
RIGHT VENTRICLE FREE WALL PEAK S': 5 CM/S
RIGHT VENTRICLE PEAK SYSTOLIC PRESSURE: 45.9 MMHG
T AXIS: -31 DEGREES
T OFFSET: 419 MS
VENTRICULAR RATE: 95 BPM

## 2025-05-06 ENCOUNTER — TELEPHONE (OUTPATIENT)
Dept: NEUROLOGY | Facility: CLINIC | Age: 65
End: 2025-05-06
Payer: COMMERCIAL

## 2025-05-06 LAB
BACTERIA BLD CULT: NORMAL
BACTERIA BLD CULT: NORMAL

## 2025-05-06 NOTE — TELEPHONE ENCOUNTER
Copied from CRM #[id number]. Topic: Needs Earlier Appointment  >> May 6, 2025  3:44 PM Beverly PRUITT wrote:  Above patient was seen in the ER was advised to schedule a follow up with Dr Mckenna Delgado nothing in system to schedule please give patient a call back [phone] no answer leave  Message  
None

## 2025-05-06 NOTE — SIGNIFICANT EVENT
Follow Up Phone Call    Outgoing phone call    Spoke to: Farooq Lang Relationship:self   Phone number: [phone]      Outcome: I left a message on answering machine   Chief Complaint   Patient presents with    Seizures    Back Pain     Back pain this week per family          Diagnosis:Not applicable

## 2025-05-07 NOTE — SIGNIFICANT EVENT
Follow Up Phone Call    Outgoing phone call    Spoke to: Farooq Lang Relationship:self   Phone number: [phone]      Outcome: contacted patient/ family   Chief Complaint   Patient presents with    Seizures    Back Pain     Back pain this week per family          Diagnosis:Not applicable    States he is feeling better. He will call [facility] to start services. No further questions or concerns.           Suspected due to IV contrast + diuresis + hypotension.  Pending repeat U/A with ulytes and protein / creatinine.  Renal function improving off ACE, diuretics.    Patient referred to hospice.  Will sign off at this time.  Please reconsult with any further questions or concerns.

## 2025-05-08 ENCOUNTER — HOME CARE VISIT (OUTPATIENT)
Dept: HOME HEALTH SERVICES | Facility: HOME HEALTH | Age: 65
End: 2025-05-08
Payer: COMMERCIAL

## 2025-05-08 VITALS
OXYGEN SATURATION: 97 % | BODY MASS INDEX: 21.53 KG/M2 | HEART RATE: 120 BPM | WEIGHT: 134 LBS | HEIGHT: 66 IN | RESPIRATION RATE: 18 BRPM | TEMPERATURE: 97.4 F | DIASTOLIC BLOOD PRESSURE: 56 MMHG | SYSTOLIC BLOOD PRESSURE: 130 MMHG

## 2025-05-08 PROCEDURE — G0299 HHS/HOSPICE OF RN EA 15 MIN: HCPCS

## 2025-05-08 ASSESSMENT — ENCOUNTER SYMPTOMS
LOWEST PAIN SEVERITY IN PAST 24 HOURS: 2/10
PAIN SEVERITY GOAL: 0/10
SUBJECTIVE PAIN PROGRESSION: WAXING AND WANING
HIGHEST PAIN SEVERITY IN PAST 24 HOURS: 8/10
PAIN: 1
PERSON REPORTING PAIN: PATIENT
PAIN LOCATION: BACK

## 2025-05-08 ASSESSMENT — ACTIVITIES OF DAILY LIVING (ADL): ENTERING_EXITING_HOME: MAXIMUM ASSIST

## 2025-05-09 ENCOUNTER — HOME CARE VISIT (OUTPATIENT)
Dept: HOME HEALTH SERVICES | Facility: HOME HEALTH | Age: 65
End: 2025-05-09
Payer: COMMERCIAL

## 2025-05-09 VITALS
TEMPERATURE: 98.1 F | HEART RATE: 84 BPM | RESPIRATION RATE: 18 BRPM | DIASTOLIC BLOOD PRESSURE: 88 MMHG | OXYGEN SATURATION: 96 % | SYSTOLIC BLOOD PRESSURE: 121 MMHG

## 2025-05-09 LAB
ATRIAL RATE: 234 BPM
Q ONSET: 222 MS
QRS COUNT: 14 BEATS
QRS DURATION: 92 MS
QT INTERVAL: 394 MS
QTC CALCULATION(BAZETT): 465 MS
QTC FREDERICIA: 440 MS
R AXIS: 78 DEGREES
T AXIS: -46 DEGREES
T OFFSET: 419 MS
VENTRICULAR RATE: 84 BPM

## 2025-05-09 PROCEDURE — G0151 HHCP-SERV OF PT,EA 15 MIN: HCPCS

## 2025-05-09 SDOH — HEALTH STABILITY: PHYSICAL HEALTH

## 2025-05-09 SDOH — HEALTH STABILITY: PHYSICAL HEALTH: PHYSICAL EXERCISE: SITTING

## 2025-05-09 SDOH — HEALTH STABILITY: PHYSICAL HEALTH: PHYSICAL EXERCISE: 10

## 2025-05-09 SDOH — HEALTH STABILITY: PHYSICAL HEALTH: EXERCISE COMMENTS: PT. REQUIRED INSTR. FOR PROPER PACE AND FULL AVAILABLE ROM FOR EACH EXERCISE.

## 2025-05-09 SDOH — HEALTH STABILITY: PHYSICAL HEALTH: EXERCISE ACTIVITY: KNEE EXT

## 2025-05-09 SDOH — HEALTH STABILITY: PHYSICAL HEALTH: EXERCISE ACTIVITIES SETS: 1

## 2025-05-09 SDOH — HEALTH STABILITY: PHYSICAL HEALTH: EXERCISE ACTIVITY: ANKLE PUMPS

## 2025-05-09 ASSESSMENT — ENCOUNTER SYMPTOMS
PAIN LOCATION: BACK
OCCASIONAL FEELINGS OF UNSTEADINESS: 1
PAIN SEVERITY GOAL: 3/10
LOWEST PAIN SEVERITY IN PAST 24 HOURS: 3/10
SUBJECTIVE PAIN PROGRESSION: WAXING AND WANING
HIGHEST PAIN SEVERITY IN PAST 24 HOURS: 10/10
PAIN: 1
PERSON REPORTING PAIN: PATIENT
MUSCLE WEAKNESS: 1

## 2025-05-09 ASSESSMENT — ACTIVITIES OF DAILY LIVING (ADL)
AMBULATION ASSISTANCE ON FLAT SURFACES: 1
CURRENT_FUNCTION: STAND BY ASSIST
AMBULATION_DISTANCE/DURATION_TOLERATED: 80 FEET X 1
AMBULATION ASSISTANCE: 1
PHYSICAL TRANSFERS ASSESSED: 1
CURRENT_FUNCTION: ONE PERSON
PHYSICAL_TRANSFERS_DEVICES: WHEELED WALKER
AMBULATION ASSISTANCE: STAND BY ASSIST
AMBULATION ASSISTANCE: ONE PERSON

## 2025-05-10 ENCOUNTER — HOME CARE VISIT (OUTPATIENT)
Dept: HOME HEALTH SERVICES | Facility: HOME HEALTH | Age: 65
End: 2025-05-10
Payer: COMMERCIAL

## 2025-05-13 ENCOUNTER — HOME CARE VISIT (OUTPATIENT)
Dept: HOME HEALTH SERVICES | Facility: HOME HEALTH | Age: 65
End: 2025-05-13
Payer: COMMERCIAL

## 2025-05-14 ENCOUNTER — HOME CARE VISIT (OUTPATIENT)
Dept: HOME HEALTH SERVICES | Facility: HOME HEALTH | Age: 65
End: 2025-05-14
Payer: COMMERCIAL

## 2025-05-14 VITALS — DIASTOLIC BLOOD PRESSURE: 80 MMHG | SYSTOLIC BLOOD PRESSURE: 120 MMHG | HEART RATE: 118 BPM | OXYGEN SATURATION: 99 %

## 2025-05-14 PROCEDURE — G0157 HHC PT ASSISTANT EA 15: HCPCS | Mod: CQ

## 2025-05-14 SDOH — HEALTH STABILITY: PHYSICAL HEALTH: EXERCISE TYPE: BLE STRENGHTNING

## 2025-05-14 ASSESSMENT — ENCOUNTER SYMPTOMS
SEIZURES: 1
FATIGUES EASILY: 1
LOSS OF SENSATION IN FEET: 1
DEPRESSION: 0
PAIN LOCATION: BACK
APPETITE LEVEL: GOOD
LOWEST PAIN SEVERITY IN PAST 24 HOURS: 3/10
SHORTNESS OF BREATH: 1
PAIN SEVERITY GOAL: 0/10
HIGHEST PAIN SEVERITY IN PAST 24 HOURS: 10/10
MUSCLE WEAKNESS: 1
PERSON REPORTING PAIN: PATIENT
CHANGE IN APPETITE: UNCHANGED
LIMITED RANGE OF MOTION: 1
PAIN LOCATION - PAIN QUALITY: ACHY
PAIN: 1
PAIN LOCATION - PAIN SEVERITY: 4/10
OCCASIONAL FEELINGS OF UNSTEADINESS: 1
DYSPNEA ON EXERTION: 1
DYSPNEA ACTIVITY LEVEL: AFTER AMBULATING MORE THAN 20 FT

## 2025-05-14 ASSESSMENT — ACTIVITIES OF DAILY LIVING (ADL)
PHYSICAL TRANSFERS ASSESSED: 1
OASIS_M1830: 03
AMBULATION_DISTANCE/DURATION_TOLERATED: 50 FEET
CURRENT_FUNCTION: ONE PERSON
AMBULATION ASSISTANCE ON FLAT SURFACES: 1

## 2025-05-15 LAB
ATRIAL RATE: 234 BPM
ATRIAL RATE: 234 BPM
Q ONSET: 222 MS
Q ONSET: 223 MS
QRS COUNT: 14 BEATS
QRS COUNT: 16 BEATS
QRS DURATION: 90 MS
QRS DURATION: 92 MS
QT INTERVAL: 392 MS
QT INTERVAL: 394 MS
QTC CALCULATION(BAZETT): 465 MS
QTC CALCULATION(BAZETT): 492 MS
QTC FREDERICIA: 440 MS
QTC FREDERICIA: 456 MS
R AXIS: 75 DEGREES
R AXIS: 78 DEGREES
T AXIS: -31 DEGREES
T AXIS: -46 DEGREES
T OFFSET: 419 MS
T OFFSET: 419 MS
VENTRICULAR RATE: 84 BPM
VENTRICULAR RATE: 95 BPM

## 2025-05-16 ENCOUNTER — HOME CARE VISIT (OUTPATIENT)
Dept: HOME HEALTH SERVICES | Facility: HOME HEALTH | Age: 65
End: 2025-05-16
Payer: COMMERCIAL

## 2025-05-16 DIAGNOSIS — R56.9 SEIZURE (MULTI): ICD-10-CM

## 2025-05-16 RX ORDER — LEVETIRACETAM 750 MG/1
750 TABLET ORAL 2 TIMES DAILY
Qty: 28 TABLET | Refills: 0 | Status: SHIPPED | OUTPATIENT
Start: 2025-05-16 | End: 2025-05-30

## 2025-05-20 ENCOUNTER — HOME CARE VISIT (OUTPATIENT)
Dept: HOME HEALTH SERVICES | Facility: HOME HEALTH | Age: 65
End: 2025-05-20
Payer: COMMERCIAL

## 2025-05-20 PROCEDURE — G0157 HHC PT ASSISTANT EA 15: HCPCS | Mod: CQ

## 2025-05-20 SDOH — HEALTH STABILITY: PHYSICAL HEALTH: EXERCISE TYPE: BLE STRENGTHENING

## 2025-05-20 ASSESSMENT — ENCOUNTER SYMPTOMS
PAIN: 1
PAIN LOCATION: BACK
PAIN LOCATION - PAIN SEVERITY: 0/10
PAIN LOCATION - PAIN DURATION: INTERMITTENT
PERSON REPORTING PAIN: PATIENT
HIGHEST PAIN SEVERITY IN PAST 24 HOURS: 4/10
PAIN LOCATION - RELIEVING FACTORS: REST
LOWEST PAIN SEVERITY IN PAST 24 HOURS: 0/10
PAIN LOCATION - EXACERBATING FACTORS: ACTIVITY
PAIN LOCATION - PAIN FREQUENCY: INTERMITTENT

## 2025-05-20 ASSESSMENT — ACTIVITIES OF DAILY LIVING (ADL)
AMBULATION ASSISTANCE ON FLAT SURFACES: 1
AMBULATION_DISTANCE/DURATION_TOLERATED: 60 FEET

## 2025-05-21 ENCOUNTER — HOME CARE VISIT (OUTPATIENT)
Dept: HOME HEALTH SERVICES | Facility: HOME HEALTH | Age: 65
End: 2025-05-21
Payer: COMMERCIAL

## 2025-05-21 VITALS
RESPIRATION RATE: 18 BRPM | SYSTOLIC BLOOD PRESSURE: 104 MMHG | HEART RATE: 120 BPM | OXYGEN SATURATION: 96 % | DIASTOLIC BLOOD PRESSURE: 60 MMHG | TEMPERATURE: 97.6 F

## 2025-05-21 PROCEDURE — G0299 HHS/HOSPICE OF RN EA 15 MIN: HCPCS

## 2025-05-21 ASSESSMENT — ENCOUNTER SYMPTOMS
SUBJECTIVE PAIN PROGRESSION: RAPIDLY IMPROVING
LOWEST PAIN SEVERITY IN PAST 24 HOURS: 0/10
DIZZINESS: 1
PERSON REPORTING PAIN: PATIENT
PAIN: 1
CHANGE IN APPETITE: UNCHANGED
APPETITE LEVEL: GOOD
HIGHEST PAIN SEVERITY IN PAST 24 HOURS: 3/10
PAIN LOCATION: BACK

## 2025-05-22 ENCOUNTER — HOSPITAL ENCOUNTER (OUTPATIENT)
Dept: NEUROLOGY | Facility: HOSPITAL | Age: 65
Discharge: HOME | End: 2025-05-22
Payer: COMMERCIAL

## 2025-05-22 DIAGNOSIS — R56.9 SEIZURE (MULTI): ICD-10-CM

## 2025-05-22 PROCEDURE — 95819 EEG AWAKE AND ASLEEP: CPT | Performed by: PSYCHIATRY & NEUROLOGY

## 2025-05-22 PROCEDURE — 95819 EEG AWAKE AND ASLEEP: CPT

## 2025-05-22 NOTE — PROGRESS NOTES
Farooq Lang is a 64 y.o. male on day 0 of admission presenting with No Principal Problem: There is no principal problem currently on the Problem List. Please update the Problem List and refresh..    Subjective   ***       Objective     Physical Exam    Last Recorded Vitals  There were no vitals taken for this visit.  Intake/Output last 3 Shifts:  No intake/output data recorded.    Relevant Results  {If you would like to pull in Medications, type .meds     If you would like to pull in Lab results for the last 24 hours, type .[id number]    If you would like to pull in Imaging results, type .imgrslt :99}    {Link to Stroke Scoring tools - Link :99}                        Assessment & Plan    ***    {This patient does not have an ACP note on file for this encounter, please fill one out - Advance Care Planning Activity :99}      Ashely Tai

## 2025-05-23 ENCOUNTER — HOME CARE VISIT (OUTPATIENT)
Dept: HOME HEALTH SERVICES | Facility: HOME HEALTH | Age: 65
End: 2025-05-23
Payer: COMMERCIAL

## 2025-05-23 VITALS
HEART RATE: 88 BPM | TEMPERATURE: 98.6 F | OXYGEN SATURATION: 95 % | SYSTOLIC BLOOD PRESSURE: 124 MMHG | RESPIRATION RATE: 18 BRPM | DIASTOLIC BLOOD PRESSURE: 88 MMHG

## 2025-05-23 PROCEDURE — G0151 HHCP-SERV OF PT,EA 15 MIN: HCPCS

## 2025-05-23 ASSESSMENT — ENCOUNTER SYMPTOMS
LOWEST PAIN SEVERITY IN PAST 24 HOURS: 1/10
SUBJECTIVE PAIN PROGRESSION: GRADUALLY IMPROVING
HIGHEST PAIN SEVERITY IN PAST 24 HOURS: 5/10
PAIN: 1
PAIN LOCATION: BACK
PERSON REPORTING PAIN: PATIENT
PAIN SEVERITY GOAL: 3/10
OCCASIONAL FEELINGS OF UNSTEADINESS: 0

## 2025-05-23 ASSESSMENT — ACTIVITIES OF DAILY LIVING (ADL)
OASIS_M1830: 01
ADLS_COMMENTS: PT. INDEP. AT THIS TIME.
HOME_HEALTH_OASIS: 00

## 2025-05-27 ENCOUNTER — OFFICE VISIT (OUTPATIENT)
Dept: NEUROLOGY | Facility: CLINIC | Age: 65
End: 2025-05-27
Payer: COMMERCIAL

## 2025-05-27 VITALS
RESPIRATION RATE: 18 BRPM | BODY MASS INDEX: 21.31 KG/M2 | WEIGHT: 132 LBS | DIASTOLIC BLOOD PRESSURE: 60 MMHG | HEART RATE: 60 BPM | SYSTOLIC BLOOD PRESSURE: 96 MMHG

## 2025-05-27 DIAGNOSIS — R56.9 SEIZURE (MULTI): Primary | ICD-10-CM

## 2025-05-27 PROCEDURE — 99215 OFFICE O/P EST HI 40 MIN: CPT | Performed by: NURSE PRACTITIONER

## 2025-05-27 PROCEDURE — 1159F MED LIST DOCD IN RCRD: CPT | Performed by: NURSE PRACTITIONER

## 2025-05-27 PROCEDURE — 1126F AMNT PAIN NOTED NONE PRSNT: CPT | Performed by: NURSE PRACTITIONER

## 2025-05-27 RX ORDER — MIDAZOLAM 5 MG/.1ML
SPRAY NASAL
Qty: 2 EACH | Refills: 2 | Status: SHIPPED | OUTPATIENT
Start: 2025-05-27

## 2025-05-27 ASSESSMENT — PAIN SCALES - GENERAL: PAINLEVEL_OUTOF10: 0-NO PAIN

## 2025-05-27 NOTE — PATIENT INSTRUCTIONS
"Thank you for coming to the [facility] today.  -If you have any sudden new, concerning or worsening symptoms, call 911 and go to the Emergency Room. Otherwise, it was good seeing you today-    -Please follow seizure precautions:   Please do not drive, operate any heavy machinery, swim unsupervised, please shower without collection of water instead of bathe. Be cautious around hot, heavy, or sharp objects. Do not cook with an open flame and do not perform any activities at heights such as on a ladder. These precautions should stay in place until 6 months seizure free and cleared by a provider.    -HOW TO CONTACT BALDO OTT EPILEPSY NURSE PRACTITIONER ([phone]).   Instructed to call in the event of seizure, medication refills, or any questions  *Please allow 24-48 hours for non-urgent responses*.  For emergency concerns, please dial 911 or present to the nearest emergency room.  For concerns after business hours (8am-4:30pm) or on weekends please call [phone]  To call and schedule a follow up appointment please call [phone]  -Paperwork may take up to 3 business days to complete-    Every attempt is made to run on time for your appointment, if you are 15 minutes or later for your appoinement you may be asked to reschedule    -Compliance education: It is important to continue to try and achieve seizure control because of the potential for injury and illness due to seizures. In a very small minority of patients with generalized tonic clonic seizures (\"grand mal\"), breathing or heart function can stop during a seizure and result in demise (sudden unexpected death in epilepsy or SUDEP). Bridger from seizures prevents this kind of outcome-   "

## 2025-05-27 NOTE — PROGRESS NOTES
"[facility]   Epilepsy    Patient ID: Farooq Lang 64 y.o.male presenting in follow-up for ER visit for first time seizure   Patient of: ER follow up     HPI  Patient presented via EMS to ED for first time seizure 5/1/25.    Wife arrived home Around dinner time He was dozing off on the couch, they had a brief conversation when she arrived. She got up to walk into the bathroom and heard a loud moaning vocalization, she looked over and he was as stiff as a statue, arms flexed at the elbow with fists clenched, he then began full body jerking. She called EMS. He was not responding and continued convulsing. Foaming at the mouth and not responding to her touching him or to verbal commands.     He only recalls taking a nap on the couch then waking in the ER, saying \"where the hell am I\"   -he states he was under extreme amounts of stress leading up to the seizure due to rental properties and court.     They deny any seizures in the past. He denies waking with tongue bite or bladder incontinence, no history of auras or BENITA/LOC.     EPILEPSY RISK FACTORS:  Febrile Seizures: Denies  Milestones: On time  CNS Infections: Denies  CNS Surgeries: Denies  Head Trauma: denies  FHx of Seizures: none    RESULTS:  CT head wo IV contrast  Result Date: 5/1/2025  No acute intracranial abnormality.   There is new thickening of the right tympanic membrane, clinical correlation for tympanic membrane perforation recommended. Mastoid air cells and middle ear cavities are grossly clear.   Signed by: Wai Walker 5/1/2025 11:23 PM Dictation workstation:   [id number]       EEG  Result Date: 5/23/2025  IMPRESSION This routine EEG is normal. No epileptiform discharges or lateralizing signs are seen. A full report will be scanned into the patient's chart at a later time. This report has been interpreted and electronically signed by                  MR brain wo IV contrast  Result Date: 5/2/2025  1. No evidence of acute infarct, " intracranial mass effect or midline shift. 2. Mild white matter FLAIR signal increase most commonly seen with early chronic small vessel ischemic change. 3. Degree of ventriculomegaly is commensurate with overall degree of brain parenchymal volume loss, which is mild. 4. Multiple old lacunar infarctions left-greater-than-right cerebellar hemispheres. 5. No discernible etiology for seizures.   MACRO: None   Signed by: Abran Cook 5/2/2025 1:15 PM Dictation workstation:   [id number]    PRESENT CONCERNS:  No further seizures. He has been tolerating LEV but it is making him tired. Wants to stop the medication.       Review of Systems  All other systems reviewed and negative unless otherwise stated above    CONTROLLED SUBSTANCE  N/a    Vitals:  Vitals:    05/27/25 1008   BP: 96/60   Pulse: 60   Resp: 18       PHYSICAL EXAM:  The patient was alert and oriented to person, time and place. Language, concentration and memory were intact. Affect was normal.   Eye movements were intact. Muscles of mastication and facial expression moved normally. Hearing was normal bilaterally. There was no dysarthria.  Coordination in the arms and legs was intact  Involuntary movements: none.  Standard gait was normal    ASSESSMENT & PLAN:   64 y.o. male presenting in follow-up for ER visit for first time seizure     Problem List Items Addressed This Visit       Seizure (Multi) - Primary    Relevant Medications    nasal spray midazolam (Nayzilam) 5 mg/spray (0.1 mL) spray,non-aerosol     We discussed he does not meet the criteria to be diagnosed with epilepsy given 1 seizure and normal EEG and bMRI. Discussed  possible triggers like stress, sleep deprivation, drugs/alcohol. Discussed risk of another seizure vs staying on LEV. He would like to stop the medication. Given the severity and length of his last GTC I am sending nayzilam to have on hand in case of another seizure. Should a 2nd seizure occur he will need Anti-seizure medication.  No driving until Nov 25     Will stop keppra   Nayzilam in case of seizure when stopping LEV   RTC 6 months   please follow seizure precautions which include no driving until 6 months seizure free and cleared by a provider  Call me with any seizures prior to your next appointment   Given my contact information/instructions for Edward

## 2025-05-29 ENCOUNTER — APPOINTMENT (OUTPATIENT)
Dept: ORTHOPEDIC SURGERY | Facility: CLINIC | Age: 65
End: 2025-05-29
Payer: COMMERCIAL

## 2025-05-29 DIAGNOSIS — S32.000A COMPRESSION FRACTURE OF LUMBAR VERTEBRA, UNSPECIFIED LUMBAR VERTEBRAL LEVEL, INITIAL ENCOUNTER (MULTI): ICD-10-CM

## 2025-05-29 PROCEDURE — 1160F RVW MEDS BY RX/DR IN RCRD: CPT | Performed by: PHYSICIAN ASSISTANT

## 2025-05-29 PROCEDURE — 99203 OFFICE O/P NEW LOW 30 MIN: CPT | Performed by: PHYSICIAN ASSISTANT

## 2025-05-29 PROCEDURE — 1125F AMNT PAIN NOTED PAIN PRSNT: CPT | Performed by: PHYSICIAN ASSISTANT

## 2025-05-29 PROCEDURE — 1159F MED LIST DOCD IN RCRD: CPT | Performed by: PHYSICIAN ASSISTANT

## 2025-05-29 ASSESSMENT — PAIN - FUNCTIONAL ASSESSMENT: PAIN_FUNCTIONAL_ASSESSMENT: 0-10

## 2025-05-29 ASSESSMENT — PAIN SCALES - GENERAL: PAINLEVEL_OUTOF10: 3

## 2025-05-29 NOTE — PROGRESS NOTES
Chief Complaint: Low back pain    HPI: Farooq Lang is a 64 y.o. male patient presenting with low back pain.  His symptoms started approximately 4 weeks ago, he had a seizure on 5/1.  He was taken to the emergency room and diagnosed with multiple lumbar compression fractures.  He is seeing daily improvement in his low back pain.  He has been wearing a lumbar brace.  Without the brace he actually gets bilateral flank pain that feels very muscular.  He has no pain rating down his legs.  He denies weakness, dragging or tripping over his feet.  He has full control of his bowel and bladder.    He takes Eliquis daily  Positive tobacco use, current smoker.    Review of Systems    All other systems have been reviewed and are negative for complaint. All pertinent positive and negative as listed in history of present illness.    Medical History[1]     Surgical History[2]     RX Allergies[3]     Medications Ordered Prior to Encounter[4]       PE:   Const: Well-appearing, well-nourished [male] in no distress.  Eyes: Normal appearing sclera and conjunctiva, no jaundice, pupils normal in appearance.  Resp: breathing comfortably, normal respiratory rate.  CV: No upper or lower extremity edema.  Musculoskeletal: [Normal gait].  Lumbar ROM is [supple].  Strength exam of the lower extremities reveals 5/5 strength in all major muscle groups.  [Negative] straight leg raise [bilaterally].  Neuro: Sensation is intact and equal bilaterally. Deep tendon reflexes are [normal and symmetric].  [No clonus].  Skin: Intact without any lesions, normal turgor.  Psych: Alert and oriented x3, normal mood and affect.        Imaging:  I personally reviewed a CT scan of the lumbar spine from 5/2.  Mild superior endplate fractures of L1 and L2 without evidence of retropulsion.  No evidence of instability.            A/P:    I discussed with him that these types of fractures are stable and best treated conservatively without surgical intervention.  He  is already seeing daily improvement in his low back pain.  He has already been going to outpatient physical therapy.  He is not interested in any medications, he is taking Tylenol over-the-counter.  We discussed the importance of smoking cessation, especially during the healing process.  He can continue wearing the lumbar support brace short-term as needed.  I am optimistic his symptoms will continue to improve with conservative treatment.  He does not require further follow-up with me.    **This note was dictated using speech recognition software and was not corrected for spelling or grammatical errors**             [1]   Past Medical History:  Diagnosis Date    A-fib (Multi)     Acute bronchitis 06/21/2024    Acute right lower quadrant pain 06/21/2024    Atherosclerosis of abdominal aorta 03/28/2016    Last Assessment & Plan:    Formatting of this note might be different from the original.   Stable asymptomatic  Formatting of this note might be different from the original.   Repeat CT reveals moderate partially calcified wall changes. Stable 6 mm thickness/plaque in the inferior aspect of the isthmus and 6 mm wall thickening/plaque along the minor curvature of the proximal descending aorta.   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   Previous history of asthma   Current everyday smoker   Also CT chest 3/28 with findings for COPD   Duonebs as needed    Costochondritis 06/21/2024    Cough 06/21/2024    Disease due to severe acute respiratory syndrome coronavirus 2 (SARS-CoV-2) 09/06/2022    Comment on above: COVID SYMPTOMS    Diverticular disease 06/21/2024    Diverticulosis     Diverticulosis 08/15/2023    Formatting of this note might be different from the original.   Formatting of this note might be different from the original. hx  diverticulitis at same time as pneumonia  Formatting of this note might be different from the original.   hx diverticulitis at same time as pneumonia    Dizziness     Dyspnea 06/21/2024    Fecal smearing 06/21/2024    Fecal soiling 06/21/2024    Headache 06/16/2023    Heart failure with mid-range ejection fraction (HFmEF)     (45%, 3/28/24)    Hypotension 06/21/2024    Incomplete passage of stool 06/21/2024    Insomnia 06/21/2024    Irregular heart beat     afib on coumadin    Irritable bowel syndrome     Irritable bowel syndrome with diarrhea 06/21/2024    Low back pain 06/21/2024    Mitral valve stenosis     Nausea & vomiting 06/21/2024    Nonspecific syndrome suggestive of viral illness 06/21/2024    Other chest pain 03/10/2016    Chest discomfort    Other emphysema (Multi) 02/06/2024    Last Assessment & Plan:    Formatting of this note might be different from the original.   As seen on cxr.  Cough is likely related to viral infection.  COPD contributes highly recommended smoking cessation to reduce progression of COPD.  With symptoms improving pt declined inhaler at this time    Pleurodynia 11/29/2017    Chest pain, pleuritic    Pneumonia 06/21/2024    Prediabetes 03/29/2016    Formatting of this note might be different from the original.   Hemoglobin a1c 6.0.   Need education on lifestyle modification.    Right lower quadrant pain 11/14/2018    Abdominal pain, acute, right lower quadrant    Shortness of breath     VANCE    Syncope 06/21/2024    Tension type headache 06/21/2024    Thrombocytopenia     Tobacco dependence 08/15/2023    Last Assessment & Plan:    Formatting of this note might be different from the original.   Once again highly recommended cessation    Tubular adenoma of colon 06/21/2024   [2]   Past Surgical History:  Procedure Laterality Date    CARDIAC CATHETERIZATION N/A 04/22/2024    Procedure: Left And Right Heart Cath, Without LV;  Surgeon: Yobani Soto MD;  Location: [place] [address]  Cardiac Cath Lab;  Service: Cardiovascular;  Laterality: N/A;  Schedule at 3:30pm Dr. Soto    CARDIAC ELECTROPHYSIOLOGY PROCEDURE N/A 06/17/2024    Procedure: Cardioversion;  Surgeon: Dick Edouard MD;  Location: [place] [address] Cardiac Cath Lab;  Service: Electrophysiology;  Laterality: N/A;    COLONOSCOPY      DENTAL SURGERY      MITRAL VALVE REPAIR  06/04/2024    TONSILLECTOMY     [3]   Allergies  Allergen Reactions    Metoprolol Shortness of breath     Concern for bronchospasm   [4]   Current Outpatient Medications on File Prior to Visit   Medication Sig Dispense Refill    apixaban (Eliquis) 5 mg tablet Take 1 tablet (5 mg) by mouth 2 times a day. 60 tablet 0    apixaban (Eliquis) 5 mg tablet Take 5 mg by mouth 2 times a day. Indications: treatment to prevent blood clots in chronic atrial fibrillation      aspirin 81 mg chewable tablet Chew 1 tablet (81 mg) once daily. 30 tablet 0    cyclobenzaprine (Flexeril) 5 mg tablet Take 1 tablet (5 mg) by mouth 3 times a day as needed for muscle spasms. 20 tablet 0    levETIRAcetam (Keppra) 750 mg tablet Take 1 tablet (750 mg) by mouth 2 times a day for 14 days. 28 tablet 0    lidocaine 4 % patch Place 1 patch over 12 hours on the skin once daily. Remove & discard patch within 12 hours or as directed by MD. 30 patch 0    multivitamin with minerals tablet Take 1 tablet by mouth once daily.      nasal spray midazolam (Nayzilam) 5 mg/spray (0.1 mL) spray,non-aerosol Use 1 spray for seizure lasting >3 minutes. If seizure continues additional 5 mins give 2nd spray. If seizure continues another 5 minutes call 911. OR use 1 spray for 2 more seizures in 24 hrs. No more than 2 sprays in 24 hrs 2 each 2    nebivolol (Bystolic) 5 mg tablet Take 1 tablet (5 mg) by mouth once daily at bedtime. 30 tablet 0     No current facility-administered medications on file prior to visit.

## 2025-06-17 ENCOUNTER — APPOINTMENT (OUTPATIENT)
Dept: AUDIOLOGY | Facility: CLINIC | Age: 65
End: 2025-06-17
Payer: COMMERCIAL

## 2025-06-17 ENCOUNTER — APPOINTMENT (OUTPATIENT)
Dept: OTOLARYNGOLOGY | Facility: CLINIC | Age: 65
End: 2025-06-17
Payer: COMMERCIAL

## 2025-06-17 DIAGNOSIS — H90.A31 MIXED CONDUCTIVE AND SENSORINEURAL HEARING LOSS OF RIGHT EAR WITH RESTRICTED HEARING OF LEFT EAR: Primary | ICD-10-CM

## 2025-06-17 DIAGNOSIS — R94.120 NEGATIVE MIDDLE EAR PRESSURE OF BOTH EARS WITH TYPE C TYMPANOGRAM CURVE: ICD-10-CM

## 2025-06-17 DIAGNOSIS — H90.A22 SENSORINEURAL HEARING LOSS (SNHL) OF LEFT EAR WITH RESTRICTED HEARING OF RIGHT EAR: ICD-10-CM

## 2025-06-17 DIAGNOSIS — H93.11 TINNITUS OF RIGHT EAR: ICD-10-CM

## 2025-06-17 DIAGNOSIS — H90.3 SENSORINEURAL HEARING LOSS (SNHL) OF BOTH EARS: Primary | ICD-10-CM

## 2025-06-17 PROCEDURE — 99203 OFFICE O/P NEW LOW 30 MIN: CPT | Performed by: OTOLARYNGOLOGY

## 2025-06-17 PROCEDURE — 92567 TYMPANOMETRY: CPT | Performed by: AUDIOLOGIST

## 2025-06-17 PROCEDURE — 1159F MED LIST DOCD IN RCRD: CPT | Performed by: OTOLARYNGOLOGY

## 2025-06-17 PROCEDURE — 92557 COMPREHENSIVE HEARING TEST: CPT | Performed by: AUDIOLOGIST

## 2025-06-17 ASSESSMENT — ENCOUNTER SYMPTOMS
LOSS OF SENSATION IN FEET: 0
DEPRESSION: 0
OCCASIONAL FEELINGS OF UNSTEADINESS: 0

## 2025-06-17 ASSESSMENT — PAIN - FUNCTIONAL ASSESSMENT: PAIN_FUNCTIONAL_ASSESSMENT: 0-10

## 2025-06-17 ASSESSMENT — PAIN SCALES - GENERAL: PAINLEVEL_OUTOF10: 0 - NO PAIN

## 2025-06-17 NOTE — PROGRESS NOTES
"History Of Present Illness  Farooq Lang is a 64 y.o. male presenting with: \"Right ear was blown up by a seizure\".  He is kindly referred by Dr. Nava from ER.    Patient had a seizure on 05.01.2025 and was taken to ER. On examination, there was bloody discharge in right ear canal. He had a CT head, report mentioned : thickening of the right tympanic membrane, clinical correlation for tympanic membrane perforation recommended. Mastoid air cells and middle ear cavities are grossly clear.    On examination, dried blood was cleaned out of right ear canal. TMs look intact. Retraction of right TM (+) superiorly at right pars flaccida.   Hearing test shows bilateral acoustic trauma worse at right. Patient was shooting guns in his youth.    Recommendation:  1- follow up in 1-2 years with hearing test    Past Medical History  He has a past medical history of A-fib (Multi), Acute bronchitis (06/21/2024), Acute right lower quadrant pain (06/21/2024), Atherosclerosis of abdominal aorta (03/28/2016), Benign paroxysmal positional vertigo (06/21/2024), Chronic diarrhea (06/21/2024), Chronic diarrhea (06/21/2024), COPD with asthma (Multi) (03/28/2016), Costochondritis (06/21/2024), Cough (06/21/2024), Disease due to severe acute respiratory syndrome coronavirus 2 (SARS-CoV-2) (09/06/2022), Diverticular disease (06/21/2024), Diverticulosis, Diverticulosis (08/15/2023), Dizziness, Dyspnea (06/21/2024), Fecal smearing (06/21/2024), Fecal soiling (06/21/2024), Headache (06/16/2023), Heart failure with mid-range ejection fraction (HFmEF), Hypotension (06/21/2024), Incomplete passage of stool (06/21/2024), Insomnia (06/21/2024), Irregular heart beat, Irritable bowel syndrome, Irritable bowel syndrome with diarrhea (06/21/2024), Low back pain (06/21/2024), Mitral valve stenosis, Nausea & vomiting (06/21/2024), Nonspecific syndrome suggestive of viral illness (06/21/2024), Other chest pain (03/10/2016), Other emphysema (Multi) " (02/06/2024), Pleurodynia (11/29/2017), Pneumonia (06/21/2024), Prediabetes (03/29/2016), Right lower quadrant pain (11/14/2018), Shortness of breath, Syncope (06/21/2024), Tension type headache (06/21/2024), Thrombocytopenia, Tobacco dependence (08/15/2023), and Tubular adenoma of colon (06/21/2024).    Surgical History  He has a past surgical history that includes Tonsillectomy; Cardiac catheterization (N/A, 04/22/2024); Colonoscopy; Dental surgery; Cardiac electrophysiology procedure (N/A, 06/17/2024); and Mitral valve repair (06/04/2024).     Social History  He reports that he has been smoking cigarettes. He started smoking about 50 years ago. He has a 25.2 pack-year smoking history. He has been exposed to tobacco smoke. He has never used smokeless tobacco. He reports that he does not drink alcohol and does not use drugs.    Family History  Family History[1]     Allergies  Metoprolol    Review of Systems   Ears feel full     Physical Exam    General appearance: Healthy-appearing, well-nourished, well groomed, in no acute distress.     Head and Face: Atraumatic with no masses, lesions, or scarring.      Salivary glands: No tenderness of the parotid glands or parotid masses.     No tenderness of the submandibular glands or submandibular masses.      Facial strength: Normal strength and symmetry, no synkinesis or facial tic.     Eyes: Conjunctivas look non-hyperemic bilaterally    Ears: Dried blood was cleaned from right ear canal. bilaterally ear canals look normal. Tympanic membranes look intact, no hyperemia, fluid or retraction.      Nose: Mucosa looks normal. No purulent discharge. Septum essentially straight.     Oral Cavity/Mouth: Lips and tongue look normal.     Throat: No postnasal discharge. No tonsil hypertrophy. No hyperemia.    Neck: Symmetrical, trachea midline.     Pulmonary: Normal respiratory effort.     Lymphatic: No palpable pathologic lymph nodes at neck.     Neurological/Psychiatric Orientation  "to person, place, and time: Normal.     Mood and affect: Normal.      Extremities: No clubbing.     Skin: No significant skin lesions were noted at face or neck        Procedure  EAR CLEANING  Patient had dried blood in right ear canal. Using small instrument(s) cleaning was done. Patient tolerated the procedure well.          Last Recorded Vitals  There were no vitals taken for this visit.    Relevant Results    Assessment and Plan:  Farooq Lang is a 64 y.o. male presenting with: \"Right ear was blown up by a seizure\".  He is kindly referred by Dr. Nava from ER.    Patient had a seizure on 05.01.2025 and was taken to ER. On examination, there was bloody discharge in right ear canal. He had a CT head, report mentioned : thickening of the right tympanic membrane, clinical correlation for tympanic membrane perforation recommended. Mastoid air cells and middle ear cavities are grossly clear.    On examination, dried blood was cleaned out of right ear canal. TMs look intact. Retraction of right TM (+) superiorly at right pars flaccida.   Hearing test shows bilateral acoustic trauma worse at right. Patient was shooting guns in his youth.    Recommendation:  1- follow up in 1-2 years with hearing test  Navin Simeon  Otolaryngology - Head & Neck Surgery         [1]   Family History  Problem Relation Name Age of Onset    Cancer Father  74    Brain Aneurysm Father       "

## 2025-06-17 NOTE — PROGRESS NOTES
Farooq Lang, age 64 years, is here today for a hearing evaluation.     Difficulty hearing - no  Tinnitus - yes, right ear for many years  Excessive noise exposure - yes, occupational   Chronic ear infections - yes, childhood   Ear pain - no  Ear drainage - no  Past ear surgery - no  Vertigo - no  Dizziness - no  Past hearing aid use - no  Family history - no    Appointment time: 9:10-10    Otoscopy revealed clear ear canals with visual inspection of the tympanic membranes bilaterally.    Behavioral hearing evaluation:  Right ear - mild mixed hearing loss 125-2000 Hz sloping to moderately-severe to severe 6027-9003 Hz  Left ear - borderline/mild sensorineural hearing loss 125-2000 Hz sloping to moderate to severe 0973-7140 Hz    Speech reception thresholds obtained at a level consistent with pure tone thresholds bilaterally.    Word discrimination:  Right ear - excellent (92%)  Left ear - excellent (100%)    Tympanometry:  Right ear - Type C, negative middle ear pressure with normal ear canal volume and normal eardrum mobility  Left ear - Type A-C, slightly negative middle ear pressure with normal ear canal volume and normal eardrum mobility    Ipsilateral acoustic reflexes:  Probe right - did not test due to discomfort  Probe left - present 500-4000 Hz    Recommendations:  1) Follow up with Dr Simeon regarding mixed hearing loss in the right ear and negative middle ear pressure bilaterally  2) Re-evaluate hearing annually, to monitor, or sooner if a change in hearing is noted

## 2025-08-04 ENCOUNTER — OFFICE VISIT (OUTPATIENT)
Dept: CARDIOLOGY | Facility: HOSPITAL | Age: 65
End: 2025-08-04
Payer: COMMERCIAL

## 2025-08-04 VITALS
OXYGEN SATURATION: 98 % | SYSTOLIC BLOOD PRESSURE: 118 MMHG | WEIGHT: 130.51 LBS | BODY MASS INDEX: 21.07 KG/M2 | HEART RATE: 125 BPM | DIASTOLIC BLOOD PRESSURE: 84 MMHG

## 2025-08-04 DIAGNOSIS — I48.19 PERSISTENT ATRIAL FIBRILLATION (MULTI): ICD-10-CM

## 2025-08-04 DIAGNOSIS — R06.09 DYSPNEA ON EFFORT: ICD-10-CM

## 2025-08-04 DIAGNOSIS — I48.92 ATRIAL FLUTTER, UNSPECIFIED TYPE (MULTI): ICD-10-CM

## 2025-08-04 DIAGNOSIS — I48.91 NEW ONSET ATRIAL FIBRILLATION (MULTI): Primary | ICD-10-CM

## 2025-08-04 LAB
ATRIAL RATE: 249 BPM
P AXIS: -80 DEGREES
P OFFSET: 173 MS
P ONSET: 139 MS
Q ONSET: 222 MS
QRS COUNT: 18 BEATS
QRS DURATION: 82 MS
QT INTERVAL: 348 MS
QTC CALCULATION(BAZETT): 485 MS
QTC FREDERICIA: 435 MS
R AXIS: 68 DEGREES
T AXIS: -3 DEGREES
T OFFSET: 396 MS
VENTRICULAR RATE: 117 BPM

## 2025-08-04 PROCEDURE — 93005 ELECTROCARDIOGRAM TRACING: CPT | Performed by: STUDENT IN AN ORGANIZED HEALTH CARE EDUCATION/TRAINING PROGRAM

## 2025-08-04 PROCEDURE — 1159F MED LIST DOCD IN RCRD: CPT | Performed by: STUDENT IN AN ORGANIZED HEALTH CARE EDUCATION/TRAINING PROGRAM

## 2025-08-04 PROCEDURE — 99204 OFFICE O/P NEW MOD 45 MIN: CPT | Performed by: STUDENT IN AN ORGANIZED HEALTH CARE EDUCATION/TRAINING PROGRAM

## 2025-08-04 PROCEDURE — 99212 OFFICE O/P EST SF 10 MIN: CPT

## 2025-08-04 RX ORDER — AMIODARONE HYDROCHLORIDE 200 MG/1
200 TABLET ORAL DAILY
Qty: 30 TABLET | Refills: 11 | Status: SHIPPED | OUTPATIENT
Start: 2025-08-04 | End: 2026-08-04

## 2025-08-04 NOTE — PROGRESS NOTES
[facility] Heart and Vascular Electrophysiology    Patient Name: Farooq Lang  Patient : 1960    Referred for  Afib/Flutter    History of Present Illness:  Farooq Lang is a 64 y.o. year old male patient with:    Seizure  Afib/Flutter on Eliquis and nebivolol  mitral valve replacement with 27mm mitris bioprosthetic valve and left atrial appendage ligation with Dr. Nugent on 2024.  HFmrEF  Tobacco abuse  COPD   ELIE    Patient with history of mitral valve replacement and left atrial appendage closure back in 2024 with Dr. Nugent.  Patient has been having A-fib and atrial flutter since.  He feels short of breath with moderate exertion.  He was on amiodarone in the past and that was stopped back in 2024.  Apparently the patient did not tolerate metoprolol in the past.  He is a currently on Nebivolol and Eliquis.  His EKG today shows atrial flutter with variable AV conduction.      Past Medical History:  He has a past medical history of A-fib (Multi), Acute bronchitis (2024), Acute right lower quadrant pain (2024), Atherosclerosis of abdominal aorta (2016), Benign paroxysmal positional vertigo (2024), Chronic diarrhea (2024), Chronic diarrhea (2024), COPD with asthma (Multi) (2016), Costochondritis (2024), Cough (2024), Disease due to severe acute respiratory syndrome coronavirus 2 (SARS-CoV-2) (2022), Diverticular disease (2024), Diverticulosis, Diverticulosis (08/15/2023), Dizziness, Dyspnea (2024), Fecal smearing (2024), Fecal soiling (2024), Headache (2023), Heart failure with mid-range ejection fraction (HFmEF), Hypotension (2024), Incomplete passage of stool (2024), Insomnia (2024), Irregular heart beat, Irritable bowel syndrome, Irritable bowel syndrome with diarrhea (2024), Low back pain (2024), Mitral valve stenosis, Nausea & vomiting (2024),  Nonspecific syndrome suggestive of viral illness (06/21/2024), Other chest pain (03/10/2016), Other emphysema (Multi) (02/06/2024), Pleurodynia (11/29/2017), Pneumonia (06/21/2024), Prediabetes (03/29/2016), Right lower quadrant pain (11/14/2018), Shortness of breath, Syncope (06/21/2024), Tension type headache (06/21/2024), Thrombocytopenia, Tobacco dependence (08/15/2023), and Tubular adenoma of colon (06/21/2024).    Past Surgical History:  He has a past surgical history that includes Tonsillectomy; Cardiac catheterization (N/A, 04/22/2024); Colonoscopy; Dental surgery; Cardiac electrophysiology procedure (N/A, 06/17/2024); and Mitral valve repair (06/04/2024).      Social History:  He reports that he has been smoking cigarettes. He started smoking about 50 years ago. He has a 25.3 pack-year smoking history. He has been exposed to tobacco smoke. He has never used smokeless tobacco. He reports that he does not drink alcohol and does not use drugs.    Family History:  Family History[1]     Allergies:  Metoprolol    Outpatient Medications:  Current Outpatient Medications   Medication Instructions    apixaban (ELIQUIS) 5 mg, oral, 2 times daily    cyclobenzaprine (FLEXERIL) 5 mg, oral, 3 times daily PRN    Eliquis 5 mg, oral, 2 times daily    levETIRAcetam (KEPPRA) 750 mg, oral, 2 times daily    multivitamin with minerals tablet 1 tablet, oral, Daily    nasal spray midazolam (Nayzilam) 5 mg/spray (0.1 mL) spray,non-aerosol Use 1 spray for seizure lasting >3 minutes. If seizure continues additional 5 mins give 2nd spray. If seizure continues another 5 minutes call 911. OR use 1 spray for 2 more seizures in 24 hrs. No more than 2 sprays in 24 hrs    nebivolol (BYSTOLIC) 5 mg, oral, Nightly        ROS:  A 14 point review of systems was done and is negative other than as stated in HPI    Physical Exam  Constitutional:       Appearance: Normal appearance.     Cardiovascular:      Rate and Rhythm: Normal rate. Rhythm  irregular.      Heart sounds: No murmur heard.     No friction rub. No gallop.   Pulmonary:      Effort: Pulmonary effort is normal.      Breath sounds: Normal breath sounds.   Abdominal:      Palpations: Abdomen is soft.     Musculoskeletal:      Cervical back: Neck supple.     Neurological:      Mental Status: He is alert.     Psychiatric:         Mood and Affect: Mood normal.         Behavior: Behavior normal.         Vitals:  There were no vitals taken for this visit.       Labs:   CBC  Lab Results   Component Value Date    WBC 14.6 (H) 05/03/2025    HGB 15.5 05/03/2025    HCT 46.2 05/03/2025    MCV 88 05/03/2025     (L) 05/03/2025        Renal Function Panel  Lab Results   Component Value Date    GLUCOSE 122 (H) 05/03/2025     (L) 05/03/2025    K 4.5 05/03/2025     05/03/2025    CO2 23 05/03/2025    ANIONGAP 12 05/03/2025    BUN 27 (H) 05/03/2025    CREATININE 1.08 05/03/2025    GFRMALE 76 06/19/2023    CALCIUM 9.1 05/03/2025        CMP  Lab Results   Component Value Date    CALCIUM 9.1 05/03/2025    PHOS 3.8 05/03/2025    PROT 7.5 05/01/2025    ALBUMIN 3.8 05/03/2025    AST 20 05/01/2025    ALT 21 05/01/2025    ALKPHOS 108 05/01/2025    BILITOT 0.6 05/01/2025       TSH  Lab Results   Component Value Date    TSH 1.32 07/16/2024          Cardiac Testing:  ECG  08/04/2025  atrial flutter with variable AV conduction.    Echocardiogram  05/02/2025  PHYSICIAN INTERPRETATION:  Left Ventricle: The left ventricular systolic function is mildly decreased, with a visually estimated ejection fraction of 40-45%. There is global hypokinesis of the left ventricle with minor regional variations. The left ventricular cavity size is normal. There is normal septal and mildly increased posterior left ventricular wall thickness. There is left ventricular concentric remodeling. Left ventricular diastolic filling is indeterminate.  Left Atrium: The left atrial size is moderately dilated.  Right Ventricle: The right  ventricle is normal in size. There is low normal right ventricular systolic function.  Right Atrium: The right atrium is mildly dilated.  Aortic Valve: The aortic valve is trileaflet. There is mild aortic valve cusp calcification. There is evidence of mild aortic valve stenosis. The aortic valve dimensionless index is 0.67. There is trace aortic valve regurgitation. The peak instantaneous gradient of the aortic valve is 3 mmHg. The mean gradient of the aortic valve is 2 mmHg.  Mitral Valve: There is a prosthetic mitral valve present. The peak instantaneous gradient of the mitral valve is 6 mmHg. There is trace to mild mitral valve regurgitation. Normal functioninal bioprosthetic MV.  Tricuspid Valve: The tricuspid valve is structurally normal. There is mild tricuspid regurgitation.  Pulmonic Valve: The pulmonic valve is not well visualized. The pulmonic valve regurgitation was not well visualized.  Pericardium: There is no pericardial effusion noted.  Aorta: The aortic root is normal.  Pulmonary Artery: The tricuspid regurgitant velocity is 3.08 m/s, and with an estimated right atrial pressure of 8 mmHg, the estimated pulmonary artery pressure is mild to moderately elevated with the RVSP at 45.9 mmHg.        CONCLUSIONS:   1. The left ventricular systolic function is mildly decreased, with a visually estimated ejection fraction of 40-45%.   2. There is global hypokinesis of the left ventricle with minor regional variations.   3. Left ventricular diastolic filling is indeterminate.   4. There is low normal right ventricular systolic function.   5. The left atrial size is moderately dilated.   6. Mild aortic valve stenosis.   7. Mild to moderately elevated pulmonary artery pressure.      Assessment:     Patient with history of mitral valve replacement and left atrial appendage closure back in June 2024 with Dr. Nugent.  Patient has been having A-fib and atrial flutter since.  He feels short of breath with moderate  exertion.  He was on amiodarone in the past and that was stopped back in September 2024.  Apparently the patient did not tolerate metoprolol in the past.  He is a currently on Nebivolol and Eliquis.  His EKG today shows atrial flutter with variable AV conduction.    Plan:  I had a long conversation with the patient about atrial fibrillation, atrial flutter and treatment options, including rate control and rhythm control with medications and catheter ablation. I explained the risks, benefits and procedure details of catheter ablation of atrial fibrillation and atrial flutter. The risks include, but are not limited to bleeding, infection, and pain from the catheter insertion, damage to the blood vessels from the catheter, puncture to the heart, blood clots, which might lead to a stroke, narrowing of the pulmonary veins, radiation exposure and death. The patient would like to proceed with catheter ablation for atrial fibrillation and atrial flutter. Will schedule.  In the meantime, we will plan to start amiodarone and proceed with DC cardioversion.         [1]   Family History  Problem Relation Name Age of Onset    Cancer Father  74    Brain Aneurysm Father

## 2025-08-28 ENCOUNTER — ANESTHESIA (OUTPATIENT)
Dept: CARDIOLOGY | Facility: HOSPITAL | Age: 65
End: 2025-08-28
Payer: COMMERCIAL

## 2025-08-28 ENCOUNTER — ANESTHESIA EVENT (OUTPATIENT)
Dept: CARDIOLOGY | Facility: HOSPITAL | Age: 65
End: 2025-08-28
Payer: COMMERCIAL

## 2025-08-28 ENCOUNTER — HOSPITAL ENCOUNTER (OUTPATIENT)
Dept: CARDIOLOGY | Facility: HOSPITAL | Age: 65
Setting detail: OUTPATIENT SURGERY
Discharge: HOME | End: 2025-08-28
Payer: COMMERCIAL

## 2025-08-28 DIAGNOSIS — I48.91 NEW ONSET ATRIAL FIBRILLATION (MULTI): ICD-10-CM

## 2025-08-28 RX ORDER — ASPIRIN 81 MG/1
81 TABLET ORAL DAILY
COMMUNITY

## 2025-08-28 SDOH — HEALTH STABILITY: MENTAL HEALTH: CURRENT SMOKER: 0

## (undated) DEVICE — GUIDEWIRE, RUN THROUGH WIRE, 180CM

## (undated) DEVICE — TUBING, SUCTION, CONNECTING, NON-CONDUCTIVE, SURE GRIP CONNECTORS, 3/16 X 18 IN, PVC

## (undated) DEVICE — PERFUSION SERVICES

## (undated) DEVICE — SINGLES, QLU, COR-KNOT

## (undated) DEVICE — CATHETER, THORACIC, STRAIGHT, ADULT, 28 FR, PVC

## (undated) DEVICE — OXYGENATOR FX 25, W/HR, ARTERIAL FILTER

## (undated) DEVICE — GUIDEWIRE, INQWIRE, 3MM J, .035 X 210CM, FIXED

## (undated) DEVICE — COVER, CART, 45 X 27 X 48 IN, CLEAR

## (undated) DEVICE — CANNULA, RCSP, SELF-INFLATE, SHAPEABLE STYLET, 18 MM BALLOON, 14 FR X 27 CM

## (undated) DEVICE — MAYO TRAY, SMALL

## (undated) DEVICE — MANIFOLD, 4 PORT NEPTUNE STANDARD

## (undated) DEVICE — CANNULA, CARDIAC SUMP

## (undated) DEVICE — CANNULA, VENOUS 22 FR SNGL STAGE RT ANGLE

## (undated) DEVICE — ACCESS KIT, MINI MAK, 4 FR X 10CM, 0.018 X 40CM, NITINOL/PLATINUM, STD NEEDLE

## (undated) DEVICE — TUBING, SUCTION, CONNECTING, STERILE 0.25 X 120 IN., LF

## (undated) DEVICE — ELECTRODE, ELECTROSURGICAL, BLADE, INSULATED, ENT/IMA, STERILE

## (undated) DEVICE — SPONGE, GAUZE, XRAY DECT, 16 PLY, 4 X 4, W/MASTER DMT,STERILE

## (undated) DEVICE — DRESSING, MEPILEX, BORDER, SACRUM, 8.7 X 9.8 IN

## (undated) DEVICE — TRAY, SURESTEP, URINE METER, 14FR, SILICONE

## (undated) DEVICE — DRESSING, MEPILEX, BORDER, HEEL, 8.7 X 9.1 IN

## (undated) DEVICE — KIT, COR-KNOT MINI COMBO

## (undated) DEVICE — TR BAND, RADIAL COMPRESSION, STANDARD, 24CM

## (undated) DEVICE — SHUNT, SENSOR

## (undated) DEVICE — CONNECTOR, Y, 0.375 X 0.375 X 0.5 IN

## (undated) DEVICE — SUTURE, SURGICAL STEEL, STERNUM 7, 18 IN, KV40, SINGL-WIRE

## (undated) DEVICE — Device

## (undated) DEVICE — SENSOR, OXYGEN, CEREBRAL, SOMASENSOR, ADULT

## (undated) DEVICE — GOWN, SURGICAL, SMARTGOWN, XLARGE, STERILE

## (undated) DEVICE — SYRINGE, 60 CC, IRRIGATION, BULB, CONTRO-BULB, PAPER POUCH

## (undated) DEVICE — APPLICATOR, CHLORAPREP, W/ORANGE TINT, 26ML

## (undated) DEVICE — CATHETER, URETHRAL, ROBNEL, 16 FR, 16 IN, LF, RED

## (undated) DEVICE — TUBING, 0.375 X 3/32 IN X 6 FT

## (undated) DEVICE — TOWEL, SURGICAL, NEURO, O/R, 16 X 26, BLUE, STERILE

## (undated) DEVICE — SUTURE, SILK, 5-0, 18 IN TF, BLACK

## (undated) DEVICE — MARKER, SKIN, DUAL TIP INK W/9 LABEL AND REMOVABLE TIME OUT SLEEVE

## (undated) DEVICE — KIT, CELL SAVER, W/COLLECTION SET, 225ML WASH SET

## (undated) DEVICE — KIT, TOURNIQUET, 7"

## (undated) DEVICE — LOOP, VESSEL, MAXI, BLUE

## (undated) DEVICE — TUBING PACK, OXYGENATOR, ADULT

## (undated) DEVICE — DRAPE, SHEET, CARDIOVASCULAR, ANTIMICROBIAL, W/ANESTHESIA SCREEN, IOBAN 2, STERI DRAPE, 107 X 133 IN, DISPOSABLE, FABRIC, BLUE, STERILE

## (undated) DEVICE — PLEDGET, PTFE, PREPUNCH, 3 X 7 MM

## (undated) DEVICE — PERFUSION PACK, HEMOCONCENTRATOR, MINNTECH, W/TUBING

## (undated) DEVICE — PAD, ELECTRODE DEFIB PADPRO ADULT STRL W/ADAPTER

## (undated) DEVICE — SUTURE, PROLENE 4-0, TAPER POINT, SH-1 BLUE 30 INCH

## (undated) DEVICE — COUNTER, NEEDLE, FOAM BLOCK, POP-N-COUNT, W/BLADEGUARD, W/ADHESIVE 40 COUNT, RED

## (undated) DEVICE — ELECTRODE, ELECTROSURGICAL, BLADE EXT 4 INCH, INSULATED

## (undated) DEVICE — CONNECTOR, STRAIGHT, 0.375 X 0.375 IN

## (undated) DEVICE — BONE WAX, 2.5G ABSORBABLE, OSTENE

## (undated) DEVICE — SHEATH, GLIDESHEATH, SLENDER, 6FR 10CM

## (undated) DEVICE — PACING WIRE, 1/2 CIRCLE, 26MM NEEDLE, WHITE

## (undated) DEVICE — PITCHER, GRADUATE, 32 OZ (1200CC), STERILE

## (undated) DEVICE — CONNECTOR, STRAIGHT, 0.5 X 0.375 IN

## (undated) DEVICE — CLIPPER, SURGICAL BLADE ASSEMBLY, GENERAL PURPOSE, SINGLE USE

## (undated) DEVICE — SUTURE, PROLENE, 5-0, 36 IN, RB1, DA, BLUE

## (undated) DEVICE — NITINOL KIT, GLIDESHEATH, 5FR

## (undated) DEVICE — DRAPE, SHEET, FAN FOLDED, HALF, 44 X 58 IN, DISPOSABLE, LF, STERILE

## (undated) DEVICE — PACING CABLE, EXTENSION, 12 FT BEIGE, DISPOSABLE

## (undated) DEVICE — PACING CABLE, EXTENSION, 12 FT BLUE, DISPOSABLE

## (undated) DEVICE — VESSEL LOOP, BLUE SUPERMAXI, 1.3X559MM

## (undated) DEVICE — DRESSING, ADHESIVE, ISLAND, TELFA, 4 X 14 IN

## (undated) DEVICE — DRAPE, INSTRUMENT, W/POUCH, STERI DRAPE, 7 X 11 IN, DISPOSABLE, STERILE

## (undated) DEVICE — CAUTERY, PENCIL, PUSH BUTTON, SMOKE EVAC, 70MM

## (undated) DEVICE — TOWEL, OR, XRAY DETECT 5 PK, WHITE, 17X26, W/DMT TAG, ST

## (undated) DEVICE — SPONGE, LAP, XRAY DECT, 18IN X 18IN, W/MASTER DMT, STERILE

## (undated) DEVICE — TAPE, POLYESTER, BRAIDED, PRE-CUT 2 X 30, WHITE"

## (undated) DEVICE — CATHETER, WEDGE PRESSURE, BALLOON, DOUBLE LUMEN, 5 FR, 110 CM

## (undated) DEVICE — CANNULA, EZ GLIDE 21FR

## (undated) DEVICE — MONITORING KIT, TRANSDUCER, RETROGRADE, MPS, W/EXTENSION, LF

## (undated) DEVICE — CASSETTE, BLOOD, PLEGIC SET

## (undated) DEVICE — MICROCOAGULATION TEST, ACT+ TEST CUVETTE

## (undated) DEVICE — PLEDGET, PTFE, SOFT, LARGE, 3/8 X 3/16 X 1/16 IN

## (undated) DEVICE — SUTURE, PROLENE, 6-0, 30 IN, C-1, CV-11, BLUE

## (undated) DEVICE — LOADS, COR-KNOT (6PK)

## (undated) DEVICE — CATHETER, ANGIO, IMPULSE, FL3.5, 5 FR X 100 CM

## (undated) DEVICE — YANKAUER, RIGID, STRAIGHT, OPEN TIP, NO VENT

## (undated) DEVICE — SUTURE, PROLENE, 4-0, 36 IN, RB1, DA, BLUE

## (undated) DEVICE — SYSTEM KIT, INCISION, PREVENA PEEL AND PLACE, 20CM

## (undated) DEVICE — CATHETER, SUCTION, SAFE-T-VAC VALVE, COIL PACKED, 14 FR

## (undated) DEVICE — CATHETER, ANGIO, IMPULSE, FR4, 5 FR X 100 CM

## (undated) DEVICE — CLEANER, ELECTROSURGICAL, TIP, 5 X 5 CM, LF

## (undated) DEVICE — COLLECTION UNIT, DRAINAGE, THORACIC, SINGLE TUBE, DRY SUCTION, ATS COMPATIBLE, OASIS 3600, LF

## (undated) DEVICE — FILTER, IV, BLOOD, MICROAGGREGATE, 40 MIC, RBC TRANSFUSION

## (undated) DEVICE — CATHETER, DRAINAGE, NASOGASTRIC, DOUBLE LUMEN, FUNNEL END, SUMP, SALEM, 18 FR, 48 IN, PVC, STERILE

## (undated) DEVICE — SUTURE, PROLENE, 3-0, 36 IN, SH, DA, BLUE

## (undated) DEVICE — DRESSING, ISLAND, TELFA, 4 X 5 IN

## (undated) DEVICE — DRESSING, ADHESIVE, ISLAND, TELFA, 2 X 3.75 IN, LF